# Patient Record
Sex: MALE | Race: WHITE | Employment: OTHER | URBAN - METROPOLITAN AREA
[De-identification: names, ages, dates, MRNs, and addresses within clinical notes are randomized per-mention and may not be internally consistent; named-entity substitution may affect disease eponyms.]

---

## 2017-03-17 ENCOUNTER — HOSPITAL ENCOUNTER (OUTPATIENT)
Dept: RADIATION ONCOLOGY | Age: 72
Discharge: HOME | End: 2017-03-17
Payer: MEDICARE

## 2017-03-17 DIAGNOSIS — C61 PROSTATE CANCER (HCC): ICD-10-CM

## 2017-03-17 DIAGNOSIS — C61 PROSTATE CANCER (HCC): Primary | ICD-10-CM

## 2017-03-17 LAB — PSA SERPL-MCNC: 0.4 NG/ML

## 2017-03-17 PROCEDURE — 84403 ASSAY OF TOTAL TESTOSTERONE: CPT | Performed by: RADIOLOGY

## 2017-03-17 PROCEDURE — 36415 COLL VENOUS BLD VENIPUNCTURE: CPT | Performed by: RADIOLOGY

## 2017-03-17 PROCEDURE — 84153 ASSAY OF PSA TOTAL: CPT | Performed by: RADIOLOGY

## 2017-03-18 LAB
COMMENT, TESC2: ABNORMAL
TESTOST SERPL-MCNC: 244 NG/DL (ref 348–1197)

## 2017-03-21 ENCOUNTER — APPOINTMENT (OUTPATIENT)
Dept: RADIATION ONCOLOGY | Age: 72
End: 2017-03-21
Payer: MEDICARE

## 2017-03-28 ENCOUNTER — HOSPITAL ENCOUNTER (OUTPATIENT)
Dept: RADIATION ONCOLOGY | Age: 72
Discharge: HOME | End: 2017-03-28
Payer: MEDICARE

## 2017-03-28 VITALS
RESPIRATION RATE: 18 BRPM | TEMPERATURE: 98.6 F | SYSTOLIC BLOOD PRESSURE: 116 MMHG | OXYGEN SATURATION: 95 % | HEART RATE: 78 BPM | WEIGHT: 276.7 LBS | BODY MASS INDEX: 38.59 KG/M2 | DIASTOLIC BLOOD PRESSURE: 68 MMHG

## 2017-03-28 PROCEDURE — 99211 OFF/OP EST MAY X REQ PHY/QHP: CPT

## 2017-03-28 NOTE — PROGRESS NOTES
Pt here today for FUP post RT to the prostate and has an AUA urinary symptom  score of 11 with mild c/o frequency, hesitancy, and nocturia. Pt's PSA has increased from 0.3 12/2016 to 0.4 now, Testosterone 244. Pt will have a Fluciclovine PET scan ordered at [facility] to assess if the prostate cancer has spread. Pt is scheduled to have the Fluciclovine PET ib 4/11/17.

## 2017-03-28 NOTE — PROGRESS NOTES
Patient: Thang Milian MRN: [id number]  SSN: [id number]    YOB: 1945  Age: 66 y.o. Sex: male      Other Providers:  Rudie Holter, D.O.    CHIEF COMPLAINT: Prostate cancer    DIAGNOSIS: Adenocarcinoma of the prostate, [place] score 7, negative margins s/p RRP in 2003, now with biochemical recurrence    SITE TREATED AND DOSE DELIVERED:   Mr. Vandana Qiu received salvage radiation therapy to the prostate bed to a dose of 6840 cGy in 38 fractions of 180 cGy each using a RapidArc IMRT/IGRT plan from  6/21/16 - 8/12/16. HISTORY OF PRESENT ILLNESS:  Thang Milian is a 66 y.o. male who saw at the request of Dr. Brittanie Pringle regarding the role of salvage radiation therapy for this biochemically recurrent prostate cancer. In 05/2003 he underwent an RRP under the direction of Dr. Benigno Guo. Final pathology was Piotr score 7 with negative margins.  Over the past few years  The PSA has been rising slowly.  In 03/13 PSA was 0.05, in 02/14 it was 0.06 and on 03/06/15 had risen to 0.13.  He was sent to Dr. Brittanie Pringle for further management.  The patient also had a history of hypotestosteronism and had been on IM testosterone injections under the direction of Dr. Ann Marie Alicea When the PSA began to rise, injections were stopped.  His PSA on 03/06/15 was 0.13. PSA on 04/22/16 ck to 0.246. At that time, Dr. Brittanie Pringle recommended MRI of the pelvis and radiation oncology consultation. MRI of the pelvis on 05/09/16 showed no evidence for local recurrence or evolving metastatic disease in the pelvis. INTERVAL HISTORY: Mr. Vandana Qiu returns today, 7 months following completion of his radiation therapy. His AUA symptom score is 11/35 and is \"mostly satisfied\" with his urinary function. He has nocturia x3 which is unchanged since prior to receiving his radiation. He continues with urinary urgency along with frequency. He reports his flow is \"weak at times\" without urinary leakage. Denies pain.  He continues with chronic constipation with a history of bloody stools at times. He reports having a negative colonoscopy last year. He has had complete erectile dysfunction since the time of the surgery in 2003 and doesn't find this to be a problem. He continues with chronic lower extremity leg pain in which he is followed by his orthopedist. His energy is low most times, but manageable. MEDICATIONS:     Current Outpatient Prescriptions:     vitamin e (E GEMS) 1,000 unit capsule, Take 1,000 Units by mouth two (2) times a day., Disp: , Rfl:     meloxicam (MOBIC) 7.5 mg tablet, Take 7.5 mg by mouth daily. , Disp: , Rfl:     cinnamon bark 500 mg cap, Take 500 mg by mouth daily. , Disp: , Rfl:     b complex vitamins tablet, Take 1 Tab by mouth daily. , Disp: , Rfl:     fenofibrate (TRICOR) 160 mg tablet, Take 160 mg by mouth daily. , Disp: , Rfl:     fluticasone (FLONASE) 50 mcg/actuation nasal spray, 2 Sprays by Both Nostrils route daily. , Disp: , Rfl:     losartan-hydrochlorothiazide (HYZAAR) 50-12.5 mg per tablet, Take 1 Tab by mouth daily. , Disp: , Rfl:     metFORMIN ER (GLUCOPHAGE XR) 500 mg tablet, Take 500 mg by mouth two (2) times a day., Disp: , Rfl:     pantoprazole (PROTONIX) 40 mg tablet, Take 40 mg by mouth daily. , Disp: , Rfl:     rOPINIRole (REQUIP) 1 mg tablet, Take 3 mg by mouth daily. One tab AM, two tabs PM, Disp: , Rfl:     simvastatin (ZOCOR) 80 mg tablet, Take 80 mg by mouth daily. , Disp: , Rfl:     multivitamin (DAILY MULTIPLE) tablet, Take 1 Tab by mouth daily. , Disp: , Rfl:     Aspirin, Buffered 81 mg tab, Take 81 mg by mouth daily. , Disp: , Rfl:     ascorbic acid (VITAMIN C) 500 mg tablet, Take 500 mg by mouth daily. , Disp: , Rfl:     ALLERGIES:   Allergies   Allergen Reactions    Amoxicillin Rash    Sulfa (Sulfonamide Antibiotics) Rash       ECOG Performance status 1  VITAL SIGNS: Weight 265 pounds, blood pressure 124/75, heart rate 64, temp 98.6    GENERAL: The patient is well-developed, ambulatory, alert and in no acute distress. ENT: No oral lesion notes. HEART: NSR. LUNGS: Clear to auscultation. ABDOMEN: Soft and nontender. EXTREMITIES: ankle brace of LLE Remaining exam deferred. LABORATORY:   03/17/17: PSA 0.4 with testosterone of 244  12/08/16: PSA 0.3 with testosterone of 410  09/07/16: PSA 0.282 with Testosterone 351  04/22/16: PSA 0.246       RADIOLOGY:  5/10/2016   MRI PELVIS WITH CONTRAST, 5/10/2016  History: History of prostate cancer status post prostatectomy with rising PSA. Technique: Axial, sagittal, coronal T2; axial T1; axial fat-saturated T1 images were followed by followed by 10 cc intravenous gadolinium, following which fat-saturated axial images through the rectum are repeated. Images were performed on a 3 Elizabeth system. Findings:    The patient is status post prostatectomy. No abnormal enhancement is seen in the prostate bed to suggest a local recurrence. Enhancing structures are seen about this area felt to represent benign vascular structures. No pelvic lymphadenopathy is seen. No retroperitoneal adenopathy is seen on large field-of-view images obtained to the kidneys. No focal osseous lesion is identified. No hydronephrosis is seen of the kidneys. The urinary bladder is poorly distended limiting assessment although shows no gross abnormality. Mild diverticulosis is seen of the sigmoid colon. A small fat-containing left inguinal hernia is seen. Impression:  No evidence for local recurrence or evolving metastatic disease in the pelvis or visualized abdomen. IMPRESSION:  Flaca Bird is a 66 y.o. male adenocarcinoma of the prostate, [place] score 7, negative margins s/p RRP in 2003, with biochemical recurrence - now 7 months after completion of radiation. His urinary function is basically at his baseline. He is 7 months out from radiation therapy and his PSA continues to rise, today at 0.4 with previous PSA of 0.246 prior to radiation. He has a testosterone level of 244.  With the continued rise in his PSA, we will go ahead and schedule a Fluciclovine PET for further evaluation. PLAN:   1) labs reviewed with patient  2) rising PSA - schedule Fluciclovine PET. I will see him along with Dr. Angelica Gutierrez shortly after PET  3) We had a 25 minute visit, over 50% of which was in direct counseling regarding rise in his PSA and Fluciclovine PET and ongoing management of his biochemical recurrent prostate cancer. All questions were answered to the best of my ability. Sam Henderson NP   March 28, 2017      Portions of this note were copied from prior encounters and reviewed for accuracy, currency, and represent documentation and tasks completed during this encounter. I verify and attest these portions to be unchanged from prior visits.

## 2017-04-18 ENCOUNTER — HOSPITAL ENCOUNTER (OUTPATIENT)
Dept: RADIATION ONCOLOGY | Age: 72
Discharge: HOME | End: 2017-04-18
Payer: MEDICARE

## 2017-04-18 VITALS
SYSTOLIC BLOOD PRESSURE: 121 MMHG | WEIGHT: 269.8 LBS | TEMPERATURE: 98.1 F | OXYGEN SATURATION: 94 % | BODY MASS INDEX: 37.63 KG/M2 | DIASTOLIC BLOOD PRESSURE: 61 MMHG | HEART RATE: 78 BPM

## 2017-04-18 DIAGNOSIS — C61 PROSTATE CANCER (HCC): Primary | ICD-10-CM

## 2017-04-18 PROCEDURE — 99211 OFF/OP EST MAY X REQ PHY/QHP: CPT

## 2017-04-18 RX ORDER — TRAMADOL HYDROCHLORIDE 50 MG/1
50 TABLET ORAL
COMMUNITY
End: 2017-06-20 | Stop reason: ALTCHOICE

## 2017-04-18 NOTE — PROGRESS NOTES
Patient: Trupti Potter MRN: [id number]  SSN: [id number]    YOB: 1945  Age: 66 y.o. Sex: male      Other Providers:  Mahamed Vogel D.O.    CHIEF COMPLAINT: Prostate cancer    DIAGNOSIS: Adenocarcinoma of the prostate, Piotr score 7, negative margins s/p RRP in 2003, now with biochemical recurrence    SITE TREATED AND DOSE DELIVERED:   Mr. Sang Rodriguez received salvage radiation therapy to the prostate bed to a dose of 6840 cGy in 38 fractions of 180 cGy each using a RapidArc IMRT/IGRT plan from  6/21/16 - 8/12/16. HISTORY OF PRESENT ILLNESS:  Trupti Potter is a 66 y.o. male who saw at the request of Dr. Rickie Wogn regarding the role of salvage radiation therapy for this biochemically recurrent prostate cancer. In 05/2003 he underwent an RRP under the direction of Dr. Donny Villagran. Final pathology was Piotr score 7 with negative margins.  Over the past few years  The PSA has been rising slowly.  In 03/13 PSA was 0.05, in 02/14 it was 0.06 and on 03/06/15 had risen to 0.13.  He was sent to Dr. Rickie Wong for further management.  The patient also had a history of hypotestosteronism and had been on IM testosterone injections under the direction of Dr. Graeme Cloud When the PSA began to rise, injections were stopped.  His PSA on 03/06/15 was 0.13. PSA on 04/22/16 ck to 0.246. At that time, Dr. Rickie Wong recommended MRI of the pelvis and radiation oncology consultation. MRI of the pelvis on 05/09/16 showed no evidence for local recurrence or evolving metastatic disease in the pelvis. INTERVAL HISTORY: Mr. Sang Rodriguez returns today to discuss result of [address] PET. He is now 8 months after completion of radiation. His PSA in March was 0.4, up from 0.3 in 12/16. His PSA prior to radiation therapy was  0.246. Fluciclovine PET on 04/11/17 was not evident for disease. He recently had a colonoscopy and EGD and was found to have radiation proctitis and external hemorrhoids.  He reports that he continues with significant constipation that causes him to strain then bleeding with stools. He doesn't take the stool softeners on a regular basis, but does find softeners helpful when taking. MEDICATIONS:     Current Outpatient Prescriptions:     traMADol (ULTRAM) 50 mg tablet, Take 50 mg by mouth every six (6) hours as needed for Pain., Disp: , Rfl:     cinnamon bark 500 mg cap, Take 500 mg by mouth daily. , Disp: , Rfl:     b complex vitamins tablet, Take 1 Tab by mouth daily. , Disp: , Rfl:     fenofibrate (TRICOR) 160 mg tablet, Take 160 mg by mouth daily. , Disp: , Rfl:     fluticasone (FLONASE) 50 mcg/actuation nasal spray, 2 Sprays by Both Nostrils route as needed. , Disp: , Rfl:     losartan-hydrochlorothiazide (HYZAAR) 50-12.5 mg per tablet, Take 1 Tab by mouth daily. , Disp: , Rfl:     metFORMIN ER (GLUCOPHAGE XR) 500 mg tablet, Take 500 mg by mouth two (2) times a day., Disp: , Rfl:     pantoprazole (PROTONIX) 40 mg tablet, Take 40 mg by mouth daily. , Disp: , Rfl:     rOPINIRole (REQUIP) 1 mg tablet, Take 3 mg by mouth daily. One tab AM, two tabs PM, Disp: , Rfl:     simvastatin (ZOCOR) 80 mg tablet, Take 80 mg by mouth daily. , Disp: , Rfl:     multivitamin (DAILY MULTIPLE) tablet, Take 1 Tab by mouth daily. , Disp: , Rfl:     Aspirin, Buffered 81 mg tab, Take 81 mg by mouth daily. , Disp: , Rfl:     ascorbic acid (VITAMIN C) 500 mg tablet, Take 500 mg by mouth daily. , Disp: , Rfl:     ALLERGIES:   Allergies   Allergen Reactions    Amoxicillin Rash    Sulfa (Sulfonamide Antibiotics) Rash       ECOG Performance status 1  VITAL SIGNS: Weight 269 pounds, blood pressure 121/61, heart rate 78, temp 98.1    GENERAL: The patient is well-developed, ambulatory, alert and in no acute distress. ENT: No oral lesion notes. HEART: NSR. LUNGS: Clear to auscultation. ABDOMEN: Soft and nontender. EXTREMITIES: ankle brace of LLE Remaining exam deferred.     LABORATORY:   03/17/17: PSA 0.4 with testosterone of 244  12/08/16: PSA 0.3 with testosterone of 410  09/07/16: PSA 0.282 with Testosterone 351  04/22/16: PSA 0.246       RADIOLOGY:  5/10/2016   MRI PELVIS WITH CONTRAST, 5/10/2016  History: History of prostate cancer status post prostatectomy with rising PSA. Technique: Axial, sagittal, coronal T2; axial T1; axial fat-saturated T1 images were followed by followed by 10 cc intravenous gadolinium, following which fat-saturated axial images through the rectum are repeated. Images were performed on a 3 Elizabeth system. Findings:    The patient is status post prostatectomy. No abnormal enhancement is seen in the prostate bed to suggest a local recurrence. Enhancing structures are seen about this area felt to represent benign vascular structures. No pelvic lymphadenopathy is seen. No retroperitoneal adenopathy is seen on large field-of-view images obtained to the kidneys. No focal osseous lesion is identified. No hydronephrosis is seen of the kidneys. The urinary bladder is poorly distended limiting assessment although shows no gross abnormality. Mild diverticulosis is seen of the sigmoid colon. A small fat-containing left inguinal hernia is seen. Impression:  No evidence for local recurrence or evolving metastatic disease in the pelvis or visualized abdomen. IMPRESSION:  Christopher Latham is a 66 y.o. male adenocarcinoma of the prostate, [place] score 7, negative margins s/p RRP in 2003, with biochemical recurrence - now 8 months after completion of radiation. His urinary function is basically at his baseline. His PSA continues to rise, with a PSA in March of 0.4. His PSA prior to radiation therapy was  0.246. Fluciclovine PET on 04/11/17 was not evident for disease. At this time it is unclear if the rise in PSA is due to inflammation, or disease that is too small to be picked up on the [address] [place], [place] [address] St PET. We will continue to follow closely and if the PSA continues to rise, we will think about repeating the Fluciclovine PET in the future. He recently had a colonoscopy and EGD and was found to have radiation proctitis and external hemorrhoids. He reports that he continues with significant constipation that causes him to strain then bleeding with stools. He doesn't take the stool softeners on a regular basis, but does find softeners helpful when taking. PLAN:   1) Return 2 months for visit and repeat PSA/Testosterone  2) We had a 25 minute visit, over 75% of which was in direct counseling regarding rise in his PSA and Fluciclovine PET and ongoing management of his biochemical recurrent prostate cancer. All questions were answered to the best of my ability. 3) Constipation: Try taking stool softeners on a regular basis with the addition of Miralax. He is to follow up with his GI physician in regards of his colonoscopy, EGD and anemia. Jc Walls NP   April 18, 2017      Portions of this note were copied from prior encounters and reviewed for accuracy, currency, and represent documentation and tasks completed during this encounter. I verify and attest these portions to be unchanged from prior visits.

## 2017-04-18 NOTE — NURSE NAVIGATOR
Pt here for f/u for Prostate cancer. Fluciclovine pet scan 4-11-17. Colonoscopy/endoscopy on 4-12-17 with Dr. Isabel Quach states DrBeverly said colon looked like measles possibly from previous RT and that straining could cause bleeding. Pending tendon surgery to left foot 5-1-17.       Mike Arthur RN

## 2017-06-13 ENCOUNTER — APPOINTMENT (OUTPATIENT)
Dept: RADIATION ONCOLOGY | Age: 72
End: 2017-06-13
Payer: MEDICARE

## 2017-06-14 ENCOUNTER — HOSPITAL ENCOUNTER (OUTPATIENT)
Dept: RADIATION ONCOLOGY | Age: 72
Discharge: HOME | End: 2017-06-14
Payer: MEDICARE

## 2017-06-14 DIAGNOSIS — C61 PROSTATE CANCER (HCC): ICD-10-CM

## 2017-06-14 LAB — PSA SERPL-MCNC: 0.6 NG/ML

## 2017-06-14 PROCEDURE — 84403 ASSAY OF TOTAL TESTOSTERONE: CPT | Performed by: RADIOLOGY

## 2017-06-14 PROCEDURE — 84153 ASSAY OF PSA TOTAL: CPT | Performed by: RADIOLOGY

## 2017-06-14 PROCEDURE — 36415 COLL VENOUS BLD VENIPUNCTURE: CPT | Performed by: RADIOLOGY

## 2017-06-15 LAB
COMMENT, TESC2: ABNORMAL
TESTOST SERPL-MCNC: 298 NG/DL (ref 348–1197)

## 2017-06-20 ENCOUNTER — HOSPITAL ENCOUNTER (OUTPATIENT)
Dept: RADIATION ONCOLOGY | Age: 72
Discharge: HOME | End: 2017-06-20
Payer: MEDICARE

## 2017-06-20 VITALS
BODY MASS INDEX: 37.39 KG/M2 | HEART RATE: 85 BPM | OXYGEN SATURATION: 96 % | DIASTOLIC BLOOD PRESSURE: 65 MMHG | TEMPERATURE: 98.1 F | SYSTOLIC BLOOD PRESSURE: 130 MMHG | WEIGHT: 268.1 LBS

## 2017-06-20 DIAGNOSIS — C61 PROSTATE CANCER (HCC): Primary | ICD-10-CM

## 2017-06-20 PROCEDURE — 99211 OFF/OP EST MAY X REQ PHY/QHP: CPT

## 2017-06-20 RX ORDER — HYDROCODONE BITARTRATE AND ACETAMINOPHEN 5; 325 MG/1; MG/1
1 TABLET ORAL
COMMUNITY
End: 2017-08-14

## 2017-06-20 NOTE — PROGRESS NOTES
Pt here for f/u for Prostate cancer. Aua/Epic symptom  completed. Aua score 15. S/P RRP 2003. Biochemical recurrence. RT end 8-12-16. Labs 6-14-17. Taking stool softners 2 bid. Constipation is better.   Pt had left foot surgery on 5-1-17 and is wearing brace to right wrist.  Presented in w/c and weighed with assist.    Morena Schulz RN

## 2017-06-20 NOTE — PROGRESS NOTES
Patient: Erin Wood MRN: [id number]  SSN: [id number]    YOB: 1945  Age: 67 y.o. Sex: male      Other Providers:  Costa De Leon D.O.    CHIEF COMPLAINT: Prostate cancer    DIAGNOSIS: Adenocarcinoma of the prostate, Piotr score 7, negative margins s/p RRP in 2003, now with biochemical recurrence    SITE TREATED AND DOSE DELIVERED:   Mr. Stanford Snellen received salvage radiation therapy to the prostate bed to a dose of 6840 cGy in 38 fractions of 180 cGy each using a RapidArc IMRT/IGRT plan from  6/21/16 - 8/12/16. HISTORY OF PRESENT ILLNESS:  Erin Wood is a 66year old male seen by Dr. Preciado Hedge 05/13/16 at the request of Dr. Ranulfo Billy regarding the role of salvage radiation therapy for this biochemically recurrent prostate cancer. In 05/2003 he underwent an RRP under the direction of Dr. Aman Muñiz. Final pathology was [place] score 7 with negative margins.  Over the past few years  The PSA has been rising slowly.  In 03/13 PSA was 0.05, in 02/14 it was 0.06 and on 03/06/15 had risen to 0.13.  He was sent to Dr. Ranulfo Billy for further management.  The patient also had a history of hypotestosteronism and had been on IM testosterone injections under the direction of Dr. Dominguez Kinds When the PSA began to rise, injections were stopped.  His PSA on 03/06/15 was 0.13. PSA on 04/22/16 kc to 0.246. At that time, Dr. Ranulfo Billy recommended MRI of the pelvis and radiation oncology consultation. MRI of the pelvis on 05/09/16 showed no evidence for local recurrence or evolving metastatic disease in the pelvis. INTERVAL HISTORY: Mr. Stanford Snellen returns today for follow up. He is now 10 months after completion of radiation. He has an AUA score of 15/35. He reports his urinary issues are unchanged. He continues with urinary frequency and occasional urgency. He has incomplete emptying and intermittency. He has nocturia x2. He denies dysuria or hematuria. His PSA in March was 0.4, up from 0.3 in 12/16.  His PSA prior to radiation therapy was  0.246. Fluciclovine PET on 04/11/17 was not evident for disease. In March he had a colonoscopy and EGD and was found to have radiation proctitis and external hemorrhoids that have since resolved. Constipation controlled with stool softeners. MEDICATIONS:     Current Outpatient Prescriptions:     HYDROcodone-acetaminophen (NORCO) 5-325 mg per tablet, Take 1 Tab by mouth every six (6) hours as needed for Pain., Disp: , Rfl:     DOCUSATE CALCIUM (STOOL SOFTENER PO), Take 2 Tabs by mouth two (2) times a day., Disp: , Rfl:     cinnamon bark 500 mg cap, Take 500 mg by mouth daily. , Disp: , Rfl:     b complex vitamins tablet, Take 1 Tab by mouth daily. , Disp: , Rfl:     fenofibrate (TRICOR) 160 mg tablet, Take 160 mg by mouth daily. , Disp: , Rfl:     fluticasone (FLONASE) 50 mcg/actuation nasal spray, 2 Sprays by Both Nostrils route as needed. , Disp: , Rfl:     losartan-hydrochlorothiazide (HYZAAR) 50-12.5 mg per tablet, Take 1 Tab by mouth daily. , Disp: , Rfl:     metFORMIN ER (GLUCOPHAGE XR) 500 mg tablet, Take 500 mg by mouth two (2) times a day., Disp: , Rfl:     pantoprazole (PROTONIX) 40 mg tablet, Take 40 mg by mouth daily. , Disp: , Rfl:     rOPINIRole (REQUIP) 1 mg tablet, Take 3 mg by mouth daily. One tab AM, two tabs PM, Disp: , Rfl:     simvastatin (ZOCOR) 80 mg tablet, Take 80 mg by mouth daily. , Disp: , Rfl:     multivitamin (DAILY MULTIPLE) tablet, Take 1 Tab by mouth daily. , Disp: , Rfl:     Aspirin, Buffered 81 mg tab, Take 81 mg by mouth daily. , Disp: , Rfl:     ascorbic acid (VITAMIN C) 500 mg tablet, Take 500 mg by mouth daily. , Disp: , Rfl:     ALLERGIES:   Allergies   Allergen Reactions    Amoxicillin Rash    Sulfa (Sulfonamide Antibiotics) Rash       ECOG Performance status 1  VITAL SIGNS: Weight 268.1 pounds, blood pressure 130/65, heart rate 85, temp 98.1    GENERAL: The patient is well-developed, ambulatory, alert and in no acute distress.  ENT: No oral lesion notes. HEART: NSR. LUNGS: Clear to auscultation. EXTREMITIES: ankle brace of LLE Remaining exam deferred. LABORATORY:   06/14/17: PSA 0.6 with testosterone of 298  03/17/17: PSA 0.4 with testosterone of 244  12/08/16: PSA 0.3 with testosterone of 410  09/07/16: PSA 0.282 with Testosterone 351  04/22/16: PSA 0.246       RADIOLOGY:  Accident PET/CT Nuclear Medicine Skull Base to Mid-Thigh Axumin PET-CT Study 4/11/2017    INDICATION: Prostate cancer initially diagnosed in 2003, status post radical prostatectomy now with rising PSA, most recently 0.4. COMPARISONS: none     FINDINGS:  Examination of the 3D tomographic PET images demonstrates on careful review a focus of intense activity in the midline pelvis which localizes at the dependent bladder base just cephalad to the anastomosis.  The bladder itself is nondistended   with a nonspecific appearance of bladder wall thickening or redundancy.  This appears most consistent with excreted contrast in the dependent bladder lumen rather than recurrent tumor. Elsewhere in the abdomen and pelvis there appears to be only physiologic radiotracer biodistribution with no suspicious tracer accumulation at a lymph node or mass lesion. In the chest and visualized head and neck there is no suspicious site of radiotracer activity.  No lung nodule is seen.  There are atherosclerosis findings and mild sinus disease. No suspicious skeletal lesion seen. IMPRESSION: Focally prominent tracer activity noted only at the dependent bladder base which appears most consistent with excreted tracer pooling in the dependent lumen. No suspicious focus of tracer accumulation seen. IMPRESSION:  Tess Fleming is a 67 y.o. male adenocarcinoma of the prostate, [place] score 7, negative margins s/p RRP in 2003, with biochemical recurrence - now 10 months after completion of radiation. His urinary function is basically at his baseline. His PSA continues to rise, with a PSA in March of 0.4. His PSA prior to radiation therapy was  0.246. Fluciclovine PET on 04/11/17 was not evident for disease. At this time it is unclear if the rise in PSA is due to previous radiation proctitis (March), or disease that is too small to be picked up on the [address] PET. We will continue to follow closely and if the PSA continues to rise (between 1 or 2), we will think about repeating the [address] PET. PLAN:   1) Return 3 months for visit and repeat PSA/Testosterone  2) We had a 25 minute visit, over 75% of which was in direct counseling regarding rise in his PSA. If the PSA continues to rise (between 1--2) we discussed repeating the Fluciclovine PET  and ongoing management of his biochemical recurrent prostate cancer. Light discussion on future treatment, if needed, discussed - radiation if possible or intermittent ADT. All questions were answered to the best of my ability. Jc Walls NP   June 20, 2017      Portions of this note were copied from prior encounters and reviewed for accuracy, currency, and represent documentation and tasks completed during this encounter. I verify and attest these portions to be unchanged from prior visits.

## 2017-06-26 ENCOUNTER — TELEPHONE (OUTPATIENT)
Dept: CASE MANAGEMENT | Age: 72
End: 2017-06-26

## 2017-06-26 NOTE — TELEPHONE ENCOUNTER
Received email from Triage 6-23-17. Dr. Marc Norman's office called to see if it was ok to give pt weekly Methotrexate for Psoriatic Arthritis. I discussed with Dr. Flaquita Lui. I called the nurse line 6-26-17 and left vm that per Dr. Flaquita Lui it is ok to give pt Methotrexate. Radiation ended on 8-12-16.     Claudia Maldonado RN

## 2017-08-14 ENCOUNTER — HOSPITAL ENCOUNTER (EMERGENCY)
Age: 72
Discharge: HOME | End: 2017-08-14
Attending: EMERGENCY MEDICINE
Payer: MEDICARE

## 2017-08-14 VITALS
DIASTOLIC BLOOD PRESSURE: 64 MMHG | HEIGHT: 71 IN | BODY MASS INDEX: 36.86 KG/M2 | WEIGHT: 263.31 LBS | TEMPERATURE: 98.3 F | OXYGEN SATURATION: 98 % | SYSTOLIC BLOOD PRESSURE: 114 MMHG | RESPIRATION RATE: 16 BRPM | HEART RATE: 73 BPM

## 2017-08-14 DIAGNOSIS — D50.9 IRON DEFICIENCY ANEMIA, UNSPECIFIED IRON DEFICIENCY ANEMIA TYPE: ICD-10-CM

## 2017-08-14 DIAGNOSIS — R42 VERTIGO: Primary | ICD-10-CM

## 2017-08-14 LAB
ALBUMIN SERPL BCP-MCNC: 3.9 G/DL (ref 3.2–4.6)
ALBUMIN/GLOB SERPL: 1.1 {RATIO} (ref 1.2–3.5)
ALP SERPL-CCNC: 64 U/L (ref 50–136)
ALT SERPL-CCNC: 23 U/L (ref 12–65)
ANION GAP BLD CALC-SCNC: 9 MMOL/L (ref 7–16)
AST SERPL W P-5'-P-CCNC: 30 U/L (ref 15–37)
ATRIAL RATE: 72 BPM
BASOPHILS # BLD AUTO: 0 K/UL (ref 0–0.2)
BASOPHILS # BLD: 1 % (ref 0–2)
BILIRUB SERPL-MCNC: 0.6 MG/DL (ref 0.2–1.1)
BUN SERPL-MCNC: 20 MG/DL (ref 8–23)
CALCIUM SERPL-MCNC: 9.3 MG/DL (ref 8.3–10.4)
CALCULATED P AXIS, ECG09: 77 DEGREES
CALCULATED R AXIS, ECG10: 22 DEGREES
CALCULATED T AXIS, ECG11: 61 DEGREES
CHLORIDE SERPL-SCNC: 99 MMOL/L (ref 98–107)
CO2 SERPL-SCNC: 27 MMOL/L (ref 21–32)
CREAT SERPL-MCNC: 0.96 MG/DL (ref 0.8–1.5)
DIAGNOSIS, 93000: NORMAL
DIFFERENTIAL METHOD BLD: ABNORMAL
EOSINOPHIL # BLD: 0.1 K/UL (ref 0–0.8)
EOSINOPHIL NFR BLD: 2 % (ref 0.5–7.8)
ERYTHROCYTE [DISTWIDTH] IN BLOOD BY AUTOMATED COUNT: 17.9 % (ref 11.9–14.6)
GLOBULIN SER CALC-MCNC: 3.4 G/DL (ref 2.3–3.5)
GLUCOSE SERPL-MCNC: 119 MG/DL (ref 65–100)
HCT VFR BLD AUTO: 27.2 % (ref 41.1–50.3)
HGB BLD-MCNC: 8.3 G/DL (ref 13.6–17.2)
IMM GRANULOCYTES # BLD: 0 K/UL (ref 0–0.5)
IMM GRANULOCYTES NFR BLD AUTO: 0.6 % (ref 0–5)
LYMPHOCYTES # BLD AUTO: 15 % (ref 13–44)
LYMPHOCYTES # BLD: 0.8 K/UL (ref 0.5–4.6)
MCH RBC QN AUTO: 22.3 PG (ref 26.1–32.9)
MCHC RBC AUTO-ENTMCNC: 30.5 G/DL (ref 31.4–35)
MCV RBC AUTO: 72.9 FL (ref 79.6–97.8)
MONOCYTES # BLD: 0.4 K/UL (ref 0.1–1.3)
MONOCYTES NFR BLD AUTO: 7 % (ref 4–12)
NEUTS SEG # BLD: 4 K/UL (ref 1.7–8.2)
NEUTS SEG NFR BLD AUTO: 74 % (ref 43–78)
P-R INTERVAL, ECG05: 182 MS
PLATELET # BLD AUTO: 173 K/UL (ref 150–450)
PMV BLD AUTO: 8.8 FL (ref 10.8–14.1)
POTASSIUM SERPL-SCNC: 4.1 MMOL/L (ref 3.5–5.1)
PROT SERPL-MCNC: 7.3 G/DL (ref 6.3–8.2)
Q-T INTERVAL, ECG07: 400 MS
QRS DURATION, ECG06: 98 MS
QTC CALCULATION (BEZET), ECG08: 438 MS
RBC # BLD AUTO: 3.73 M/UL (ref 4.23–5.67)
SODIUM SERPL-SCNC: 135 MMOL/L (ref 136–145)
VENTRICULAR RATE, ECG03: 72 BPM
WBC # BLD AUTO: 5.3 K/UL (ref 4.3–11.1)

## 2017-08-14 PROCEDURE — 85025 COMPLETE CBC W/AUTO DIFF WBC: CPT | Performed by: EMERGENCY MEDICINE

## 2017-08-14 PROCEDURE — 80053 COMPREHEN METABOLIC PANEL: CPT | Performed by: EMERGENCY MEDICINE

## 2017-08-14 PROCEDURE — 74011250637 HC RX REV CODE- 250/637: Performed by: EMERGENCY MEDICINE

## 2017-08-14 PROCEDURE — 74011250636 HC RX REV CODE- 250/636: Performed by: EMERGENCY MEDICINE

## 2017-08-14 PROCEDURE — 99285 EMERGENCY DEPT VISIT HI MDM: CPT | Performed by: EMERGENCY MEDICINE

## 2017-08-14 PROCEDURE — 93005 ELECTROCARDIOGRAM TRACING: CPT | Performed by: EMERGENCY MEDICINE

## 2017-08-14 RX ORDER — TRAMADOL HYDROCHLORIDE 50 MG/1
50 TABLET ORAL
Status: ON HOLD | COMMUNITY
End: 2022-01-01 | Stop reason: SDUPTHER

## 2017-08-14 RX ORDER — MECLIZINE HYDROCHLORIDE 25 MG/1
25 TABLET ORAL
Qty: 15 TAB | Refills: 0 | Status: SHIPPED | OUTPATIENT
Start: 2017-08-14 | End: 2017-08-24

## 2017-08-14 RX ORDER — METHOTREXATE 25 MG/ML
0.4 INJECTION INTRA-ARTERIAL; INTRAMUSCULAR; INTRATHECAL; INTRAVENOUS
COMMUNITY
End: 2018-10-17

## 2017-08-14 RX ORDER — PROMETHAZINE HYDROCHLORIDE 25 MG/1
25 TABLET ORAL
Qty: 12 TAB | Refills: 0 | Status: SHIPPED | OUTPATIENT
Start: 2017-08-14 | End: 2017-09-26 | Stop reason: ALTCHOICE

## 2017-08-14 RX ORDER — ROPINIROLE 1 MG/1
1 TABLET, FILM COATED ORAL
Status: COMPLETED | OUTPATIENT
Start: 2017-08-14 | End: 2017-08-14

## 2017-08-14 RX ORDER — FERROUS SULFATE 325(65) MG
325 TABLET, DELAYED RELEASE (ENTERIC COATED) ORAL
Qty: 90 TAB | Refills: 0 | Status: SHIPPED | OUTPATIENT
Start: 2017-08-14 | End: 2018-03-27 | Stop reason: ALTCHOICE

## 2017-08-14 RX ORDER — ONDANSETRON 8 MG/1
8 TABLET, ORALLY DISINTEGRATING ORAL
Status: COMPLETED | OUTPATIENT
Start: 2017-08-14 | End: 2017-08-14

## 2017-08-14 RX ORDER — MECLIZINE HYDROCHLORIDE 25 MG/1
25 TABLET ORAL
Status: COMPLETED | OUTPATIENT
Start: 2017-08-14 | End: 2017-08-14

## 2017-08-14 RX ORDER — DOCUSATE SODIUM 100 MG/1
200 CAPSULE, LIQUID FILLED ORAL DAILY
Qty: 62 CAP | Refills: 0 | Status: SHIPPED | OUTPATIENT
Start: 2017-08-14 | End: 2017-08-15

## 2017-08-14 RX ADMIN — MECLIZINE HYDROCHLORIDE 25 MG: 25 TABLET ORAL at 12:08

## 2017-08-14 RX ADMIN — ROPINIROLE HYDROCHLORIDE 1 MG: 1 TABLET, FILM COATED ORAL at 12:08

## 2017-08-14 RX ADMIN — ONDANSETRON 8 MG: 8 TABLET, ORALLY DISINTEGRATING ORAL at 12:08

## 2017-08-14 NOTE — ED NOTES
Pt c/o dizziness and not feeling well for a few days. Pt came to ER after seeing PCP and told his blood counts were low.

## 2017-08-14 NOTE — ED PROVIDER NOTES
HPI Comments: Patient was at rheumatologist office and complained of not feeling well, patient said he felt like \"death warmed over\", the physician noted his hemoglobin to be 8.8 and upon hearing the patient was dizzy since him to the emergency department. Patient describes a vertiginous type dizziness which is spinning by his description and worsens with changes in head incision, the faster he moves his head but worse the dizziness becomes. If he remains still, the spinning sensation abates. Patient has had vertigo in the past from time to time. He has chronic tinnitus which is unchanged. Patient's hemoglobin was 11 has recently as April 20. He has recently undergone EGD and colonoscopy neither of which were very revealing, it sounds like he is soon to be scheduled for a pill endoscopy. He does have some constipation at times and describes some hard stools with rectal bleeding. No melena, no hematemesis. Patient is a 67 y.o. male presenting with dizziness. The history is provided by the patient and the spouse. Dizziness   This is a new problem. The current episode started 2 days ago. The problem has not changed since onset. There was no focality noted. Pertinent negatives include no focal weakness, no loss of sensation, no slurred speech, no speech difficulty, no visual change and no unresponsiveness. There has been no fever. Associated symptoms include vomiting and nausea. Pertinent negatives include no shortness of breath, no chest pain and no headaches.  There were no medications administered prior to arrival.        Past Medical History:   Diagnosis Date    Allergic rhinitis     Back pain     Colon polyps     Diabetes ([address]     Elevated liver function tests     GERD (gastroesophageal reflux disease)     H/O alcohol abuse     Hiatal hernia     Hypercholesterolemia     Hypertension     Hypotestosteronism     Migraine     Personal history of prostate cancer     Restless leg     Sleep apnea        Past Surgical History:   Procedure Laterality Date    HX COLONOSCOPY      HX PROSTATECTOMY           Family History:   Problem Relation Age of Onset    Heart Disease Father     Emphysema Mother     Heart Disease Brother        Social History     Social History    Marital status:      Spouse name: N/A    Number of children: N/A    Years of education: N/A     Occupational History    Not on file. Social History Main Topics    Smoking status: Former Smoker     Types: Cigars     Quit date: 4/18/2003    Smokeless tobacco: Not on file    Alcohol use No    Drug use: Not on file    Sexual activity: Not on file     Other Topics Concern    Not on file     Social History Narrative         ALLERGIES: Amoxicillin and Sulfa (sulfonamide antibiotics)    Review of Systems   Constitutional: Negative for chills and fever. HENT: Positive for tinnitus. Negative for nosebleeds, rhinorrhea, sinus pressure and sore throat. Eyes: Negative for discharge and redness. Respiratory: Negative for cough and shortness of breath. Cardiovascular: Negative for chest pain and palpitations. Gastrointestinal: Positive for anal bleeding, nausea and vomiting. Negative for abdominal pain and diarrhea. Genitourinary: Negative for dysuria and hematuria. Musculoskeletal: Negative for arthralgias and back pain. Skin: Negative for rash. Neurological: Positive for dizziness. Negative for focal weakness, syncope, speech difficulty, weakness, light-headedness, numbness and headaches. All other systems reviewed and are negative. Vitals:    08/14/17 1056   BP: 130/75   Pulse: 72   Resp: 18   Temp: 98 °F (36.7 °C)   SpO2: 99%   Weight: 119.4 kg (263 lb 5 oz)   Height: 5' 11\" (1.803 m)            Physical Exam   Constitutional: He is oriented to person, place, and time. He appears well-developed and well-nourished. No distress. HENT:   Head: Normocephalic and atraumatic.    Right Ear: External ear normal.   Left Ear: External ear normal.   Mouth/Throat: Oropharynx is clear and moist.   Eyes: Conjunctivae are normal. Pupils are equal, round, and reactive to light. Right eye exhibits no discharge. Left eye exhibits no discharge. No scleral icterus. pale conjunctiva     Neck: Normal range of motion. Neck supple. Cardiovascular: Normal rate, regular rhythm and normal heart sounds. Exam reveals no gallop. No murmur heard. Pulmonary/Chest: Effort normal and breath sounds normal. No respiratory distress. He has no wheezes. He has no rales. Abdominal: Soft. Bowel sounds are normal. There is no tenderness. There is no guarding. Genitourinary: Rectal exam shows guaiac positive stool. Musculoskeletal: Normal range of motion. He exhibits no edema. Neurological: He is alert and oriented to person, place, and time. He exhibits normal muscle tone. cni 2-12   No pronator drift, normal finger to nose,  Clear speech, symmetrical facies  Normal gait   Skin: Skin is warm and dry. He is not diaphoretic. Psychiatric: He has a normal mood and affect. His behavior is normal.   Nursing note and vitals reviewed. MDM  Number of Diagnoses or Management Options  Iron deficiency anemia, unspecified iron deficiency anemia type:   Vertigo:   Diagnosis management comments: Medical decision making note:  Patient feeling poorly for a few days. Presents with vertigo. Hemoglobin is 2 g lighter than in April. Start iron, treat vertigo,  Gi work up underway - awaiting pill endoscopy  Pt told md he felt like \"death warmed over\" this am,  In good spirits,   This concludes the \"medical decision making note\" part of this emergency department visit note.       ED Course       Procedures

## 2017-08-14 NOTE — DISCHARGE INSTRUCTIONS
Follow-up with Dr. Ila Sapp regarding the vertigo, and the anemia, which appears to be iron deficiency in nature. Start the iron supplements daily  Continue with stool softeners daily  Antivert as needed for vertigo  Phenergan as needed for nausea  Follow-up with the GI doctors as discussed to continue the \"pill endoscopy\" workup     Vertigo: Care Instructions  Your Care Instructions  Vertigo is the feeling that you or your surroundings are moving when there is no actual movement. It is often described as a feeling of spinning, whirling, falling, or tilting. Vertigo may make you vomit or feel nauseated. You may have trouble standing or walking and may lose your balance. Vertigo is often related to an inner ear problem, but it can have other more serious causes. If vertigo continues, you may need more tests to find its cause. Follow-up care is a key part of your treatment and safety. Be sure to make and go to all appointments, and call your doctor if you are having problems. Its also a good idea to know your test results and keep a list of the medicines you take. How can you care for yourself at home? · Do not lie flat on your back. Prop yourself up slightly. This may reduce the spinning feeling. Keep your eyes open. · Move slowly so that you do not fall. · If your doctor recommends medicine, take it exactly as directed. · Do not drive while you are having vertigo. Certain exercises, called Biggs-Daroff exercises, can help decrease vertigo. To do Biggs-Daroff exercises:  · Sit on the edge of a bed or sofa and quickly lie down on the side that causes the worst vertigo. Lie on your side with your ear down. · Stay in this position for at least 30 seconds or until the vertigo goes away. · Sit up. If this causes vertigo, wait for it to stop. · Repeat the procedure on the other side. · Repeat this 10 times. Do these exercises 2 times a day until the vertigo is gone. When should you call for help?   Call [phone] anytime you think you may need emergency care. For example, call if:  · You passed out (lost consciousness). · You have symptoms of a stroke. These may include:  ¨ Sudden numbness, tingling, weakness, or loss of movement in your face, arm, or leg, especially on only one side of your body. ¨ Sudden vision changes. ¨ Sudden trouble speaking. ¨ Sudden confusion or trouble understanding simple statements. ¨ Sudden problems with walking or balance. ¨ A sudden, severe headache that is different from past headaches. Call your doctor now or seek immediate medical care if:  · Vertigo occurs with a fever, a headache, or ringing in your ears. · You have new or increased nausea and vomiting. Watch closely for changes in your health, and be sure to contact your doctor if:  · Vertigo gets worse or happens more often. · Vertigo has not gotten better after 2 weeks. Where can you learn more? Go to http://mary lou-osvaldo.info/. Enter F622 in the search box to learn more about \"Vertigo: Care Instructions. \"  Current as of: July 29, 2016  Content Version: 11.3  © 3480-5638 [employer]. Care instructions adapted under license by [employer] (which disclaims liability or warranty for this information). If you have questions about a medical condition or this instruction, always ask your healthcare professional. [place] [address] any warranty or liability for your use of this information. Iron-Rich Diet: Care Instructions  Your Care Instructions  Your body needs iron to make hemoglobin. Hemoglobin is a substance in red blood cells that carries oxygen from the lungs to cells all through your body. If you do not get enough iron, your body makes fewer and smaller red blood cells. As a result, your body's cells may not get enough oxygen. Adult men need 8 milligrams of iron a day; adult women need 18 milligrams of iron a day.  After menopause, women need 8 milligrams of iron a day. A pregnant woman needs 27 milligrams of iron a day. Infants and young children have higher iron needs relative to their size than other age groups. People who have lost blood because of ulcers or heavy menstrual periods may become very low in iron and may develop anemia. Most people can get the iron their bodies need by eating enough of certain iron-rich foods. Your doctor may recommend that you take an iron supplement along with eating an iron-rich diet. Follow-up care is a key part of your treatment and safety. Be sure to make and go to all appointments, and call your doctor if you are having problems. Its also a good idea to know your test results and keep a list of the medicines you take. How can you care for yourself at home? · Make iron-rich foods a part of your daily diet. Iron-rich foods include:  ¨ All meats, such as chicken, beef, lamb, pork, fish, and shellfish. Liver is especially high in iron. ¨ Leafy green vegetables. ¨ Raisins, peas, beans, lentils, barley, and eggs. ¨ Iron-fortified breakfast cereals. · Eat foods with vitamin C along with iron-rich foods. Vitamin C helps you absorb more iron from food. Drink a glass of orange juice or another citrus juice with your food. · Eat meat and vegetables or grains together. The iron in meat helps your body absorb the iron in other foods. Where can you learn more? Go to http://mary lou-osvaldo.info/. Enter 0328 [id number] in the search box to learn more about \"Iron-Rich Diet: Care Instructions. \"  Current as of: July 26, 2016  Content Version: 11.3  © 7301-2085 [employer]. Care instructions adapted under license by [employer] (which disclaims liability or warranty for this information). If you have questions about a medical condition or this instruction, always ask your healthcare professional. [address] any warranty or liability for your use of this information.          Iron Deficiency Anemia: Care Instructions  Your Care Instructions    Anemia means that you do not have enough red blood cells. Red blood cells carry oxygen around your body. When you have anemia, it can make you pale, weak, and tired. Many things can cause anemia. The most common cause is loss of blood. This can happen if you have heavy menstrual periods. It can also happen if you have bleeding in your stomach or bowel. You can also get anemia if you don't have enough iron in your diet or if it's hard for your body to absorb iron. In some cases, pregnancy causes anemia. That's because a pregnant woman needs more iron. Your doctor may do more tests to find the cause of your anemia. If a disease or other health problem is causing it, your doctor will treat that problem. It's important to follow up with your doctor to make sure that your iron level returns to normal.  Follow-up care is a key part of your treatment and safety. Be sure to make and go to all appointments, and call your doctor if you are having problems. It's also a good idea to know your test results and keep a list of the medicines you take. How can you care for yourself at home? · If your doctor recommended iron pills, take them as directed. ¨ Try to take the pills on an empty stomach. You can do this about 1 hour before or 2 hours after meals. But you may need to take iron with food to avoid an upset stomach. ¨ Do not take antacids or drink milk or anything with caffeine within 2 hours of when you take your iron. They can keep your body from absorbing the iron well. ¨ Vitamin C helps your body absorb iron. You may want to take iron pills with a glass of orange juice or some other food high in vitamin C.  ¨ Iron pills may cause stomach problems. These include heartburn, nausea, diarrhea, constipation, and cramps. It can help to drink plenty of fluids and include fruits, vegetables, and fiber in your diet.   ¨ It's normal for iron pills to make your stool a greenish or grayish black. But internal bleeding can also cause dark stool. So it's important to tell your doctor about any color changes. ¨ Call your doctor if you think you are having a problem with your iron pills. Even after you start to feel better, it will take several months for your body to build up its supply of iron. ¨ If you miss a pill, don't take a double dose. ¨ Keep iron pills out of the reach of small children. Too much iron can be very dangerous. · Eat foods with a lot of iron. These include red meat, shellfish, poultry, and eggs. They also include beans, raisins, whole-grain bread, and leafy green vegetables. · Steam your vegetables. This is the best way to prepare them if you want to get as much iron as possible. · Be safe with medicines. Do not take nonsteroidal anti-inflammatory pain relievers unless your doctor tells you to. These include aspirin, naproxen (Aleve), and ibuprofen (Advil, Motrin). · Liquid iron can stain your teeth. But you can mix it with water or juice and drink it with a straw. Then it won't get on your teeth. When should you call for help? Call 911 anytime you think you may need emergency care. For example, call if:  · You passed out (lost consciousness). · You vomit blood or what looks like coffee grounds. · You pass maroon or very bloody stools. Call your doctor now or seek immediate medical care if:  · Your stools are black and look like tar, or they have streaks of blood. · You are dizzy or lightheaded, or you feel like you may faint. Watch closely for changes in your health, and be sure to contact your doctor if:  · Your fatigue and weakness continue or get worse. · You have side effects from taking iron pills, such as nausea, vomiting, constipation, diarrhea, or heartburn. · You do not get better as expected. Where can you learn more? Go to http://mary lou-osvaldo.info/.   Enter A299 in the search box to learn more about \"Iron Deficiency Anemia: Care Instructions. \"  Current as of: October 13, 2016  Content Version: 11.3  © 5186-9062 [employer]. Care instructions adapted under license by [employer] (which disclaims liability or warranty for this information). If you have questions about a medical condition or this instruction, always ask your healthcare professional. [place] [address] any warranty or liability for your use of this information. Learning About Blood Transfusions  What is a blood transfusion? Blood transfusion is a medical treatment to replace the blood or parts of blood that your body has lost. The blood goes through a tube from a bag to an intravenous (IV) catheter and into your vein. You may need a blood transfusion after losing blood from an injury, a major surgery, an illness that causes bleeding, or an illness that destroys blood cells. Transfusions are also used to give you the parts of blood--such as platelets, plasma, or substances that cause clotting--that your body needs to fight an illness or stop bleeding. How is a blood transfusion done? Before you receive a blood transfusion, your blood is tested to find out what your blood type is. Blood or blood parts that are a match with your blood type are ordered by your doctor. Blood is typed as A, B, AB, or O. It is also typed as Rh-positive or Rh-negative. The blood you are getting is checked and rechecked to make sure that it's the right type for you. A sample of your blood is mixed with a sample of the blood you will receive to check for problems. Before actually giving you the transfusion, a doctor and nurses will look at the label on the package of blood and compare it to your hospital ID bracelet and medical records. The transfusion begins only when all agree that this is the correct blood and that you are the correct person to receive it.   To receive the transfusion, you will have an intravenous (IV) catheter inserted into a vein. A tube connects the catheter to the bag containing the blood, which is placed higher than your body. The blood then flows slowly into your vein. A doctor or nurse will check you several times during the transfusion to watch for a reaction or other problems. What are the possible risks? Blood transfusions have many benefits and are often life-saving. But they also have a few risks. Possible risks include:  · Your body's reaction to receiving new blood. This may include:  ¨ Fever. ¨ Allergic reactions. ¨ Breathing problems. · An infection from the blood. This risk is small because of the strict rules placed on handling and storing blood. Getting a viral infection, such as HIV or hepatitis B or C, through blood transfusions has become very rare. The U[place]S. Food and Drug Administration (FDA) enforces strict guidelines on the collection, testing, storage, and use of blood. · Getting the wrong blood type by accident. Severe reactions, which can be life-threatening, are very rare. What can you expect after a blood transfusion? Here are some things you can do at home to help prevent infection at the transfusion site:  · Wash the area daily with warm, soapy water, and pat it dry. Don't use hydrogen peroxide or alcohol, which can slow healing. You may cover the area with a gauze bandage if it weeps or rubs against clothing. Change the bandage every day. · Keep the area clean and dry. Follow-up care is a key part of your treatment and safety. Be sure to make and go to all appointments, and call your doctor if you are having problems. It's also a good idea to know your test results and keep a list of the medicines you take. When should you call for help? Call 911 anytime you think you may need emergency care. For example, call if:  · You have severe trouble breathing. Call your doctor now or seek immediate medical care if:  · You have a fever.   · You feel weaker or more tired than usual.  · You have a yellow tint to your skin or the whites of your eyes. Watch closely for changes in your health, and be sure to contact your doctor if you have any problems. Where can you learn more? Go to http://mary lou-osvaldo.info/. Enter V588 in the search box to learn more about \"Learning About Blood Transfusions. \"  Current as of: October 13, 2016  Content Version: 11.3  © 5434-8550 [employer] [place]. Care instructions adapted under license by [employer] (which disclaims liability or warranty for this information). If you have questions about a medical condition or this instruction, always ask your healthcare professional. [place] [address] any warranty or liability for your use of this information.

## 2017-09-19 ENCOUNTER — HOSPITAL ENCOUNTER (OUTPATIENT)
Dept: RADIATION ONCOLOGY | Age: 72
Discharge: HOME | End: 2017-09-19
Payer: MEDICARE

## 2017-09-19 DIAGNOSIS — C61 PROSTATE CANCER (HCC): ICD-10-CM

## 2017-09-19 PROCEDURE — 84403 ASSAY OF TOTAL TESTOSTERONE: CPT | Performed by: NURSE PRACTITIONER

## 2017-09-19 PROCEDURE — 84153 ASSAY OF PSA TOTAL: CPT

## 2017-09-19 PROCEDURE — 36415 COLL VENOUS BLD VENIPUNCTURE: CPT | Performed by: NURSE PRACTITIONER

## 2017-09-20 LAB
Lab: NORMAL
REFERENCE LAB,REFLB: NORMAL
TEST DESCRIPTION:,ATST: NORMAL
TESTOST SERPL-MCNC: 384 NG/DL (ref 264–916)

## 2017-09-26 ENCOUNTER — HOSPITAL ENCOUNTER (OUTPATIENT)
Dept: RADIATION ONCOLOGY | Age: 72
Discharge: HOME | End: 2017-09-26
Payer: MEDICARE

## 2017-09-26 VITALS
WEIGHT: 263.7 LBS | DIASTOLIC BLOOD PRESSURE: 70 MMHG | HEART RATE: 76 BPM | BODY MASS INDEX: 36.78 KG/M2 | SYSTOLIC BLOOD PRESSURE: 127 MMHG | OXYGEN SATURATION: 97 % | TEMPERATURE: 98.6 F

## 2017-09-26 DIAGNOSIS — C61 PROSTATE CANCER (HCC): Primary | ICD-10-CM

## 2017-09-26 PROCEDURE — 99211 OFF/OP EST MAY X REQ PHY/QHP: CPT

## 2017-09-26 NOTE — PROGRESS NOTES
Patient: Cheryl Chavira MRN: [id number]  SSN: [id number]    YOB: 1945  Age: 67 y.o. Sex: male      Other Providers:  Jerman Trevino D.O.    CHIEF COMPLAINT: Prostate cancer    DIAGNOSIS: Adenocarcinoma of the prostate, Piotr score 7, negative margins s/p RRP in 2003, now with biochemical recurrence    SITE TREATED AND DOSE DELIVERED:   Mr. Isabela Winters received salvage radiation therapy to the prostate bed to a dose of 6840 cGy in 38 fractions of 180 cGy each using a RapidArc IMRT/IGRT plan from  6/21/16 - 8/12/16    HISTORY OF PRESENT ILLNESS:  Cheryl Chavira is a 66year old male seen by Dr. Irma Liriano 05/13/16 at the request of Dr. Nicko Riley regarding the role of salvage radiation therapy for this biochemically recurrent prostate cancer. In 05/2003 he underwent an RRP under the direction of Dr. Gilma Jaeger. Final pathology was [place] score 7 with negative margins.  Over the past few years  The PSA has been rising slowly.  In 03/13 PSA was 0.05, in 02/14 it was 0.06 and on 03/06/15 had risen to 0.13.  He was sent to Dr. Nicko Riley for further management.  The patient also had a history of hypotestosteronism and had been on IM testosterone injections under the direction of Dr. Jesús Gill When the PSA began to rise, injections were stopped.  His PSA on 03/06/15 was 0.13. PSA on 04/22/16 ck to 0.246. At that time, Dr. Nicko Riley recommended MRI of the pelvis and radiation oncology consultation. MRI of the pelvis on 05/09/16 showed no evidence for local recurrence or evolving metastatic disease in the pelvis. INTERVAL HISTORY: Mr. Isabela Winters returns today for follow up. He is now 13 months after completion of radiation. He has an AUA score of 15/35 and is \"mostly satisfied\" with his urinary function. Dennis Elizabeth He reports his urinary issues are unchanged. He continues with urinary frequency and occasional urgency. He has incomplete emptying and intermittency. He has nocturia x2. He denies dysuria or hematuria.  He has an AUA score of 15/35 His PSA in March was 0.4, up from 0.3 in 12/16. His PSA prior to radiation therapy was  0.246. Fluciclovine PET on 04/11/17 was not evident for disease. 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 Constipation controlled with stool softeners. His energy remains on the low side, but manageable. MEDICATIONS:     Current Outpatient Prescriptions:     methotrexate, PF, 25 mg/mL injection, 0.4 mg once., Disp: , Rfl:     traMADol (ULTRAM) 50 mg tablet, Take 50 mg by mouth every six (6) hours as needed for Pain., Disp: , Rfl:     promethazine (PHENERGAN) 25 mg tablet, Take 1 Tab by mouth every six (6) hours as needed for Nausea., Disp: 12 Tab, Rfl: 0    ferrous sulfate (IRON) 325 mg (65 mg iron) EC tablet, Take 1 Tab by mouth three (3) times daily (with meals). , Disp: 90 Tab, Rfl: 0    DOCUSATE CALCIUM (STOOL SOFTENER PO), Take 2 Tabs by mouth two (2) times a day., Disp: , Rfl:     cinnamon bark 500 mg cap, Take 500 mg by mouth daily. , Disp: , Rfl:     b complex vitamins tablet, Take 1 Tab by mouth daily. , Disp: , Rfl:     fenofibrate (TRICOR) 160 mg tablet, Take 160 mg by mouth daily. , Disp: , Rfl:     fluticasone (FLONASE) 50 mcg/actuation nasal spray, 2 Sprays by Both Nostrils route as needed. , Disp: , Rfl:     losartan-hydrochlorothiazide (HYZAAR) 50-12.5 mg per tablet, Take 1 Tab by mouth daily. , Disp: , Rfl:     metFORMIN ER (GLUCOPHAGE XR) 500 mg tablet, Take 500 mg by mouth two (2) times a day., Disp: , Rfl:     pantoprazole (PROTONIX) 40 mg tablet, Take 40 mg by mouth daily. , Disp: , Rfl:     rOPINIRole (REQUIP) 1 mg tablet, Take 3 mg by mouth daily. One tab AM, two tabs PM, Disp: , Rfl:     simvastatin (ZOCOR) 80 mg tablet, Take 80 mg by mouth daily. , Disp: , Rfl:     multivitamin (DAILY MULTIPLE) tablet, Take 1 Tab by mouth daily. , Disp: , Rfl:     Aspirin, Buffered 81 mg tab, Take 81 mg by mouth daily. , Disp: , Rfl:     ascorbic acid (VITAMIN C) 500 mg tablet, Take 500 mg by mouth daily. , Disp: , Rfl:     ALLERGIES:   Allergies   Allergen Reactions    Amoxicillin Rash    Sulfa (Sulfonamide Antibiotics) Rash       ECOG Performance status 1  VITAL SIGNS: note flow sheet     GENERAL: The patient is well-developed, ambulatory, alert and in no acute distress. ENT: No oral lesion notes. HEART: NSR. LUNGS: Clear to auscultation. EXTREMITIES: ankle wrap of LLE. Right upper extremity brace. Remaining exam deferred. LABORATORY:   09/19/17: PSA 0.542 with testosterone of 384  06/14/17: PSA 0.6 with testosterone of 298  03/17/17: PSA 0.4 with testosterone of 244  12/08/16: PSA 0.3 with testosterone of 410  09/07/16: PSA 0.282 with Testosterone 351  04/22/16: PSA 0.246     RADIOLOGY:  Accident PET/CT Nuclear Medicine Skull Base to Mid-Thigh Axumin PET-CT Study 4/11/2017    INDICATION: Prostate cancer initially diagnosed in 2003, status post radical prostatectomy now with rising PSA, most recently 0.4. COMPARISONS: none     FINDINGS:  Examination of the 3D tomographic PET images demonstrates on careful review a focus of intense activity in the midline pelvis which localizes at the dependent bladder base just cephalad to the anastomosis.  The bladder itself is nondistended   with a nonspecific appearance of bladder wall thickening or redundancy.  This appears most consistent with excreted contrast in the dependent bladder lumen rather than recurrent tumor. Elsewhere in the abdomen and pelvis there appears to be only physiologic radiotracer biodistribution with no suspicious tracer accumulation at a lymph node or mass lesion. In the chest and visualized head and neck there is no suspicious site of radiotracer activity.  No lung nodule is seen.  There are atherosclerosis findings and mild sinus disease. No suspicious skeletal lesion seen.     IMPRESSION: Focally prominent tracer activity noted only at the dependent bladder base which appears most consistent with excreted tracer pooling in the dependent lumen. No suspicious focus of tracer accumulation seen. IMPRESSION:  Paul Childress is a 67 y.o. male adenocarcinoma of the prostate, Piotr score 7, negative margins s/p RRP in 2003, with biochemical recurrence - now 13 months after completion of radiation. His urinary function is essentially at his baseline. His PSA was at 0.4 in March 2017. His PSA prior to radiation therapy was  0.246. Fluciclovine PET on 04/11/17 was not evident for disease. At this time it is unclear if the rise in PSA was due to previous radiation proctitis (March), or disease that is too small to be picked up on the [address] PET. We will continue to follow closely and if the PSA continues to rise (between 1 or 2), we will think about repeating the [address] PET. PLAN:   1) We will begin alternating visits with urology. Follow up with Dr. Vicki Sheehan in 3 months, I will see him again in 6 months with PSA/Testosterone prior to his visit. 2) We had a 20 minute visit, over 50% of which was in direct counseling regarding the management of his biochemical recurrent prostate cancer. All questions were answered to the best of my ability. 3) If the PSA continues to rise (between 1 or 2), we will think about repeating the [address] PET. Machelle Curtis NP   September 26, 2017      Portions of this note were copied from prior encounters and reviewed for accuracy, currency, and represent documentation and tasks completed during this encounter. I verify and attest these portions to be unchanged from prior visits.

## 2017-09-26 NOTE — PROGRESS NOTES
Pt here today for FUP post RT to the prostate which ended 8/2016. Pt has an AUA score of 15/35 with c/o weak urinary stream, frequency, urgency. PSA is 0.542, Testosterone 298.

## 2018-03-20 ENCOUNTER — HOSPITAL ENCOUNTER (OUTPATIENT)
Dept: RADIATION ONCOLOGY | Age: 73
Discharge: HOME | End: 2018-03-20
Payer: MEDICARE

## 2018-03-20 DIAGNOSIS — C61 PROSTATE CANCER (HCC): ICD-10-CM

## 2018-03-20 PROCEDURE — 84403 ASSAY OF TOTAL TESTOSTERONE: CPT | Performed by: NURSE PRACTITIONER

## 2018-03-20 PROCEDURE — 84153 ASSAY OF PSA TOTAL: CPT | Performed by: NURSE PRACTITIONER

## 2018-03-21 LAB
PSA SERPL DL<=0.01 NG/ML-MCNC: 1.08 NG/ML (ref 0–4)
TESTOST SERPL-MCNC: 373 NG/DL (ref 264–916)

## 2018-03-27 ENCOUNTER — HOSPITAL ENCOUNTER (OUTPATIENT)
Dept: RADIATION ONCOLOGY | Age: 73
Discharge: HOME | End: 2018-03-27
Payer: MEDICARE

## 2018-03-27 VITALS
OXYGEN SATURATION: 95 % | BODY MASS INDEX: 37.62 KG/M2 | HEART RATE: 75 BPM | TEMPERATURE: 97.8 F | WEIGHT: 269.7 LBS | DIASTOLIC BLOOD PRESSURE: 73 MMHG | SYSTOLIC BLOOD PRESSURE: 119 MMHG

## 2018-03-27 PROCEDURE — 99211 OFF/OP EST MAY X REQ PHY/QHP: CPT

## 2018-03-27 NOTE — PROGRESS NOTES
F/u for prostate cancer. Prostatectomy 5/03. RT end 8-12-16. Aua/Epic completed. Aua score  18. Labs 3-20-18. F/u Dr. Shwetha Amezquita 7-27-18.     Bridget Obrien RN

## 2018-03-27 NOTE — PROGRESS NOTES
Patient: Phyllis Concepcion MRN: [id number]  SSN: [id number]    YOB: 1945  Age: 67 y.o. Sex: male      Other Providers:  Nadira Albarran D.O.    CHIEF COMPLAINT: Prostate cancer    DIAGNOSIS: Adenocarcinoma of the prostate, Piotr score 7, negative margins s/p RRP in 2003, now with biochemical recurrence    SITE TREATED AND DOSE DELIVERED:   Mr. Soila Stephens received salvage radiation therapy to the prostate bed to a dose of 6840 cGy in 38 fractions of 180 cGy each using a RapidArc IMRT/IGRT plan from  6/21/16 - 8/12/16    HISTORY OF PRESENT ILLNESS:  Phyllis Concepcion is a 66year old male initially seen by Dr. Pérez Larger 05/13/16 at the request of Dr. Rhonda Huizar regarding the role of salvage radiation therapy for this biochemically recurrent prostate cancer. In 05/2003 he underwent an RRP under the direction of Dr. Ashish Madsen. Final pathology was [place] score 7 with negative margins.  Over the past few years  The PSA has been rising slowly.  In 03/13 PSA was 0.05, in 02/14 it was 0.06 and on 03/06/15 had risen to 0.13.  He was sent to Dr. Rhonda Huizar for further management.  The patient also had a history of hypotestosteronism and had been on IM testosterone injections under the direction of Dr. Baylee Hyatt When the PSA began to rise, injections were stopped.  His PSA on 03/06/15 was 0.13. PSA on 04/22/16 ck to 0.246. At that time, Dr. Rhonda Huizar recommended MRI of the pelvis and radiation oncology consultation. MRI of the pelvis on 05/09/16 showed no evidence for local recurrence or evolving metastatic disease in the pelvis. His PSA in March was 0.4, up from 0.3 in 12/16. His PSA prior to radiation therapy was  0.246. Fluciclovine PET on 04/11/17 was not evident for disease. 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 INTERVAL HISTORY: Mr. Soila Stephens returns today for follow up. He is now 18 months after completion of radiation.  He has an AUA score of 18/35 and is \"pleased\" with his urinary function. He reports his urinary issues are unchanged. He continues with urinary frequency and urgency about half the time. He has incomplete emptying and intermittency. He has nocturia x2-3. He denies dysuria or hematuria. Constipation controlled with stool softeners. His energy remains on the low side, but manageable. MEDICATIONS:     Current Outpatient Prescriptions:     Iron Fum & P-FA-Vit B & C No.9 (INTEGRA PLUS) 125 mg iron- 1 mg cap, Take 1 Tab by mouth daily. Except Thursday. Integra on Thursday due to Methotrexate injection. , Disp: , Rfl:     Iron Fum & PS Cmp-Vit C-Niacin (INTEGRA) 125-40-3 mg cap, Take 1 Tab by mouth Every Thursday. , Disp: , Rfl:     methotrexate, PF, 25 mg/mL injection, 0.4 mg every seven (7) days. On Thursdays. , Disp: , Rfl:     traMADol (ULTRAM) 50 mg tablet, Take 50 mg by mouth every six (6) hours as needed for Pain., Disp: , Rfl:     DOCUSATE CALCIUM (STOOL SOFTENER PO), Take 2 Tabs by mouth two (2) times a day., Disp: , Rfl:     cinnamon bark 500 mg cap, Take 500 mg by mouth daily. , Disp: , Rfl:     b complex vitamins tablet, Take 1 Tab by mouth daily. , Disp: , Rfl:     fenofibrate (TRICOR) 160 mg tablet, Take 160 mg by mouth daily. , Disp: , Rfl:     fluticasone (FLONASE) 50 mcg/actuation nasal spray, 2 Sprays by Both Nostrils route as needed. , Disp: , Rfl:     losartan-hydrochlorothiazide (HYZAAR) 50-12.5 mg per tablet, Take 1 Tab by mouth daily. , Disp: , Rfl:     metFORMIN ER (GLUCOPHAGE XR) 500 mg tablet, Take 500 mg by mouth two (2) times a day., Disp: , Rfl:     pantoprazole (PROTONIX) 40 mg tablet, Take 40 mg by mouth daily. , Disp: , Rfl:     rOPINIRole (REQUIP) 1 mg tablet, Take 3 mg by mouth daily. One tab AM, two tabs PM, Disp: , Rfl:     simvastatin (ZOCOR) 80 mg tablet, Take 80 mg by mouth daily. , Disp: , Rfl:     multivitamin (DAILY MULTIPLE) tablet, Take 1 Tab by mouth daily. , Disp: , Rfl:    Aspirin, Buffered 81 mg tab, Take 81 mg by mouth daily. , Disp: , Rfl:     ascorbic acid (VITAMIN C) 500 mg tablet, Take 500 mg by mouth daily. , Disp: , Rfl:     ALLERGIES:   Allergies   Allergen Reactions    Amoxicillin Rash    Sulfa (Sulfonamide Antibiotics) Rash       ECOG Performance status 1  VITAL SIGNS: blood pressure 119/73  Pulse 75  Temperature 97.8  Weight 269.7    GENERAL: The patient is well-developed, ambulatory, alert and in no acute distress. LABORATORY:   03/20/18: PSA 1.080 with testosterone of 327  09/19/17: PSA 0.542 with testosterone of 384  06/14/17: PSA 0.6 with testosterone of 298  03/17/17: PSA 0.4 with testosterone of 244  12/08/16: PSA 0.3 with testosterone of 410  09/07/16: PSA 0.282 with Testosterone 351  04/22/16: PSA 0.246     RADIOLOGY:  Accident PET/CT Nuclear Medicine Skull Base to Mid-Thigh Axumin PET-CT Study 4/11/2017    INDICATION: Prostate cancer initially diagnosed in 2003, status post radical prostatectomy now with rising PSA, most recently 0.4. COMPARISONS: none     FINDINGS:  Examination of the 3D tomographic PET images demonstrates on careful review a focus of intense activity in the midline pelvis which localizes at the dependent bladder base just cephalad to the anastomosis.  The bladder itself is nondistended   with a nonspecific appearance of bladder wall thickening or redundancy.  This appears most consistent with excreted contrast in the dependent bladder lumen rather than recurrent tumor. Elsewhere in the abdomen and pelvis there appears to be only physiologic radiotracer biodistribution with no suspicious tracer accumulation at a lymph node or mass lesion. In the chest and visualized head and neck there is no suspicious site of radiotracer activity.  No lung nodule is seen.  There are atherosclerosis findings and mild sinus disease. No suspicious skeletal lesion seen.     IMPRESSION: Focally prominent tracer activity noted only at the dependent bladder base which appears most consistent with excreted tracer pooling in the dependent lumen. No suspicious focus of tracer accumulation seen. IMPRESSION:  Pushpa Meraz is a 67 y.o. male adenocarcinoma of the prostate, [place] score 7, negative margins s/p RRP in 2003, with biochemical recurrence. He completed radiation therapy, 8/12/2016. He is now months after completion of radiation. His urinary function is essentially at his baseline. His PSA was at 0.4 in March 2017. His PSA prior to radiation therapy was  0.246. Fluciclovine PET on 04/11/17 was not evident for disease. His PSA continues to rise. He has a doubling time of 9.1 month with a PSA of 1.080 today. His PSA is at a level that could now be identifiable on an axumin scan. PLAN:   1) Axumin scan in the next 1-2 weeks   2) RTC shortly after the scan to discuss PET and treatment plan if needed. He will meet with Dr. Lois Iglesias and myself. 3) He is sheduled with Dr. Zena Carl in July. 4) We had a 25 minute visit, over 75% of which was in direct counseling regarding the management of his biochemical recurrent prostate cancer. All questions were answered to the best of my ability. Robert Jeong NP   March 27, 2018      Portions of this note were copied from prior encounters and reviewed for accuracy, currency, and represent documentation and tasks completed during this encounter. I verify and attest these portions to be unchanged from prior visits.

## 2018-04-10 ENCOUNTER — HOSPITAL ENCOUNTER (OUTPATIENT)
Dept: RADIATION ONCOLOGY | Age: 73
Discharge: HOME | End: 2018-04-10
Payer: MEDICARE

## 2018-04-10 VITALS
BODY MASS INDEX: 38.08 KG/M2 | DIASTOLIC BLOOD PRESSURE: 71 MMHG | WEIGHT: 272 LBS | SYSTOLIC BLOOD PRESSURE: 121 MMHG | HEIGHT: 71 IN | TEMPERATURE: 97.8 F | OXYGEN SATURATION: 94 % | HEART RATE: 78 BPM | RESPIRATION RATE: 18 BRPM

## 2018-04-10 DIAGNOSIS — C61 PROSTATE CANCER (HCC): Primary | ICD-10-CM

## 2018-04-10 PROCEDURE — 99211 OFF/OP EST MAY X REQ PHY/QHP: CPT

## 2018-04-10 NOTE — PROGRESS NOTES
Patient: Jordan Herring MRN: [id number]  SSN: [id number]    YOB: 1945  Age: 67 y.o. Sex: male      Other Providers:  Bijal Rose D.O.    CHIEF COMPLAINT: Prostate cancer    DIAGNOSIS: Adenocarcinoma of the prostate, [place] score 7, negative margins s/p RRP in 2003, now with biochemical recurrence    SITE TREATED AND DOSE DELIVERED:   Mr. Bobbi Holland received salvage radiation therapy to the prostate bed to a dose of 6840 cGy in 38 fractions of 180 cGy each using a RapidArc IMRT/IGRT plan from  6/21/16 - 8/12/16    HISTORY OF PRESENT ILLNESS:  Jordan Herring is a 66year old male initially seen by Dr. Yi Ends 05/13/16 at the request of Dr. Ernst Castano regarding the role of salvage radiation therapy for this biochemically recurrent prostate cancer. In 05/2003 he underwent an RRP under the direction of Dr. Melina Nagel. Final pathology was Piot[place] score 7 with negative margins.  Over the past few years  The PSA has been rising slowly.  In 03/13 PSA was 0.05, in 02/14 it was 0.06 and on 03/06/15 had risen to 0.13.  He was sent to Dr. Ernst Castano for further management.  The patient also had a history of hypotestosteronism and had been on IM testosterone injections under the direction of Dr. Kaelyn Larios When the PSA began to rise, injections were stopped.  His PSA on 03/06/15 was 0.13. PSA on 04/22/16 ck to 0.246. At that time, Dr. Ernst Castano recommended MRI of the pelvis and radiation oncology consultation. MRI of the pelvis on 05/09/16 showed no evidence for local recurrence or evolving metastatic disease in the pelvis. His PSA in March was 0.4, up from 0.3 in 12/16. His PSA prior to radiation therapy was  0.246. Fluciclovine PET on 04/11/17 was not evident for disease. 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 INTERVAL HISTORY: Mr. Bobbi Holland returns today to discuss recent Axumin scan.  He is now 18 months after completion of radiation. He continues to do well with no complaints. He denies urinary changes. he denies pain    MEDICATIONS:     Current Outpatient Prescriptions:     Iron Fum & P-FA-Vit B & C No.9 (INTEGRA PLUS) 125 mg iron- 1 mg cap, Take 1 Tab by mouth daily. Except Thursday. Integra on Thursday due to Methotrexate injection. , Disp: , Rfl:     Iron Fum & PS Cmp-Vit C-Niacin (INTEGRA) 125-40-3 mg cap, Take 1 Tab by mouth Every Thursday. , Disp: , Rfl:     methotrexate, PF, 25 mg/mL injection, 0.4 mg every seven (7) days. On Thursdays. , Disp: , Rfl:     traMADol (ULTRAM) 50 mg tablet, Take 50 mg by mouth every six (6) hours as needed for Pain., Disp: , Rfl:     DOCUSATE CALCIUM (STOOL SOFTENER PO), Take 2 Tabs by mouth two (2) times a day., Disp: , Rfl:     cinnamon bark 500 mg cap, Take 500 mg by mouth daily. , Disp: , Rfl:     b complex vitamins tablet, Take 1 Tab by mouth daily. , Disp: , Rfl:     fenofibrate (TRICOR) 160 mg tablet, Take 160 mg by mouth daily. , Disp: , Rfl:     fluticasone (FLONASE) 50 mcg/actuation nasal spray, 2 Sprays by Both Nostrils route as needed. , Disp: , Rfl:     losartan-hydrochlorothiazide (HYZAAR) 50-12.5 mg per tablet, Take 1 Tab by mouth daily. , Disp: , Rfl:     metFORMIN ER (GLUCOPHAGE XR) 500 mg tablet, Take 500 mg by mouth two (2) times a day., Disp: , Rfl:     pantoprazole (PROTONIX) 40 mg tablet, Take 40 mg by mouth daily. , Disp: , Rfl:     rOPINIRole (REQUIP) 1 mg tablet, Take 3 mg by mouth daily. One tab AM, two tabs PM, Disp: , Rfl:     simvastatin (ZOCOR) 80 mg tablet, Take 80 mg by mouth daily. , Disp: , Rfl:     Aspirin, Buffered 81 mg tab, Take 81 mg by mouth daily. , Disp: , Rfl:     ascorbic acid (VITAMIN C) 500 mg tablet, Take 500 mg by mouth daily. , Disp: , Rfl:     multivitamin (DAILY MULTIPLE) tablet, Take 1 Tab by mouth daily. , Disp: , Rfl:     ALLERGIES:   Allergies   Allergen Reactions    Amoxicillin Rash    Sulfa (Sulfonamide Antibiotics) Rash       ECOG Performance status 1  VITAL SIGNS: blood pressure 119/73  Pulse 75  Temperature 97.8  Weight 269.7    GENERAL: The patient is well-developed, ambulatory, alert and in no acute distress. LABORATORY:   03/20/18: PSA 1.080 with testosterone of 327  09/19/17: PSA 0.542 with testosterone of 384  06/14/17: PSA 0.6 with testosterone of 298  03/17/17: PSA 0.4 with testosterone of 244  12/08/16: PSA 0.3 with testosterone of 410  09/07/16: PSA 0.282 with Testosterone 351  04/22/16: PSA 0.246     RADIOLOGY:  Accident PET/CT Nuclear Medicine Skull Base to Mid-Thigh Axumin PET-CT Study 4/11/2017    INDICATION: Prostate cancer initially diagnosed in 2003, status post radical prostatectomy now with rising PSA, most recently 0.4. COMPARISONS: none     FINDINGS:  Examination of the 3D tomographic PET images demonstrates on careful review a focus of intense activity in the midline pelvis which localizes at the dependent bladder base just cephalad to the anastomosis.  The bladder itself is nondistended   with a nonspecific appearance of bladder wall thickening or redundancy.  This appears most consistent with excreted contrast in the dependent bladder lumen rather than recurrent tumor. Elsewhere in the abdomen and pelvis there appears to be only physiologic radiotracer biodistribution with no suspicious tracer accumulation at a lymph node or mass lesion. In the chest and visualized head and neck there is no suspicious site of radiotracer activity.  No lung nodule is seen.  There are atherosclerosis findings and mild sinus disease. No suspicious skeletal lesion seen. IMPRESSION: Focally prominent tracer activity noted only at the dependent bladder base which appears most consistent with excreted tracer pooling in the dependent lumen. No suspicious focus of tracer accumulation seen.     IMPRESSION:  Shanna Sullivan is a 67 y.o. male adenocarcinoma of the prostate, [place] score 7, negative margins s/p RRP in 2003, with biochemical recurrence. He completed radiation therapy, 8/12/2016. He is now months after completion of radiation. His urinary function is essentially at his baseline. His PSA was at 0.4 in March 2017. His PSA prior to radiation therapy was  0.246. Fluciclovine PET on 04/11/17 was not evident for disease. His PSA continued to rise with a doubling time of 9.1 months and now with a PSA of 1.080. Dorice Ponds scan was recommended which he had done on 4/5/18. The scan shows no evidence of disease. The plan will be to continue close follow up and monitoring of his PSA doubling time. If the doubling time worsens, we discussed possibly referring him to medical oncology for discussion of treatment options, otherwise if no change or better we would continue to closely follow him. Mr. Lilliam Pendleton is in agreement with this plan. PLAN:   1) For now we will continue close follow up and plan to see him in 3 months with PSA/Testosterone prior to his visit. We will continue checking his doubling time. 2) We had a 20 minute visit, over 75% of which was in direct counseling regarding the management of his biochemical recurrent prostate cancer. All questions were answered to the best of my ability. Spenser Mejia NP   April 10, 2018      Portions of this note were copied from prior encounters and reviewed for accuracy, currency, and represent documentation and tasks completed during this encounter. I verify and attest these portions to be unchanged from prior visits.

## 2018-04-10 NOTE — PROGRESS NOTES
Pt here today for FUP post RT to the prostate which ended 8/12/16. He has a prostatectomy in 2003. Pt had a 4/5/18 Axumin scan which indicated no changes. No c/o today. He will see Dr. Pablito Grove 7/27/18.

## 2018-06-26 ENCOUNTER — HOSPITAL ENCOUNTER (OUTPATIENT)
Dept: RADIATION ONCOLOGY | Age: 73
Discharge: HOME | End: 2018-06-26
Payer: MEDICARE

## 2018-06-26 DIAGNOSIS — C61 PROSTATE CANCER (HCC): ICD-10-CM

## 2018-06-26 PROCEDURE — 36415 COLL VENOUS BLD VENIPUNCTURE: CPT | Performed by: NURSE PRACTITIONER

## 2018-06-26 PROCEDURE — 84403 ASSAY OF TOTAL TESTOSTERONE: CPT | Performed by: NURSE PRACTITIONER

## 2018-06-26 PROCEDURE — 84153 ASSAY OF PSA TOTAL: CPT | Performed by: NURSE PRACTITIONER

## 2018-06-27 LAB
PSA SERPL DL<=0.01 NG/ML-MCNC: 1.53 NG/ML (ref 0–4)
TESTOST SERPL-MCNC: 322 NG/DL (ref 264–916)

## 2018-07-03 ENCOUNTER — HOSPITAL ENCOUNTER (OUTPATIENT)
Dept: RADIATION ONCOLOGY | Age: 73
Discharge: HOME | End: 2018-07-03
Payer: MEDICARE

## 2018-07-03 VITALS
WEIGHT: 267.9 LBS | TEMPERATURE: 97.7 F | RESPIRATION RATE: 16 BRPM | DIASTOLIC BLOOD PRESSURE: 71 MMHG | HEART RATE: 76 BPM | BODY MASS INDEX: 37.51 KG/M2 | HEIGHT: 71 IN | SYSTOLIC BLOOD PRESSURE: 128 MMHG | OXYGEN SATURATION: 96 %

## 2018-07-03 DIAGNOSIS — C61 PROSTATE CANCER (HCC): Primary | ICD-10-CM

## 2018-07-03 PROCEDURE — 99211 OFF/OP EST MAY X REQ PHY/QHP: CPT

## 2018-07-03 NOTE — PROGRESS NOTES
Pt here today for FUP post RT to the prostate bed which ended 8/12/16. He is s/p a prostatectomy in 2003. His 6/26/18 PSA has increased slightly to 1.530, (it was 1.080 on 3/20/18) and his testosterone is 322. The 4/5/18 Axumin scan indicated no changes. Pt will see Dr. Helena Younger 9/18. He will return for FUP 12/2018 with labs prior.

## 2018-07-03 NOTE — PROGRESS NOTES
Patient: Shasta Mendez MRN: [id number]  SSN: [id number]    YOB: 1945  Age: 75 y.o. Sex: male      Other Providers:  Yanet Hare D.O.    CHIEF COMPLAINT: Prostate cancer    DIAGNOSIS: Adenocarcinoma of the prostate, Piotr score 7, negative margins s/p RRP in 2003, now with biochemical recurrence    SITE TREATED AND DOSE DELIVERED:   Mr. Francia Junior received salvage radiation therapy to the prostate bed to a dose of 6840 cGy in 38 fractions of 180 cGy each using a RapidArc IMRT/IGRT plan from  6/21/16 - 8/12/16    HISTORY OF PRESENT ILLNESS:  Shasta Mendez is a 66year old male initially seen by Dr. Pretty Delaney 05/13/16 at the request of Dr. Catalina Almaraz regarding the role of salvage radiation therapy for this biochemically recurrent prostate cancer. In 05/2003 he underwent an RRP under the direction of Dr. Jah Mcclelland. Final pathology was Gregory score 7 with negative margins.  Over the past few years  The PSA has been rising slowly.  In 03/13 PSA was 0.05, in 02/14 it was 0.06 and on 03/06/15 had risen to 0.13.  He was sent to Dr. Catalina Almaraz for further management.  The patient also had a history of hypotestosteronism and had been on IM testosterone injections under the direction of Dr. Vale Anderson When the PSA began to rise, injections were stopped.  His PSA on 03/06/15 was 0.13. PSA on 04/22/16 ck to 0.246. At that time, Dr. Catalina Almaraz recommended MRI of the pelvis and radiation oncology consultation. MRI of the pelvis on 05/09/16 showed no evidence for local recurrence or evolving metastatic disease in the pelvis. His PSA in March was 0.4, up from 0.3 in 12/16. His PSA prior to radiation therapy was  0.246. Fluciclovine PET on 04/11/17 was not evident for disease. 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 INTERVAL HISTORY: Mr. Francia Junior returns today for routine follow up of his PSA.  He is now 23 months after completion of salvage radiation. He continues to do well with no complaints. He denies urinary changes, although his AUA is much higher than his previous visit. His AUA today is 25/35 and is \"mixed\" with his urinary function. He reports that his urinary function is essentially unchanged. He has nocturia x3. He has frequency and urgency more than half the time. He continues with frequent dribbling that requires him to wear a pad. He has normal bowel function. He has no new pains. Other than fatigue, he is doing well. MEDICATIONS:     Current Outpatient Prescriptions:     Iron Fum & P-FA-Vit B & C No.9 (INTEGRA PLUS) 125 mg iron- 1 mg cap, Take 1 Tab by mouth daily. Except Thursday. Integra on Thursday due to Methotrexate injection. , Disp: , Rfl:     Iron Fum & PS Cmp-Vit C-Niacin (INTEGRA) 125-40-3 mg cap, Take 1 Tab by mouth Every Thursday. , Disp: , Rfl:     methotrexate, PF, 25 mg/mL injection, 0.4 mg every seven (7) days. On Thursdays. , Disp: , Rfl:     traMADol (ULTRAM) 50 mg tablet, Take 50 mg by mouth every six (6) hours as needed for Pain., Disp: , Rfl:     DOCUSATE CALCIUM (STOOL SOFTENER PO), Take 2 Tabs by mouth two (2) times a day., Disp: , Rfl:     cinnamon bark 500 mg cap, Take 500 mg by mouth daily. , Disp: , Rfl:     b complex vitamins tablet, Take 1 Tab by mouth daily. , Disp: , Rfl:     fenofibrate (TRICOR) 160 mg tablet, Take 160 mg by mouth daily. , Disp: , Rfl:     fluticasone (FLONASE) 50 mcg/actuation nasal spray, 2 Sprays by Both Nostrils route as needed. , Disp: , Rfl:     losartan-hydrochlorothiazide (HYZAAR) 50-12.5 mg per tablet, Take 1 Tab by mouth daily. , Disp: , Rfl:     metFORMIN ER (GLUCOPHAGE XR) 500 mg tablet, Take 500 mg by mouth two (2) times a day., Disp: , Rfl:     pantoprazole (PROTONIX) 40 mg tablet, Take 40 mg by mouth daily. , Disp: , Rfl:     rOPINIRole (REQUIP) 1 mg tablet, Take 3 mg by mouth daily.  One tab AM, two tabs PM, Disp: , Rfl:     simvastatin (ZOCOR) 80 mg tablet, Take 80 mg by mouth daily. , Disp: , Rfl:     multivitamin (DAILY MULTIPLE) tablet, Take 1 Tab by mouth daily. , Disp: , Rfl:     Aspirin, Buffered 81 mg tab, Take 81 mg by mouth daily. , Disp: , Rfl:     ascorbic acid (VITAMIN C) 500 mg tablet, Take 500 mg by mouth daily. , Disp: , Rfl:     ALLERGIES:   Allergies   Allergen Reactions    Amoxicillin Rash    Sulfa (Sulfonamide Antibiotics) Rash       ECOG Performance status 0-1  VITAL SIGNS: blood pressure 128/71  Pulse 76  Temperature  97.7  Weight  267.9    GENERAL: The patient is well-developed, ambulatory, alert and in no acute distress. LABORATORY:   06/26/18: PSA 1.53 with testosterone of 322  03/20/18: PSA 1.080 with testosterone of 327  09/19/17: PSA 0.542 with testosterone of 384  06/14/17: PSA 0.6 with testosterone of 298  03/17/17: PSA 0.4 with testosterone of 244  12/08/16: PSA 0.3 with testosterone of 410  09/07/16: PSA 0.282 with Testosterone 351  04/22/16: PSA 0.246     RADIOLOGY:  Accident PET/CT Nuclear Medicine Skull Base to Mid-Thigh Axumin PET-CT Study 4/11/2017    INDICATION: Prostate cancer initially diagnosed in 2003, status post radical prostatectomy now with rising PSA, most recently 0.4. COMPARISONS: none     FINDINGS:  Examination of the 3D tomographic PET images demonstrates on careful review a focus of intense activity in the midline pelvis which localizes at the dependent bladder base just cephalad to the anastomosis.  The bladder itself is nondistended   with a nonspecific appearance of bladder wall thickening or redundancy.  This appears most consistent with excreted contrast in the dependent bladder lumen rather than recurrent tumor. Elsewhere in the abdomen and pelvis there appears to be only physiologic radiotracer biodistribution with no suspicious tracer accumulation at a lymph node or mass lesion.     In the chest and visualized head and neck there is no suspicious site of radiotracer activity.  No lung nodule is seen.  There are atherosclerosis findings and mild sinus disease. No suspicious skeletal lesion seen. IMPRESSION: Focally prominent tracer activity noted only at the dependent bladder base which appears most consistent with excreted tracer pooling in the dependent lumen. No suspicious focus of tracer accumulation seen. IMPRESSION:  Nahid Barton is a 75 y.o. male adenocarcinoma of the prostate, Piotr score 7, negative margins s/p RRP in 2003, with biochemical recurrence. He completed radiation therapy, 8/12/2016. He is now 23 months after completion of radiation. His urinary function is essentially at his baseline. His PSA prior to radiation therapy was  0.246. His PSA was at 0.4 in March 2017. Fluciclovine PET on 04/11/17 was not evident for disease. His PSA continued to rise with a doubling time of 9.1 months with a PSA of 1.080 in March 2018. He had an Axumin scan on 4/5/18 that showed no evidence of disease. He now has a PSA of 1.53 with a doubling time of 6 months. He is asymptomatic. I reviewed his case with Dr. Gary Whatley. We discussed at some point he will need to see medical oncology for discussion of treatment options. He is asymptomatic. Recent Axumin was negative. For now, Mr. Kisha Adrian is comfortable with watchful waiting. PLAN:   1) To continue close follow up and plan to see him in 3 months with PSA/Testosterone prior to his visit. We will continue checking his doubling time. 2) We had a 25 minute visit, over 75% of which was in direct counseling regarding the management of his biochemical recurrent prostate cancer. All questions were answered to the best of my ability. Zac Green NP   July 3, 2018      Portions of this note were copied from prior encounters and reviewed for accuracy, currency, and represent documentation and tasks completed during this encounter. I verify and attest these portions to be unchanged from prior visits.

## 2018-09-26 ENCOUNTER — TELEPHONE (OUTPATIENT)
Dept: CASE MANAGEMENT | Age: 73
End: 2018-09-26

## 2018-09-26 NOTE — TELEPHONE ENCOUNTER
Pt called. C/o having trouble urinating unless lying flat. States he will pass a clot and then will have easier time going. He saw Dr. Cee Sloan on 9-21-18. Pt is scheduled for Cystoscopy on 9-28-18. Pt is concerned about UTI. He had questions about Cystoscopy. I instructed  him to call Urology and request urine testing. He will inquire about Cystoscopy procedure. I explained that Cystoscopy is important to find out what is going on.     Farhana Glass RN

## 2018-10-17 ENCOUNTER — HOSPITAL ENCOUNTER (OUTPATIENT)
Dept: SURGERY | Age: 73
Discharge: HOME | End: 2018-10-17
Payer: MEDICARE

## 2018-10-17 VITALS
TEMPERATURE: 97.8 F | OXYGEN SATURATION: 96 % | WEIGHT: 272.5 LBS | BODY MASS INDEX: 38.15 KG/M2 | RESPIRATION RATE: 16 BRPM | SYSTOLIC BLOOD PRESSURE: 119 MMHG | DIASTOLIC BLOOD PRESSURE: 65 MMHG | HEART RATE: 72 BPM | HEIGHT: 71 IN

## 2018-10-17 LAB
ANION GAP SERPL CALC-SCNC: 7 MMOL/L (ref 7–16)
APPEARANCE UR: CLEAR
BACTERIA URNS QL MICRO: 0 /HPF
BILIRUB UR QL: NEGATIVE
BUN SERPL-MCNC: 14 MG/DL (ref 8–23)
CALCIUM SERPL-MCNC: 9.4 MG/DL (ref 8.3–10.4)
CASTS URNS QL MICRO: ABNORMAL /LPF
CHLORIDE SERPL-SCNC: 107 MMOL/L (ref 98–107)
CO2 SERPL-SCNC: 27 MMOL/L (ref 21–32)
COLOR UR: YELLOW
CREAT SERPL-MCNC: 1.28 MG/DL (ref 0.8–1.5)
EPI CELLS #/AREA URNS HPF: 0 /HPF
ERYTHROCYTE [DISTWIDTH] IN BLOOD BY AUTOMATED COUNT: 15.8 %
GLUCOSE BLD STRIP.AUTO-MCNC: 108 MG/DL (ref 65–100)
GLUCOSE SERPL-MCNC: 108 MG/DL (ref 65–100)
GLUCOSE UR STRIP.AUTO-MCNC: NEGATIVE MG/DL
HCT VFR BLD AUTO: 36.3 % (ref 41.1–50.3)
HGB BLD-MCNC: 11.9 G/DL (ref 13.6–17.2)
HGB UR QL STRIP: NEGATIVE
KETONES UR QL STRIP.AUTO: NEGATIVE MG/DL
LEUKOCYTE ESTERASE UR QL STRIP.AUTO: ABNORMAL
MCH RBC QN AUTO: 30.2 PG (ref 26.1–32.9)
MCHC RBC AUTO-ENTMCNC: 32.8 G/DL (ref 31.4–35)
MCV RBC AUTO: 92.1 FL (ref 79.6–97.8)
NITRITE UR QL STRIP.AUTO: NEGATIVE
NRBC # BLD: 0 K/UL (ref 0–0.2)
PH UR STRIP: 6.5 [PH] (ref 5–9)
PLATELET # BLD AUTO: 116 K/UL (ref 150–450)
PMV BLD AUTO: 9.3 FL (ref 9.4–12.3)
POTASSIUM SERPL-SCNC: 4.4 MMOL/L (ref 3.5–5.1)
PROT UR STRIP-MCNC: NEGATIVE MG/DL
RBC # BLD AUTO: 3.94 M/UL (ref 4.23–5.6)
RBC #/AREA URNS HPF: ABNORMAL /HPF
SODIUM SERPL-SCNC: 141 MMOL/L (ref 136–145)
SP GR UR REFRACTOMETRY: 1.01 (ref 1–1.02)
UROBILINOGEN UR QL STRIP.AUTO: 1 EU/DL (ref 0.2–1)
WBC # BLD AUTO: 4.4 K/UL (ref 4.3–11.1)
WBC URNS QL MICRO: ABNORMAL /HPF

## 2018-10-17 PROCEDURE — 80048 BASIC METABOLIC PNL TOTAL CA: CPT

## 2018-10-17 PROCEDURE — 81001 URINALYSIS AUTO W/SCOPE: CPT

## 2018-10-17 PROCEDURE — 87186 SC STD MICRODIL/AGAR DIL: CPT

## 2018-10-17 PROCEDURE — 87086 URINE CULTURE/COLONY COUNT: CPT

## 2018-10-17 PROCEDURE — 85027 COMPLETE CBC AUTOMATED: CPT

## 2018-10-17 PROCEDURE — 87088 URINE BACTERIA CULTURE: CPT

## 2018-10-17 PROCEDURE — 82962 GLUCOSE BLOOD TEST: CPT

## 2018-10-17 RX ORDER — CLINDAMYCIN HYDROCHLORIDE 300 MG/1
300 CAPSULE ORAL 3 TIMES DAILY
COMMUNITY
Start: 2018-10-03 | End: 2022-01-01

## 2018-10-17 RX ORDER — UREA 10 %
400 LOTION (ML) TOPICAL DAILY
COMMUNITY
End: 2022-01-01

## 2018-10-17 RX ORDER — TRIAMCINOLONE ACETONIDE 1 MG/G
CREAM TOPICAL
COMMUNITY
Start: 2018-01-25 | End: 2022-01-01

## 2018-10-17 RX ORDER — MONTELUKAST SODIUM 10 MG/1
10 TABLET ORAL DAILY
COMMUNITY
Start: 2018-05-04 | End: 2019-05-04

## 2018-10-17 RX ORDER — CODEINE PHOSPHATE AND GUAIFENESIN 10; 100 MG/5ML; MG/5ML
5 SOLUTION ORAL
COMMUNITY
Start: 2018-06-26 | End: 2019-06-26

## 2018-10-17 RX ORDER — METHOTREXATE 25 MG/ML
20 INJECTION, SOLUTION INTRA-ARTERIAL; INTRAMUSCULAR; INTRAVENOUS
COMMUNITY
Start: 2018-08-16 | End: 2022-01-01

## 2018-10-17 NOTE — PERIOP NOTES
Recent Results (from the past 12 hour(s))   GLUCOSE, POC    Collection Time: 10/17/18  3:23 PM   Result Value Ref Range    Glucose (POC) 108 (H) 65 - 100 mg/dL   CBC W/O DIFF    Collection Time: 10/17/18  3:24 PM   Result Value Ref Range    WBC 4.4 4.3 - 11.1 K/uL    RBC 3.94 (L) 4.23 - 5.6 M/uL    HGB 11.9 (L) 13.6 - 17.2 g/dL    HCT 36.3 (L) 41.1 - 50.3 %    MCV 92.1 79.6 - 97.8 FL    MCH 30.2 26.1 - 32.9 PG    MCHC 32.8 31.4 - 35.0 g/dL    RDW 15.8 %    PLATELET 275 (L) 848 - 450 K/uL    MPV 9.3 (L) 9.4 - 12.3 FL    ABSOLUTE NRBC 0.00 0.0 - 0.2 K/uL   METABOLIC PANEL, BASIC    Collection Time: 10/17/18  3:24 PM   Result Value Ref Range    Sodium 141 136 - 145 mmol/L    Potassium 4.4 3.5 - 5.1 mmol/L    Chloride 107 98 - 107 mmol/L    CO2 27 21 - 32 mmol/L    Anion gap 7 7 - 16 mmol/L    Glucose 108 (H) 65 - 100 mg/dL    BUN 14 8 - 23 MG/DL    Creatinine 1.28 0.8 - 1.5 MG/DL    GFR est AA >60 >60 ml/min/1.73m2    GFR est non-AA 58 (L) >60 ml/min/1.73m2    Calcium 9.4 8.3 - 10.4 MG/DL   URINALYSIS W/ RFLX MICROSCOPIC    Collection Time: 10/17/18  3:24 PM   Result Value Ref Range    Color YELLOW      Appearance CLEAR      Specific gravity 1.015 1.001 - 1.023      pH (UA) 6.5 5.0 - 9.0      Protein NEGATIVE  NEG mg/dL    Glucose NEGATIVE  mg/dL    Ketone NEGATIVE  NEG mg/dL    Bilirubin NEGATIVE  NEG      Blood NEGATIVE  NEG      Urobilinogen 1.0 0.2 - 1.0 EU/dL    Nitrites NEGATIVE  NEG      Leukocyte Esterase TRACE (A) NEG      WBC 5-10 0 /hpf    RBC 0-3 0 /hpf    Epithelial cells 0 0 /hpf    Bacteria 0 0 /hpf    Casts 0-3 0 /lpf     Labs reviewed. No further action required, but will route to surgeon to review platelets.

## 2018-10-17 NOTE — PERIOP NOTES
Type 1B surgery, PAT assessment complete. Labs per surgeon: Urine culture and UA, CBC, BMP    Labs per anesthesia protocol: none  EKG: not required  Glucose: 108    Hibiclens and instructions given per hospital policy. Patient provided with and instructed on educational handouts including Guide to Surgery, Pain Management, Hand Hygiene, Blood Transfusion Education, and [facility] Brochure. Patient answered medical/surgical history questions at their best of ability. All prior to admission medications documented in [facility]. Patient instructed to hold all vitamins 7 days prior to surgery and NSAIDS 5 days prior to surgery, patient verbalized understanding. Medications to be held: aspirin 81 (patient states instructed to hold per surgeon- denies hx CAD, MI, or any heart stents- states taking just as preventative)    Patient instructed to continue previous medications as prescribed prior to surgery and to take the following medications the day of surgery according to anesthesia guidelines with a small sip of water: flonase, singulair, protonix, requip, zocor. May take ultram if needed. Patient instructed on the following:  Arrive at [facility], time of arrival to be called the day before by 1700  NPO after midnight including gum, mints, and ice chips. Responsible adult must drive patient to the hospital, stay during surgery, and patient will require supervision 24 hours after anesthesia. Use antibacterial soap in shower the night before surgery and on the morning of surgery. Leave all valuables (money and jewelry) at home but bring insurance card and ID on       DOS. Do not wear make-up, nail polish, lotions, cologne, perfumes, powders, or oil on skin. Patient teach back successful and patient demonstrates knowledge of instruction.

## 2018-10-19 LAB
BACTERIA SPEC CULT: ABNORMAL
SERVICE CMNT-IMP: ABNORMAL

## 2018-10-19 NOTE — PERIOP NOTES
[facility] at Dr. Hughes Prime office notified of abnormal urine culture results. She will have provider review.

## 2018-10-23 ENCOUNTER — ANESTHESIA EVENT (OUTPATIENT)
Dept: SURGERY | Age: 73
End: 2018-10-23
Payer: MEDICARE

## 2018-10-24 ENCOUNTER — ANESTHESIA (OUTPATIENT)
Dept: SURGERY | Age: 73
End: 2018-10-24
Payer: MEDICARE

## 2018-10-24 ENCOUNTER — HOSPITAL ENCOUNTER (OUTPATIENT)
Age: 73
Setting detail: OUTPATIENT SURGERY
Discharge: HOME | End: 2018-10-24
Attending: UROLOGY | Admitting: UROLOGY
Payer: MEDICARE

## 2018-10-24 VITALS
OXYGEN SATURATION: 98 % | RESPIRATION RATE: 15 BRPM | BODY MASS INDEX: 36.96 KG/M2 | DIASTOLIC BLOOD PRESSURE: 71 MMHG | TEMPERATURE: 98 F | WEIGHT: 265 LBS | HEART RATE: 68 BPM | SYSTOLIC BLOOD PRESSURE: 124 MMHG

## 2018-10-24 DIAGNOSIS — C61 PROSTATE CANCER (HCC): ICD-10-CM

## 2018-10-24 DIAGNOSIS — N32.0 BLADDER NECK CONTRACTURE: Primary | ICD-10-CM

## 2018-10-24 LAB — GLUCOSE BLD STRIP.AUTO-MCNC: 109 MG/DL (ref 65–100)

## 2018-10-24 PROCEDURE — 77030020782 HC GWN BAIR PAWS FLX 3M -B: Performed by: ANESTHESIOLOGY

## 2018-10-24 PROCEDURE — 74011250636 HC RX REV CODE- 250/636

## 2018-10-24 PROCEDURE — 76210000020 HC REC RM PH II FIRST 0.5 HR: Performed by: UROLOGY

## 2018-10-24 PROCEDURE — 77030019927 HC TBNG IRR CYSTO BAXT -A: Performed by: UROLOGY

## 2018-10-24 PROCEDURE — 77030005518 HC CATH URETH FOL 2W BARD -B: Performed by: UROLOGY

## 2018-10-24 PROCEDURE — 88305 TISSUE EXAM BY PATHOLOGIST: CPT

## 2018-10-24 PROCEDURE — 74011250636 HC RX REV CODE- 250/636: Performed by: NURSE PRACTITIONER

## 2018-10-24 PROCEDURE — 77030032490 HC SLV COMPR SCD KNE COVD -B: Performed by: UROLOGY

## 2018-10-24 PROCEDURE — 77030020143 HC AIRWY LARYN INTUB CGAS -A: Performed by: ANESTHESIOLOGY

## 2018-10-24 PROCEDURE — 76060000032 HC ANESTHESIA 0.5 TO 1 HR: Performed by: UROLOGY

## 2018-10-24 PROCEDURE — 77030018832 HC SOL IRR H20 ICUM -A: Performed by: UROLOGY

## 2018-10-24 PROCEDURE — 77030010545: Performed by: UROLOGY

## 2018-10-24 PROCEDURE — 82962 GLUCOSE BLOOD TEST: CPT

## 2018-10-24 PROCEDURE — 74011250636 HC RX REV CODE- 250/636: Performed by: ANESTHESIOLOGY

## 2018-10-24 PROCEDURE — C1769 GUIDE WIRE: HCPCS | Performed by: UROLOGY

## 2018-10-24 PROCEDURE — 74011636320 HC RX REV CODE- 636/320: Performed by: UROLOGY

## 2018-10-24 PROCEDURE — 76210000006 HC OR PH I REC 0.5 TO 1 HR: Performed by: UROLOGY

## 2018-10-24 PROCEDURE — 76010000138 HC OR TIME 0.5 TO 1 HR: Performed by: UROLOGY

## 2018-10-24 RX ORDER — PROPOFOL 10 MG/ML
INJECTION, EMULSION INTRAVENOUS AS NEEDED
Status: DISCONTINUED | OUTPATIENT
Start: 2018-10-24 | End: 2018-10-24 | Stop reason: HOSPADM

## 2018-10-24 RX ORDER — OXYCODONE HYDROCHLORIDE 5 MG/1
5 TABLET ORAL
Status: DISCONTINUED | OUTPATIENT
Start: 2018-10-24 | End: 2018-10-24 | Stop reason: HOSPADM

## 2018-10-24 RX ORDER — SODIUM CHLORIDE 0.9 % (FLUSH) 0.9 %
5-10 SYRINGE (ML) INJECTION EVERY 8 HOURS
Status: DISCONTINUED | OUTPATIENT
Start: 2018-10-24 | End: 2018-10-24 | Stop reason: HOSPADM

## 2018-10-24 RX ORDER — FLUMAZENIL 0.1 MG/ML
0.2 INJECTION INTRAVENOUS
Status: DISCONTINUED | OUTPATIENT
Start: 2018-10-24 | End: 2018-10-24 | Stop reason: HOSPADM

## 2018-10-24 RX ORDER — SODIUM CHLORIDE 0.9 % (FLUSH) 0.9 %
5-10 SYRINGE (ML) INJECTION AS NEEDED
Status: DISCONTINUED | OUTPATIENT
Start: 2018-10-24 | End: 2018-10-24 | Stop reason: HOSPADM

## 2018-10-24 RX ORDER — MIDAZOLAM HYDROCHLORIDE 1 MG/ML
2 INJECTION, SOLUTION INTRAMUSCULAR; INTRAVENOUS
Status: DISCONTINUED | OUTPATIENT
Start: 2018-10-24 | End: 2018-10-24 | Stop reason: HOSPADM

## 2018-10-24 RX ORDER — SODIUM CHLORIDE, SODIUM LACTATE, POTASSIUM CHLORIDE, CALCIUM CHLORIDE 600; 310; 30; 20 MG/100ML; MG/100ML; MG/100ML; MG/100ML
75 INJECTION, SOLUTION INTRAVENOUS CONTINUOUS
Status: DISCONTINUED | OUTPATIENT
Start: 2018-10-24 | End: 2018-10-24 | Stop reason: HOSPADM

## 2018-10-24 RX ORDER — ONDANSETRON 2 MG/ML
INJECTION INTRAMUSCULAR; INTRAVENOUS AS NEEDED
Status: DISCONTINUED | OUTPATIENT
Start: 2018-10-24 | End: 2018-10-24 | Stop reason: HOSPADM

## 2018-10-24 RX ORDER — HYDROCODONE BITARTRATE AND ACETAMINOPHEN 5; 325 MG/1; MG/1
1 TABLET ORAL
Qty: 15 TAB | Refills: 0 | Status: SHIPPED | OUTPATIENT
Start: 2018-10-24 | End: 2022-01-01

## 2018-10-24 RX ORDER — FENTANYL CITRATE 50 UG/ML
INJECTION, SOLUTION INTRAMUSCULAR; INTRAVENOUS AS NEEDED
Status: DISCONTINUED | OUTPATIENT
Start: 2018-10-24 | End: 2018-10-24 | Stop reason: HOSPADM

## 2018-10-24 RX ORDER — HYDROMORPHONE HYDROCHLORIDE 2 MG/ML
0.5 INJECTION, SOLUTION INTRAMUSCULAR; INTRAVENOUS; SUBCUTANEOUS
Status: DISCONTINUED | OUTPATIENT
Start: 2018-10-24 | End: 2018-10-24 | Stop reason: HOSPADM

## 2018-10-24 RX ORDER — LIDOCAINE HYDROCHLORIDE 10 MG/ML
0.1 INJECTION INFILTRATION; PERINEURAL AS NEEDED
Status: DISCONTINUED | OUTPATIENT
Start: 2018-10-24 | End: 2018-10-24 | Stop reason: HOSPADM

## 2018-10-24 RX ORDER — LIDOCAINE HYDROCHLORIDE 20 MG/ML
INJECTION, SOLUTION EPIDURAL; INFILTRATION; INTRACAUDAL; PERINEURAL AS NEEDED
Status: DISCONTINUED | OUTPATIENT
Start: 2018-10-24 | End: 2018-10-24 | Stop reason: HOSPADM

## 2018-10-24 RX ORDER — CIPROFLOXACIN 2 MG/ML
400 INJECTION, SOLUTION INTRAVENOUS
Status: COMPLETED | OUTPATIENT
Start: 2018-10-24 | End: 2018-10-24

## 2018-10-24 RX ORDER — DIPHENHYDRAMINE HYDROCHLORIDE 50 MG/ML
12.5 INJECTION, SOLUTION INTRAMUSCULAR; INTRAVENOUS
Status: DISCONTINUED | OUTPATIENT
Start: 2018-10-24 | End: 2018-10-24 | Stop reason: RX

## 2018-10-24 RX ORDER — NALOXONE HYDROCHLORIDE 0.4 MG/ML
0.1 INJECTION, SOLUTION INTRAMUSCULAR; INTRAVENOUS; SUBCUTANEOUS
Status: DISCONTINUED | OUTPATIENT
Start: 2018-10-24 | End: 2018-10-24 | Stop reason: HOSPADM

## 2018-10-24 RX ORDER — SODIUM CHLORIDE, SODIUM LACTATE, POTASSIUM CHLORIDE, CALCIUM CHLORIDE 600; 310; 30; 20 MG/100ML; MG/100ML; MG/100ML; MG/100ML
100 INJECTION, SOLUTION INTRAVENOUS CONTINUOUS
Status: DISCONTINUED | OUTPATIENT
Start: 2018-10-24 | End: 2018-10-24 | Stop reason: HOSPADM

## 2018-10-24 RX ADMIN — ONDANSETRON 4 MG: 2 INJECTION INTRAMUSCULAR; INTRAVENOUS at 13:37

## 2018-10-24 RX ADMIN — CIPROFLOXACIN 400 MG: 2 INJECTION, SOLUTION INTRAVENOUS at 12:59

## 2018-10-24 RX ADMIN — FENTANYL CITRATE 50 MCG: 50 INJECTION, SOLUTION INTRAMUSCULAR; INTRAVENOUS at 13:09

## 2018-10-24 RX ADMIN — FENTANYL CITRATE 50 MCG: 50 INJECTION, SOLUTION INTRAMUSCULAR; INTRAVENOUS at 13:23

## 2018-10-24 RX ADMIN — LIDOCAINE HYDROCHLORIDE 100 MG: 20 INJECTION, SOLUTION EPIDURAL; INFILTRATION; INTRACAUDAL; PERINEURAL at 13:09

## 2018-10-24 RX ADMIN — SODIUM CHLORIDE, SODIUM LACTATE, POTASSIUM CHLORIDE, AND CALCIUM CHLORIDE 100 ML/HR: 600; 310; 30; 20 INJECTION, SOLUTION INTRAVENOUS at 12:29

## 2018-10-24 RX ADMIN — PROPOFOL 250 MG: 10 INJECTION, EMULSION INTRAVENOUS at 13:09

## 2018-10-24 NOTE — ANESTHESIA POSTPROCEDURE EVALUATION
Procedure(s): 
CYSTOSCOPY WITH BLADDER NECK CONTRACTURE DILATION / BLADDER BIOPSY / RETROGRADE PYELOGRAM. Anesthesia Post Evaluation Multimodal analgesia: multimodal analgesia not used between 6 hours prior to anesthesia start to PACU discharge Patient location during evaluation: PACU Patient participation: complete - patient participated Level of consciousness: awake and alert Pain management: adequate Airway patency: patent Anesthetic complications: no 
Cardiovascular status: hemodynamically stable Respiratory status: acceptable Hydration status: acceptable Visit Vitals /67 (BP 1 Location: Left arm, BP Patient Position: At rest) Pulse 71 Temp 36.7 °C (98 °F) Resp 16 Wt 120.2 kg (265 lb) SpO2 99% BMI 36.96 kg/m²

## 2018-10-24 NOTE — BRIEF OP NOTE
BRIEF OPERATIVE NOTE    Date of Procedure: 10/24/2018   Preoperative Diagnosis: Bladder neck contracture [N32.0] gross hematuria  Postoperative Diagnosis: Bladder neck contracture [N32.0]   Gross hematuria.   Abnl bladder urothelium  Procedure(s):  CYSTOSCOPY WITH BLADDER NECK CONTRACTURE DILATION / BLADDER BIOPSIES/FULGERATION / RETROGRADE PYELOGRAMS  Surgeon(s) and Role:     * Nj Leon DO - Primary         Surgical Assistant: None    Surgical Staff:  Circ-1: Anat Abreu RN  Circ-2: Zoë Yañez RN  Event Time In Time Out   Incision Start 1320    Incision Close 1337      Anesthesia: General   Estimated Blood Loss: <5cc  Specimens:   ID Type Source Tests Collected by Time Destination   1 : bladder biopsy Preservative Bladder  Vandana Ahmadi DO 10/24/2018 1329 Pathology      Findings: see op note  Complications: none immediate  Implants: * No implants in log *

## 2018-10-24 NOTE — DISCHARGE INSTRUCTIONS
May resume asa in 48-72h if urine is clear. Follow up in am for Guajardo removal with nursing staff if urine is clear. 8:15 AM at . [address]--- Follow up in am for Guajardo removal with nursing staff if urine is clear      . Surgery Follow up on - 11/2/18 @09:30. Cystoscopy: What to Expect at [facility]    A cystoscopy is a procedure that lets a doctor look inside of the bladder and the urethra. The urethra is the tube that carries urine from the bladder to outside the body. The doctor uses a thin, lighted tool called a cystoscope. Your bladder is filled with fluid. This stretches the bladder so that your doctor can look closely at the inside of your bladder. After the cystoscopy, your urethra may be sore at first, and it may burn when you urinate for the first few days after the procedure. You may feel the need to urinate more often, and your urine may be pink. These symptoms should get better in 1 or 2 days. You will probably be able to go back to most of your usual activities in 1 or 2 days. This care sheet gives you a general idea about how long it will take for you to recover. But each person recovers at a different pace. Follow the steps below to get better as quickly as possible. How can you care for yourself at home? Activity    · Rest when you feel tired. Getting enough sleep will help you recover.     · Try to walk each day. Start by walking a little more than you did the day before. Bit by bit, increase the amount you walk. Walking boosts blood flow and helps prevent pneumonia and constipation.     · Avoid strenuous activities, such as bicycle riding, jogging, weight lifting, or aerobic exercise, until your doctor says it is okay.     · Ask your doctor when you can drive again.     · Most people are able to return to work within 1 or 2 days after the procedure.     · You may shower and take baths as usual.     · Ask your doctor when it is okay for you to have sex. Diet    · You can eat your normal diet. If your stomach is upset, try bland, low-fat foods like plain rice, broiled chicken, toast, and yogurt.     · Drink plenty of fluids (unless your doctor tells you not to). Medicines    · Take pain medicines exactly as directed. ? If the doctor gave you a prescription medicine for pain, take it as prescribed. ? If you are not taking a prescription pain medicine, ask your doctor if you can take an over-the-counter medicine.     · If you think your pain medicine is making you sick to your stomach:  ? Take your medicine after meals (unless your doctor has told you not to). ? Ask your doctor for a different pain medicine.     · If your doctor prescribed antibiotics, take them as directed. Do not stop taking them just because you feel better. You need to take the full course of antibiotics. Follow-up care is a key part of your treatment and safety. Be sure to make and go to all appointments, and call your doctor if you are having problems. It's also a good idea to know your test results and keep a list of the medicines you take. When should you call for help? Call 911 anytime you think you may need emergency care. For example, call if:    · You passed out (lost consciousness).     · You have severe trouble breathing.     · You have sudden chest pain and shortness of breath, or you cough up blood.     · You have severe belly pain.    Call your doctor now or seek immediate medical care if:    · You are sick to your stomach or cannot keep fluids down.     · Your urine is still red or you see blood clots after you have urinated several times.     · You have trouble passing urine or stool, especially if you have pain or swelling in your lower belly.     · You have signs of a blood clot, such as:  ? Pain in your calf, back of the knee, thigh, or groin. ?  Redness and swelling in your leg or groin.     · You develop a fever or severe chills.     · You have pain in your back just below your rib cage. This is called flank pain.    Watch closely for changes in your health, and be sure to contact your doctor if:    · You have pain or burning when you urinate. A burning feeling is normal for a day or two after the test, but call if it does not get better.     · You have a frequent urge to urinate but can pass only small amounts of urine.     · Your urine is pink, red, or cloudy, or smells bad. It is normal for the urine to have a pinkish color for a few days after the test, but call if it does not get better. Where can you learn more? Go to http://mary lou-osvaldo.info/. Enter G354 in the search box to learn more about \"Cystoscopy: What to Expect at Home. \"  Current as of: March 21, 2018  Content Version: 11.8  © 6118-3065 [employer]. Care instructions adapted under license by [employer] (which disclaims liability or warranty for this information). If you have questions about a medical condition or this instruction, always ask your healthcare professional. [place] [address] any warranty or liability for your use of this information. Learning About How to Care for an Indwelling Urinary Catheter  Learning About How to Care for an Indwelling Urinary Catheter    A urinary catheter is a flexible plastic tube used to drain urine from the bladder when a person cannot urinate. A doctor will place the catheter into the bladder by inserting it through the urethra. The urethra is the opening that carries urine from the bladder to the outside of the body. When the catheter is in the bladder, a small balloon is used to keep the catheter in place. The catheter lets urine drain from the bladder into a bag. The bag is usually attached to the thigh. Urinary catheters can be used in both men and women. A catheter that stays in place for a longer period of time is called an indwelling catheter.   A catheter may be needed because of certain medical conditions. These include an enlarged prostate or problems controlling urine. It may be used after surgery on the pelvis or urinary tract. Urinary catheters are also used when the lower part of the body is paralyzed. When helping a loved one with a catheter, try to be relaxed. Caring for a catheter can be embarrassing for both of you. This may be especially true if you are caring for someone of the opposite sex. If you are calm and don't seem embarrassed, the person may feel more comfortable. How do you take care of the catheter? Wear disposable gloves when handling someone's catheter. Make sure to follow all of the instructions the doctor has given. And always wash your hands before and after you're done. Here are some other things to remember when caring for someone's catheter:  · Make sure that urine is running out of the catheter into the urine collection bag. And make sure that the catheter tubing does not get twisted or bent. · Keep the urine collection bag below the level of the bladder. At night it may be helpful to hang the bag on the side of the bed. · Make sure that the urine collection bag does not drag and pull on the catheter. · It is okay to shower with a catheter and urine collection bag in place, unless the doctor says not to. · Check for swelling or signs of infection in the area around the catheter. Signs of infection include pus or irritated, swollen, red, or tender skin. · Clean the area around the catheter twice a day with soap and water. Dry with a clean towel afterward. · Do not apply powder or lotion to the skin around the catheter. · Do not tug or pull on the catheter. · Sexual intercourse may still be possible for individuals who wear a catheter. It is best to talk with a doctor about options. How do you empty the bag? The urine collection bag needs to be emptied regularly. It is best to empty the bag when it's about half full or at bedtime.  If the doctor has asked you to measure the amount of urine, do that before you empty the urine into the toilet. When you are ready to empty the bag, follow these steps:  1. Put on disposable gloves. 2. Remove the drain spout from its sleeve at the bottom of the collection bag. Open the valve on the spout. 3. Let the urine flow out of the bag and into the toilet or a container. Do not let the tubing or drain spout touch anything. 4. After you empty the bag, wipe off any liquid on the end of the drain spout. Close the valve and put the drain spout back into its sleeve. 5. Remove your gloves and throw them away. 6. Wash your hands with soap and water. How do you care for someone after the catheter is removed? After the catheter is taken out, the person may have trouble urinating. If this happens, try helping them sit in a few inches of warm water (sitz bath). If the urge to urinate comes during the sitz bath, it may be easier for them to urinate while still in the bath. Some burning may happen the first few times the person urinates. If the burning lasts longer, it may be a sign of an infection. If the catheter causes irritation or a rash, wearing loose, cotton underwear may help. Watch closely for changes in the person's health, and be sure to contact their doctor if you notice any problems. Where can you learn more? Go to http://mary lou-osvaldo.info/. Enter Z125 in the search box to learn more about \"Learning About How to Care for an Indwelling Urinary Catheter. \"  Current as of: April 19, 2018  Content Version: 11.8  © 5271-7387 [employer]. Care instructions adapted under license by [employer] (which disclaims liability or warranty for this information). If you have questions about a medical condition or this instruction, always ask your healthcare professional. [address] any warranty or liability for your use of this information.     After general anesthesia or intravenous sedation, for 24 hours or while taking prescription Narcotics:  · Limit your activities  · Do not drive and operate hazardous machinery  · Do not make important personal or business decisions  · Do  not drink alcoholic beverages  · If you have not urinated within 8 hours after discharge, please contact your surgeon on call. *  Please give a list of your current medications to your Primary Care Provider. *  Please update this list whenever your medications are discontinued, doses are      changed, or new medications (including over-the-counter products) are added. *  Please carry medication information at all times in case of emergency situations. These are general instructions for a healthy lifestyle:  No smoking/ No tobacco products/ Avoid exposure to second hand smoke  Surgeon General's Warning:  Quitting smoking now greatly reduces serious risk to your health. Obesity, smoking, and sedentary lifestyle greatly increases your risk for illness  A healthy diet, regular physical exercise & weight monitoring are important for maintaining a healthy lifestyle    You may be retaining fluid if you have a history of heart failure or if you experience any of the following symptoms:  Weight gain of 3 pounds or more overnight or 5 pounds in a week, increased swelling in our hands or feet or shortness of breath while lying flat in bed. Please call your doctor as soon as you notice any of these symptoms; do not wait until your next office visit. Recognize signs and symptoms of STROKE:  F-face looks uneven  A-arms unable to move or move unevenly  S-speech slurred or non-existent  T-time-call 911 as soon as signs and symptoms begin-DO NOT go       Back to bed or wait to see if you get better-TIME IS BRAIN.

## 2018-10-24 NOTE — ANESTHESIA PREPROCEDURE EVALUATION
Anesthetic History Review of Systems / Medical History Patient summary reviewed and pertinent labs reviewed Pulmonary Sleep apnea: CPAP Neuro/Psych Cardiovascular Hypertension: well controlled GI/Hepatic/Renal 
  
GERD: well controlled Endo/Other Diabetes: type 2 Obesity Other Findings Physical Exam 
 
Airway Mallampati: II 
TM Distance: > 6 cm Neck ROM: normal range of motion Mouth opening: Normal 
 
 Cardiovascular Regular rate and rhythm,  S1 and S2 normal,  no murmur, click, rub, or gallop Rhythm: regular Rate: normal 
 
 
 
 Dental 
No notable dental hx Pulmonary Breath sounds clear to auscultation Abdominal 
 
 
 
 Other Findings Anesthetic Plan ASA: 3 Anesthesia type: general 
 
 
 
 
 
Anesthetic plan and risks discussed with: Patient

## 2018-10-25 NOTE — OP NOTES
[facility] REPORT    Bismark Perez  MR#: [id number]  : 1945  ACCOUNT #: [de-identified]   DATE OF SERVICE: 10/24/2018    PREOPERATIVE DIAGNOSES:  Gross hematuria and bladder neck contracture. POSTOPERATIVE DIAGNOSES:  Gross hematuria and bladder neck contracture, with abnormal bladder urothelium. PROCEDURES PERFORMED:  Cystoscopy, dilation of bladder neck contracture, bladder biopsies, fulguration of abnormal bladder urothelium, and bilateral retrograde pyelograms. SURGEON:  Stacey Burgos DO    ANESTHESIA:  General    SPECIMENS REMOVED:  Bladder biopsies    COMPLICATIONS:  None immediate. ESTIMATED BLOOD LOSS:  Less than 5 mL. ASSISTANT:  None    IMPLANTS:  None    CLINICAL HISTORY:  This is a 79-year-old gentleman who is status post radical prostatectomy in . 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 He was recently noted to have a terminal stream gross hematuria. Cystoscopy in the office on 2018, showed a bladder neck contracture. Previous PET/CT scan on 2018 was unremarkable. All risks, benefits, and alternatives to the procedure have been reviewed, and he is willing to proceed at this time. DESCRIPTION OF OPERATIVE PROCEDURE:  Patient consent was obtained. The patient was brought to the operating suite, at which time he was placed in the supine position. After the uneventful induction of general anesthesia, he was then placed in the dorsal lithotomy position. His genital area was prepped and draped, and a sterile field applied. A 22-Arabic cystoscope was inserted into urethra and advanced under direct vision. A tight bladder neck contracture was seen. A guidewire was introduced into the bladder without difficulty. The bladder neck contracture was then dilated using Amplatz dilators serially up to 24-Arabic. This was done without significant resistance.   The cystoscope was then reinserted alongside the wire. The bladder neck contracture was very short in nature, measuring less than 1 cm. The bladder was systematically surveyed. Several areas of raised and injected bladder urothelium were seen on the lateral walls bilaterally. The more concerning lesions were seen on the left lateral wall. Both of these lesions were biopsied using a cold cup biopsy forceps. All abnormal bladder urothelium was then fulgurated using the Bugbee electrode. Following fulguration, bilateral retrograde pyelograms were performed using a 5-Macanese cone-tipped catheter. No filling defects or dilation were seen bilaterally. Prompt excretion was noted bilaterally. The wire was then replaced into the bladder, and the cystoscope was removed. A [place] tip catheter was passed over the wire to dependent drainage. The patient tolerated the procedure well. I will plan to have his catheter removed tomorrow if clear, and have him follow up in 1 week to review his pathology results. INTERPRETATION OF BILATERAL RETROGRADE PYELOGRAMS:  Bilateral retrograde pyelograms were performed using a 5-Macanese cone-tipped catheter. No filling defects or dilation were seen bilaterally. Prompt excretion was noted bilaterally.       DO Willis Bonilla / Mitali Martin  D: 10/24/2018 13:54     T: 10/24/2018 21:26  JOB #: [id number]

## 2018-11-29 ENCOUNTER — HOSPITAL ENCOUNTER (EMERGENCY)
Age: 73
Discharge: HOME | End: 2018-11-29
Attending: EMERGENCY MEDICINE
Payer: MEDICARE

## 2018-11-29 ENCOUNTER — HOSPITAL ENCOUNTER (EMERGENCY)
Age: 73
Discharge: HOME | End: 2018-11-30
Attending: EMERGENCY MEDICINE
Payer: MEDICARE

## 2018-11-29 VITALS
SYSTOLIC BLOOD PRESSURE: 150 MMHG | HEART RATE: 70 BPM | OXYGEN SATURATION: 98 % | BODY MASS INDEX: 37.8 KG/M2 | WEIGHT: 270 LBS | HEIGHT: 71 IN | RESPIRATION RATE: 14 BRPM | DIASTOLIC BLOOD PRESSURE: 80 MMHG | TEMPERATURE: 97.4 F

## 2018-11-29 DIAGNOSIS — R31.9 HEMATURIA, UNSPECIFIED TYPE: ICD-10-CM

## 2018-11-29 DIAGNOSIS — R31.0 GROSS HEMATURIA: Primary | ICD-10-CM

## 2018-11-29 DIAGNOSIS — R33.9 URINARY RETENTION: ICD-10-CM

## 2018-11-29 DIAGNOSIS — R33.9 URINARY RETENTION: Primary | ICD-10-CM

## 2018-11-29 LAB
AMORPH CRY URNS QL MICRO: ABNORMAL
ANION GAP SERPL CALC-SCNC: 8 MMOL/L (ref 7–16)
APPEARANCE UR: ABNORMAL
BACTERIA URNS QL MICRO: 0 /HPF
BASOPHILS # BLD: 0 K/UL (ref 0–0.2)
BASOPHILS NFR BLD: 1 % (ref 0–2)
BILIRUB UR QL: NEGATIVE
BUN SERPL-MCNC: 18 MG/DL (ref 8–23)
CALCIUM SERPL-MCNC: 9.5 MG/DL (ref 8.3–10.4)
CHLORIDE SERPL-SCNC: 108 MMOL/L (ref 98–107)
CK SERPL-CCNC: 77 U/L (ref 21–215)
CO2 SERPL-SCNC: 23 MMOL/L (ref 21–32)
COLOR UR: ABNORMAL
CREAT SERPL-MCNC: 1.18 MG/DL (ref 0.8–1.5)
DIFFERENTIAL METHOD BLD: ABNORMAL
EOSINOPHIL # BLD: 0.1 K/UL (ref 0–0.8)
EOSINOPHIL NFR BLD: 3 % (ref 0.5–7.8)
ERYTHROCYTE [DISTWIDTH] IN BLOOD BY AUTOMATED COUNT: 15.3 % (ref 11.9–14.6)
GLUCOSE SERPL-MCNC: 117 MG/DL (ref 65–100)
GLUCOSE UR STRIP.AUTO-MCNC: NEGATIVE MG/DL
HCT VFR BLD AUTO: 36.1 % (ref 41.1–50.3)
HGB BLD-MCNC: 11.8 G/DL (ref 13.6–17.2)
HGB UR QL STRIP: ABNORMAL
IMM GRANULOCYTES # BLD: 0 K/UL (ref 0–0.5)
IMM GRANULOCYTES NFR BLD AUTO: 1 % (ref 0–5)
KETONES UR QL STRIP.AUTO: ABNORMAL MG/DL
LEUKOCYTE ESTERASE UR QL STRIP.AUTO: ABNORMAL
LYMPHOCYTES # BLD: 0.9 K/UL (ref 0.5–4.6)
LYMPHOCYTES NFR BLD: 20 % (ref 13–44)
MCH RBC QN AUTO: 29 PG (ref 26.1–32.9)
MCHC RBC AUTO-ENTMCNC: 32.7 G/DL (ref 31.4–35)
MCV RBC AUTO: 88.7 FL (ref 79.6–97.8)
MONOCYTES # BLD: 0.4 K/UL (ref 0.1–1.3)
MONOCYTES NFR BLD: 9 % (ref 4–12)
MYOGLOBIN SERPL-MCNC: 88 NG/ML (ref 16–96)
NEUTS SEG # BLD: 2.8 K/UL (ref 1.7–8.2)
NEUTS SEG NFR BLD: 66 % (ref 43–78)
NITRITE UR QL STRIP.AUTO: NEGATIVE
NRBC # BLD: 0 K/UL (ref 0–0.2)
PH UR STRIP: 6 [PH] (ref 5–9)
PLATELET # BLD AUTO: 100 K/UL (ref 150–450)
PMV BLD AUTO: 9.5 FL (ref 9.4–12.3)
POTASSIUM SERPL-SCNC: 4 MMOL/L (ref 3.5–5.1)
PROT UR STRIP-MCNC: 100 MG/DL
RBC # BLD AUTO: 4.07 M/UL (ref 4.23–5.6)
RBC #/AREA URNS HPF: >100 /HPF
SODIUM SERPL-SCNC: 139 MMOL/L (ref 136–145)
SP GR UR REFRACTOMETRY: 1.02 (ref 1–1.02)
UROBILINOGEN UR QL STRIP.AUTO: 1 EU/DL (ref 0.2–1)
WBC # BLD AUTO: 4.2 K/UL (ref 4.3–11.1)
WBC URNS QL MICRO: ABNORMAL /HPF
YEAST URNS QL MICRO: ABNORMAL

## 2018-11-29 PROCEDURE — 87086 URINE CULTURE/COLONY COUNT: CPT

## 2018-11-29 PROCEDURE — 81001 URINALYSIS AUTO W/SCOPE: CPT

## 2018-11-29 PROCEDURE — 77030005518 HC CATH URETH FOL 2W BARD -B

## 2018-11-29 PROCEDURE — 82550 ASSAY OF CK (CPK): CPT

## 2018-11-29 PROCEDURE — 99283 EMERGENCY DEPT VISIT LOW MDM: CPT | Performed by: EMERGENCY MEDICINE

## 2018-11-29 PROCEDURE — 51702 INSERT TEMP BLADDER CATH: CPT | Performed by: EMERGENCY MEDICINE

## 2018-11-29 PROCEDURE — 51703 INSERT BLADDER CATH COMPLEX: CPT | Performed by: EMERGENCY MEDICINE

## 2018-11-29 PROCEDURE — 80048 BASIC METABOLIC PNL TOTAL CA: CPT

## 2018-11-29 PROCEDURE — 83874 ASSAY OF MYOGLOBIN: CPT

## 2018-11-29 PROCEDURE — 85025 COMPLETE CBC W/AUTO DIFF WBC: CPT

## 2018-11-29 PROCEDURE — 77030012862 HC BG URIN LEG BARD -A

## 2018-11-29 RX ORDER — PHENAZOPYRIDINE HYDROCHLORIDE 200 MG/1
200 TABLET, FILM COATED ORAL 3 TIMES DAILY
Qty: 6 TAB | Refills: 0 | Status: SHIPPED | OUTPATIENT
Start: 2018-11-29 | End: 2018-12-01

## 2018-11-29 RX ORDER — LIDOCAINE HYDROCHLORIDE 20 MG/ML
JELLY TOPICAL ONCE
Status: DISCONTINUED | OUTPATIENT
Start: 2018-11-29 | End: 2018-11-29 | Stop reason: HOSPADM

## 2018-11-29 RX ORDER — NYSTATIN 100000 U/G
CREAM TOPICAL 2 TIMES DAILY
Qty: 15 G | Refills: 0 | Status: SHIPPED | OUTPATIENT
Start: 2018-11-29 | End: 2022-01-01

## 2018-11-29 NOTE — DISCHARGE INSTRUCTIONS
Blood in the Urine: Care Instructions  Your Care Instructions    Blood in the urine, or hematuria, may make the urine look red, brown, or pink. There may be blood every time you urinate or just from time to time. You cannot always see blood in the urine, but it will show up in a urine test.  Blood in the urine may be serious. It should always be checked by a doctor. Your doctor may recommend more tests, including an X-ray, a CT scan, or a cystoscopy (which lets a doctor look inside the urethra and bladder). Blood in the urine can be a sign of another problem. Common causes are bladder infections and kidney stones. An injury to your groin or your genital area can also cause bleeding in the urinary tract. Very hard exercise--such as running a marathon--can cause blood in the urine. Blood in the urine can also be a sign of kidney disease or cancer in the bladder or kidney. Many cases of blood in the urine are caused by a harmless condition that runs in families. This is called benign familial hematuria. It does not need any treatment. Sometimes your urine may look red or brown even though it does not contain blood. For example, not getting enough fluids (dehydration), taking certain medicines, or having a liver problem can change the color of your urine. Eating foods such as beets, rhubarb, or blackberries or foods with red food coloring can make your urine look red or pink. Follow-up care is a key part of your treatment and safety. Be sure to make and go to all appointments, and call your doctor if you are having problems. It's also a good idea to know your test results and keep a list of the medicines you take. When should you call for help? Call your doctor now or seek immediate medical care if:    · You have symptoms of a urinary infection. For example:  ? You have pus in your urine. ? You have pain in your back just below your rib cage. This is called flank pain. ?  You have a fever, chills, or body aches.  ? It hurts to urinate. ? You have groin or belly pain.     · You have more blood in your urine.    Watch closely for changes in your health, and be sure to contact your doctor if:    · You have new urination problems.     · You do not get better as expected. Where can you learn more? Go to http://mary lou-osvaldo.info/. Enter E116 in the search box to learn more about \"Blood in the Urine: Care Instructions. \"  Current as of: March 21, 2018  Content Version: 11.8  © 0551-2621 [employer]. Care instructions adapted under license by [employer] (which disclaims liability or warranty for this information). If you have questions about a medical condition or this instruction, always ask your healthcare professional. [address] any warranty or liability for your use of this information. Urinary Retention: Care Instructions  Your Care Instructions    Urinary retention means that you aren't able to urinate. In men, it is often caused by a blockage of the urinary tract from an enlarged prostate gland. In men and women, it can also be caused by an infection or nerve damage. Or it may be a side effect of a medicine. The doctor may have drained the urine from your bladder. If you still have problems passing urine, you may need to use a catheter at home. This is used to empty your bladder until the problem can be fixed. Your doctor may put a catheter in your bladder before you go home. If so, he or she will tell you when to come back to have the catheter removed. The doctor has checked you closely. But problems can develop later. If you notice any problems or new symptoms, get medical treatment right away. Follow-up care is a key part of your treatment and safety. Be sure to make and go to all appointments, and call your doctor if you are having problems.  It's also a good idea to know your test results and keep a list of the medicines you take.  How can you care for yourself at home? · Take your medicines exactly as prescribed. Call your doctor if you think you are having a problem with your medicine. You will get more details on the specific medicines your doctor prescribes. · Check with your doctor before you use any over-the-counter medicines. Many cold and allergy medicines, for example, can make this problem worse. Make sure your doctor knows all of the medicines, vitamins, supplements, and herbal remedies you take. · Spread out through the day the amount of fluid you drink. Do not drink a lot at bedtime. · Avoid alcohol and caffeine. · If you have been given a catheter, or if one is already in place, follow the instructions you were given. Always wash your hands before and after you handle the catheter. When should you call for help? Call your doctor now or seek immediate medical care if:    · You cannot urinate at all, or it is getting harder to urinate.     · You have symptoms of a urinary tract infection. These may include:  ? Pain or burning when you urinate. ? A frequent need to urinate without being able to pass much urine. ? Pain in the flank, which is just below the rib cage and above the waist on either side of the back. ? Blood in your urine. ? A fever.    Watch closely for changes in your health, and be sure to contact your doctor if:    · You have any problems with your catheter.     · You do not get better as expected. Where can you learn more? Go to http://mary lou-osvaldo.info/. Enter M244 in the search box to learn more about \"Urinary Retention: Care Instructions. \"  Current as of: March 21, 2018  Content Version: 11.8  © 5535-1763 [employer]. Care instructions adapted under license by [employer] (which disclaims liability or warranty for this information).  If you have questions about a medical condition or this instruction, always ask your healthcare professional. [employer] disclaims any warranty or liability for your use of this information.

## 2018-11-29 NOTE — LETTER
NOTIFICATION RETURN TO WORK / SCHOOL 
 
11/29/2018 10:46 AM 
 
Mr. Alberto Buenrostro [address] To Whom It May Concern: 
 
Alberto Buenrostro is currently under the care of [facility] EMERGENCY DEPT. He will return to work/school on: After following up with Urology.  
 
 
 
Sincerely,

## 2018-11-29 NOTE — ED TRIAGE NOTES
Pt states he has been unable to urinate since last night. Had cystoscopy around 3 weeks ago by Dr Chava Gregory. Has experienced burning with urination and hematuria. Family states urine was very bloody last night.

## 2018-11-29 NOTE — ED PROVIDER NOTES
Patient presents from complaining of suprapubic pain and urinary retention. The patient had a recent uroscopy performed by Dr. Wong Livers the patient reported there were some abnormalities in the bladder wall that were cauterized. The patient reports total prostatectomy in the past.  He denies any history of urinary retention. He reports seeing blood in the urine yesterday then passed a small clot last night but has not urinated since and has a feeling of constant pressure and the urge to void but has been unable to do so. The history is provided by the patient. Urinary Retention This is a new problem. The current episode started 6 to 12 hours ago. The problem occurs constantly. The problem has not changed since onset. The pain is associated with an unknown factor. The pain is located in the suprapubic region. The quality of the pain is sharp. The pain is at a severity of 10/10. The pain is severe. Pertinent negatives include no anorexia, no fever, no belching, no diarrhea, no flatus, no hematochezia, no melena, no nausea, no vomiting, no constipation, no dysuria, no frequency, no hematuria, no headaches, no arthralgias, no myalgias, no trauma, no chest pain and no back pain. Nothing worsens the pain. The pain is relieved by nothing. prostatectomy Past Medical History:  
Diagnosis Date  Adverse effect of anesthesia   
 reports \"slow to get over effects\" of anesthesia- drowsy  Allergic rhinitis  Anemia 2018  Back pain  Colon polyps  Diabetes ([facility] [address] oral agents; rarely checks BS, reports 138 today; occasional hypo symptoms (shaky) unaware of hypo level  Elevated liver function tests  GERD (gastroesophageal reflux disease)   
 controlled with meds  H/O alcohol abuse  Hiatal hernia  Hypercholesterolemia  Hypertension   
 well controlled with meds  Hypotestosteronism  Migraine  Personal history of prostate cancer  Restless leg  Sleep apnea   
 wears CPAP Past Surgical History:  
Procedure Laterality Date  HX COLONOSCOPY  ~2017  HX ORTHOPAEDIC Left 2017  
 post tibial tendon repair  HX OTHER SURGICAL Right 10/16/2018  
 ingrown toenail removal  
 HX PROSTATECTOMY  2003 Family History:  
Problem Relation Age of Onset  Heart Disease Father  Emphysema Mother  Heart Disease Brother  Psychiatric Disorder Brother Social History Socioeconomic History  Marital status:  Spouse name: Not on file  Number of children: Not on file  Years of education: Not on file  Highest education level: Not on file Social Needs  Financial resource strain: Not on file  Food insecurity - worry: Not on file  Food insecurity - inability: Not on file  Transportation needs - medical: Not on file  Transportation needs - non-medical: Not on file Occupational History  Not on file Tobacco Use  Smoking status: Former Smoker Types: Cigars Last attempt to quit: 4/18/2003 Years since quitting: 15.6  Smokeless tobacco: Never Used Substance and Sexual Activity  Alcohol use: No  
 Drug use: No  
 Sexual activity: Not on file Other Topics Concern  Not on file Social History Narrative  Not on file ALLERGIES: Amoxicillin; Lisinopril; and Sulfa (sulfonamide antibiotics) Review of Systems Constitutional: Negative for fever. Cardiovascular: Negative for chest pain. Gastrointestinal: Negative for anorexia, constipation, diarrhea, flatus, hematochezia, melena, nausea and vomiting. Genitourinary: Negative for dysuria, frequency and hematuria. Musculoskeletal: Negative for arthralgias, back pain and myalgias. Neurological: Negative for headaches. All other systems reviewed and are negative. Vitals:  
 11/29/18 6762 BP: 155/82 Pulse: 76 Resp: 16 Temp: 97.4 °F (36.3 °C) SpO2: 96% Weight: 122.5 kg (270 lb) Height: 5' 11\" (1.803 m) Physical Exam  
Constitutional: He is oriented to person, place, and time. He appears well-developed and well-nourished. No distress. HENT:  
Head: Normocephalic and atraumatic. Eyes: EOM are normal. Pupils are equal, round, and reactive to light. Neck: Normal range of motion. Neck supple. Cardiovascular: Normal rate and regular rhythm. Pulmonary/Chest: Effort normal and breath sounds normal.  
Abdominal: Soft. Musculoskeletal: Normal range of motion. Neurological: He is alert and oriented to person, place, and time. Skin: Skin is warm and dry. He is not diaphoretic. Psychiatric: He has a normal mood and affect. His behavior is normal.  
Nursing note and vitals reviewed. MDM Number of Diagnoses or Management Options Diagnosis management comments: After Guajardo placement there was no evidence of hematuria or clots only dark urine. Will evaluate renal function. Amount and/or Complexity of Data Reviewed Clinical lab tests: ordered and reviewed Independent visualization of images, tracings, or specimens: yes Risk of Complications, Morbidity, and/or Mortality Presenting problems: moderate Diagnostic procedures: moderate Management options: moderate Patient Progress Patient progress: stable Procedures

## 2018-11-29 NOTE — ED NOTES
Pts romero moved to leg bag at this time. I have reviewed discharge instructions with the patient. The patient verbalized understanding. Patient left ED via Discharge Method: ambulatory to Home with wife. Opportunity for questions and clarification provided. Patient given 2 scripts. To continue your aftercare when you leave the hospital, you may receive an automated call from our care team to check in on how you are doing. This is a free service and part of our promise to provide the best care and service to meet your aftercare needs.  If you have questions, or wish to unsubscribe from this service please call [phone]. Thank you for Choosing our [facility] Emergency Department.

## 2018-11-30 VITALS
SYSTOLIC BLOOD PRESSURE: 132 MMHG | DIASTOLIC BLOOD PRESSURE: 76 MMHG | HEART RATE: 75 BPM | TEMPERATURE: 97.6 F | HEIGHT: 71 IN | BODY MASS INDEX: 37.8 KG/M2 | OXYGEN SATURATION: 99 % | WEIGHT: 270 LBS | RESPIRATION RATE: 16 BRPM

## 2018-11-30 PROCEDURE — 77030005546 HC CATH URETH FOL 3W BARD -A

## 2018-11-30 PROCEDURE — 51703 INSERT BLADDER CATH COMPLEX: CPT | Performed by: EMERGENCY MEDICINE

## 2018-11-30 PROCEDURE — 77030005518 HC CATH URETH FOL 2W BARD -B

## 2018-11-30 NOTE — ED PROVIDER NOTES
patient had Guajardo catheter placed this morning for urinary retention in ED. It was flowing fine Until he went home and then he had no output for several hours. He felt like his bladder was distended and came back. Guajardo was attempted in triage without success and now states he is constantly urinating and patient is incontinent. He has seen urology recently for hematuria and had cystoscopy. The history is provided by the patient and the spouse. Urinary Catheter Problem This is a new problem. The problem occurs every urination. The problem has been gradually worsening. There has been no fever. Associated symptoms include frequency, hematuria, hesitancy, urgency and abdominal pain. His past medical history is significant for urological procedure and urinary catheter problem. Past Medical History:  
Diagnosis Date  Adverse effect of anesthesia   
 reports \"slow to get over effects\" of anesthesia- drowsy  Allergic rhinitis  Anemia 2018  Back pain  Colon polyps  Diabetes ([address] oral agents; rarely checks BS, reports 138 today; occasional hypo symptoms (shaky) unaware of hypo level  Elevated liver function tests  GERD (gastroesophageal reflux disease)   
 controlled with meds  H/O alcohol abuse  Hiatal hernia  Hypercholesterolemia  Hypertension   
 well controlled with meds  Hypotestosteronism  Migraine  Personal history of prostate cancer  Restless leg  Sleep apnea   
 wears CPAP Past Surgical History:  
Procedure Laterality Date  HX COLONOSCOPY  ~2017  HX ORTHOPAEDIC Left 2017  
 post tibial tendon repair  HX OTHER SURGICAL Right 10/16/2018  
 ingrown toenail removal  
 HX PROSTATECTOMY  2003 Family History:  
Problem Relation Age of Onset  Heart Disease Father  Emphysema Mother  Heart Disease Brother  Psychiatric Disorder Brother Social History Socioeconomic History  Marital status:  Spouse name: Not on file  Number of children: Not on file  Years of education: Not on file  Highest education level: Not on file Social Needs  Financial resource strain: Not on file  Food insecurity - worry: Not on file  Food insecurity - inability: Not on file  Transportation needs - medical: Not on file  Transportation needs - non-medical: Not on file Occupational History  Not on file Tobacco Use  Smoking status: Former Smoker Types: Cigars Last attempt to quit: 4/18/2003 Years since quitting: 15.6  Smokeless tobacco: Never Used Substance and Sexual Activity  Alcohol use: No  
 Drug use: No  
 Sexual activity: Not on file Other Topics Concern  Not on file Social History Narrative  Not on file ALLERGIES: Amoxicillin; Lisinopril; and Sulfa (sulfonamide antibiotics) Review of Systems Gastrointestinal: Positive for abdominal pain. Genitourinary: Positive for frequency, hematuria, hesitancy and urgency. All other systems reviewed and are negative. Vitals:  
 11/29/18 2050 BP: 138/84 Pulse: 78 Resp: 14 Temp: 97.6 °F (36.4 °C) SpO2: 96% Weight: 122.5 kg (270 lb) Height: 5' 11\" (1.803 m) Physical Exam  
Constitutional: He is oriented to person, place, and time. He appears well-developed and well-nourished. No distress. HENT:  
Head: Normocephalic and atraumatic. Eyes: Conjunctivae are normal. Right eye exhibits no discharge. Left eye exhibits no discharge. Neck: Normal range of motion. Neck supple. Cardiovascular: Normal rate and regular rhythm. Pulmonary/Chest: Effort normal and breath sounds normal. No respiratory distress. Abdominal: Soft. He exhibits no distension. There is no tenderness. Musculoskeletal: Normal range of motion. He exhibits no edema. Neurological: He is alert and oriented to person, place, and time. No cranial nerve deficit. Skin: Skin is warm and dry. Capillary refill takes less than 2 seconds. He is not diaphoretic. Psychiatric: He has a normal mood and affect. His behavior is normal.  
Nursing note and vitals reviewed. MDM Number of Diagnoses or Management Options Gross hematuria:  
Urinary retention:  
Diagnosis management comments: Nursing tried to place cath in triage without success. In room a different nurse was able to place 3 way and pt felt much better. I reviewed chart. He had labs and urine culture this am.  He had cystoscopy and bx with high grade dysplasia. Pt d/w Dr. Geri Downs and were planning to obs. Called urology for f/u Risk of Complications, Morbidity, and/or Mortality Presenting problems: moderate Diagnostic procedures: moderate Management options: moderate Patient Progress Patient progress: improved Procedures

## 2018-11-30 NOTE — ED TRIAGE NOTES
Established jasso on arrival.  Reports no output for several hours and blood noted in jasso yesterday and earlier today.

## 2018-11-30 NOTE — DISCHARGE INSTRUCTIONS
Urinary Retention: Care Instructions  Your Care Instructions    Urinary retention means that you aren't able to urinate. In men, it is often caused by a blockage of the urinary tract from an enlarged prostate gland. In men and women, it can also be caused by an infection or nerve damage. Or it may be a side effect of a medicine. The doctor may have drained the urine from your bladder. If you still have problems passing urine, you may need to use a catheter at home. This is used to empty your bladder until the problem can be fixed. Your doctor may put a catheter in your bladder before you go home. If so, he or she will tell you when to come back to have the catheter removed. The doctor has checked you closely. But problems can develop later. If you notice any problems or new symptoms, get medical treatment right away. Follow-up care is a key part of your treatment and safety. Be sure to make and go to all appointments, and call your doctor if you are having problems. It's also a good idea to know your test results and keep a list of the medicines you take. How can you care for yourself at home? · Take your medicines exactly as prescribed. Call your doctor if you think you are having a problem with your medicine. You will get more details on the specific medicines your doctor prescribes. · Check with your doctor before you use any over-the-counter medicines. Many cold and allergy medicines, for example, can make this problem worse. Make sure your doctor knows all of the medicines, vitamins, supplements, and herbal remedies you take. · Spread out through the day the amount of fluid you drink. Do not drink a lot at bedtime. · Avoid alcohol and caffeine. · If you have been given a catheter, or if one is already in place, follow the instructions you were given. Always wash your hands before and after you handle the catheter. When should you call for help?   Call your doctor now or seek immediate medical care if:    · You cannot urinate at all, or it is getting harder to urinate.     · You have symptoms of a urinary tract infection. These may include:  ? Pain or burning when you urinate. ? A frequent need to urinate without being able to pass much urine. ? Pain in the flank, which is just below the rib cage and above the waist on either side of the back. ? Blood in your urine. ? A fever.    Watch closely for changes in your health, and be sure to contact your doctor if:    · You have any problems with your catheter.     · You do not get better as expected. Where can you learn more? Go to http://mary lou-osvaldo.info/. Enter M244 in the search box to learn more about \"Urinary Retention: Care Instructions. \"  Current as of: March 21, 2018  Content Version: 11.8  © 2178-2474 [employer]. Care instructions adapted under license by [employer] (which disclaims liability or warranty for this information). If you have questions about a medical condition or this instruction, always ask your healthcare professional. [place] [address] any warranty or liability for your use of this information.

## 2018-11-30 NOTE — ED NOTES
I have reviewed discharge instructions with the patient. The patient verbalized understanding. Patient left ED via Discharge Method: ambulatory to Home with wife. Opportunity for questions and clarification provided. Patient given 0 scripts. To continue your aftercare when you leave the hospital, you may receive an automated call from our care team to check in on how you are doing. This is a free service and part of our promise to provide the best care and service to meet your aftercare needs.  If you have questions, or wish to unsubscribe from this service please call [phone]. Thank you for Choosing our [facility] Emergency Department.

## 2018-12-01 LAB
BACTERIA SPEC CULT: NORMAL
SERVICE CMNT-IMP: NORMAL

## 2018-12-20 ENCOUNTER — APPOINTMENT (OUTPATIENT)
Dept: RADIATION ONCOLOGY | Age: 73
End: 2018-12-20

## 2019-01-11 ENCOUNTER — HOSPITAL ENCOUNTER (OUTPATIENT)
Dept: RADIATION ONCOLOGY | Age: 74
Discharge: HOME | End: 2019-01-11
Payer: MEDICARE

## 2019-01-11 DIAGNOSIS — C61 PROSTATE CANCER (HCC): ICD-10-CM

## 2019-01-11 PROCEDURE — 84403 ASSAY OF TOTAL TESTOSTERONE: CPT

## 2019-01-11 PROCEDURE — 36415 COLL VENOUS BLD VENIPUNCTURE: CPT

## 2019-01-11 PROCEDURE — 84153 ASSAY OF PSA TOTAL: CPT

## 2019-01-12 LAB
PSA SERPL DL<=0.01 NG/ML-MCNC: 1.61 NG/ML (ref 0–4)
TESTOST SERPL-MCNC: 389 NG/DL (ref 264–916)

## 2019-01-18 ENCOUNTER — HOSPITAL ENCOUNTER (OUTPATIENT)
Dept: RADIATION ONCOLOGY | Age: 74
Discharge: HOME | End: 2019-01-18
Payer: MEDICARE

## 2019-01-18 VITALS
DIASTOLIC BLOOD PRESSURE: 75 MMHG | WEIGHT: 266.9 LBS | RESPIRATION RATE: 16 BRPM | SYSTOLIC BLOOD PRESSURE: 134 MMHG | TEMPERATURE: 98.8 F | HEART RATE: 82 BPM | BODY MASS INDEX: 37.22 KG/M2

## 2019-01-18 DIAGNOSIS — C61 PROSTATE CANCER (HCC): Primary | ICD-10-CM

## 2019-01-18 PROCEDURE — 99211 OFF/OP EST MAY X REQ PHY/QHP: CPT

## 2019-01-18 NOTE — PROGRESS NOTES
Pt here today for FUP post RT to the prostate bed which ended 8/12/16. The 1/11/19 PSA is slightly elevated at 1.1610, testosterone 389.  6/26/18 PSA 1.530. Pt is s/p a prostatectomy in 2003. Pt stated that Dr. Dana Byrd removed some tissue in his bladder and now pt is continuously incontinent. Pt sees Dr. Dana Byrd and is asking to switch to another urologist.  Dea Medina called [facility] and requested Dr. Lawyer Ferris for pt. Pt will return in 6/2019 with labs prior.

## 2019-01-18 NOTE — PROGRESS NOTES
Patient: Sylvia Harrington MRN: [id number]  SSN: [id number] YOB: 1945  Age: 75 y.o. Sex: male Other Providers:  Bijal Rose D.O. 
 
CHIEF COMPLAINT: Prostate cancer DIAGNOSIS: Adenocarcinoma of the prostate, Piotr score 7, negative margins s/p RRP in 2003, now with biochemical recurrence SITE TREATED AND DOSE DELIVERED:  
Mr. Bobbi Holland received salvage radiation therapy to the prostate bed to a dose of 6840 cGy in 38 fractions of 180 cGy each using a RapidArc IMRT/IGRT plan from  6/21/16 - 8/12/16 HISTORY OF PRESENT ILLNESS:  Jordan Herring is a 66year old male initially seen by Dr. Yi Ends 05/13/16 at the request of Dr. Ernst Castano regarding the role of salvage radiation therapy for this biochemically recurrent prostate cancer. In 05/2003 he underwent an RRP under the direction of Dr. Melina Nagel. Final pathology was Piotr score 7 with negative margins.  Over the past few years  The PSA has been rising slowly.  In 03/13 PSA was 0.05, in 02/14 it was 0.06 and on 03/06/15 had risen to 0.13.  He was sent to Dr. Ernst Castano for further management.  The patient also had a history of hypotestosteronism and had been on IM testosterone injections under the direction of Dr. Kaelyn Larios When the PSA began to rise, injections were stopped.  His PSA on 03/06/15 was 0.13. PSA on 04/22/16 ck to 0.246. At that time, Dr. Ernst Castano recommended MRI of the pelvis and radiation oncology consultation. MRI of the pelvis on 05/09/16 showed no evidence for local recurrence or evolving metastatic disease in the pelvis. His PSA in March was 0.4, up from 0.3 in 12/16. His PSA prior to radiation therapy was  0.246. Fluciclovine PET on 04/11/17 was not evident for disease. 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 INTERVAL HISTORY:  
 
1/18/19: Returns today 29 months after completing radiation.  He underwent a cystoscopy, 12/13/18 for multiple episodes of gross hematuria with clots with Dr. Rondon Friday. 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 He is to return in March for repeat cystoscopy with Dr. Rondon Friday. AUA not completed. Patient states that he has had leakage since his surgery that required x1 pad daily, but since his most recent cystoscopy, he now goes through a pad every 30 minutes. He is currently using a \"texas cath\" that he made himself. He denies hematuria at this time. Denies pain. Has normal bowel function. He is not happy in regards to the urinary leakage, otherwise doing OK. MEDICATIONS:  
 
Current Outpatient Medications:  
  nystatin (MYCOSTATIN) topical cream, Apply  to affected area two (2) times a day., Disp: 15 g, Rfl: 0 
  HYDROcodone-acetaminophen (NORCO) 5-325 mg per tablet, Take 1 Tab by mouth every four (4) hours as needed for Pain. Max Daily Amount: 6 Tabs., Disp: 15 Tab, Rfl: 0 
  ciprofloxacin HCl (CIPRO) 500 mg tablet, Take 1 Tab by mouth two (2) times a day., Disp: 14 Tab, Rfl: 0 
  doxepin HCl (DOXEPIN PO), Take 0.3 mL by mouth nightly., Disp: , Rfl:  
  methotrexate 25 mg/mL chemo injection, 20 mg by SubCUTAneous route Every Thursday. , Disp: , Rfl:  
  montelukast (SINGULAIR) 10 mg tablet, Take 10 mg by mouth daily. , Disp: , Rfl:  
  triamcinolone acetonide (KENALOG) 0.1 % topical cream, Apply topically 2 (two) times a day., Disp: , Rfl:  
  clindamycin (CLEOCIN) 300 mg capsule, 300 mg three (3) times daily. , Disp: , Rfl:  
  guaiFENesin-codeine (ROBITUSSIN AC) 100-10 mg/5 mL solution, Take 5 mL by mouth., Disp: , Rfl:  
  folic acid 002 mcg tablet, Take 400 mcg by mouth daily. Everyday except Thursday, Disp: , Rfl:  
  traMADol (ULTRAM) 50 mg tablet, Take 50 mg by mouth every six (6) hours as needed for Pain., Disp: , Rfl:  
  cinnamon bark 500 mg cap, Take 500 mg by mouth daily. , Disp: , Rfl:  
   b complex vitamins tablet, Take 1 Tab by mouth daily. , Disp: , Rfl:  
  fenofibrate (TRICOR) 160 mg tablet, Take 160 mg by mouth daily. , Disp: , Rfl:  
  fluticasone (FLONASE) 50 mcg/actuation nasal spray, 2 Sprays by Both Nostrils route as needed. , Disp: , Rfl:  
  losartan-hydrochlorothiazide (HYZAAR) 50-12.5 mg per tablet, Take 1 Tab by mouth daily. , Disp: , Rfl:  
  metFORMIN ER (GLUCOPHAGE XR) 500 mg tablet, Take 500 mg by mouth two (2) times a day., Disp: , Rfl:  
  pantoprazole (PROTONIX) 40 mg tablet, Take 40 mg by mouth daily. , Disp: , Rfl:  
  rOPINIRole (REQUIP) 1 mg tablet, Take 3 mg by mouth daily. One tab AM, two tabs PM, Disp: , Rfl:  
  simvastatin (ZOCOR) 80 mg tablet, Take 80 mg by mouth daily. , Disp: , Rfl:  
  ascorbic acid (VITAMIN C) 500 mg tablet, Take 500 mg by mouth daily. , Disp: , Rfl: ALLERGIES:  
Allergies Allergen Reactions  Amoxicillin Rash  Lisinopril Cough  
  cough  Sulfa (Sulfonamide Antibiotics) Rash ECOG Performance status 0-1 VITAL SIGNS: blood pressure 134/75 Pulse 82  Temperature  98.8  Weight  266.9 GENERAL: The patient is well-developed, ambulatory, alert and in no acute distress. LABORATORY:  
01/11/19: PSA 1.610 with testosterone of 389 
06/26/18: PSA 1.53 with testosterone of 322 
03/20/18: PSA 1.080 with testosterone of 327 
09/19/17: PSA 0.542 with testosterone of 384 
06/14/17: PSA 0.6 with testosterone of 298 
03/17/17: PSA 0.4 with testosterone of 244 
12/08/16: PSA 0.3 with testosterone of 410 
09/07/16: PSA 0.282 with Testosterone 351 
04/22/16: PSA 0.246 RADIOLOGY: 
Accident PET/CT Nuclear Medicine Skull Base to Mid-Thigh Axumin PET-CT Study 4/11/2017 INDICATION: Prostate cancer initially diagnosed in 2003, status post radical prostatectomy now with rising PSA, most recently 0.4. COMPARISONS: none FINDINGS:  Examination of the 3D tomographic PET images demonstrates on careful review a focus of intense activity in the midline pelvis which localizes at the dependent bladder base just cephalad to the anastomosis.  The bladder itself is nondistended  
with a nonspecific appearance of bladder wall thickening or redundancy.  This appears most consistent with excreted contrast in the dependent bladder lumen rather than recurrent tumor. Elsewhere in the abdomen and pelvis there appears to be only physiologic radiotracer biodistribution with no suspicious tracer accumulation at a lymph node or mass lesion. In the chest and visualized head and neck there is no suspicious site of radiotracer activity.  No lung nodule is seen.  There are atherosclerosis findings and mild sinus disease. No suspicious skeletal lesion seen. IMPRESSION: Focally prominent tracer activity noted only at the dependent bladder base which appears most consistent with excreted tracer pooling in the dependent lumen. No suspicious focus of tracer accumulation seen. IMPRESSION:  Donovan Restrepo is a 75 y.o. male adenocarcinoma of the prostate, Kramer score 7, negative margins s/p RRP in 2003, with biochemical recurrence. He completed radiation therapy, 8/12/2016. He is now 23 months after completion of radiation. His urinary function is essentially at his baseline. His PSA prior to radiation therapy was  0.246. His PSA was at 0.4 in March 2017. Fluciclovine PET on 04/11/17 was not evident for disease. His PSA continued to rise with a doubling time of 9.1 months with a PSA of 1.080 in March 2018. He had an Axumin scan on 4/5/18 that showed no evidence of disease. He now has a PSA of 1.53 with a doubling time of 6 months. He is asymptomatic. I reviewed his case with Dr. Faye Heart. Mr Candace Escobar is now 29 months after completion of salvage radiation. He underwent a cystoscopy, 12/13/18 for an episode of gross hematuria/clots with Dr. Leticia Simpson.  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 He is to return in March for repeat cystoscopy with Dr. Clinton Mckeon. PSA is 1.61, up from 1.5 and 1.08 the time before that. We again discussed at some point he will need to see medical oncology for discussion of treatment options. He is asymptomatic. Recent Axumin, 4/5/18 was negative. For now, Mr. Graeme Valencia is comfortable and would like to continues with watchful waiting. He states that he understands to risk of rising PSA but is more concerned with his urinary leakage at this time. PLAN:  
1) Scheduled with Dr. Clinton Mckeon in March for labs and repeat cystoscopy. To continue close follow up. I plan to see him in June with PSA/Testosterone prior to his visit. We will continue checking his doubling time. 2) We had a 25 minute visit, over 75% of which was in direct counseling regarding the management of his biochemical recurrent prostate cancer. All questions were answered to the best of my ability. Sharron Joseph NP  
January 18, 2019 Portions of this note were copied from prior encounters and reviewed for accuracy, currency, and represent documentation and tasks completed during this encounter. I verify and attest these portions to be unchanged from prior visits.

## 2019-03-22 PROCEDURE — 88112 CYTOPATH CELL ENHANCE TECH: CPT

## 2019-03-25 ENCOUNTER — HOSPITAL ENCOUNTER (OUTPATIENT)
Dept: LAB | Age: 74
Discharge: HOME | End: 2019-03-25

## 2019-03-25 NOTE — PROGRESS NOTES
Please call patient to let him know that urine shows rare atypical cells consistent with his prior radiation but no cancer cells. He should keep his follow up with me as scheduled.

## 2019-06-18 ENCOUNTER — HOSPITAL ENCOUNTER (OUTPATIENT)
Dept: RADIATION ONCOLOGY | Age: 74
Discharge: HOME | End: 2019-06-18
Payer: MEDICARE

## 2019-06-18 DIAGNOSIS — C61 PROSTATE CANCER (HCC): ICD-10-CM

## 2019-06-18 PROCEDURE — 84403 ASSAY OF TOTAL TESTOSTERONE: CPT

## 2019-06-18 PROCEDURE — 84153 ASSAY OF PSA TOTAL: CPT

## 2019-06-18 PROCEDURE — 36415 COLL VENOUS BLD VENIPUNCTURE: CPT

## 2019-06-19 LAB
PSA SERPL DL<=0.01 NG/ML-MCNC: 1.28 NG/ML (ref 0–4)
TESTOST SERPL-MCNC: 422 NG/DL (ref 264–916)

## 2019-06-25 ENCOUNTER — HOSPITAL ENCOUNTER (OUTPATIENT)
Dept: RADIATION ONCOLOGY | Age: 74
Discharge: HOME | End: 2019-06-25
Payer: MEDICARE

## 2019-06-25 VITALS
TEMPERATURE: 98.3 F | RESPIRATION RATE: 18 BRPM | SYSTOLIC BLOOD PRESSURE: 127 MMHG | DIASTOLIC BLOOD PRESSURE: 65 MMHG | HEART RATE: 75 BPM | OXYGEN SATURATION: 95 %

## 2019-06-25 DIAGNOSIS — C61 PROSTATE CANCER (HCC): Primary | ICD-10-CM

## 2019-06-25 PROCEDURE — 99211 OFF/OP EST MAY X REQ PHY/QHP: CPT

## 2019-06-25 NOTE — PROGRESS NOTES
Patient: Katlin Jurado MRN: [id number]  SSN: [id number]    YOB: 1945  Age: 75 y.o. Sex: male      Other Providers:  Yanet Hare D.O.    CHIEF COMPLAINT: Prostate cancer    DIAGNOSIS: Adenocarcinoma of the prostate, Piotr score 7, negative margins s/p RRP in 2003, now with biochemical recurrence    SITE TREATED AND DOSE DELIVERED:   Mr. Francia Junior received salvage radiation therapy to the prostate bed to a dose of 6840 cGy in 38 fractions of 180 cGy each using a RapidArc IMRT/IGRT plan from  6/21/16 - 8/12/16    HISTORY OF PRESENT ILLNESS:  Shasta Mendez is a 66year old male initially seen by Dr. Pretty Delaney 05/13/16 at the request of Dr. Catalina Almaraz regarding the role of salvage radiation therapy for this biochemically recurrent prostate cancer. In 05/2003 he underwent an RRP under the direction of Dr. Jah Mcclelland. Final pathology was [place] score 7 with negative margins.  Over the past few years  The PSA has been rising slowly.  In 03/13 PSA was 0.05, in 02/14 it was 0.06 and on 03/06/15 had risen to 0.13.  He was sent to Dr. Catalina Almaraz for further management.  The patient also had a history of hypotestosteronism and had been on IM testosterone injections under the direction of Dr. Vale Anderson When the PSA began to rise, injections were stopped.  His PSA on 03/06/15 was 0.13. PSA on 04/22/16 ck to 0.246. At that time, Dr. Catalina Almaraz recommended MRI of the pelvis and radiation oncology consultation. MRI of the pelvis on 05/09/16 showed no evidence for local recurrence or evolving metastatic disease in the pelvis. His PSA in March was 0.4, up from 0.3 in 12/16. His PSA prior to radiation therapy was  0.246. Fluciclovine PET on 04/11/17 was not evident for disease. 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 INTERVAL HISTORY:     -4/5/18: Izabela Body, negative  -10/2018: Gross hematuria, cystoscopy.  Pathology revealed high grade urothelial dysplasia  -12/13/18: cystoscopy for an episode of gross hematuria/clots with Dr. Helena Younger. The hematuria was noted   to be most likely to radiation cystitis. -12/2018: Surveillance cystoscopy: no recurrence   -1/18/19: Returns 29 months after completing radiation. He underwent a cystoscopy, 12/13/18 for multiple episodes of gross hematuria with clots with Dr. Helena Younger. 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 He is to return in March for repeat cystoscopy with Dr. Helena Younger. AUA not completed. Patient states that he has had leakage since his surgery that required x1 pad daily, but since his most recent cystoscopy, he now goes through a pad every 30 minutes. He is currently using a \"texas cath\" that he made himself. He denies hematuria at this time. Denies pain. Has normal bowel function. He is not happy in regards to the urinary leakage, otherwise doing OK.   -3/8/19: Cystoscopy revealed post radiation changes - negative pathology    -6/25/19: Returns 35 months out from completing radiation therapy. He reports that he is doing well. He continues with urinary incontinence. AUA not completed (not applicable due to incontinence). He requires 3+ pads daily. Denies painful urination or hematuria. He discussed multiple options regarding his urinary incontinence with Dr. Opal Rausch but is still not sure if he wants to undergo an invasive procedure that may not be helpful. He has normal bowel function. He has poor erectile function that he reports as no problem. He is in good spirits. No other complaints.     MEDICATIONS:     Current Outpatient Medications:     miscellaneous medical supply ([facility]) misc, Use as needed for incontinence, Disp: 1 Each, Rfl: 0    brief disposable (ADULT) misc, by Does Not Apply route., Disp: 1 Package, Rfl: 11    nystatin (MYCOSTATIN) topical cream, Apply  to affected area two (2) times a day., Disp: 15 g, Rfl: 0   HYDROcodone-acetaminophen (NORCO) 5-325 mg per tablet, Take 1 Tab by mouth every four (4) hours as needed for Pain. Max Daily Amount: 6 Tabs., Disp: 15 Tab, Rfl: 0    ciprofloxacin HCl (CIPRO) 500 mg tablet, Take 1 Tab by mouth two (2) times a day., Disp: 14 Tab, Rfl: 0    doxepin HCl (DOXEPIN PO), Take 0.3 mL by mouth nightly., Disp: , Rfl:     methotrexate 25 mg/mL chemo injection, 20 mg by SubCUTAneous route Every Thursday. , Disp: , Rfl:     triamcinolone acetonide (KENALOG) 0.1 % topical cream, Apply topically 2 (two) times a day., Disp: , Rfl:     clindamycin (CLEOCIN) 300 mg capsule, 300 mg three (3) times daily. , Disp: , Rfl:     guaiFENesin-codeine (ROBITUSSIN AC) 100-10 mg/5 mL solution, Take 5 mL by mouth., Disp: , Rfl:     folic acid 784 mcg tablet, Take 400 mcg by mouth daily. Everyday except Thursday, Disp: , Rfl:     traMADol (ULTRAM) 50 mg tablet, Take 50 mg by mouth every six (6) hours as needed for Pain., Disp: , Rfl:     cinnamon bark 500 mg cap, Take 500 mg by mouth daily. , Disp: , Rfl:     b complex vitamins tablet, Take 1 Tab by mouth daily. , Disp: , Rfl:     fenofibrate (TRICOR) 160 mg tablet, Take 160 mg by mouth daily. , Disp: , Rfl:     fluticasone (FLONASE) 50 mcg/actuation nasal spray, 2 Sprays by Both Nostrils route as needed. , Disp: , Rfl:     losartan-hydrochlorothiazide (HYZAAR) 50-12.5 mg per tablet, Take 1 Tab by mouth daily. , Disp: , Rfl:     metFORMIN ER (GLUCOPHAGE XR) 500 mg tablet, Take 500 mg by mouth two (2) times a day., Disp: , Rfl:     pantoprazole (PROTONIX) 40 mg tablet, Take 40 mg by mouth daily. , Disp: , Rfl:     rOPINIRole (REQUIP) 1 mg tablet, Take 3 mg by mouth daily. One tab AM, two tabs PM, Disp: , Rfl:     simvastatin (ZOCOR) 80 mg tablet, Take 80 mg by mouth daily. , Disp: , Rfl:     ascorbic acid (VITAMIN C) 500 mg tablet, Take 500 mg by mouth daily. , Disp: , Rfl:     ALLERGIES:   Allergies   Allergen Reactions    Amoxicillin Rash    Lisinopril Cough     cough    Sulfa (Sulfonamide Antibiotics) Rash     ECOG Performance status 0  VITAL SIGNS: blood pressure 127/65 Pulse 75  Temperature 98.3  Weight  Not done     GENERAL: The patient is well-developed, ambulatory, alert and in no acute distress. LABORATORY:   06/18/19: PSA 1.280 with testosterone 422  06/14/19: PSA 1.3  - testosterone not done  01/11/19: PSA 1.610 with testosterone of 389  06/26/18: PSA 1.53 with testosterone of 322  03/20/18: PSA 1.080 with testosterone of 327  09/19/17: PSA 0.542 with testosterone of 384  06/14/17: PSA 0.6 with testosterone of 298  03/17/17: PSA 0.4 with testosterone of 244  12/08/16: PSA 0.3 with testosterone of 410  09/07/16: PSA 0.282 with Testosterone 351  04/22/16: PSA 0.246     RADIOLOGY:  Accident PET/CT Nuclear Medicine Skull Base to Mid-Thigh Axumin PET-CT Study 4/11/2017    INDICATION: Prostate cancer initially diagnosed in 2003, status post radical prostatectomy now with rising PSA, most recently 0.4. COMPARISONS: none     FINDINGS:  Examination of the 3D tomographic PET images demonstrates on careful review a focus of intense activity in the midline pelvis which localizes at the dependent bladder base just cephalad to the anastomosis.  The bladder itself is nondistended   with a nonspecific appearance of bladder wall thickening or redundancy.  This appears most consistent with excreted contrast in the dependent bladder lumen rather than recurrent tumor. Elsewhere in the abdomen and pelvis there appears to be only physiologic radiotracer biodistribution with no suspicious tracer accumulation at a lymph node or mass lesion. In the chest and visualized head and neck there is no suspicious site of radiotracer activity.  No lung nodule is seen.  There are atherosclerosis findings and mild sinus disease. No suspicious skeletal lesion seen.     IMPRESSION: Focally prominent tracer activity noted only at the dependent bladder base which appears most consistent with excreted tracer pooling in the dependent lumen. No suspicious focus of tracer accumulation seen. IMPRESSION:  Rosalee Choi is a 75 y.o. male adenocarcinoma of the prostate, T[place] score 7, negative margins s/p RRP in 2003, with biochemical recurrence. He completed radiation therapy, 8/12/2016. He is now 23 months after completion of radiation. His urinary function is essentially at his baseline. His PSA prior to radiation therapy was  0.246. His PSA was at 0.4 in March 2017. Fluciclovine PET on 04/11/17 was not evident for disease. His PSA continued to rise with a doubling time of 9.1 months with a PSA of 1.080 in March 2018. He had an Axumin scan on 4/5/18 that showed no evidence of disease. Mr Faby Morse is now 35 months after completion of salvage radiation. He returned in March 2019 for repeat cystoscopy by Dr. Georgiana Torres. PSA better, today 1.280, down from 1.60. Continues with urinary incontinence. For now, Mr. Faby Morse is comfortable and would like to continues with watchful waiting. He states that he understands to risk of rising PSA but is more concerned with his urinary leakage at this time. PLAN:   1) Scheduled with Dr. Georgiana Torres in September with plans for repeat cystoscopy. To continue close follow up. I plan to see him in 6 months with PSA/Testosterone checked prior to his visit. 2) Urinary incontinence - Mr Faby Morse has discussed options with Dr Georgiana Torres   3) We had a 25 minute visit, over 75% of which was in direct counseling regarding the management of his biochemical recurrent prostate cancer. All questions were answered to the best of my ability. Faustino Sandoval NP   June 25, 2019      Portions of this note were copied from prior encounters and reviewed for accuracy, currency, and represent documentation and tasks completed during this encounter. I verify and attest these portions to be unchanged from prior visits.

## 2019-06-25 NOTE — PROGRESS NOTES
RT Prostate Bed 08/12/2016 06/18/2019: PSA: 1.280, Test: 422  01/11/2019: PSA: 1.610, Test: 389    Switched from Edward to Anil Salomon at patient's request    05/2003: S/P RRP    Cystoscopies by Edward, episodes of hematuria    History of continuous incontinence  -Patient states he uses disposable diapers and pads and those seem to prevent leaks. AUA/Epic: 32  -Patient has several symptom complaints and states that the quality of life due to his urinary symptoms are terrible.        Enzo Honeycutt, CMA

## 2019-06-27 PROBLEM — E66.01 SEVERE OBESITY (HCC): Status: ACTIVE | Noted: 2019-06-27

## 2020-01-06 PROCEDURE — 88112 CYTOPATH CELL ENHANCE TECH: CPT

## 2020-01-07 ENCOUNTER — HOSPITAL ENCOUNTER (OUTPATIENT)
Dept: LAB | Age: 75
Discharge: HOME | End: 2020-01-07

## 2020-01-07 ENCOUNTER — HOSPITAL ENCOUNTER (OUTPATIENT)
Dept: RADIATION ONCOLOGY | Age: 75
Discharge: HOME | End: 2020-01-07
Payer: MEDICARE

## 2020-01-07 DIAGNOSIS — C61 PROSTATE CANCER (HCC): ICD-10-CM

## 2020-01-07 PROCEDURE — 84403 ASSAY OF TOTAL TESTOSTERONE: CPT

## 2020-01-07 PROCEDURE — 36415 COLL VENOUS BLD VENIPUNCTURE: CPT

## 2020-01-07 PROCEDURE — 84153 ASSAY OF PSA TOTAL: CPT

## 2020-01-08 LAB
PSA SERPL DL<=0.01 NG/ML-MCNC: 1.04 NG/ML (ref 0–4)
TESTOST SERPL-MCNC: 566 NG/DL (ref 264–916)

## 2020-01-21 ENCOUNTER — HOSPITAL ENCOUNTER (OUTPATIENT)
Dept: RADIATION ONCOLOGY | Age: 75
Discharge: HOME | End: 2020-01-21
Payer: MEDICARE

## 2020-01-21 VITALS
SYSTOLIC BLOOD PRESSURE: 128 MMHG | WEIGHT: 260.5 LBS | OXYGEN SATURATION: 96 % | DIASTOLIC BLOOD PRESSURE: 65 MMHG | HEART RATE: 73 BPM | BODY MASS INDEX: 36.33 KG/M2 | RESPIRATION RATE: 16 BRPM | TEMPERATURE: 98.6 F

## 2020-01-21 PROCEDURE — 99211 OFF/OP EST MAY X REQ PHY/QHP: CPT

## 2020-01-21 NOTE — PROGRESS NOTES
Patient: Jane Rain MRN: [id number]  SSN: [id number]    YOB: 1945  Age: 75 y.o. Sex: male      Other Providers:  Cassidy Krishnan D.O.    CHIEF COMPLAINT: Prostate cancer    DIAGNOSIS: Adenocarcinoma of the prostate, [place] score 7, negative margins s/p RRP in 2003, now with biochemical recurrence    SITE TREATED AND DOSE DELIVERED:   Mr. Sharpe Neither received salvage radiation therapy to the prostate bed to a dose of 6840 cGy in 38 fractions of 180 cGy each using a RapidArc IMRT/IGRT plan from  6/21/16 - 8/12/16    HISTORY OF PRESENT ILLNESS:  Sheela Garcia is a 66year old male initially seen by Dr. Basurto Bhupendra 05/13/16 at the request of Dr. Martita Cordoba regarding the role of salvage radiation therapy for this biochemically recurrent prostate cancer. In 05/2003 he underwent an RRP under the direction of Dr. Shelly Alcaraz. Final pathology was [place] score 7 with negative margins.  Over the past few years  The PSA has been rising slowly.  In 03/13 PSA was 0.05, in 02/14 it was 0.06 and on 03/06/15 had risen to 0.13.  He was sent to Dr. Martita Cordoba for further management.  The patient also had a history of hypotestosteronism and had been on IM testosterone injections under the direction of Dr. Woody Tobias When the PSA began to rise, injections were stopped.  His PSA on 03/06/15 was 0.13. PSA on 04/22/16 ck to 0.246. At that time, Dr. Martita Cordoba recommended MRI of the pelvis and radiation oncology consultation. MRI of the pelvis on 05/09/16 showed no evidence for local recurrence or evolving metastatic disease in the pelvis. His PSA in March was 0.4, up from 0.3 in 12/16. His PSA prior to radiation therapy was  0.246. Fluciclovine PET on 04/11/17 was not evident for disease. 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 INTERVAL HISTORY:     -4/5/18: Shaquille Brink, negative  -10/2018: Gross hematuria, cystoscopy.  Pathology revealed high grade urothelial dysplasia  -12/13/18: cystoscopy for an episode of gross hematuria/clots with Dr. Edward Nuno. The hematuria was noted   to be most likely to radiation cystitis. -12/2018: Surveillance cystoscopy: no recurrence   -1/18/19: Returns 29 months after completing radiation. He underwent a cystoscopy, 12/13/18 for multiple episodes of gross hematuria with clots with Dr. Edward Nuno. 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 He is to return in March for repeat cystoscopy with Dr. Edward Nuno. AUA not completed. Patient states that he has had leakage since his surgery that required x1 pad daily, but since his most recent cystoscopy, he now goes through a pad every 30 minutes. He is currently using a \"texas cath\" that he made himself. He denies hematuria at this time. Denies pain. Has normal bowel function. He is not happy in regards to the urinary leakage, otherwise doing OK.   -3/8/19: Cystoscopy revealed post radiation changes - negative pathology    -6/25/19: Returns 35 months out from completing radiation therapy. He reports that he is doing well. He continues with urinary incontinence. AUA not completed (not applicable due to incontinence). He requires 3+ pads daily. Denies painful urination or hematuria. He discussed multiple options regarding his urinary incontinence with Dr. Krissy Stein but is still not sure if he wants to undergo an invasive procedure that may not be helpful. He has normal bowel function. He has poor erectile function that he reports as no problem. He is in good spirits. No other complaints. 01/21/20: Returns 40 months out from completing radiation therapy. He reports that he is doing well. AUA not completed (not applicable due to incontinence). He requires 3+ pads daily. Denies painful urination or hematuria. He discussed multiple options regarding his urinary incontinence with Dr. Krissy Stein and has decided that he wants no further intervention.  He has normal bowel function. He has poor erectile function that he reports as no problem. He is in good spirits. No other complaints. MEDICATIONS:     Current Outpatient Medications:     brief disposable (ADULT) misc, by Does Not Apply route., Disp: 1 Package, Rfl: 11    miscellaneous medical supply (BARD [facility] INCONTIN CLAMP) misc, Use as needed for incontinence, Disp: 1 Each, Rfl: 0    nystatin (MYCOSTATIN) topical cream, Apply  to affected area two (2) times a day., Disp: 15 g, Rfl: 0    HYDROcodone-acetaminophen (NORCO) 5-325 mg per tablet, Take 1 Tab by mouth every four (4) hours as needed for Pain. Max Daily Amount: 6 Tabs., Disp: 15 Tab, Rfl: 0    ciprofloxacin HCl (CIPRO) 500 mg tablet, Take 1 Tab by mouth two (2) times a day., Disp: 14 Tab, Rfl: 0    doxepin HCl (DOXEPIN PO), Take 0.3 mL by mouth nightly., Disp: , Rfl:     methotrexate 25 mg/mL chemo injection, 20 mg by SubCUTAneous route Every Thursday. , Disp: , Rfl:     triamcinolone acetonide (KENALOG) 0.1 % topical cream, Apply topically 2 (two) times a day., Disp: , Rfl:     clindamycin (CLEOCIN) 300 mg capsule, 300 mg three (3) times daily. , Disp: , Rfl:     folic acid 137 mcg tablet, Take 400 mcg by mouth daily. Everyday except Thursday, Disp: , Rfl:     traMADol (ULTRAM) 50 mg tablet, Take 50 mg by mouth every six (6) hours as needed for Pain., Disp: , Rfl:     cinnamon bark 500 mg cap, Take 500 mg by mouth daily. , Disp: , Rfl:     b complex vitamins tablet, Take 1 Tab by mouth daily. , Disp: , Rfl:     fenofibrate (TRICOR) 160 mg tablet, Take 160 mg by mouth daily. , Disp: , Rfl:     fluticasone (FLONASE) 50 mcg/actuation nasal spray, 2 Sprays by Both Nostrils route as needed. , Disp: , Rfl:     losartan-hydrochlorothiazide (HYZAAR) 50-12.5 mg per tablet, Take 1 Tab by mouth daily. , Disp: , Rfl:     metFORMIN ER (GLUCOPHAGE XR) 500 mg tablet, Take 500 mg by mouth two (2) times a day., Disp: , Rfl:     pantoprazole (PROTONIX) 40 mg tablet, Take 40 mg by mouth daily. , Disp: , Rfl:     rOPINIRole (REQUIP) 1 mg tablet, Take 3 mg by mouth daily. One tab AM, two tabs PM, Disp: , Rfl:     simvastatin (ZOCOR) 80 mg tablet, Take 80 mg by mouth daily. , Disp: , Rfl:     ascorbic acid (VITAMIN C) 500 mg tablet, Take 500 mg by mouth daily. , Disp: , Rfl:     ALLERGIES:   Allergies   Allergen Reactions    Amoxicillin Rash    Lisinopril Cough     cough    Sulfa (Sulfonamide Antibiotics) Rash     ECOG Performance status 0  VITAL SIGNS: blood pressure 128/65 Pulse 73 Temperature 98.6  Weight  260.5     GENERAL: The patient is well-developed, ambulatory, alert and in no acute distress. LABORATORY:   01/07/20: PSA 1.040 with testosterone 566  12/30/19: PSA 1.1  06/18/19: PSA 1.280 with testosterone 422  06/14/19: PSA 1.3  - testosterone not done  01/11/19: PSA 1.610 with testosterone of 389  06/26/18: PSA 1.53 with testosterone of 322  03/20/18: PSA 1.080 with testosterone of 327  09/19/17: PSA 0.542 with testosterone of 384  06/14/17: PSA 0.6 with testosterone of 298  03/17/17: PSA 0.4 with testosterone of 244  12/08/16: PSA 0.3 with testosterone of 410  09/07/16: PSA 0.282 with Testosterone 351  04/22/16: PSA 0.246     RADIOLOGY:  Accident PET/CT Nuclear Medicine Skull Base to Mid-Thigh Axumin PET-CT Study 4/11/2017    INDICATION: Prostate cancer initially diagnosed in 2003, status post radical prostatectomy now with rising PSA, most recently 0.4. COMPARISONS: none     FINDINGS:  Examination of the 3D tomographic PET images demonstrates on careful review a focus of intense activity in the midline pelvis which localizes at the dependent bladder base just cephalad to the anastomosis.  The bladder itself is nondistended   with a nonspecific appearance of bladder wall thickening or redundancy.  This appears most consistent with excreted contrast in the dependent bladder lumen rather than recurrent tumor.   Elsewhere in the abdomen and pelvis there appears to be only physiologic radiotracer biodistribution with no suspicious tracer accumulation at a lymph node or mass lesion. In the chest and visualized head and neck there is no suspicious site of radiotracer activity.  No lung nodule is seen.  There are atherosclerosis findings and mild sinus disease. No suspicious skeletal lesion seen. IMPRESSION: Focally prominent tracer activity noted only at the dependent bladder base which appears most consistent with excreted tracer pooling in the dependent lumen. No suspicious focus of tracer accumulation seen. IMPRESSION:  Andrez Dancer is a 75 y.o. male adenocarcinoma of the prostate, [place] score 7, negative margins s/p RRP in 2003, with biochemical recurrence. He completed radiation therapy, 8/12/2016. He is now 40 months after completion of radiation. His urinary function is essentially at his baseline. His PSA prior to radiation therapy was  0.246. Fluciclovine PET on 04/11/17 was not evident for disease. His PSA continued to rise with a doubling time of 9.1 months with a PSA of 1.080 in March 2018. He had an Axumin scan on 4/5/18 that showed no evidence of disease. In October 2018 he developed gross hematuria. Cysto performed on 10/24/2018. Bladder biopsy revealed high grade urothelial dysplasia. Cystoscopy on 1/6/2020 revealed no obvious tumors (information provided from Dr Nafisa Hu, Urology. Mr Mackenzie Campoverde is now 40 months after completion of salvage radiation. PSA 1.040. Continues with urinary incontinence. Mr. Mackenzie Campoverde has had multiple conversations with Dr. Nafisa Hu in regards to treatment for his urinary incontinence and has decided to forgo any further intervention. Cystoscopy on 1/6/2020 revealed no obvious tumors (information provided from Dr Gonzalez Fails note). He is scheduled with Dr. Nafisa Hu in July with plans for repeat surveillance cystoscopy.  After our discussion, Mr Mackenzie Campoverde has decided to continue his care for his prostate cancer solely with  Nazia Arguello, Urology. We are available if needed in the future. We had a 25 minute visit, over 75% of which was in direct counseling regarding the management of his biochemical recurrent prostate cancer. All questions were answered to the best of my ability. John Duarte NP   January 21, 2020      Portions of this note were copied from prior encounters and reviewed for accuracy, currency, and represent documentation and tasks completed during this encounter. I verify and attest these portions to be unchanged from prior visits.

## 2020-01-21 NOTE — PROGRESS NOTES
07/06/2020 - Dr Gwen Gonzales    5/2003 - S/P RRP  08/12/2016 - SBRT, BCR  10/24/2018 - Dilation BNC    PET/Axumin (04/05/2018): Negative  Cystoscopy (01/06/2020): Negative  -History of bladder dysplasia    LABS:  01/07/2020 - PSA, US: 1.040, Test: 566  12/03/2019 - PSA: 1.1  06/18/2019 - PSA, US: 1.280, Test: 422    No AUA  -Incontinent        Carissa Long, [facility]

## 2020-07-13 PROCEDURE — 88112 CYTOPATH CELL ENHANCE TECH: CPT

## 2020-07-15 ENCOUNTER — HOSPITAL ENCOUNTER (OUTPATIENT)
Dept: LAB | Age: 75
Discharge: HOME | End: 2020-07-15

## 2020-07-16 NOTE — PROGRESS NOTES
Please call patient to let him know that urine showed NO cancer cells. He should keep follow up next year as scheduled.

## 2020-08-25 ENCOUNTER — HOSPITAL ENCOUNTER (OUTPATIENT)
Dept: PHYSICAL THERAPY | Age: 75
Discharge: HOME | End: 2020-08-25
Payer: MEDICARE

## 2020-08-25 PROCEDURE — 97165 OT EVAL LOW COMPLEX 30 MIN: CPT

## 2020-08-25 PROCEDURE — 97535 SELF CARE MNGMENT TRAINING: CPT

## 2020-08-25 NOTE — THERAPY EVALUATION
Nadira Younger  : 1945  [place]  Secondary: [facility] Generic [facility]  at [address] [address], [facility] [address].  [phone]   [phone]         OUTPATIENT OCCUPATIONAL THERAPY:Initial Assessment 2020   ICD-10: Treatment Diagnosis: Lymphedema, not elsewhere classified (I89.0)  Precautions/Allergies:   Amoxicillin; Lisinopril; and Sulfa (sulfonamide antibiotics)   TREATMENT PLAN:  Effective Dates: 2020 TO 2020 (90 days). Frequency/Duration: 2 times a week for 90 Day(s) MEDICAL/REFERRING DIAGNOSIS:  Lymphedema, not elsewhere classified [I89.0]   DATE OF ONSET: at least 2-3 years ago  REFERRING PHYSICIAN: Bettie Coburn*  MD Orders: Evaluate and treat  Return MD Appointment: unknown     INITIAL ASSESSMENT:  Mr. Liudmila Mcnamara presents with edema in lower extremities, left worse than right, that is consistent with stage 2 lymphedema. Feel he may benefit from skilled occupational therapy to maximize independence with home management of lymphedema. PROBLEM LIST (Impacting functional limitations):  1. Edema/Girth  2. Decreased Knowledge of Precautions INTERVENTIONS PLANNED: (Treatment may consist of any combination of the following)  1. Activities of daily living training  2. Manual therapy training  3. Therapeutic activity  4. Therapeutic exercise     GOALS: (Goals have been discussed and agreed upon with patient.)  Short-Term Functional Goals: Time Frame: 45 days  1. The patient/caregiver will verbalize understanding of lymphedema precautions. 2. Patient will be independent with skin care regimen. 3. The patient/caregiver will be independent at donning and doffing bilateral lower extremity compression bandages. 4. Patient will be independent in lymphatic exercises. Discharge Goals: Time Frame: 90 days  1.  Patient's bilateral lower extremity circumferential measurements will decrease on volumetric graph by 1-3 cm to maximize functional use in ADL's.   2. The patient/caregiver will be independent with home management of lymphedema. 3. Patient/caregiver will be independent donning and doffing bilateral lower extremity compression garment. OUTCOME MEASURE:   Tool Used: Lymphedema Life Impact Scale   Score:  Initial: 12 Most Recent: X (Date: -- )   Interpretation of Score: The Lymphedema Life Impact Scale (LLIS) is a validated instrument that measures the physical, functional, and psychosocial concerns pertinent to patients with extremity lymphedema. The Scale's questionnaire is administered to patients to gauge impairments, activity limitations, and participation restrictions resulting from their lymphedema. MEDICAL NECESSITY:   · Patient demonstrates good rehab potential due to higher previous functional level. REASON FOR SERVICES/OTHER COMMENTS:  · Patient continues to require skilled intervention due to decreased independence with home management of lymphedema. Total Duration:  OT Patient Time In/Time Out  Time In: 0800  Time Out: 0900    Rehabilitation Potential For Stated Goals: Good  Regarding Toni Younger's therapy, I certify that the treatment plan above will be carried out by a therapist or under their direction.   Thank you for this referral,  Lazarus George, OT     Referring Physician Signature: Damion Prakash _______________________________ Date _____________     PAIN/SUBJECTIVE:   Initial: Pain Intensity 1: 0   Post Session:  0/10   OCCUPATIONAL PROFILE & HISTORY:   History of Injury/Illness (Reason for Referral):  Edema in lower extremities that is worse in left than right  Past Medical History/Comorbidities:   Mr. Dalia Cotton  has a past medical history of Adverse effect of anesthesia, Allergic rhinitis, Anemia (2018), Back pain, Colon polyps, Diabetes ([facility] [address] Elevated liver function tests, GERD (gastroesophageal reflux disease), H/O alcohol abuse, Hiatal hernia, Hypercholesterolemia, Hypertension, Hypotestosteronism, Migraine, Personal history of prostate cancer, Restless leg, and Sleep apnea. Mr. Arun Kwong  has a past surgical history that includes hx colonoscopy (~2017); hx prostatectomy (2003); hx orthopaedic (Left, 2017); and hx other surgical (Right, 10/16/2018). Social History/Living Environment:   Home Environment: Private residence  # Steps to Enter: 2  One/Two Story Residence: One story  Living Alone: No  Support Systems: Spouse/Significant Other/Partner  Current DME Used/Available at Home: None  Tub or Shower Type: Tub/Shower combination  Prior Level of Function/Work/Activity:  Works in  at [employer]:          Sex:  male        Age:  67 y.o. Ambulatory/Rehab Services H2 Model Falls Risk Assessment   Risk Factors:       (1)  Gender [Male] Ability to Rise from Chair:       (1)  Pushes up, successful in one attempt   Falls Prevention Plan:       No modifications necessary   Total: (5 or greater = High Risk): 2   ©2010 [facility] [employer]. All Rights Reserved. [facility] [employer] Patent #[id number]. Federal Law prohibits the replication, distribution or use without written permission from [facility] [employer]   Current Medications:       Current Outpatient Medications:     brief disposable (ADULT) misc, by Does Not Apply route., Disp: 1 Package, Rfl: 11    miscellaneous medical supply ([facility]) misc, Use as needed for incontinence, Disp: 1 Each, Rfl: 0    nystatin (MYCOSTATIN) topical cream, Apply  to affected area two (2) times a day., Disp: 15 g, Rfl: 0    HYDROcodone-acetaminophen (NORCO) 5-325 mg per tablet, Take 1 Tab by mouth every four (4) hours as needed for Pain.  Max Daily Amount: 6 Tabs., Disp: 15 Tab, Rfl: 0    ciprofloxacin HCl (CIPRO) 500 mg tablet, Take 1 Tab by mouth two (2) times a day., Disp: 14 Tab, Rfl: 0    doxepin HCl (DOXEPIN PO), Take 0.3 mL by mouth nightly., Disp: , Rfl:   methotrexate 25 mg/mL chemo injection, 20 mg by SubCUTAneous route Every Thursday. , Disp: , Rfl:     triamcinolone acetonide (KENALOG) 0.1 % topical cream, Apply topically 2 (two) times a day., Disp: , Rfl:     clindamycin (CLEOCIN) 300 mg capsule, 300 mg three (3) times daily. , Disp: , Rfl:     folic acid 944 mcg tablet, Take 400 mcg by mouth daily. Everyday except Thursday, Disp: , Rfl:     traMADol (ULTRAM) 50 mg tablet, Take 50 mg by mouth every six (6) hours as needed for Pain., Disp: , Rfl:     cinnamon bark 500 mg cap, Take 500 mg by mouth daily. , Disp: , Rfl:     b complex vitamins tablet, Take 1 Tab by mouth daily. , Disp: , Rfl:     fenofibrate (TRICOR) 160 mg tablet, Take 160 mg by mouth daily. , Disp: , Rfl:     fluticasone (FLONASE) 50 mcg/actuation nasal spray, 2 Sprays by Both Nostrils route as needed. , Disp: , Rfl:     losartan-hydrochlorothiazide (HYZAAR) 50-12.5 mg per tablet, Take 1 Tab by mouth daily. , Disp: , Rfl:     metFORMIN ER (GLUCOPHAGE XR) 500 mg tablet, Take 500 mg by mouth daily (with dinner). , Disp: , Rfl:     pantoprazole (PROTONIX) 40 mg tablet, Take 40 mg by mouth daily. , Disp: , Rfl:     rOPINIRole (REQUIP) 1 mg tablet, Take 3 mg by mouth daily. One tab AM, two tabs PM, Disp: , Rfl:     simvastatin (ZOCOR) 80 mg tablet, Take 80 mg by mouth daily. , Disp: , Rfl:     ascorbic acid, vitamin C, (VITAMIN C) 500 mg tablet, Take 500 mg by mouth daily. , Disp: , Rfl:    Date Last Reviewed:  8/25/2020   Complexity Level: Expanded review of therapy/medical records (1-2):  MODERATE COMPLEXITY   ASSESSMENT OF OCCUPATIONAL PERFORMANCE:   Palpation:          Notable for pitting edema in lower legs and feet  ROM:          WDLs  Strength:          WDLs  Skin Integrity:          Intact  Mental Status:             Neurologic State: Alert  Orientation Level: Oriented X4  Cognition: Appropriate decision making  Perception: Appears intact  Perseveration: No perseveration noted  Safety/Judgement: Insight into deficits     Edema/Girth:  pitting    Left Right    Initial Most Recent Initial Most Recent   Upper  Extremity           Lower  Extremity 5th Tuberosity (cm): 24.8  Ankle (cm): 30.7  Calf (cm): 40.8  Mid Knee (cm): 40.5   5th Tuberosity (cm): 24.2(20 cm from base of wild;)  Ankle (cm): 28.6(10 cm from base of heel)  Calf (cm): 39.2(30 cm from base of heel)  Mid Knee (cm): 41.5(45 cm from base of heel)        Activities of Daily Living:           Basic ADLs (From Assessment) Complex ADLs (From Assessment)   Basic ADL  Feeding: Independent  Oral Facial Hygiene/Grooming: Independent  Bathing: Independent  Type of Bath: Shower  Upper Body Dressing: Independent  Lower Body Dressing: Modified independent, Additional time  Toileting: Independent Instrumental ADL  Meal Preparation: Independent  Homemaking: Independent  Medication Management: Independent   Grooming/Bathing/Dressing Activities of Daily Living     Cognitive Retraining  Safety/Judgement: Insight into deficits                 Functional Transfers  Bathroom Mobility: Independent  Toilet Transfer : Independent  Tub Transfer: Independent  Shower Transfer: Independent  Car Transfer: Independent     Bed/Mat Mobility  Rolling: Independent  Supine to Sit: Independent  Sit to Supine: Independent  Sit to Stand: Independent  Stand to Sit: Independent  Bed to Chair: Independent  Scooting: Independent     Sensation:         Light touch intact bilateral lower extremities    Physical Skills Involved:  1. Edema Cognitive Skills Affected (resulting in the inability to perform in a timely and safe manner):  1. None Psychosocial Skills Affected:  1. Habits/Routines   Number of elements that affect the Plan of Care[de-identified] 1-3:  LOW COMPLEXITY   CLINICAL DECISION MAKING:   Clinical Decision-Making Assessment:  Uncomplicated at this time.    Assessment process, impact of co-morbidities, assessment modification\need for assistance, and selection of interventions: Analytical Complexity:LOW COMPLEXITY

## 2020-08-25 NOTE — PROGRESS NOTES
Sofie Younger  : 1945  [place]  Secondary: [employer] Generic [facility]  at [facility], [address] [address], [place] [address].  [phone]   [phone]       OUTPATIENT OCCUPATIONAL THERAPY: Daily Treatment Note 2020  Visit Count:  1    ICD-10: Treatment Diagnosis: Lymphedema, not elsewhere classified (I89.0)  Precautions/Allergies:   Amoxicillin; Lisinopril; and Sulfa (sulfonamide antibiotics)   TREATMENT PLAN:  Effective Dates: 2020 TO 2020 (90 days). Frequency/Duration: 2 times a week for 90 Day(s)    Pre-treatment Symptoms/Complaints:  Patient states, \"I understand this swelling won't go away. I don't mind wearing compression. \"  Pain: Initial: Pain Intensity 1: 0  Post Session:  0/10   Medications Last Reviewed:  2020  Updated Objective Findings:  See evaluation note from today   TREATMENT:     Self Care: (20 minutes): Procedure(s) (per grid) utilized to improve and/or restore self-care/home management as related to home management of lymphedema. Required moderate verbal cueing to facilitate education. .  Patient educated on lymphedema diagnosis, risk reduction practices and complete decongestive therapy including skin care, manual lymph drainage, compression and therapeutic exercise; patient verbalized understanding. [employer] Portal  Treatment/Session Summary:    · Response to Treatment: Patient agreeable to goals and plan of care. · Communication/Consultation:  None today  · Equipment provided today:  None today  · Recommendations/Intent for next treatment session: Next visit will focus on complete decongestive therapy.     Total Treatment Billable Duration:  20 minutes in addition to low complexity evaluation  OT Patient Time In/Time Out  Time In: 0800  Time Out: 0900  Pedro Dorado OT    Future Appointments   Date Time Provider Giuseppe Robins   2020  8:00 AM Cornel Thacker OT SFEORPT SFE   9/1/2020  8:00 AM Ildefonso RIOS, OT SFEORPT SFE   9/4/2020  8:00 AM Ildefonso RIOS, OT SFEORPT SFE   9/8/2020  8:00 AM Ildefonso RIOS, OT SFEORPT SFE   9/11/2020  8:00 AM Elroy Olivas OT SFEORPT SFE   7/9/2021 10:00 AM [id number] BLOOD DRAW [id number] PGU   7/16/2021  9:00 AM Andrew Mchugh MD [id number] PGU

## 2020-08-28 ENCOUNTER — HOSPITAL ENCOUNTER (OUTPATIENT)
Dept: PHYSICAL THERAPY | Age: 75
Discharge: HOME | End: 2020-08-28
Payer: MEDICARE

## 2020-08-28 PROCEDURE — 97140 MANUAL THERAPY 1/> REGIONS: CPT

## 2020-08-28 NOTE — PROGRESS NOTES
Christianne Younger  : 1945  [place]  Secondary: [facility] at [facility]  [address], [address] [address],[address], [address] [facility]  [phone]   [phone]       OUTPATIENT OCCUPATIONAL THERAPY: Daily Treatment Note 2020  Visit Count:  2    ICD-10: Treatment Diagnosis: Lymphedema, not elsewhere classified (I89.0)  Precautions/Allergies:   Amoxicillin; Lisinopril; and Sulfa (sulfonamide antibiotics)   TREATMENT PLAN:  Effective Dates: 2020 TO 2020 (90 days). Frequency/Duration: 2 times a week for 90 Day(s)    Pre-treatment Symptoms/Complaints:  Patient states, \"I forgot that I was supposed to try and bring bigger shoes. I might need you to write some of this down. \"  Pain: Initial: Pain Intensity 1: 0  Post Session:  0/10   Medications Last Reviewed:  2020  Updated Objective Findings:  None Today   Edema/Girth:   Left Right    Initial Most Recent Initial Most Recent   Upper  Extremity           Lower  Extremity              TREATMENT:   Manual Therapy: (45 minutes): Complex decongestive physiotherapy was utilized and necessary because of the patient's stage 2 lymphedema. Patient's bilateral lower extremities wrapped from base of toes to just below the popliteal crease with cotton stockinette and padding then short-stretch compression bandages. Patient educated re: donning/doffing and wear schedule until next appointment unless painful, as well as the need for larger and/or more adjustable shoes and the option to have a family member come to learn how to change the bandages between therapy visits; he verbalized understanding. All education also provided in written form. [employer] Portal  Treatment/Session Summary:    · Response to Treatment: Patient initially tolerating compression bandaging without complaint.     · Communication/Consultation:  None today  · Equipment provided today:  None today  · Recommendations/Intent for next treatment session: Next visit will focus on complete decongestive therapy.     Total Treatment Billable Duration:  45 minutesevaluation  OT Patient Time In/Time Out  Time In: 0800  Time Out: [address], OT    Future Appointments   Date Time Provider Giuseppe Robins   9/1/2020  8:00 AM Cornel Thacker, OT SFEORPT SFE   9/4/2020  8:00 AM Cornel Thacker, OT SFEORPT SFE   9/8/2020  8:00 AM Cornel Thacker OT SFEORPT SFE   9/11/2020  8:00 AM Cornel Thacker, OT SFEORPT SFE   7/9/2021 10:00 AM [id number] BLOOD DRAW [id number] PGU   7/16/2021  9:00 AM Romana Creighton, MD [id number] PGU

## 2020-08-31 RX ORDER — PHENYLEPHRINE HYDROCHLORIDE 25 MG/ML
1 SOLUTION/ DROPS OPHTHALMIC
Status: CANCELLED | OUTPATIENT
Start: 2020-08-31 | End: 2020-08-31

## 2020-08-31 RX ORDER — CYCLOPENTOLATE HYDROCHLORIDE 20 MG/ML
1 SOLUTION/ DROPS OPHTHALMIC
Status: CANCELLED | OUTPATIENT
Start: 2020-08-31 | End: 2020-08-31

## 2020-08-31 RX ORDER — TROPICAMIDE 10 MG/ML
1 SOLUTION/ DROPS OPHTHALMIC
Status: CANCELLED | OUTPATIENT
Start: 2020-08-31 | End: 2020-08-31

## 2020-09-01 ENCOUNTER — ANESTHESIA (OUTPATIENT)
Dept: SURGERY | Age: 75
End: 2020-09-01
Payer: MEDICARE

## 2020-09-01 ENCOUNTER — HOSPITAL ENCOUNTER (OUTPATIENT)
Age: 75
Setting detail: OUTPATIENT SURGERY
Discharge: HOME | End: 2020-09-01
Attending: OPHTHALMOLOGY | Admitting: OPHTHALMOLOGY
Payer: MEDICARE

## 2020-09-01 ENCOUNTER — ANESTHESIA EVENT (OUTPATIENT)
Dept: SURGERY | Age: 75
End: 2020-09-01
Payer: MEDICARE

## 2020-09-01 ENCOUNTER — HOSPITAL ENCOUNTER (OUTPATIENT)
Dept: PHYSICAL THERAPY | Age: 75
Discharge: HOME | End: 2020-09-01

## 2020-09-01 VITALS
HEART RATE: 85 BPM | BODY MASS INDEX: 36.4 KG/M2 | HEIGHT: 71 IN | DIASTOLIC BLOOD PRESSURE: 67 MMHG | RESPIRATION RATE: 16 BRPM | WEIGHT: 260 LBS | OXYGEN SATURATION: 98 % | SYSTOLIC BLOOD PRESSURE: 123 MMHG | TEMPERATURE: 98 F

## 2020-09-01 LAB
GLUCOSE BLD STRIP.AUTO-MCNC: 110 MG/DL (ref 65–100)
POTASSIUM BLD-SCNC: 4.4 MMOL/L (ref 3.5–5.1)

## 2020-09-01 PROCEDURE — 77030022403 HC IMPL RETIN STRP DTCH -B: Performed by: OPHTHALMOLOGY

## 2020-09-01 PROCEDURE — 77030002937 HC SUT MERS J&J -B: Performed by: OPHTHALMOLOGY

## 2020-09-01 PROCEDURE — 74011000250 HC RX REV CODE- 250: Performed by: NURSE ANESTHETIST, CERTIFIED REGISTERED

## 2020-09-01 PROCEDURE — 77030040922 HC BLNKT HYPOTHRM STRY -A: Performed by: ANESTHESIOLOGY

## 2020-09-01 PROCEDURE — 77030002996 HC SUT SLK J&J -A: Performed by: OPHTHALMOLOGY

## 2020-09-01 PROCEDURE — 77030018838 HC SOL IRR OPTH ALCN -B: Performed by: OPHTHALMOLOGY

## 2020-09-01 PROCEDURE — 82962 GLUCOSE BLOOD TEST: CPT

## 2020-09-01 PROCEDURE — 77030032623 HC KT VITRCMY TOT PLS ALCN -F: Performed by: OPHTHALMOLOGY

## 2020-09-01 PROCEDURE — 77030008547 HC TBNG OPHTH BD -A: Performed by: OPHTHALMOLOGY

## 2020-09-01 PROCEDURE — 77030003029 HC SUT VCRL J&J -B: Performed by: OPHTHALMOLOGY

## 2020-09-01 PROCEDURE — 76210000020 HC REC RM PH II FIRST 0.5 HR: Performed by: OPHTHALMOLOGY

## 2020-09-01 PROCEDURE — 76010000162 HC OR TIME 1.5 TO 2 HR INTENSV-TIER 1: Performed by: OPHTHALMOLOGY

## 2020-09-01 PROCEDURE — 77030032490 HC SLV COMPR SCD KNE COVD -B: Performed by: OPHTHALMOLOGY

## 2020-09-01 PROCEDURE — 74011250636 HC RX REV CODE- 250/636: Performed by: ANESTHESIOLOGY

## 2020-09-01 PROCEDURE — 77030014436 HC IMPL RETIN SLV DTCH -A: Performed by: OPHTHALMOLOGY

## 2020-09-01 PROCEDURE — 74011250636 HC RX REV CODE- 250/636: Performed by: OPHTHALMOLOGY

## 2020-09-01 PROCEDURE — 77030002975 HC SUT PLN J&J -B: Performed by: OPHTHALMOLOGY

## 2020-09-01 PROCEDURE — 74011000250 HC RX REV CODE- 250: Performed by: OPHTHALMOLOGY

## 2020-09-01 PROCEDURE — 77030010509 HC AIRWY LMA MSK TELE -A: Performed by: ANESTHESIOLOGY

## 2020-09-01 PROCEDURE — 77030040356 HC CORD BPLR FRCP COVD -A: Performed by: OPHTHALMOLOGY

## 2020-09-01 PROCEDURE — 77030017621 HC NDL OPHTH1 ALCN -B: Performed by: OPHTHALMOLOGY

## 2020-09-01 PROCEDURE — 76210000063 HC OR PH I REC FIRST 0.5 HR: Performed by: OPHTHALMOLOGY

## 2020-09-01 PROCEDURE — 84132 ASSAY OF SERUM POTASSIUM: CPT

## 2020-09-01 PROCEDURE — 77030011291 HC ERAS HEMSTAT BVTC -A: Performed by: OPHTHALMOLOGY

## 2020-09-01 PROCEDURE — 76060000034 HC ANESTHESIA 1.5 TO 2 HR: Performed by: OPHTHALMOLOGY

## 2020-09-01 PROCEDURE — 74011250637 HC RX REV CODE- 250/637: Performed by: ANESTHESIOLOGY

## 2020-09-01 PROCEDURE — 74011250636 HC RX REV CODE- 250/636: Performed by: NURSE ANESTHETIST, CERTIFIED REGISTERED

## 2020-09-01 PROCEDURE — 77030032653 HC PRB DSP DIATHRMY DISP ALCN -B: Performed by: OPHTHALMOLOGY

## 2020-09-01 DEVICE — STRIP SCLER W3.5XL125MM THK0.75MM SIL SLD STYL 41/S2970: Type: IMPLANTABLE DEVICE | Site: EYE | Status: FUNCTIONAL

## 2020-09-01 DEVICE — SLEEVE SCLER BCKL L30IN OD21IN ID1IN SIL STYL 70: Type: IMPLANTABLE DEVICE | Site: EYE | Status: FUNCTIONAL

## 2020-09-01 RX ORDER — TROPICAMIDE 10 MG/ML
1 SOLUTION/ DROPS OPHTHALMIC
Status: COMPLETED | OUTPATIENT
Start: 2020-09-01 | End: 2020-09-01

## 2020-09-01 RX ORDER — CIPROFLOXACIN HYDROCHLORIDE 3.5 MG/ML
1 SOLUTION/ DROPS TOPICAL
Status: DISCONTINUED | OUTPATIENT
Start: 2020-09-01 | End: 2020-09-01 | Stop reason: HOSPADM

## 2020-09-01 RX ORDER — PREDNISOLONE ACETATE 10 MG/ML
SUSPENSION/ DROPS OPHTHALMIC AS NEEDED
Status: DISCONTINUED | OUTPATIENT
Start: 2020-09-01 | End: 2020-09-01 | Stop reason: HOSPADM

## 2020-09-01 RX ORDER — NEOMYCIN SULFATE, POLYMYXIN B SULFATE, AND DEXAMETHASONE 3.5; 10000; 1 MG/G; [USP'U]/G; MG/G
OINTMENT OPHTHALMIC AS NEEDED
Status: DISCONTINUED | OUTPATIENT
Start: 2020-09-01 | End: 2020-09-01 | Stop reason: HOSPADM

## 2020-09-01 RX ORDER — SODIUM CHLORIDE, SODIUM LACTATE, POTASSIUM CHLORIDE, CALCIUM CHLORIDE 600; 310; 30; 20 MG/100ML; MG/100ML; MG/100ML; MG/100ML
50 INJECTION, SOLUTION INTRAVENOUS CONTINUOUS
Status: DISCONTINUED | OUTPATIENT
Start: 2020-09-01 | End: 2020-09-01 | Stop reason: HOSPADM

## 2020-09-01 RX ORDER — FENTANYL CITRATE 50 UG/ML
100 INJECTION, SOLUTION INTRAMUSCULAR; INTRAVENOUS ONCE
Status: DISCONTINUED | OUTPATIENT
Start: 2020-09-01 | End: 2020-09-01 | Stop reason: HOSPADM

## 2020-09-01 RX ORDER — DEXAMETHASONE SODIUM PHOSPHATE 4 MG/ML
INJECTION, SOLUTION INTRA-ARTICULAR; INTRALESIONAL; INTRAMUSCULAR; INTRAVENOUS; SOFT TISSUE AS NEEDED
Status: DISCONTINUED | OUTPATIENT
Start: 2020-09-01 | End: 2020-09-01 | Stop reason: HOSPADM

## 2020-09-01 RX ORDER — ACETAMINOPHEN 500 MG
1000 TABLET ORAL ONCE
Status: COMPLETED | OUTPATIENT
Start: 2020-09-01 | End: 2020-09-01

## 2020-09-01 RX ORDER — CYCLOPENTOLATE HYDROCHLORIDE 20 MG/ML
1 SOLUTION/ DROPS OPHTHALMIC
Status: COMPLETED | OUTPATIENT
Start: 2020-09-01 | End: 2020-09-01

## 2020-09-01 RX ORDER — ONDANSETRON 2 MG/ML
INJECTION INTRAMUSCULAR; INTRAVENOUS AS NEEDED
Status: DISCONTINUED | OUTPATIENT
Start: 2020-09-01 | End: 2020-09-01 | Stop reason: HOSPADM

## 2020-09-01 RX ORDER — PROPOFOL 10 MG/ML
INJECTION, EMULSION INTRAVENOUS AS NEEDED
Status: DISCONTINUED | OUTPATIENT
Start: 2020-09-01 | End: 2020-09-01 | Stop reason: HOSPADM

## 2020-09-01 RX ORDER — FENTANYL CITRATE 50 UG/ML
INJECTION, SOLUTION INTRAMUSCULAR; INTRAVENOUS AS NEEDED
Status: DISCONTINUED | OUTPATIENT
Start: 2020-09-01 | End: 2020-09-01 | Stop reason: HOSPADM

## 2020-09-01 RX ORDER — GENTAMICIN SULFATE 3 MG/ML
SOLUTION/ DROPS OPHTHALMIC AS NEEDED
Status: DISCONTINUED | OUTPATIENT
Start: 2020-09-01 | End: 2020-09-01 | Stop reason: HOSPADM

## 2020-09-01 RX ORDER — OXYCODONE HYDROCHLORIDE 5 MG/1
5 TABLET ORAL
Status: DISCONTINUED | OUTPATIENT
Start: 2020-09-01 | End: 2020-09-01 | Stop reason: HOSPADM

## 2020-09-01 RX ORDER — BUPIVACAINE HYDROCHLORIDE 5 MG/ML
INJECTION, SOLUTION EPIDURAL; INTRACAUDAL AS NEEDED
Status: DISCONTINUED | OUTPATIENT
Start: 2020-09-01 | End: 2020-09-01 | Stop reason: HOSPADM

## 2020-09-01 RX ORDER — ALBUTEROL SULFATE 0.83 MG/ML
2.5 SOLUTION RESPIRATORY (INHALATION) AS NEEDED
Status: DISCONTINUED | OUTPATIENT
Start: 2020-09-01 | End: 2020-09-01 | Stop reason: HOSPADM

## 2020-09-01 RX ORDER — MIDAZOLAM HYDROCHLORIDE 1 MG/ML
2 INJECTION, SOLUTION INTRAMUSCULAR; INTRAVENOUS ONCE
Status: DISCONTINUED | OUTPATIENT
Start: 2020-09-01 | End: 2020-09-01 | Stop reason: HOSPADM

## 2020-09-01 RX ORDER — MIDAZOLAM HYDROCHLORIDE 1 MG/ML
2 INJECTION, SOLUTION INTRAMUSCULAR; INTRAVENOUS
Status: DISCONTINUED | OUTPATIENT
Start: 2020-09-01 | End: 2020-09-01 | Stop reason: HOSPADM

## 2020-09-01 RX ORDER — EPHEDRINE SULFATE/0.9% NACL/PF 50 MG/5 ML
SYRINGE (ML) INTRAVENOUS AS NEEDED
Status: DISCONTINUED | OUTPATIENT
Start: 2020-09-01 | End: 2020-09-01 | Stop reason: HOSPADM

## 2020-09-01 RX ORDER — PHENYLEPHRINE HYDROCHLORIDE 25 MG/ML
1 SOLUTION/ DROPS OPHTHALMIC
Status: COMPLETED | OUTPATIENT
Start: 2020-09-01 | End: 2020-09-01

## 2020-09-01 RX ORDER — CIPROFLOXACIN HYDROCHLORIDE 3.5 MG/ML
SOLUTION/ DROPS TOPICAL AS NEEDED
Status: DISCONTINUED | OUTPATIENT
Start: 2020-09-01 | End: 2020-09-01 | Stop reason: HOSPADM

## 2020-09-01 RX ORDER — HYDROMORPHONE HYDROCHLORIDE 2 MG/ML
0.5 INJECTION, SOLUTION INTRAMUSCULAR; INTRAVENOUS; SUBCUTANEOUS
Status: DISCONTINUED | OUTPATIENT
Start: 2020-09-01 | End: 2020-09-01 | Stop reason: HOSPADM

## 2020-09-01 RX ORDER — LIDOCAINE HYDROCHLORIDE 10 MG/ML
0.1 INJECTION INFILTRATION; PERINEURAL AS NEEDED
Status: DISCONTINUED | OUTPATIENT
Start: 2020-09-01 | End: 2020-09-01 | Stop reason: HOSPADM

## 2020-09-01 RX ORDER — LIDOCAINE HYDROCHLORIDE 20 MG/ML
INJECTION, SOLUTION INFILTRATION; PERINEURAL AS NEEDED
Status: DISCONTINUED | OUTPATIENT
Start: 2020-09-01 | End: 2020-09-01 | Stop reason: HOSPADM

## 2020-09-01 RX ORDER — LIDOCAINE HYDROCHLORIDE 20 MG/ML
INJECTION, SOLUTION EPIDURAL; INFILTRATION; INTRACAUDAL; PERINEURAL AS NEEDED
Status: DISCONTINUED | OUTPATIENT
Start: 2020-09-01 | End: 2020-09-01 | Stop reason: HOSPADM

## 2020-09-01 RX ORDER — BETAMETHASONE SODIUM PHOSPHATE AND BETAMETHASONE ACETATE 3; 3 MG/ML; MG/ML
INJECTION, SUSPENSION INTRA-ARTICULAR; INTRALESIONAL; INTRAMUSCULAR; SOFT TISSUE AS NEEDED
Status: DISCONTINUED | OUTPATIENT
Start: 2020-09-01 | End: 2020-09-01 | Stop reason: HOSPADM

## 2020-09-01 RX ORDER — SODIUM CHLORIDE, SODIUM LACTATE, POTASSIUM CHLORIDE, CALCIUM CHLORIDE 600; 310; 30; 20 MG/100ML; MG/100ML; MG/100ML; MG/100ML
75 INJECTION, SOLUTION INTRAVENOUS CONTINUOUS
Status: DISCONTINUED | OUTPATIENT
Start: 2020-09-01 | End: 2020-09-01 | Stop reason: HOSPADM

## 2020-09-01 RX ADMIN — PROPOFOL 30 MG: 10 INJECTION, EMULSION INTRAVENOUS at 10:06

## 2020-09-01 RX ADMIN — ACETAMINOPHEN 1000 MG: 500 TABLET, FILM COATED ORAL at 07:35

## 2020-09-01 RX ADMIN — DEXAMETHASONE SODIUM PHOSPHATE 4 MG: 4 INJECTION, SOLUTION INTRAMUSCULAR; INTRAVENOUS at 11:11

## 2020-09-01 RX ADMIN — PROPOFOL 150 MG: 10 INJECTION, EMULSION INTRAVENOUS at 09:40

## 2020-09-01 RX ADMIN — PHENYLEPHRINE HYDROCHLORIDE 100 MCG: 10 INJECTION INTRAVENOUS at 10:46

## 2020-09-01 RX ADMIN — TROPICAMIDE 1 DROP: 10 SOLUTION/ DROPS OPHTHALMIC at 07:35

## 2020-09-01 RX ADMIN — SODIUM CHLORIDE, SODIUM LACTATE, POTASSIUM CHLORIDE, AND CALCIUM CHLORIDE: 600; 310; 30; 20 INJECTION, SOLUTION INTRAVENOUS at 10:55

## 2020-09-01 RX ADMIN — CYCLOPENTOLATE HYDROCHLORIDE 1 DROP: 20 SOLUTION/ DROPS OPHTHALMIC at 07:45

## 2020-09-01 RX ADMIN — PHENYLEPHRINE HYDROCHLORIDE 1 DROP: 25 SOLUTION/ DROPS OPHTHALMIC at 07:35

## 2020-09-01 RX ADMIN — PHENYLEPHRINE HYDROCHLORIDE 1 DROP: 25 SOLUTION/ DROPS OPHTHALMIC at 07:45

## 2020-09-01 RX ADMIN — TROPICAMIDE 1 DROP: 10 SOLUTION/ DROPS OPHTHALMIC at 07:45

## 2020-09-01 RX ADMIN — Medication 10 MG: at 09:55

## 2020-09-01 RX ADMIN — FENTANYL CITRATE 25 MCG: 50 INJECTION INTRAMUSCULAR; INTRAVENOUS at 10:08

## 2020-09-01 RX ADMIN — FENTANYL CITRATE 50 MCG: 50 INJECTION INTRAMUSCULAR; INTRAVENOUS at 09:51

## 2020-09-01 RX ADMIN — PHENYLEPHRINE HYDROCHLORIDE 100 MCG: 10 INJECTION INTRAVENOUS at 10:34

## 2020-09-01 RX ADMIN — CYCLOPENTOLATE HYDROCHLORIDE 1 DROP: 20 SOLUTION/ DROPS OPHTHALMIC at 07:40

## 2020-09-01 RX ADMIN — FENTANYL CITRATE 25 MCG: 50 INJECTION INTRAMUSCULAR; INTRAVENOUS at 10:06

## 2020-09-01 RX ADMIN — ONDANSETRON 4 MG: 2 INJECTION INTRAMUSCULAR; INTRAVENOUS at 11:13

## 2020-09-01 RX ADMIN — SODIUM CHLORIDE, SODIUM LACTATE, POTASSIUM CHLORIDE, AND CALCIUM CHLORIDE 75 ML/HR: 600; 310; 30; 20 INJECTION, SOLUTION INTRAVENOUS at 07:30

## 2020-09-01 RX ADMIN — CYCLOPENTOLATE HYDROCHLORIDE 1 DROP: 20 SOLUTION/ DROPS OPHTHALMIC at 07:35

## 2020-09-01 RX ADMIN — TROPICAMIDE 1 DROP: 10 SOLUTION/ DROPS OPHTHALMIC at 07:40

## 2020-09-01 RX ADMIN — Medication 10 MG: at 10:52

## 2020-09-01 RX ADMIN — PHENYLEPHRINE HYDROCHLORIDE 1 DROP: 25 SOLUTION/ DROPS OPHTHALMIC at 07:40

## 2020-09-01 RX ADMIN — Medication 10 MG: at 09:57

## 2020-09-01 RX ADMIN — LIDOCAINE HYDROCHLORIDE 100 MG: 20 INJECTION, SOLUTION EPIDURAL; INFILTRATION; INTRACAUDAL; PERINEURAL at 09:40

## 2020-09-01 NOTE — PROGRESS NOTES
Rubia Younger  : 1945  [place]  Secondary: [facility] at [address] [address] [place] [address].  [phone]   [phone]       Mr. Younger cancelled today's outpatient therapy session due to surgery.      Chaitanya Lynn, OT

## 2020-09-01 NOTE — ANESTHESIA POSTPROCEDURE EVALUATION
Procedure(s):  LEFT EYE  -VITRECTOMY POSTERIOR 25 GAUGE/ LASER/ GAS FLUID EXCHANGE  SCLERAL BUCKLE. general    Anesthesia Post Evaluation      Multimodal analgesia: multimodal analgesia used between 6 hours prior to anesthesia start to PACU discharge  Patient location during evaluation: PACU  Patient participation: complete - patient participated  Level of consciousness: awake  Pain management: adequate  Airway patency: patent  Anesthetic complications: no  Cardiovascular status: acceptable  Respiratory status: acceptable, spontaneous ventilation and nonlabored ventilation  Hydration status: acceptable  Post anesthesia nausea and vomiting:  none      INITIAL Post-op Vital signs:   Vitals Value Taken Time   /66 9/1/2020 11:55 AM   Temp 36.4 °C (97.6 °F) 9/1/2020 11:30 AM   Pulse 78 9/1/2020 11:56 AM   Resp 16 9/1/2020 11:54 AM   SpO2 98 % 9/1/2020 11:57 AM   Vitals shown include unvalidated device data.

## 2020-09-01 NOTE — BRIEF OP NOTE
Brief Postoperative Note    Patient: Silke Campos  YOB: 1945  MRN: [id number]    Date of Procedure: 9/1/2020     Pre-Op Diagnosis: 1. 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 Proliferative diabetic retinopathy left eye                      3. Preretinal and vitreous hemorrhage left eye    Post-Op Diagnosis: Same as preoperative diagnosis. Procedure(s):  VITRECTOMY POSTERIOR 25 GAUGE/ SCAR TISSUE REMOVAL/ LASER LEFT EYE    Surgeon(s):  Sofiya Dotson MD    Surgical Assistant: None    Anesthesia: General     Estimated Blood Loss (mL): Minimal    Complications: None    Specimens: * No specimens in log *     Implants: * No implants in log *    Drains: * No LDAs found *    Findings: Tractional retinal detachment due to severe proliferative diabetic retinopathy of the left eye. Tractional retinal detachment involves superior posterior pole area and mid periphery. Preretinal heme below inferotemporal arcade.   Dictation# [id number]    Electronically Signed by Lucía Jacobson MD on 9/1/2020 at 9:35 AM

## 2020-09-01 NOTE — DISCHARGE INSTRUCTIONS
ACTIVITY  · As tolerated and as directed by your doctor. · Bathe or shower as directed by your doctor. DIET  · Clear liquids until no nausea or vomiting; then light diet for the first day. · Advance to regular diet on second day, unless your doctor orders otherwise. · If nausea and vomiting continues, call your doctor. PAIN  · Take pain medication as directed by your doctor. · Call your doctor if pain is NOT relieved by medication. · DO NOT take aspirin of blood thinners unless directed by your doctor. DRESSING CARE       CALL YOUR DOCTOR IF   · Excessive bleeding that does not stop after holding pressure over the area  · Temperature of 101 degrees F or above  · Excessive redness, swelling or bruising, and/ or green or yellow, smelly discharge from incision    AFTER ANESTHESIA   · For the first 24 hours: DO NOT Drive, Drink alcoholic beverages, or Make important decisions. · Be aware of dizziness following anesthesia and while taking pain medication. APPOINTMENT DATE/ TIME    YOUR DOCTOR'S PHONE NUMBER       DISCHARGE SUMMARY from Nurse    PATIENT INSTRUCTIONS:    After general anesthesia or intravenous sedation, for 24 hours or while taking prescription Narcotics:  · Limit your activities  · Do not drive and operate hazardous machinery  · Do not make important personal or business decisions  · Do  not drink alcoholic beverages  · If you have not urinated within 8 hours after discharge, please contact your surgeon on call. *  Please give a list of your current medications to your Primary Care Provider. *  Please update this list whenever your medications are discontinued, doses are      changed, or new medications (including over-the-counter products) are added. *  Please carry medication information at all times in case of emergency situations.       These are general instructions for a healthy lifestyle:    No smoking/ No tobacco products/ Avoid exposure to second hand smoke    Surgeon General's Warning:  Quitting smoking now greatly reduces serious risk to your health. Obesity, smoking, and sedentary lifestyle greatly increases your risk for illness    A healthy diet, regular physical exercise & weight monitoring are important for maintaining a healthy lifestyle    You may be retaining fluid if you have a history of heart failure or if you experience any of the following symptoms:  Weight gain of 3 pounds or more overnight or 5 pounds in a week, increased swelling in our hands or feet or shortness of breath while lying flat in bed. Please call your doctor as soon as you notice any of these symptoms; do not wait until your next office visit. Recognize signs and symptoms of STROKE:    F-face looks uneven    A-arms unable to move or move unevenly    S-speech slurred or non-existent    T-time-call 911 as soon as signs and symptoms begin-DO NOT go       Back to bed or wait to see if you get better-TIME IS BRAIN. Patient Education        Learning About COVID-19 and Social Distancing  What is it? Social distancing means putting space between yourself and other people. The recommended distance is 6 feet, or about 2 meters. This also means staying away from any place where people may gather, such as gonsales or other public gathering places. Why is it important? Social distancing is the best way to reduce the spread of COVID-19. This virus seems to spread from person to person through droplets from coughing and sneezing. So if you keep your distance from others, you're less likely to get it or spread it. And social distancing is important for everyone, not just those who are at high risk of infection, like older people. You might have the virus but not have symptoms. You could then give the infection to someone you come into contact with. How is it done? Putting 6 feet, or about 2 meters, between you and other people is the recommended distance.  Also stay away from any place where people may gather, such as gonsales or other public gathering places. So if possible:  · Work from home, and keep your kids at home. · Don't travel if you don't have to. And avoid public transportation, ride-shares, and taxis unless you have no choice. · Limit shopping to essentials, like food and medicines. · Wear a cloth face cover if you have to go to a public place like the grocery store or pharmacy. · Don't eat in restaurants. (You can still get takeout or food deliveries.)  · Avoid crowds and busy places. Follow stay-at-home orders or other directions for your area. Current as of: May 8, 2020               Content Version: 12.5  © 2006-2020 [employer]. Care instructions adapted under license by [employer] (which disclaims liability or warranty for this information). If you have questions about a medical condition or this instruction, always ask your healthcare professional. [address] [place] [address] any warranty or liability for your use of this information.

## 2020-09-01 NOTE — OP NOTES
[address] 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 PROCEDURE PERFORMED:  Repair of retinal detachment of the left eye by vitrectomy, scleral buckle, gas-fluid exchange and laser. SURGEON:  Radha Saldana. Nila ATKINSON MD    ASSISTANT:  None. ANESTHESIA:  None. COMPLICATIONS:  None. SPECIMENS REMOVED:  None. IMPLANTS:  We used a #41 scleral buckle and #70 sleeve. We also used SF6 gas. ESTIMATED BLOOD LOSS:  None. INDICATIONS FOR SURGERY:  The patient has experienced loss of vision in the left eye associated with a total retinal detachment. Surgery is recommended to repair the retinal detachment in order to improve vision. The risks and benefits of the surgical procedure were thoroughly reviewed and the patient wished to proceed. SUMMARY OF PROCEDURE:  After appropriate preoperative evaluation and signing of operative permit, the patient was taken to the operating room and placed in a supine fashion upon the procedure table. A time-out was then performed and all operating room personnel confirmed the patient's identity, the surgical site and procedures to be performed. The patient was then placed under general anesthesia and a retrobulbar injection was given on the left consisting of 5 mL of a 50:50 solution of 2% Xylocaine and 0.75% Marcaine. The left eye was then carefully draped and prepped in sterile ophthalmic fashion with 5% Betadine solution. The conjunctiva and periorbital tissues were carefully prepped. A 360-degree conjunctival peritomy was fashioned at the limbus with blunt-tip Warner scissors.   Each of the four quadrants were opened with Rick Alaniz scissors. 3-0 silk ties were placed under the insertions of each of the four rectus muscles. A #41 band was soaked in gentamicin solution and then placed under the insertions of each of the four rectus muscles. The band was secured superior nasally with a #70 sleeve. The scleral buckle was then secured in each of four quadrants with 5-0 Mersilene sutures placed in a mattress fashion. Each suture was placed in the mid quadrant approximately 2 mm posterior to the muscle insertions. Once this was completed, the scleral buckle was tightened approximately 10 mm. The ends of the buckle were then trimmed. The buckle was noted to be in good position externally and of moderate height. A 25-gauge 4-mm infusion cannula was placed 4 mm posterior to the limbus at the 4 o'clock position. Once the tip of the cannula was visualized and posterior segment found to be clear, it was turned to the on position and the intraocular pressure was adjusted to 30 mmHg. Two additional cannulas were placed at the 10 and 2 o'clock positions 4 mm posterior to the limbus. The handheld fiberoptic tube was inserted in the nasal cannula site while the Microvit instrument was inserted in the temporal cannula site. Visualization of the posterior segment was achieved using the overhead microscope and a handheld contact lens. Examination of the posterior segment confirmed a total retinal detachment. A horseshoe retinal tear was visualized at the 9 o'clock position and an additional atrophic retinal hole was present at the 10 o'clock position. Both were present anterior to the equator. Posterior vitreous detachment was present. Also previous demarcation line was visualized extending radially at the 10 o'clock position. A thorough central vitrectomy was performed. Care was taken to trim the vitreous to the far periphery in all quadrants. Each of the retinal tears were carefully marked with internal diathermy instrument.   In addition, a retinotomy was fashioned at the 11 o'clock position superior to the optic nerve. An air-fluid exchange was performed and all subretinal fluid was drained. The indirect laser was then used by laser treatment around the retinotomy site as well as the initial retinal tears. Additional laser was applied in all quadrants between the ora joseph and the vortex veins. This laser was applied both on and posterior to the scleral buckle in all quadrants. All instruments were removed from the eye and a 20% solution of SF6 gas was infused through the infusion cannula. This was vented through the superior temporal cannula site. The intraocular pressure was adjusted to approximately 15 mmHg and all cannulas were removed from the eye. The muscle retraction sutures were removed. The conjunctiva was reapproximated at the limbus with buried 6-0 plain gut suture. A subconjunctival injection consisting of 0.5 mL of Ancef and 0.5 mL of betamethasone was given in the inferior temporal quadrant. Prednisolone and ciprofloxacin were placed in the inferior fornix and a patch and shield were placed over the left eye. The patient tolerated the procedure well and was taken to the recovery room for routine postoperative care.         Dilcia Trejo MD [id number]  D:  09/01/2020 11:26  T:  09/01/2020 13:08  JOB #:  [id number]

## 2020-09-01 NOTE — BRIEF OP NOTE
Brief Postoperative Note    Patient: Waqar Vick  YOB: 1945  MRN: [id number]    Date of Procedure: 9/1/2020     Pre-Op Diagnosis: Retinal detachment of left eye with multiple breaks [H33.022]    Post-Op Diagnosis: Same as preoperative diagnosis. Procedure(s):  LEFT EYE  -VITRECTOMY POSTERIOR 25 GAUGE/ LASER/ GAS FLUID EXCHANGE  SCLERAL BUCKLE    Surgeon(s):  Yunier Dotson MD    Surgical Assistant: None    Anesthesia: General     Estimated Blood Loss (mL): Minimal    Complications: None    Specimens: * No specimens in log *     Implants:   Implant Name Type Inv. Item Serial No.  Lot No. LRB No. Used Action   IMPL RETIN SLV RND 1X2.1X30MM --  - [id number]  IMPL RETIN SLV RND 1X2.1X30MM --   [facility] [employer] [id number] Left 1 Implanted   IMPL RETIN STRP 0.75X3. [id number] --  - [id number]  IMPL RETIN STRP 0.75X3. [id number] --   [facility] [id number] Left 1 Implanted       Drains: * No LDAs found *    Findings: Total retinal detachment with tears at 9 and 10 o'clock. Old radial demarcation line at 10.   Dictation# [id number]    Electronically Signed by Indira Morillo MD on 9/1/2020 at 11:27 AM

## 2020-09-01 NOTE — ANESTHESIA PREPROCEDURE EVALUATION
Relevant Problems   No relevant active problems       Anesthetic History     Other anesthesia complications     Comments: Slow to wake up- sensitive to meds     Review of Systems / Medical History  Patient summary reviewed, nursing notes reviewed and pertinent labs reviewed    Pulmonary        Sleep apnea: CPAP           Neuro/Psych         Headaches     Cardiovascular    Hypertension: well controlled          Hyperlipidemia    Exercise tolerance: >4 METS     GI/Hepatic/Renal     GERD: well controlled           Endo/Other    Diabetes: well controlled, type 2    Obesity     Other Findings   Comments: Restless leg syndrome           Physical Exam    Airway  Mallampati: II    Neck ROM: normal range of motion   Mouth opening: Normal     Cardiovascular  Regular rate and rhythm,  S1 and S2 normal,  no murmur, click, rub, or gallop             Dental  No notable dental hx       Pulmonary  Breath sounds clear to auscultation               Abdominal         Other Findings            Anesthetic Plan    ASA: 2  Anesthesia type: general          Induction: Intravenous  Anesthetic plan and risks discussed with: Patient and Family

## 2020-09-04 ENCOUNTER — HOSPITAL ENCOUNTER (OUTPATIENT)
Dept: PHYSICAL THERAPY | Age: 75
Discharge: HOME | End: 2020-09-04

## 2020-09-04 NOTE — PROGRESS NOTES
Raul Younger  : 1945  [place]  Secondary: [facility] at [address] [place] [address],[address], [address] [place] [address]  [phone]   [phone]       Mr. Younger cancelled today's outpatient therapy session due to eye surgery. He will call back to schedule next visit once he is able.     Kathi Marc, OT

## 2020-09-08 ENCOUNTER — APPOINTMENT (OUTPATIENT)
Dept: PHYSICAL THERAPY | Age: 75
End: 2020-09-08

## 2020-09-11 ENCOUNTER — APPOINTMENT (OUTPATIENT)
Dept: PHYSICAL THERAPY | Age: 75
End: 2020-09-11

## 2021-01-01 ENCOUNTER — HOSPITAL ENCOUNTER (OUTPATIENT)
Dept: GENERAL RADIOLOGY | Age: 76
Discharge: HOME | End: 2021-12-17
Payer: MEDICARE

## 2021-01-01 ENCOUNTER — TRANSCRIBE ORDER (OUTPATIENT)
Dept: REGISTRATION | Age: 76
End: 2021-01-01

## 2021-01-01 ENCOUNTER — HOSPITAL ENCOUNTER (OUTPATIENT)
Dept: LAB | Age: 76
Discharge: HOME | End: 2021-07-12

## 2021-01-01 DIAGNOSIS — M54.9 BACK PAIN: ICD-10-CM

## 2021-01-01 DIAGNOSIS — L40.50 PSORIATIC ARTHRITIS (HCC): ICD-10-CM

## 2021-01-01 DIAGNOSIS — C61 PROSTATE CANCER (HCC): ICD-10-CM

## 2021-01-01 DIAGNOSIS — L40.50 PSORIATIC ARTHRITIS (HCC): Primary | ICD-10-CM

## 2021-01-01 PROCEDURE — 73120 X-RAY EXAM OF HAND: CPT

## 2021-01-01 PROCEDURE — 88112 CYTOPATH CELL ENHANCE TECH: CPT

## 2021-01-01 PROCEDURE — 73630 X-RAY EXAM OF FOOT: CPT

## 2021-01-01 PROCEDURE — 72100 X-RAY EXAM L-S SPINE 2/3 VWS: CPT

## 2021-01-01 PROCEDURE — 72070 X-RAY EXAM THORAC SPINE 2VWS: CPT

## 2021-07-17 NOTE — PROGRESS NOTES
Mr. Tonie Beach, your cytology shows NO cancer cells. I will plan to see you next year as scheduled.      Best,  Dr. Erich Coelho

## 2021-08-06 NOTE — THERAPY DISCHARGE
Feroz Younger  : 1945  [place]  Secondary: [facility]  at [place] [address],[address] [place] [address].  [phone]   [phone]         OUTPATIENT OCCUPATIONAL THERAPY:Discontinuation Summary 21   ICD-10: Treatment Diagnosis: Lymphedema, not elsewhere classified (I89.0)  Precautions/Allergies:   Amoxicillin, Lisinopril, and Sulfa (sulfonamide antibiotics)   TREATMENT PLAN:  Effective Dates: 2020 TO 2020 (90 days). Frequency/Duration: 2 times a week for 90 Day(s) MEDICAL/REFERRING DIAGNOSIS:  Lymphedema, not elsewhere classified [I89.0]   DATE OF ONSET: at least 2-3 years ago  REFERRING PHYSICIAN: Olivia Campos*  MD Orders: Evaluate and treat  Return MD Appointment: unknown   Discontinuation Summary: Mr. Ruel Pompa cancelled his last 2 therapy visits due to surgery and did not return; his plan of care  and will be discontinued. Thank you. INITIAL ASSESSMENT:  Mr. Ruel Pompa presents with edema in lower extremities, left worse than right, that is consistent with stage 2 lymphedema. Feel he may benefit from skilled occupational therapy to maximize independence with home management of lymphedema. PROBLEM LIST (Impacting functional limitations):  1. Edema/Girth  2. Decreased Knowledge of Precautions INTERVENTIONS PLANNED: (Treatment may consist of any combination of the following)  1. Activities of daily living training  2. Manual therapy training  3. Therapeutic activity  4. Therapeutic exercise     GOALS: (Goals have been discussed and agreed upon with patient.) Unable to assess at discharge  Short-Term Functional Goals: Time Frame: 45 days  1. The patient/caregiver will verbalize understanding of lymphedema precautions. 2. Patient will be independent with skin care regimen.    3. The patient/caregiver will be independent at donning and doffing bilateral lower extremity compression bandages. 4. Patient will be independent in lymphatic exercises. Discharge Goals: Time Frame: 90 days  1. Patient's bilateral lower extremity circumferential measurements will decrease on volumetric graph by 1-3 cm to maximize functional use in ADL's.   2. The patient/caregiver will be independent with home management of lymphedema. 3. Patient/caregiver will be independent donning and doffing bilateral lower extremity compression garment. OUTCOME MEASURE:   Tool Used: Lymphedema Life Impact Scale   Score:  Initial: 12 Most Recent: X (Date: -- )   Interpretation of Score: The Lymphedema Life Impact Scale (LLIS) is a validated instrument that measures the physical, functional, and psychosocial concerns pertinent to patients with extremity lymphedema. The Scale's questionnaire is administered to patients to gauge impairments, activity limitations, and participation restrictions resulting from their lymphedema. Thank you for this referral,  Yris Brown OT     Referring Physician Signature: Mady Garcia* No Signature is Required for this note.

## 2022-01-01 ENCOUNTER — APPOINTMENT (OUTPATIENT)
Dept: INTERVENTIONAL RADIOLOGY/VASCULAR | Age: 77
DRG: 699 | End: 2022-01-01
Attending: INTERNAL MEDICINE
Payer: MEDICARE

## 2022-01-01 ENCOUNTER — HOME CARE VISIT (OUTPATIENT)
Dept: SCHEDULING | Facility: HOME HEALTH | Age: 77
End: 2022-01-01
Payer: MEDICARE

## 2022-01-01 ENCOUNTER — APPOINTMENT (OUTPATIENT)
Dept: ULTRASOUND IMAGING | Age: 77
DRG: 699 | End: 2022-01-01
Attending: FAMILY MEDICINE
Payer: MEDICARE

## 2022-01-01 ENCOUNTER — APPOINTMENT (OUTPATIENT)
Dept: MRI IMAGING | Age: 77
DRG: 314 | End: 2022-01-01
Attending: INTERNAL MEDICINE
Payer: MEDICARE

## 2022-01-01 ENCOUNTER — HOSPITAL ENCOUNTER (OUTPATIENT)
Dept: INTERVENTIONAL RADIOLOGY/VASCULAR | Age: 77
Discharge: HOME | DRG: 314 | End: 2022-04-08
Attending: INTERNAL MEDICINE
Payer: MEDICARE

## 2022-01-01 ENCOUNTER — HOME CARE VISIT (OUTPATIENT)
Dept: HOSPICE | Facility: HOSPICE | Age: 77
End: 2022-01-01
Payer: MEDICARE

## 2022-01-01 ENCOUNTER — APPOINTMENT (OUTPATIENT)
Dept: GENERAL RADIOLOGY | Age: 77
DRG: 314 | End: 2022-01-01
Attending: INTERNAL MEDICINE
Payer: MEDICARE

## 2022-01-01 ENCOUNTER — APPOINTMENT (OUTPATIENT)
Dept: GENERAL RADIOLOGY | Age: 77
DRG: 699 | End: 2022-01-01
Attending: FAMILY MEDICINE
Payer: MEDICARE

## 2022-01-01 ENCOUNTER — HOSPITAL ENCOUNTER (INPATIENT)
Age: 77
LOS: 5 days | Discharge: STILL ADMITTED OR ADMINISTRATIVE | DRG: 314 | End: 2022-04-08
Attending: EMERGENCY MEDICINE | Admitting: FAMILY MEDICINE
Payer: MEDICARE

## 2022-01-01 ENCOUNTER — HOSPITAL ENCOUNTER (INPATIENT)
Age: 77
LOS: 11 days | Discharge: NURSING, REHAB OR LONG-TERM CARE | DRG: 699 | End: 2022-02-11
Attending: EMERGENCY MEDICINE | Admitting: FAMILY MEDICINE
Payer: MEDICARE

## 2022-01-01 ENCOUNTER — HOSPITAL ENCOUNTER (INPATIENT)
Age: 77
LOS: 4 days | Discharge: HOSPICE | DRG: 314 | End: 2022-04-12
Attending: FAMILY MEDICINE | Admitting: INTERNAL MEDICINE
Payer: MEDICARE

## 2022-01-01 ENCOUNTER — APPOINTMENT (OUTPATIENT)
Dept: CT IMAGING | Age: 77
DRG: 699 | End: 2022-01-01
Attending: NURSE PRACTITIONER
Payer: MEDICARE

## 2022-01-01 ENCOUNTER — APPOINTMENT (OUTPATIENT)
Dept: NON INVASIVE DIAGNOSTICS | Age: 77
DRG: 699 | End: 2022-01-01
Payer: MEDICARE

## 2022-01-01 ENCOUNTER — APPOINTMENT (OUTPATIENT)
Dept: GENERAL RADIOLOGY | Age: 77
DRG: 314 | End: 2022-01-01
Attending: EMERGENCY MEDICINE
Payer: MEDICARE

## 2022-01-01 ENCOUNTER — HOSPICE ADMISSION (OUTPATIENT)
Dept: HOSPICE | Facility: HOSPICE | Age: 77
End: 2022-01-01
Payer: MEDICARE

## 2022-01-01 ENCOUNTER — APPOINTMENT (OUTPATIENT)
Dept: NON INVASIVE DIAGNOSTICS | Age: 77
DRG: 314 | End: 2022-01-01
Attending: HOSPITALIST
Payer: MEDICARE

## 2022-01-01 VITALS
TEMPERATURE: 97.8 F | SYSTOLIC BLOOD PRESSURE: 128 MMHG | HEART RATE: 70 BPM | RESPIRATION RATE: 17 BRPM | DIASTOLIC BLOOD PRESSURE: 76 MMHG

## 2022-01-01 VITALS
WEIGHT: 220.02 LBS | TEMPERATURE: 98.3 F | BODY MASS INDEX: 30.8 KG/M2 | HEART RATE: 108 BPM | DIASTOLIC BLOOD PRESSURE: 70 MMHG | HEIGHT: 71 IN | OXYGEN SATURATION: 95 % | RESPIRATION RATE: 22 BRPM | SYSTOLIC BLOOD PRESSURE: 101 MMHG

## 2022-01-01 VITALS
DIASTOLIC BLOOD PRESSURE: 72 MMHG | OXYGEN SATURATION: 100 % | HEART RATE: 87 BPM | HEIGHT: 71 IN | BODY MASS INDEX: 29.71 KG/M2 | WEIGHT: 212.2 LBS | SYSTOLIC BLOOD PRESSURE: 110 MMHG | RESPIRATION RATE: 15 BRPM | TEMPERATURE: 97.8 F

## 2022-01-01 VITALS
SYSTOLIC BLOOD PRESSURE: 86 MMHG | TEMPERATURE: 98.2 F | HEART RATE: 79 BPM | DIASTOLIC BLOOD PRESSURE: 52 MMHG | RESPIRATION RATE: 24 BRPM

## 2022-01-01 VITALS
WEIGHT: 236.7 LBS | TEMPERATURE: 98.2 F | DIASTOLIC BLOOD PRESSURE: 60 MMHG | BODY MASS INDEX: 39.44 KG/M2 | SYSTOLIC BLOOD PRESSURE: 99 MMHG | OXYGEN SATURATION: 99 % | RESPIRATION RATE: 16 BRPM | HEART RATE: 64 BPM | HEIGHT: 65 IN

## 2022-01-01 VITALS
TEMPERATURE: 97.9 F | HEART RATE: 77 BPM | RESPIRATION RATE: 18 BRPM | DIASTOLIC BLOOD PRESSURE: 78 MMHG | SYSTOLIC BLOOD PRESSURE: 132 MMHG

## 2022-01-01 VITALS
TEMPERATURE: 98 F | SYSTOLIC BLOOD PRESSURE: 115 MMHG | DIASTOLIC BLOOD PRESSURE: 63 MMHG | HEART RATE: 75 BPM | RESPIRATION RATE: 19 BRPM

## 2022-01-01 VITALS
HEART RATE: 76 BPM | BODY MASS INDEX: 30.8 KG/M2 | HEIGHT: 71 IN | DIASTOLIC BLOOD PRESSURE: 80 MMHG | TEMPERATURE: 97.4 F | RESPIRATION RATE: 18 BRPM | SYSTOLIC BLOOD PRESSURE: 120 MMHG | WEIGHT: 220 LBS

## 2022-01-01 VITALS
RESPIRATION RATE: 16 BRPM | SYSTOLIC BLOOD PRESSURE: 133 MMHG | DIASTOLIC BLOOD PRESSURE: 64 MMHG | TEMPERATURE: 97.5 F | HEART RATE: 84 BPM

## 2022-01-01 VITALS
HEART RATE: 66 BPM | TEMPERATURE: 98.6 F | RESPIRATION RATE: 16 BRPM | DIASTOLIC BLOOD PRESSURE: 58 MMHG | SYSTOLIC BLOOD PRESSURE: 102 MMHG

## 2022-01-01 VITALS
SYSTOLIC BLOOD PRESSURE: 97 MMHG | WEIGHT: 212 LBS | TEMPERATURE: 97.5 F | OXYGEN SATURATION: 98 % | DIASTOLIC BLOOD PRESSURE: 58 MMHG | HEART RATE: 82 BPM | RESPIRATION RATE: 16 BRPM | HEIGHT: 71 IN | BODY MASS INDEX: 29.68 KG/M2

## 2022-01-01 VITALS — HEART RATE: 80 BPM | RESPIRATION RATE: 20 BRPM | SYSTOLIC BLOOD PRESSURE: 130 MMHG | DIASTOLIC BLOOD PRESSURE: 72 MMHG

## 2022-01-01 DIAGNOSIS — A41.9 SEPTIC SHOCK (HCC): ICD-10-CM

## 2022-01-01 DIAGNOSIS — N02.9 RECURRENT HEMATURIA: ICD-10-CM

## 2022-01-01 DIAGNOSIS — Z51.5 ENCOUNTER FOR PALLIATIVE CARE: ICD-10-CM

## 2022-01-01 DIAGNOSIS — K74.60 HEPATIC CIRRHOSIS, UNSPECIFIED HEPATIC CIRRHOSIS TYPE, UNSPECIFIED WHETHER ASCITES PRESENT (HCC): Chronic | ICD-10-CM

## 2022-01-01 DIAGNOSIS — N39.0 SEPSIS DUE TO URINARY TRACT INFECTION (HCC): ICD-10-CM

## 2022-01-01 DIAGNOSIS — R41.82 ALTERED MENTAL STATUS, UNSPECIFIED ALTERED MENTAL STATUS TYPE: ICD-10-CM

## 2022-01-01 DIAGNOSIS — D69.6 THROMBOCYTOPENIA (HCC): ICD-10-CM

## 2022-01-01 DIAGNOSIS — A41.9 SEPSIS, DUE TO UNSPECIFIED ORGANISM, UNSPECIFIED WHETHER ACUTE ORGAN DYSFUNCTION PRESENT (HCC): ICD-10-CM

## 2022-01-01 DIAGNOSIS — N30.40 RADIATION CYSTITIS: ICD-10-CM

## 2022-01-01 DIAGNOSIS — S32.010A CLOSED COMPRESSION FRACTURE OF BODY OF L1 VERTEBRA (HCC): ICD-10-CM

## 2022-01-01 DIAGNOSIS — R52 PAIN, GENERALIZED: ICD-10-CM

## 2022-01-01 DIAGNOSIS — D64.9 ANEMIA, UNSPECIFIED TYPE: ICD-10-CM

## 2022-01-01 DIAGNOSIS — R77.8 ELEVATED TROPONIN: ICD-10-CM

## 2022-01-01 DIAGNOSIS — R54 FRAILTY: ICD-10-CM

## 2022-01-01 DIAGNOSIS — R31.0 GROSS HEMATURIA: Primary | ICD-10-CM

## 2022-01-01 DIAGNOSIS — C61 PROSTATE CANCER (HCC): ICD-10-CM

## 2022-01-01 DIAGNOSIS — Z71.89 ADVANCED CARE PLANNING/COUNSELING DISCUSSION: ICD-10-CM

## 2022-01-01 DIAGNOSIS — I50.30 HEART FAILURE WITH PRESERVED EJECTION FRACTION, UNSPECIFIED HF CHRONICITY (HCC): Chronic | ICD-10-CM

## 2022-01-01 DIAGNOSIS — N30.00 ACUTE CYSTITIS WITHOUT HEMATURIA: Primary | ICD-10-CM

## 2022-01-01 DIAGNOSIS — D62 ANEMIA DUE TO ACUTE BLOOD LOSS: ICD-10-CM

## 2022-01-01 DIAGNOSIS — R65.21 SEPTIC SHOCK (HCC): ICD-10-CM

## 2022-01-01 DIAGNOSIS — D62 ACUTE BLOOD LOSS ANEMIA (ABLA): ICD-10-CM

## 2022-01-01 DIAGNOSIS — A41.9 SEPSIS DUE TO URINARY TRACT INFECTION (HCC): ICD-10-CM

## 2022-01-01 LAB
ABO + RH BLD: NORMAL
ABO + RH BLD: NORMAL
ACC. NO. FROM MICRO ORDER, ACCP: ABNORMAL
ALBUMIN SERPL-MCNC: 2.1 G/DL (ref 3.2–4.6)
ALBUMIN SERPL-MCNC: 2.2 G/DL (ref 3.2–4.6)
ALBUMIN SERPL-MCNC: 2.2 G/DL (ref 3.2–4.6)
ALBUMIN SERPL-MCNC: 2.5 G/DL (ref 3.2–4.6)
ALBUMIN/GLOB SERPL: 0.9 {RATIO} (ref 1.2–3.5)
ALBUMIN/GLOB SERPL: 1 {RATIO} (ref 1.2–3.5)
ALBUMIN/GLOB SERPL: 1.1 {RATIO} (ref 1.2–3.5)
ALBUMIN/GLOB SERPL: 1.2 {RATIO} (ref 1.2–3.5)
ALP SERPL-CCNC: 57 U/L (ref 50–136)
ALP SERPL-CCNC: 62 U/L (ref 50–136)
ALP SERPL-CCNC: 79 U/L (ref 50–136)
ALP SERPL-CCNC: 99 U/L (ref 50–136)
ALT SERPL-CCNC: 18 U/L (ref 12–65)
ALT SERPL-CCNC: 20 U/L (ref 12–65)
ALT SERPL-CCNC: 26 U/L (ref 12–65)
ALT SERPL-CCNC: 33 U/L (ref 12–65)
AMMONIA PLAS-SCNC: 27 UMOL/L (ref 11–32)
ANION GAP SERPL CALC-SCNC: 11 MMOL/L (ref 7–16)
ANION GAP SERPL CALC-SCNC: 3 MMOL/L (ref 7–16)
ANION GAP SERPL CALC-SCNC: 3 MMOL/L (ref 7–16)
ANION GAP SERPL CALC-SCNC: 4 MMOL/L (ref 7–16)
ANION GAP SERPL CALC-SCNC: 5 MMOL/L (ref 7–16)
ANION GAP SERPL CALC-SCNC: 6 MMOL/L (ref 7–16)
ANION GAP SERPL CALC-SCNC: 7 MMOL/L (ref 7–16)
ANION GAP SERPL CALC-SCNC: 8 MMOL/L (ref 7–16)
ANION GAP SERPL CALC-SCNC: 9 MMOL/L (ref 7–16)
APPEARANCE UR: ABNORMAL
AST SERPL-CCNC: 25 U/L (ref 15–37)
AST SERPL-CCNC: 28 U/L (ref 15–37)
AST SERPL-CCNC: 37 U/L (ref 15–37)
AST SERPL-CCNC: 60 U/L (ref 15–37)
ATRIAL RATE: 75 BPM
BACTERIA SPEC CULT: ABNORMAL
BACTERIA SPEC CULT: NORMAL
BACTERIA URNS QL MICRO: NORMAL /HPF
BACTERIA URNS QL MICRO: NORMAL /HPF
BASOPHILS # BLD: 0 K/UL (ref 0–0.2)
BASOPHILS # BLD: 0.1 K/UL (ref 0–0.2)
BASOPHILS NFR BLD: 0 % (ref 0–2)
BASOPHILS NFR BLD: 1 % (ref 0–2)
BILIRUB SERPL-MCNC: 0.7 MG/DL (ref 0.2–1.1)
BILIRUB SERPL-MCNC: 0.8 MG/DL (ref 0.2–1.1)
BILIRUB SERPL-MCNC: 0.9 MG/DL (ref 0.2–1.1)
BILIRUB SERPL-MCNC: 1.4 MG/DL (ref 0.2–1.1)
BILIRUB UR QL: NEGATIVE
BLD PROD TYP BPU: NORMAL
BLD PROD TYP BPU: NORMAL
BLOOD BANK CMNT PATIENT-IMP: NORMAL
BLOOD GROUP ANTIBODIES SERPL: NORMAL
BLOOD GROUP ANTIBODIES SERPL: NORMAL
BNP SERPL-MCNC: 2968 PG/ML
BPU ID: NORMAL
BPU ID: NORMAL
BUN SERPL-MCNC: 10 MG/DL (ref 8–23)
BUN SERPL-MCNC: 12 MG/DL (ref 8–23)
BUN SERPL-MCNC: 12 MG/DL (ref 8–23)
BUN SERPL-MCNC: 14 MG/DL (ref 8–23)
BUN SERPL-MCNC: 24 MG/DL (ref 8–23)
BUN SERPL-MCNC: 27 MG/DL (ref 8–23)
BUN SERPL-MCNC: 31 MG/DL (ref 8–23)
BUN SERPL-MCNC: 32 MG/DL (ref 8–23)
BUN SERPL-MCNC: 33 MG/DL (ref 8–23)
BUN SERPL-MCNC: 34 MG/DL (ref 8–23)
BUN SERPL-MCNC: 34 MG/DL (ref 8–23)
BUN SERPL-MCNC: 36 MG/DL (ref 8–23)
BUN SERPL-MCNC: 36 MG/DL (ref 8–23)
BUN SERPL-MCNC: 37 MG/DL (ref 8–23)
BUN SERPL-MCNC: 38 MG/DL (ref 8–23)
BUN SERPL-MCNC: 40 MG/DL (ref 8–23)
BUN SERPL-MCNC: 46 MG/DL (ref 8–23)
CALCIUM SERPL-MCNC: 7.6 MG/DL (ref 8.3–10.4)
CALCIUM SERPL-MCNC: 7.9 MG/DL (ref 8.3–10.4)
CALCIUM SERPL-MCNC: 8 MG/DL (ref 8.3–10.4)
CALCIUM SERPL-MCNC: 8.2 MG/DL (ref 8.3–10.4)
CALCIUM SERPL-MCNC: 8.2 MG/DL (ref 8.3–10.4)
CALCIUM SERPL-MCNC: 8.3 MG/DL (ref 8.3–10.4)
CALCIUM SERPL-MCNC: 8.3 MG/DL (ref 8.3–10.4)
CALCIUM SERPL-MCNC: 8.4 MG/DL (ref 8.3–10.4)
CALCIUM SERPL-MCNC: 8.5 MG/DL (ref 8.3–10.4)
CALCIUM SERPL-MCNC: 8.5 MG/DL (ref 8.3–10.4)
CALCIUM SERPL-MCNC: 8.6 MG/DL (ref 8.3–10.4)
CALCIUM SERPL-MCNC: 8.6 MG/DL (ref 8.3–10.4)
CALCIUM SERPL-MCNC: 8.7 MG/DL (ref 8.3–10.4)
CALCIUM SERPL-MCNC: 8.8 MG/DL (ref 8.3–10.4)
CALCIUM SERPL-MCNC: 8.8 MG/DL (ref 8.3–10.4)
CALCIUM SERPL-MCNC: 8.9 MG/DL (ref 8.3–10.4)
CALCIUM SERPL-MCNC: 9.4 MG/DL (ref 8.3–10.4)
CALCULATED P AXIS, ECG09: 70 DEGREES
CALCULATED R AXIS, ECG10: 9 DEGREES
CALCULATED T AXIS, ECG11: 66 DEGREES
CASTS URNS QL MICRO: 0 /LPF
CASTS URNS QL MICRO: NORMAL /LPF
CHLORIDE SERPL-SCNC: 103 MMOL/L (ref 98–107)
CHLORIDE SERPL-SCNC: 105 MMOL/L (ref 98–107)
CHLORIDE SERPL-SCNC: 106 MMOL/L (ref 98–107)
CHLORIDE SERPL-SCNC: 109 MMOL/L (ref 98–107)
CHLORIDE SERPL-SCNC: 110 MMOL/L (ref 98–107)
CHLORIDE SERPL-SCNC: 110 MMOL/L (ref 98–107)
CHLORIDE SERPL-SCNC: 111 MMOL/L (ref 98–107)
CHLORIDE SERPL-SCNC: 111 MMOL/L (ref 98–107)
CHLORIDE SERPL-SCNC: 112 MMOL/L (ref 98–107)
CHLORIDE SERPL-SCNC: 113 MMOL/L (ref 98–107)
CHLORIDE SERPL-SCNC: 113 MMOL/L (ref 98–107)
CHLORIDE SERPL-SCNC: 114 MMOL/L (ref 98–107)
CHLORIDE SERPL-SCNC: 115 MMOL/L (ref 98–107)
CHLORIDE SERPL-SCNC: 116 MMOL/L (ref 98–107)
CHLORIDE SERPL-SCNC: 117 MMOL/L (ref 98–107)
CHLORIDE SERPL-SCNC: 117 MMOL/L (ref 98–107)
CK SERPL-CCNC: 18 U/L (ref 21–215)
CO2 SERPL-SCNC: 19 MMOL/L (ref 21–32)
CO2 SERPL-SCNC: 20 MMOL/L (ref 21–32)
CO2 SERPL-SCNC: 21 MMOL/L (ref 21–32)
CO2 SERPL-SCNC: 22 MMOL/L (ref 21–32)
CO2 SERPL-SCNC: 22 MMOL/L (ref 21–32)
CO2 SERPL-SCNC: 23 MMOL/L (ref 21–32)
CO2 SERPL-SCNC: 24 MMOL/L (ref 21–32)
CO2 SERPL-SCNC: 25 MMOL/L (ref 21–32)
CO2 SERPL-SCNC: 26 MMOL/L (ref 21–32)
CO2 SERPL-SCNC: 28 MMOL/L (ref 21–32)
COLOR UR: ABNORMAL
CREAT SERPL-MCNC: 0.6 MG/DL (ref 0.8–1.5)
CREAT SERPL-MCNC: 0.6 MG/DL (ref 0.8–1.5)
CREAT SERPL-MCNC: 0.62 MG/DL (ref 0.8–1.5)
CREAT SERPL-MCNC: 0.62 MG/DL (ref 0.8–1.5)
CREAT SERPL-MCNC: 0.65 MG/DL (ref 0.8–1.5)
CREAT SERPL-MCNC: 0.7 MG/DL (ref 0.8–1.5)
CREAT SERPL-MCNC: 0.7 MG/DL (ref 0.8–1.5)
CREAT SERPL-MCNC: 0.71 MG/DL (ref 0.8–1.5)
CREAT SERPL-MCNC: 0.79 MG/DL (ref 0.8–1.5)
CREAT SERPL-MCNC: 0.8 MG/DL (ref 0.8–1.5)
CREAT SERPL-MCNC: 0.81 MG/DL (ref 0.8–1.5)
CREAT SERPL-MCNC: 0.9 MG/DL (ref 0.8–1.5)
CREAT SERPL-MCNC: 1 MG/DL (ref 0.8–1.5)
CREAT SERPL-MCNC: 1.11 MG/DL (ref 0.8–1.5)
CREAT SERPL-MCNC: 1.37 MG/DL (ref 0.8–1.5)
CREAT SERPL-MCNC: 1.5 MG/DL (ref 0.8–1.5)
CREAT SERPL-MCNC: 1.5 MG/DL (ref 0.8–1.5)
CROSSMATCH RESULT,%XM: NORMAL
CROSSMATCH RESULT,%XM: NORMAL
CRYSTALS URNS QL MICRO: 0 /LPF
CRYSTALS URNS QL MICRO: 0 /LPF
DIAGNOSIS, 93000: NORMAL
DIFFERENTIAL METHOD BLD: ABNORMAL
ECHO AO ASC DIAM: 3.7 CM
ECHO AO ASCENDING AORTA INDEX: 1.78 CM/M2
ECHO AO ROOT DIAM: 3.8 CM
ECHO AO ROOT DIAM: 4.1 CM
ECHO AO ROOT INDEX: 1.88 CM/M2
ECHO AO ROOT INDEX: 1.97 CM/M2
ECHO AV AREA PEAK VELOCITY: 2.4 CM2
ECHO AV AREA PEAK VELOCITY: 2.6 CM2
ECHO AV AREA VTI: 2.6 CM2
ECHO AV AREA VTI: 2.8 CM2
ECHO AV AREA/BSA PEAK VELOCITY: 1.2 CM2/M2
ECHO AV AREA/BSA PEAK VELOCITY: 1.3 CM2/M2
ECHO AV AREA/BSA VTI: 1.3 CM2/M2
ECHO AV AREA/BSA VTI: 1.4 CM2/M2
ECHO AV MEAN GRADIENT: 10 MMHG
ECHO AV MEAN GRADIENT: 6 MMHG
ECHO AV MEAN VELOCITY: 1.2 M/S
ECHO AV MEAN VELOCITY: 1.5 M/S
ECHO AV PEAK GRADIENT: 10 MMHG
ECHO AV PEAK GRADIENT: 18 MMHG
ECHO AV PEAK VELOCITY: 1.6 M/S
ECHO AV PEAK VELOCITY: 2.2 M/S
ECHO AV VELOCITY RATIO: 0.68
ECHO AV VELOCITY RATIO: 0.69
ECHO AV VTI: 34.9 CM
ECHO AV VTI: 40.5 CM
ECHO EST RA PRESSURE: 8 MMHG
ECHO IVC EXP: 2.2 CM
ECHO IVC PROX: 2.5 CM
ECHO LA AREA 2C: 22.1 CM2
ECHO LA AREA 4C: 21.7 CM2
ECHO LA AREA 4C: 26.2 CM2
ECHO LA DIAMETER INDEX: 1.83 CM/M2
ECHO LA DIAMETER: 3.8 CM
ECHO LA MAJOR AXIS: 6.3 CM
ECHO LA MAJOR AXIS: 6.6 CM
ECHO LA MINOR AXIS: 6.3 CM
ECHO LA TO AORTIC ROOT RATIO: 0.93
ECHO LA VOL BP: 73 ML (ref 18–58)
ECHO LA VOL/BSA BIPLANE: 35 ML/M2 (ref 16–34)
ECHO LV E' LATERAL VELOCITY: 11 CM/S
ECHO LV E' LATERAL VELOCITY: 15 CM/S
ECHO LV E' SEPTAL VELOCITY: 11 CM/S
ECHO LV E' SEPTAL VELOCITY: 11 CM/S
ECHO LV EDV A2C: 143 ML
ECHO LV EDV A4C: 144 ML
ECHO LV EDV A4C: 166 ML
ECHO LV EDV INDEX A4C: 71 ML/M2
ECHO LV EDV INDEX A4C: 80 ML/M2
ECHO LV EDV NDEX A2C: 69 ML/M2
ECHO LV EJECTION FRACTION A2C: 67 %
ECHO LV EJECTION FRACTION A4C: 62 %
ECHO LV EJECTION FRACTION A4C: 67 %
ECHO LV EJECTION FRACTION BIPLANE: 67 % (ref 55–100)
ECHO LV ESV A2C: 48 ML
ECHO LV ESV A4C: 54 ML
ECHO LV ESV A4C: 55 ML
ECHO LV ESV INDEX A2C: 23 ML/M2
ECHO LV ESV INDEX A4C: 26 ML/M2
ECHO LV ESV INDEX A4C: 27 ML/M2
ECHO LV FRACTIONAL SHORTENING: 39 % (ref 28–44)
ECHO LV INTERNAL DIMENSION DIASTOLE INDEX: 2.6 CM/M2
ECHO LV INTERNAL DIMENSION DIASTOLIC: 5.4 CM (ref 4.2–5.9)
ECHO LV INTERNAL DIMENSION SYSTOLIC INDEX: 1.59 CM/M2
ECHO LV INTERNAL DIMENSION SYSTOLIC: 3.3 CM
ECHO LV IVSD: 1 CM (ref 0.6–1)
ECHO LV MASS 2D: 220.6 G (ref 88–224)
ECHO LV MASS INDEX 2D: 106.1 G/M2 (ref 49–115)
ECHO LV POSTERIOR WALL DIASTOLIC: 1.1 CM (ref 0.6–1)
ECHO LV RELATIVE WALL THICKNESS RATIO: 0.41
ECHO LVOT AREA: 3.5 CM2
ECHO LVOT AREA: 3.8 CM2
ECHO LVOT AV VTI INDEX: 0.73
ECHO LVOT AV VTI INDEX: 0.75
ECHO LVOT DIAM: 2.1 CM
ECHO LVOT DIAM: 2.2 CM
ECHO LVOT MEAN GRADIENT: 3 MMHG
ECHO LVOT MEAN GRADIENT: 5 MMHG
ECHO LVOT PEAK GRADIENT: 5 MMHG
ECHO LVOT PEAK GRADIENT: 9 MMHG
ECHO LVOT PEAK VELOCITY: 1.1 M/S
ECHO LVOT PEAK VELOCITY: 1.5 M/S
ECHO LVOT STROKE VOLUME INDEX: 48 ML/M2
ECHO LVOT STROKE VOLUME INDEX: 50.4 ML/M2
ECHO LVOT SV: 104.9 ML
ECHO LVOT SV: 96.9 ML
ECHO LVOT VTI: 25.5 CM
ECHO LVOT VTI: 30.3 CM
ECHO MV A VELOCITY: 0.82 M/S
ECHO MV A VELOCITY: 0.92 M/S
ECHO MV E DECELERATION TIME (DT): 169 MS
ECHO MV E DECELERATION TIME (DT): 183 MS
ECHO MV E VELOCITY: 1.03 M/S
ECHO MV E VELOCITY: 1.29 M/S
ECHO MV E/A RATIO: 1.26
ECHO MV E/A RATIO: 1.4
ECHO MV E/E' LATERAL: 11.73
ECHO MV E/E' LATERAL: 6.87
ECHO MV E/E' RATIO (AVERAGED): 11.73
ECHO MV E/E' RATIO (AVERAGED): 8.12
ECHO MV E/E' SEPTAL: 11.73
ECHO MV E/E' SEPTAL: 9.36
ECHO RIGHT VENTRICULAR SYSTOLIC PRESSURE (RVSP): 28 MMHG
ECHO RV BASAL DIMENSION: 4.9 CM
ECHO RV TAPSE: 2.1 CM (ref 1.5–2)
ECHO RV TAPSE: 2.5 CM (ref 1.5–2)
ECHO TV REGURGITANT MAX VELOCITY: 2.21 M/S
ECHO TV REGURGITANT PEAK GRADIENT: 20 MMHG
EOSINOPHIL # BLD: 0 K/UL (ref 0–0.8)
EOSINOPHIL # BLD: 0.2 K/UL (ref 0–0.8)
EOSINOPHIL # BLD: 0.3 K/UL (ref 0–0.8)
EOSINOPHIL # BLD: 0.4 K/UL (ref 0–0.8)
EOSINOPHIL # BLD: 0.5 K/UL (ref 0–0.8)
EOSINOPHIL NFR BLD: 0 % (ref 0.5–7.8)
EOSINOPHIL NFR BLD: 2 % (ref 0.5–7.8)
EOSINOPHIL NFR BLD: 2 % (ref 0.5–7.8)
EOSINOPHIL NFR BLD: 3 % (ref 0.5–7.8)
EOSINOPHIL NFR BLD: 4 % (ref 0.5–7.8)
EOSINOPHIL NFR BLD: 7 % (ref 0.5–7.8)
EOSINOPHIL NFR BLD: 8 % (ref 0.5–7.8)
EOSINOPHIL NFR BLD: 8 % (ref 0.5–7.8)
EOSINOPHIL NFR BLD: 9 % (ref 0.5–7.8)
EPI CELLS #/AREA URNS HPF: NORMAL /HPF
EPI CELLS #/AREA URNS HPF: NORMAL /HPF
ERYTHROCYTE [DISTWIDTH] IN BLOOD BY AUTOMATED COUNT: 15.4 % (ref 11.9–14.6)
ERYTHROCYTE [DISTWIDTH] IN BLOOD BY AUTOMATED COUNT: 15.5 % (ref 11.9–14.6)
ERYTHROCYTE [DISTWIDTH] IN BLOOD BY AUTOMATED COUNT: 15.5 % (ref 11.9–14.6)
ERYTHROCYTE [DISTWIDTH] IN BLOOD BY AUTOMATED COUNT: 15.6 % (ref 11.9–14.6)
ERYTHROCYTE [DISTWIDTH] IN BLOOD BY AUTOMATED COUNT: 15.7 % (ref 11.9–14.6)
ERYTHROCYTE [DISTWIDTH] IN BLOOD BY AUTOMATED COUNT: 15.7 % (ref 11.9–14.6)
ERYTHROCYTE [DISTWIDTH] IN BLOOD BY AUTOMATED COUNT: 15.8 % (ref 11.9–14.6)
ERYTHROCYTE [DISTWIDTH] IN BLOOD BY AUTOMATED COUNT: 15.9 % (ref 11.9–14.6)
ERYTHROCYTE [DISTWIDTH] IN BLOOD BY AUTOMATED COUNT: 16 % (ref 11.9–14.6)
ERYTHROCYTE [DISTWIDTH] IN BLOOD BY AUTOMATED COUNT: 16.1 % (ref 11.9–14.6)
ERYTHROCYTE [DISTWIDTH] IN BLOOD BY AUTOMATED COUNT: 16.2 % (ref 11.9–14.6)
ERYTHROCYTE [DISTWIDTH] IN BLOOD BY AUTOMATED COUNT: 16.3 % (ref 11.9–14.6)
ERYTHROCYTE [DISTWIDTH] IN BLOOD BY AUTOMATED COUNT: 16.4 % (ref 11.9–14.6)
ERYTHROCYTE [DISTWIDTH] IN BLOOD BY AUTOMATED COUNT: 16.6 % (ref 11.9–14.6)
ERYTHROCYTE [DISTWIDTH] IN BLOOD BY AUTOMATED COUNT: 17 % (ref 11.9–14.6)
ERYTHROCYTE [DISTWIDTH] IN BLOOD BY AUTOMATED COUNT: 17 % (ref 11.9–14.6)
ERYTHROCYTE [DISTWIDTH] IN BLOOD BY AUTOMATED COUNT: 17.1 % (ref 11.9–14.6)
EST. AVERAGE GLUCOSE BLD GHB EST-MCNC: NORMAL MG/DL
FOLATE SERPL-MCNC: 21.2 NG/ML (ref 3.1–17.5)
GLOBULIN SER CALC-MCNC: 2 G/DL (ref 2.3–3.5)
GLOBULIN SER CALC-MCNC: 2.1 G/DL (ref 2.3–3.5)
GLOBULIN SER CALC-MCNC: 2.2 G/DL (ref 2.3–3.5)
GLOBULIN SER CALC-MCNC: 2.5 G/DL (ref 2.3–3.5)
GLUCOSE BLD STRIP.AUTO-MCNC: 101 MG/DL (ref 65–100)
GLUCOSE BLD STRIP.AUTO-MCNC: 101 MG/DL (ref 65–100)
GLUCOSE BLD STRIP.AUTO-MCNC: 102 MG/DL (ref 65–100)
GLUCOSE BLD STRIP.AUTO-MCNC: 104 MG/DL (ref 65–100)
GLUCOSE BLD STRIP.AUTO-MCNC: 105 MG/DL (ref 65–100)
GLUCOSE BLD STRIP.AUTO-MCNC: 106 MG/DL (ref 65–100)
GLUCOSE BLD STRIP.AUTO-MCNC: 106 MG/DL (ref 65–100)
GLUCOSE BLD STRIP.AUTO-MCNC: 107 MG/DL (ref 65–100)
GLUCOSE BLD STRIP.AUTO-MCNC: 108 MG/DL (ref 65–100)
GLUCOSE BLD STRIP.AUTO-MCNC: 111 MG/DL (ref 65–100)
GLUCOSE BLD STRIP.AUTO-MCNC: 113 MG/DL (ref 65–100)
GLUCOSE BLD STRIP.AUTO-MCNC: 113 MG/DL (ref 65–100)
GLUCOSE BLD STRIP.AUTO-MCNC: 115 MG/DL (ref 65–100)
GLUCOSE BLD STRIP.AUTO-MCNC: 116 MG/DL (ref 65–100)
GLUCOSE BLD STRIP.AUTO-MCNC: 120 MG/DL (ref 65–100)
GLUCOSE BLD STRIP.AUTO-MCNC: 121 MG/DL (ref 65–100)
GLUCOSE BLD STRIP.AUTO-MCNC: 125 MG/DL (ref 65–100)
GLUCOSE BLD STRIP.AUTO-MCNC: 130 MG/DL (ref 65–100)
GLUCOSE BLD STRIP.AUTO-MCNC: 131 MG/DL (ref 65–100)
GLUCOSE BLD STRIP.AUTO-MCNC: 139 MG/DL (ref 65–100)
GLUCOSE BLD STRIP.AUTO-MCNC: 174 MG/DL (ref 65–100)
GLUCOSE BLD STRIP.AUTO-MCNC: 52 MG/DL (ref 65–100)
GLUCOSE BLD STRIP.AUTO-MCNC: 58 MG/DL (ref 65–100)
GLUCOSE BLD STRIP.AUTO-MCNC: 58 MG/DL (ref 65–100)
GLUCOSE BLD STRIP.AUTO-MCNC: 62 MG/DL (ref 65–100)
GLUCOSE BLD STRIP.AUTO-MCNC: 66 MG/DL (ref 65–100)
GLUCOSE BLD STRIP.AUTO-MCNC: 66 MG/DL (ref 65–100)
GLUCOSE BLD STRIP.AUTO-MCNC: 67 MG/DL (ref 65–100)
GLUCOSE BLD STRIP.AUTO-MCNC: 68 MG/DL (ref 65–100)
GLUCOSE BLD STRIP.AUTO-MCNC: 70 MG/DL (ref 65–100)
GLUCOSE BLD STRIP.AUTO-MCNC: 71 MG/DL (ref 65–100)
GLUCOSE BLD STRIP.AUTO-MCNC: 72 MG/DL (ref 65–100)
GLUCOSE BLD STRIP.AUTO-MCNC: 73 MG/DL (ref 65–100)
GLUCOSE BLD STRIP.AUTO-MCNC: 73 MG/DL (ref 65–100)
GLUCOSE BLD STRIP.AUTO-MCNC: 75 MG/DL (ref 65–100)
GLUCOSE BLD STRIP.AUTO-MCNC: 76 MG/DL (ref 65–100)
GLUCOSE BLD STRIP.AUTO-MCNC: 76 MG/DL (ref 65–100)
GLUCOSE BLD STRIP.AUTO-MCNC: 77 MG/DL (ref 65–100)
GLUCOSE BLD STRIP.AUTO-MCNC: 78 MG/DL (ref 65–100)
GLUCOSE BLD STRIP.AUTO-MCNC: 78 MG/DL (ref 65–100)
GLUCOSE BLD STRIP.AUTO-MCNC: 79 MG/DL (ref 65–100)
GLUCOSE BLD STRIP.AUTO-MCNC: 79 MG/DL (ref 65–100)
GLUCOSE BLD STRIP.AUTO-MCNC: 80 MG/DL (ref 65–100)
GLUCOSE BLD STRIP.AUTO-MCNC: 81 MG/DL (ref 65–100)
GLUCOSE BLD STRIP.AUTO-MCNC: 82 MG/DL (ref 65–100)
GLUCOSE BLD STRIP.AUTO-MCNC: 84 MG/DL (ref 65–100)
GLUCOSE BLD STRIP.AUTO-MCNC: 85 MG/DL (ref 65–100)
GLUCOSE BLD STRIP.AUTO-MCNC: 87 MG/DL (ref 65–100)
GLUCOSE BLD STRIP.AUTO-MCNC: 88 MG/DL (ref 65–100)
GLUCOSE BLD STRIP.AUTO-MCNC: 90 MG/DL (ref 65–100)
GLUCOSE BLD STRIP.AUTO-MCNC: 91 MG/DL (ref 65–100)
GLUCOSE BLD STRIP.AUTO-MCNC: 93 MG/DL (ref 65–100)
GLUCOSE BLD STRIP.AUTO-MCNC: 93 MG/DL (ref 65–100)
GLUCOSE BLD STRIP.AUTO-MCNC: 99 MG/DL (ref 65–100)
GLUCOSE BLD STRIP.AUTO-MCNC: 99 MG/DL (ref 65–100)
GLUCOSE SERPL-MCNC: 102 MG/DL (ref 65–100)
GLUCOSE SERPL-MCNC: 111 MG/DL (ref 65–100)
GLUCOSE SERPL-MCNC: 113 MG/DL (ref 65–100)
GLUCOSE SERPL-MCNC: 128 MG/DL (ref 65–100)
GLUCOSE SERPL-MCNC: 169 MG/DL (ref 65–100)
GLUCOSE SERPL-MCNC: 57 MG/DL (ref 65–100)
GLUCOSE SERPL-MCNC: 65 MG/DL (ref 65–100)
GLUCOSE SERPL-MCNC: 66 MG/DL (ref 65–100)
GLUCOSE SERPL-MCNC: 67 MG/DL (ref 65–100)
GLUCOSE SERPL-MCNC: 67 MG/DL (ref 65–100)
GLUCOSE SERPL-MCNC: 71 MG/DL (ref 65–100)
GLUCOSE SERPL-MCNC: 72 MG/DL (ref 65–100)
GLUCOSE SERPL-MCNC: 73 MG/DL (ref 65–100)
GLUCOSE SERPL-MCNC: 76 MG/DL (ref 65–100)
GLUCOSE SERPL-MCNC: 85 MG/DL (ref 65–100)
GLUCOSE SERPL-MCNC: 87 MG/DL (ref 65–100)
GLUCOSE SERPL-MCNC: 92 MG/DL (ref 65–100)
GLUCOSE SERPL-MCNC: 93 MG/DL (ref 65–100)
GLUCOSE SERPL-MCNC: 96 MG/DL (ref 65–100)
GLUCOSE UR STRIP.AUTO-MCNC: NEGATIVE MG/DL
GRAM STN SPEC: ABNORMAL
HBA1C MFR BLD: 4.7 % (ref 4.2–6.3)
HCT VFR BLD AUTO: 19.3 % (ref 41.1–50.3)
HCT VFR BLD AUTO: 21.4 % (ref 41.1–50.3)
HCT VFR BLD AUTO: 22.5 % (ref 41.1–50.3)
HCT VFR BLD AUTO: 22.5 % (ref 41.1–50.3)
HCT VFR BLD AUTO: 22.6 % (ref 41.1–50.3)
HCT VFR BLD AUTO: 22.7 % (ref 41.1–50.3)
HCT VFR BLD AUTO: 22.9 % (ref 41.1–50.3)
HCT VFR BLD AUTO: 23.1 % (ref 41.1–50.3)
HCT VFR BLD AUTO: 23.3 % (ref 41.1–50.3)
HCT VFR BLD AUTO: 23.3 % (ref 41.1–50.3)
HCT VFR BLD AUTO: 23.6 % (ref 41.1–50.3)
HCT VFR BLD AUTO: 23.8 % (ref 41.1–50.3)
HCT VFR BLD AUTO: 24.2 % (ref 41.1–50.3)
HCT VFR BLD AUTO: 24.4 % (ref 41.1–50.3)
HCT VFR BLD AUTO: 24.6 % (ref 41.1–50.3)
HCT VFR BLD AUTO: 25 % (ref 41.1–50.3)
HCT VFR BLD AUTO: 25.4 % (ref 41.1–50.3)
HCT VFR BLD AUTO: 25.5 % (ref 41.1–50.3)
HCT VFR BLD AUTO: 26.1 % (ref 41.1–50.3)
HCT VFR BLD AUTO: 26.5 % (ref 41.1–50.3)
HCT VFR BLD AUTO: 27.1 % (ref 41.1–50.3)
HCT VFR BLD AUTO: 28.2 % (ref 41.1–50.3)
HCT VFR BLD AUTO: 30 % (ref 41.1–50.3)
HEMOCCULT STL QL: NEGATIVE
HGB BLD-MCNC: 6.2 G/DL (ref 13.6–17.2)
HGB BLD-MCNC: 7 G/DL (ref 13.6–17.2)
HGB BLD-MCNC: 7.2 G/DL (ref 13.6–17.2)
HGB BLD-MCNC: 7.3 G/DL (ref 13.6–17.2)
HGB BLD-MCNC: 7.4 G/DL (ref 13.6–17.2)
HGB BLD-MCNC: 7.4 G/DL (ref 13.6–17.2)
HGB BLD-MCNC: 7.5 G/DL (ref 13.6–17.2)
HGB BLD-MCNC: 7.6 G/DL (ref 13.6–17.2)
HGB BLD-MCNC: 7.7 G/DL (ref 13.6–17.2)
HGB BLD-MCNC: 7.8 G/DL (ref 13.6–17.2)
HGB BLD-MCNC: 7.8 G/DL (ref 13.6–17.2)
HGB BLD-MCNC: 7.9 G/DL (ref 13.6–17.2)
HGB BLD-MCNC: 8 G/DL (ref 13.6–17.2)
HGB BLD-MCNC: 8.1 G/DL (ref 13.6–17.2)
HGB BLD-MCNC: 8.2 G/DL (ref 13.6–17.2)
HGB BLD-MCNC: 8.3 G/DL (ref 13.6–17.2)
HGB BLD-MCNC: 8.3 G/DL (ref 13.6–17.2)
HGB BLD-MCNC: 8.7 G/DL (ref 13.6–17.2)
HGB BLD-MCNC: 8.8 G/DL (ref 13.6–17.2)
HGB BLD-MCNC: 9.1 G/DL (ref 13.6–17.2)
HGB BLD-MCNC: 9.9 G/DL (ref 13.6–17.2)
HGB UR QL STRIP: ABNORMAL
HISTORY CHECKED?,CKHIST: NORMAL
HISTORY CHECKED?,CKHIST: NORMAL
IMM GRANULOCYTES # BLD AUTO: 0 K/UL (ref 0–0.5)
IMM GRANULOCYTES # BLD AUTO: 0.1 K/UL (ref 0–0.5)
IMM GRANULOCYTES # BLD AUTO: 0.2 K/UL (ref 0–0.5)
IMM GRANULOCYTES # BLD AUTO: 0.3 K/UL (ref 0–0.5)
IMM GRANULOCYTES # BLD AUTO: 0.7 K/UL (ref 0–0.5)
IMM GRANULOCYTES NFR BLD AUTO: 0 % (ref 0–5)
IMM GRANULOCYTES NFR BLD AUTO: 0 % (ref 0–5)
IMM GRANULOCYTES NFR BLD AUTO: 1 % (ref 0–5)
IMM GRANULOCYTES NFR BLD AUTO: 2 % (ref 0–5)
IMM GRANULOCYTES NFR BLD AUTO: 2 % (ref 0–5)
IMM GRANULOCYTES NFR BLD AUTO: 3 % (ref 0–5)
INR PPP: 1.4
INR PPP: 1.5
INR PPP: 1.5
INR PPP: 2.1
INTERPRETATION: ABNORMAL
KETONES UR QL STRIP.AUTO: NEGATIVE MG/DL
LACTATE SERPL-SCNC: 2 MMOL/L (ref 0.4–2)
LACTATE SERPL-SCNC: 3 MMOL/L (ref 0.4–2)
LACTATE SERPL-SCNC: 3.4 MMOL/L (ref 0.4–2)
LEUKOCYTE ESTERASE UR QL STRIP.AUTO: ABNORMAL
LIPASE SERPL-CCNC: 56 U/L (ref 73–393)
LYMPHOCYTES # BLD: 0.3 K/UL (ref 0.5–4.6)
LYMPHOCYTES # BLD: 0.4 K/UL (ref 0.5–4.6)
LYMPHOCYTES # BLD: 0.5 K/UL (ref 0.5–4.6)
LYMPHOCYTES # BLD: 0.6 K/UL (ref 0.5–4.6)
LYMPHOCYTES # BLD: 0.7 K/UL (ref 0.5–4.6)
LYMPHOCYTES # BLD: 0.8 K/UL (ref 0.5–4.6)
LYMPHOCYTES # BLD: 0.8 K/UL (ref 0.5–4.6)
LYMPHOCYTES # BLD: 0.9 K/UL (ref 0.5–4.6)
LYMPHOCYTES # BLD: 1 K/UL (ref 0.5–4.6)
LYMPHOCYTES # BLD: 1 K/UL (ref 0.5–4.6)
LYMPHOCYTES # BLD: 1.2 K/UL (ref 0.5–4.6)
LYMPHOCYTES # BLD: 1.3 K/UL (ref 0.5–4.6)
LYMPHOCYTES # BLD: 1.4 K/UL (ref 0.5–4.6)
LYMPHOCYTES # BLD: 1.9 K/UL (ref 0.5–4.6)
LYMPHOCYTES NFR BLD: 10 % (ref 13–44)
LYMPHOCYTES NFR BLD: 10 % (ref 13–44)
LYMPHOCYTES NFR BLD: 11 % (ref 13–44)
LYMPHOCYTES NFR BLD: 12 % (ref 13–44)
LYMPHOCYTES NFR BLD: 12 % (ref 13–44)
LYMPHOCYTES NFR BLD: 13 % (ref 13–44)
LYMPHOCYTES NFR BLD: 14 % (ref 13–44)
LYMPHOCYTES NFR BLD: 14 % (ref 13–44)
LYMPHOCYTES NFR BLD: 17 % (ref 13–44)
LYMPHOCYTES NFR BLD: 2 % (ref 13–44)
LYMPHOCYTES NFR BLD: 2 % (ref 13–44)
LYMPHOCYTES NFR BLD: 20 % (ref 13–44)
LYMPHOCYTES NFR BLD: 20 % (ref 13–44)
LYMPHOCYTES NFR BLD: 21 % (ref 13–44)
LYMPHOCYTES NFR BLD: 24 % (ref 13–44)
LYMPHOCYTES NFR BLD: 5 % (ref 13–44)
LYMPHOCYTES NFR BLD: 7 % (ref 13–44)
LYMPHOCYTES NFR BLD: 7 % (ref 13–44)
Lab: NORMAL
Lab: NORMAL
MAGNESIUM SERPL-MCNC: 1.3 MG/DL (ref 1.8–2.4)
MAGNESIUM SERPL-MCNC: 1.8 MG/DL (ref 1.8–2.4)
MAGNESIUM SERPL-MCNC: 1.8 MG/DL (ref 1.8–2.4)
MAGNESIUM SERPL-MCNC: 2 MG/DL (ref 1.8–2.4)
MCH RBC QN AUTO: 26.4 PG (ref 26.1–32.9)
MCH RBC QN AUTO: 26.4 PG (ref 26.1–32.9)
MCH RBC QN AUTO: 26.9 PG (ref 26.1–32.9)
MCH RBC QN AUTO: 27 PG (ref 26.1–32.9)
MCH RBC QN AUTO: 27 PG (ref 26.1–32.9)
MCH RBC QN AUTO: 27.1 PG (ref 26.1–32.9)
MCH RBC QN AUTO: 27.3 PG (ref 26.1–32.9)
MCH RBC QN AUTO: 27.3 PG (ref 26.1–32.9)
MCH RBC QN AUTO: 27.8 PG (ref 26.1–32.9)
MCH RBC QN AUTO: 29.1 PG (ref 26.1–32.9)
MCH RBC QN AUTO: 29.4 PG (ref 26.1–32.9)
MCH RBC QN AUTO: 29.6 PG (ref 26.1–32.9)
MCH RBC QN AUTO: 29.8 PG (ref 26.1–32.9)
MCH RBC QN AUTO: 29.8 PG (ref 26.1–32.9)
MCH RBC QN AUTO: 29.9 PG (ref 26.1–32.9)
MCH RBC QN AUTO: 29.9 PG (ref 26.1–32.9)
MCH RBC QN AUTO: 30.3 PG (ref 26.1–32.9)
MCH RBC QN AUTO: 30.3 PG (ref 26.1–32.9)
MCHC RBC AUTO-ENTMCNC: 30.3 G/DL (ref 31.4–35)
MCHC RBC AUTO-ENTMCNC: 31.5 G/DL (ref 31.4–35)
MCHC RBC AUTO-ENTMCNC: 31.8 G/DL (ref 31.4–35)
MCHC RBC AUTO-ENTMCNC: 31.9 G/DL (ref 31.4–35)
MCHC RBC AUTO-ENTMCNC: 32 G/DL (ref 31.4–35)
MCHC RBC AUTO-ENTMCNC: 32.1 G/DL (ref 31.4–35)
MCHC RBC AUTO-ENTMCNC: 32.2 G/DL (ref 31.4–35)
MCHC RBC AUTO-ENTMCNC: 32.2 G/DL (ref 31.4–35)
MCHC RBC AUTO-ENTMCNC: 32.3 G/DL (ref 31.4–35)
MCHC RBC AUTO-ENTMCNC: 32.3 G/DL (ref 31.4–35)
MCHC RBC AUTO-ENTMCNC: 32.5 G/DL (ref 31.4–35)
MCHC RBC AUTO-ENTMCNC: 32.6 G/DL (ref 31.4–35)
MCHC RBC AUTO-ENTMCNC: 32.8 G/DL (ref 31.4–35)
MCHC RBC AUTO-ENTMCNC: 32.8 G/DL (ref 31.4–35)
MCHC RBC AUTO-ENTMCNC: 33.2 G/DL (ref 31.4–35)
MCV RBC AUTO: 82.5 FL (ref 79.6–97.8)
MCV RBC AUTO: 82.6 FL (ref 79.6–97.8)
MCV RBC AUTO: 83.1 FL (ref 79.6–97.8)
MCV RBC AUTO: 83.8 FL (ref 79.6–97.8)
MCV RBC AUTO: 83.9 FL (ref 79.6–97.8)
MCV RBC AUTO: 84 FL (ref 79.6–97.8)
MCV RBC AUTO: 84.2 FL (ref 79.6–97.8)
MCV RBC AUTO: 84.7 FL (ref 79.6–97.8)
MCV RBC AUTO: 84.8 FL (ref 79.6–97.8)
MCV RBC AUTO: 88.6 FL (ref 79.6–97.8)
MCV RBC AUTO: 90 FL (ref 79.6–97.8)
MCV RBC AUTO: 90.3 FL (ref 79.6–97.8)
MCV RBC AUTO: 91.4 FL (ref 79.6–97.8)
MCV RBC AUTO: 91.5 FL (ref 79.6–97.8)
MCV RBC AUTO: 91.7 FL (ref 79.6–97.8)
MCV RBC AUTO: 91.8 FL (ref 79.6–97.8)
MCV RBC AUTO: 92.4 FL (ref 79.6–97.8)
MCV RBC AUTO: 92.8 FL (ref 79.6–97.8)
MCV RBC AUTO: 92.8 FL (ref 79.6–97.8)
MCV RBC AUTO: 98.3 FL (ref 79.6–97.8)
MECA (METHICILLIN-RESISTANCE GENES), MRGP: DETECTED
MM INDURATION POC: 0 MM (ref 0–5)
MM INDURATION POC: NORMAL (ref 0–5)
MONOCYTES # BLD: 0.3 K/UL (ref 0.1–1.3)
MONOCYTES # BLD: 0.3 K/UL (ref 0.1–1.3)
MONOCYTES # BLD: 0.4 K/UL (ref 0.1–1.3)
MONOCYTES # BLD: 0.5 K/UL (ref 0.1–1.3)
MONOCYTES # BLD: 0.6 K/UL (ref 0.1–1.3)
MONOCYTES # BLD: 0.7 K/UL (ref 0.1–1.3)
MONOCYTES # BLD: 0.7 K/UL (ref 0.1–1.3)
MONOCYTES # BLD: 0.8 K/UL (ref 0.1–1.3)
MONOCYTES # BLD: 0.9 K/UL (ref 0.1–1.3)
MONOCYTES # BLD: 1 K/UL (ref 0.1–1.3)
MONOCYTES # BLD: 1.1 K/UL (ref 0.1–1.3)
MONOCYTES # BLD: 1.2 K/UL (ref 0.1–1.3)
MONOCYTES # BLD: 1.3 K/UL (ref 0.1–1.3)
MONOCYTES # BLD: 1.5 K/UL (ref 0.1–1.3)
MONOCYTES NFR BLD: 10 % (ref 4–12)
MONOCYTES NFR BLD: 11 % (ref 4–12)
MONOCYTES NFR BLD: 12 % (ref 4–12)
MONOCYTES NFR BLD: 14 % (ref 4–12)
MONOCYTES NFR BLD: 4 % (ref 4–12)
MONOCYTES NFR BLD: 5 % (ref 4–12)
MONOCYTES NFR BLD: 5 % (ref 4–12)
MONOCYTES NFR BLD: 7 % (ref 4–12)
MONOCYTES NFR BLD: 8 % (ref 4–12)
MONOCYTES NFR BLD: 9 % (ref 4–12)
MONOCYTES NFR BLD: 9 % (ref 4–12)
MUCOUS THREADS URNS QL MICRO: 0 /LPF
MUCOUS THREADS URNS QL MICRO: 0 /LPF
NEUTS SEG # BLD: 1.9 K/UL (ref 1.7–8.2)
NEUTS SEG # BLD: 10.4 K/UL (ref 1.7–8.2)
NEUTS SEG # BLD: 12.6 K/UL (ref 1.7–8.2)
NEUTS SEG # BLD: 15.1 K/UL (ref 1.7–8.2)
NEUTS SEG # BLD: 15.3 K/UL (ref 1.7–8.2)
NEUTS SEG # BLD: 2.2 K/UL (ref 1.7–8.2)
NEUTS SEG # BLD: 2.3 K/UL (ref 1.7–8.2)
NEUTS SEG # BLD: 21.3 K/UL (ref 1.7–8.2)
NEUTS SEG # BLD: 4.1 K/UL (ref 1.7–8.2)
NEUTS SEG # BLD: 4.4 K/UL (ref 1.7–8.2)
NEUTS SEG # BLD: 4.9 K/UL (ref 1.7–8.2)
NEUTS SEG # BLD: 5.5 K/UL (ref 1.7–8.2)
NEUTS SEG # BLD: 5.5 K/UL (ref 1.7–8.2)
NEUTS SEG # BLD: 6.5 K/UL (ref 1.7–8.2)
NEUTS SEG # BLD: 7 K/UL (ref 1.7–8.2)
NEUTS SEG # BLD: 9.9 K/UL (ref 1.7–8.2)
NEUTS SEG NFR BLD: 56 % (ref 43–78)
NEUTS SEG NFR BLD: 60 % (ref 43–78)
NEUTS SEG NFR BLD: 61 % (ref 43–78)
NEUTS SEG NFR BLD: 61 % (ref 43–78)
NEUTS SEG NFR BLD: 65 % (ref 43–78)
NEUTS SEG NFR BLD: 65 % (ref 43–78)
NEUTS SEG NFR BLD: 71 % (ref 43–78)
NEUTS SEG NFR BLD: 72 % (ref 43–78)
NEUTS SEG NFR BLD: 73 % (ref 43–78)
NEUTS SEG NFR BLD: 74 % (ref 43–78)
NEUTS SEG NFR BLD: 74 % (ref 43–78)
NEUTS SEG NFR BLD: 77 % (ref 43–78)
NEUTS SEG NFR BLD: 78 % (ref 43–78)
NEUTS SEG NFR BLD: 79 % (ref 43–78)
NEUTS SEG NFR BLD: 84 % (ref 43–78)
NEUTS SEG NFR BLD: 85 % (ref 43–78)
NEUTS SEG NFR BLD: 90 % (ref 43–78)
NEUTS SEG NFR BLD: 92 % (ref 43–78)
NITRITE UR QL STRIP.AUTO: NEGATIVE
NRBC # BLD: 0 K/UL (ref 0–0.2)
OTHER OBSERVATIONS,UCOM: NORMAL
P-R INTERVAL, ECG05: 160 MS
PH UR STRIP: 7 [PH] (ref 5–9)
PHOSPHATE SERPL-MCNC: 3.2 MG/DL (ref 2.3–3.7)
PLATELET # BLD AUTO: 101 K/UL (ref 150–450)
PLATELET # BLD AUTO: 112 K/UL (ref 150–450)
PLATELET # BLD AUTO: 113 K/UL (ref 150–450)
PLATELET # BLD AUTO: 116 K/UL (ref 150–450)
PLATELET # BLD AUTO: 121 K/UL (ref 150–450)
PLATELET # BLD AUTO: 128 K/UL (ref 150–450)
PLATELET # BLD AUTO: 129 K/UL (ref 150–450)
PLATELET # BLD AUTO: 137 K/UL (ref 150–450)
PLATELET # BLD AUTO: 142 K/UL (ref 150–450)
PLATELET # BLD AUTO: 151 K/UL (ref 150–450)
PLATELET # BLD AUTO: 285 K/UL (ref 150–450)
PLATELET # BLD AUTO: 53 K/UL (ref 150–450)
PLATELET # BLD AUTO: 66 K/UL (ref 150–450)
PLATELET # BLD AUTO: 71 K/UL (ref 150–450)
PLATELET # BLD AUTO: 74 K/UL (ref 150–450)
PLATELET # BLD AUTO: 75 K/UL (ref 150–450)
PLATELET # BLD AUTO: 84 K/UL (ref 150–450)
PLATELET # BLD AUTO: 85 K/UL (ref 150–450)
PLATELET # BLD AUTO: 88 K/UL (ref 150–450)
PLATELET # BLD AUTO: 97 K/UL (ref 150–450)
PLATELET COMMENTS,PCOM: ABNORMAL
PMV BLD AUTO: 10 FL (ref 9.4–12.3)
PMV BLD AUTO: 10.3 FL (ref 9.4–12.3)
PMV BLD AUTO: 8.2 FL (ref 9.4–12.3)
PMV BLD AUTO: 8.3 FL (ref 9.4–12.3)
PMV BLD AUTO: 8.5 FL (ref 9.4–12.3)
PMV BLD AUTO: 8.6 FL (ref 9.4–12.3)
PMV BLD AUTO: 8.8 FL (ref 9.4–12.3)
PMV BLD AUTO: 8.9 FL (ref 9.4–12.3)
PMV BLD AUTO: 8.9 FL (ref 9.4–12.3)
PMV BLD AUTO: 9 FL (ref 9.4–12.3)
PMV BLD AUTO: 9.3 FL (ref 9.4–12.3)
PMV BLD AUTO: 9.3 FL (ref 9.4–12.3)
PMV BLD AUTO: 9.5 FL (ref 9.4–12.3)
PMV BLD AUTO: 9.5 FL (ref 9.4–12.3)
PMV BLD AUTO: 9.7 FL (ref 9.4–12.3)
POTASSIUM SERPL-SCNC: 3 MMOL/L (ref 3.5–5.1)
POTASSIUM SERPL-SCNC: 3.4 MMOL/L (ref 3.5–5.1)
POTASSIUM SERPL-SCNC: 3.4 MMOL/L (ref 3.5–5.1)
POTASSIUM SERPL-SCNC: 3.6 MMOL/L (ref 3.5–5.1)
POTASSIUM SERPL-SCNC: 3.7 MMOL/L (ref 3.5–5.1)
POTASSIUM SERPL-SCNC: 3.7 MMOL/L (ref 3.5–5.1)
POTASSIUM SERPL-SCNC: 3.8 MMOL/L (ref 3.5–5.1)
POTASSIUM SERPL-SCNC: 3.8 MMOL/L (ref 3.5–5.1)
POTASSIUM SERPL-SCNC: 3.9 MMOL/L (ref 3.5–5.1)
POTASSIUM SERPL-SCNC: 3.9 MMOL/L (ref 3.5–5.1)
POTASSIUM SERPL-SCNC: 4 MMOL/L (ref 3.5–5.1)
POTASSIUM SERPL-SCNC: 4.1 MMOL/L (ref 3.5–5.1)
POTASSIUM SERPL-SCNC: 4.2 MMOL/L (ref 3.5–5.1)
POTASSIUM SERPL-SCNC: 4.4 MMOL/L (ref 3.5–5.1)
POTASSIUM SERPL-SCNC: 4.6 MMOL/L (ref 3.5–5.1)
PPD POC: NEGATIVE NEGATIVE
PPD POC: NORMAL
PROCALCITONIN SERPL-MCNC: 1.58 NG/ML (ref 0–0.49)
PROT SERPL-MCNC: 4.1 G/DL (ref 6.3–8.2)
PROT SERPL-MCNC: 4.4 G/DL (ref 6.3–8.2)
PROT SERPL-MCNC: 4.6 G/DL (ref 6.3–8.2)
PROT SERPL-MCNC: 4.7 G/DL (ref 6.3–8.2)
PROT UR STRIP-MCNC: 100 MG/DL
PROTHROMBIN TIME: 16.9 SEC (ref 12.6–14.5)
PROTHROMBIN TIME: 18 SEC (ref 12.6–14.5)
PROTHROMBIN TIME: 18.2 SEC (ref 12.6–14.5)
PROTHROMBIN TIME: 23.7 SEC (ref 12.6–14.5)
Q-T INTERVAL, ECG07: 362 MS
QRS DURATION, ECG06: 102 MS
QTC CALCULATION (BEZET), ECG08: 404 MS
RBC # BLD AUTO: 2.08 M/UL (ref 4.23–5.6)
RBC # BLD AUTO: 2.42 M/UL (ref 4.23–5.6)
RBC # BLD AUTO: 2.48 M/UL (ref 4.23–5.6)
RBC # BLD AUTO: 2.5 M/UL (ref 4.23–5.6)
RBC # BLD AUTO: 2.51 M/UL (ref 4.23–5.6)
RBC # BLD AUTO: 2.51 M/UL (ref 4.23–5.6)
RBC # BLD AUTO: 2.58 M/UL (ref 4.23–5.6)
RBC # BLD AUTO: 2.67 M/UL (ref 4.23–5.6)
RBC # BLD AUTO: 2.68 M/UL (ref 4.23–5.6)
RBC # BLD AUTO: 2.7 M/UL (ref 4.23–5.6)
RBC # BLD AUTO: 2.78 M/UL (ref 4.23–5.6)
RBC # BLD AUTO: 2.86 M/UL (ref 4.23–5.6)
RBC # BLD AUTO: 2.93 M/UL (ref 4.23–5.6)
RBC # BLD AUTO: 2.93 M/UL (ref 4.23–5.6)
RBC # BLD AUTO: 2.95 M/UL (ref 4.23–5.6)
RBC # BLD AUTO: 2.99 M/UL (ref 4.23–5.6)
RBC # BLD AUTO: 3.03 M/UL (ref 4.23–5.6)
RBC # BLD AUTO: 3.14 M/UL (ref 4.23–5.6)
RBC # BLD AUTO: 3.22 M/UL (ref 4.23–5.6)
RBC # BLD AUTO: 3.36 M/UL (ref 4.23–5.6)
RBC #/AREA URNS HPF: >100 /HPF
RBC #/AREA URNS HPF: NORMAL /HPF
RBC MORPH BLD: ABNORMAL
REFERENCE LAB,REFLB: NORMAL
REFERENCE LAB,REFLB: NORMAL
SERVICE CMNT-IMP: ABNORMAL
SERVICE CMNT-IMP: NORMAL
SODIUM SERPL-SCNC: 137 MMOL/L (ref 136–145)
SODIUM SERPL-SCNC: 137 MMOL/L (ref 136–145)
SODIUM SERPL-SCNC: 138 MMOL/L (ref 136–145)
SODIUM SERPL-SCNC: 138 MMOL/L (ref 136–145)
SODIUM SERPL-SCNC: 138 MMOL/L (ref 138–145)
SODIUM SERPL-SCNC: 139 MMOL/L (ref 136–145)
SODIUM SERPL-SCNC: 139 MMOL/L (ref 138–145)
SODIUM SERPL-SCNC: 139 MMOL/L (ref 138–145)
SODIUM SERPL-SCNC: 140 MMOL/L (ref 136–145)
SODIUM SERPL-SCNC: 140 MMOL/L (ref 138–145)
SODIUM SERPL-SCNC: 141 MMOL/L (ref 136–145)
SODIUM SERPL-SCNC: 141 MMOL/L (ref 136–145)
SODIUM SERPL-SCNC: 142 MMOL/L (ref 136–145)
SODIUM SERPL-SCNC: 142 MMOL/L (ref 136–145)
SODIUM SERPL-SCNC: 142 MMOL/L (ref 138–145)
SODIUM SERPL-SCNC: 143 MMOL/L (ref 136–145)
SODIUM SERPL-SCNC: 145 MMOL/L (ref 136–145)
SODIUM SERPL-SCNC: 146 MMOL/L (ref 138–145)
SODIUM SERPL-SCNC: 147 MMOL/L (ref 136–145)
SP GR UR REFRACTOMETRY: 1.02 (ref 1–1.02)
SPECIMEN EXP DATE BLD: NORMAL
SPECIMEN EXP DATE BLD: NORMAL
STAPHYLOCOCCUS AUREUS: DETECTED
STAPHYLOCOCCUS, STAPP: DETECTED
STATUS OF UNIT,%ST: NORMAL
STATUS OF UNIT,%ST: NORMAL
TEST DESCRIPTION:,ATST: NORMAL
TEST DESCRIPTION:,ATST: NORMAL
TROPONIN-HIGH SENSITIVITY: 46.4 PG/ML (ref 0–14)
TROPONIN-HIGH SENSITIVITY: 50 PG/ML (ref 0–14)
TROPONIN-HIGH SENSITIVITY: 592.7 PG/ML (ref 0–14)
TROPONIN-HIGH SENSITIVITY: 66.6 PG/ML (ref 0–14)
UNIT DIVISION, %UDIV: 0
UNIT DIVISION, %UDIV: 0
UROBILINOGEN UR QL STRIP.AUTO: 0.2 EU/DL (ref 0.2–1)
VANCOMYCIN SERPL-MCNC: 14.3 UG/ML
VANCOMYCIN SERPL-MCNC: 25.2 UG/ML
VANCOMYCIN SERPL-MCNC: 8.7 UG/ML
VENTRICULAR RATE, ECG03: 75 BPM
VIT B12 SERPL-MCNC: 1336 PG/ML (ref 193–986)
WBC # BLD AUTO: 12.5 K/UL (ref 4.3–11.1)
WBC # BLD AUTO: 13.3 K/UL (ref 4.3–11.1)
WBC # BLD AUTO: 16.5 K/UL (ref 4.3–11.1)
WBC # BLD AUTO: 16.6 K/UL (ref 4.3–11.1)
WBC # BLD AUTO: 17.9 K/UL (ref 4.3–11.1)
WBC # BLD AUTO: 2.5 K/UL (ref 4.3–11.1)
WBC # BLD AUTO: 23.7 K/UL (ref 4.3–11.1)
WBC # BLD AUTO: 3.1 K/UL (ref 4.3–11.1)
WBC # BLD AUTO: 3.2 K/UL (ref 4.3–11.1)
WBC # BLD AUTO: 3.3 K/UL (ref 4.3–11.1)
WBC # BLD AUTO: 3.4 K/UL (ref 4.3–11.1)
WBC # BLD AUTO: 3.9 K/UL (ref 4.3–11.1)
WBC # BLD AUTO: 4.9 K/UL (ref 4.3–11.1)
WBC # BLD AUTO: 6.1 K/UL (ref 4.3–11.1)
WBC # BLD AUTO: 6.4 K/UL (ref 4.3–11.1)
WBC # BLD AUTO: 6.5 K/UL (ref 4.3–11.1)
WBC # BLD AUTO: 6.8 K/UL (ref 4.3–11.1)
WBC # BLD AUTO: 7.7 K/UL (ref 4.3–11.1)
WBC # BLD AUTO: 8.8 K/UL (ref 4.3–11.1)
WBC # BLD AUTO: 9.5 K/UL (ref 4.3–11.1)
WBC MORPH BLD: ABNORMAL
WBC URNS QL MICRO: >100 /HPF
WBC URNS QL MICRO: NORMAL /HPF

## 2022-01-01 PROCEDURE — 85025 COMPLETE CBC W/AUTO DIFF WBC: CPT

## 2022-01-01 PROCEDURE — 82746 ASSAY OF FOLIC ACID SERUM: CPT

## 2022-01-01 PROCEDURE — 73060 X-RAY EXAM OF HUMERUS: CPT

## 2022-01-01 PROCEDURE — 65660000000 HC RM CCU STEPDOWN

## 2022-01-01 PROCEDURE — 0651 HSPC ROUTINE HOME CARE

## 2022-01-01 PROCEDURE — 74011000250 HC RX REV CODE- 250: Performed by: FAMILY MEDICINE

## 2022-01-01 PROCEDURE — 74011250637 HC RX REV CODE- 250/637: Performed by: NURSE PRACTITIONER

## 2022-01-01 PROCEDURE — 74011250636 HC RX REV CODE- 250/636: Performed by: FAMILY MEDICINE

## 2022-01-01 PROCEDURE — 2709999900 HC NON-CHARGEABLE SUPPLY

## 2022-01-01 PROCEDURE — 74011000258 HC RX REV CODE- 258: Performed by: NURSE PRACTITIONER

## 2022-01-01 PROCEDURE — 74011250637 HC RX REV CODE- 250/637: Performed by: PHYSICIAN ASSISTANT

## 2022-01-01 PROCEDURE — 74011250636 HC RX REV CODE- 250/636: Performed by: HOSPITALIST

## 2022-01-01 PROCEDURE — 74011250637 HC RX REV CODE- 250/637: Performed by: UROLOGY

## 2022-01-01 PROCEDURE — 74011250636 HC RX REV CODE- 250/636: Performed by: INTERNAL MEDICINE

## 2022-01-01 PROCEDURE — 99222 1ST HOSP IP/OBS MODERATE 55: CPT | Performed by: INTERNAL MEDICINE

## 2022-01-01 PROCEDURE — 83880 ASSAY OF NATRIURETIC PEPTIDE: CPT

## 2022-01-01 PROCEDURE — 74011000258 HC RX REV CODE- 258: Performed by: INTERNAL MEDICINE

## 2022-01-01 PROCEDURE — 87077 CULTURE AEROBIC IDENTIFY: CPT

## 2022-01-01 PROCEDURE — 77010033678 HC OXYGEN DAILY

## 2022-01-01 PROCEDURE — 97530 THERAPEUTIC ACTIVITIES: CPT

## 2022-01-01 PROCEDURE — 83735 ASSAY OF MAGNESIUM: CPT

## 2022-01-01 PROCEDURE — G0299 HHS/HOSPICE OF RN EA 15 MIN: HCPCS

## 2022-01-01 PROCEDURE — 65610000001 HC ROOM ICU GENERAL

## 2022-01-01 PROCEDURE — 85018 HEMOGLOBIN: CPT

## 2022-01-01 PROCEDURE — 87205 SMEAR GRAM STAIN: CPT

## 2022-01-01 PROCEDURE — 87040 BLOOD CULTURE FOR BACTERIA: CPT

## 2022-01-01 PROCEDURE — 74011000250 HC RX REV CODE- 250: Performed by: INTERNAL MEDICINE

## 2022-01-01 PROCEDURE — 99232 SBSQ HOSP IP/OBS MODERATE 35: CPT | Performed by: INTERNAL MEDICINE

## 2022-01-01 PROCEDURE — 74011250637 HC RX REV CODE- 250/637: Performed by: HOSPITALIST

## 2022-01-01 PROCEDURE — 99232 SBSQ HOSP IP/OBS MODERATE 35: CPT | Performed by: UROLOGY

## 2022-01-01 PROCEDURE — 36415 COLL VENOUS BLD VENIPUNCTURE: CPT

## 2022-01-01 PROCEDURE — 74011250637 HC RX REV CODE- 250/637: Performed by: FAMILY MEDICINE

## 2022-01-01 PROCEDURE — 74011250637 HC RX REV CODE- 250/637: Performed by: INTERNAL MEDICINE

## 2022-01-01 PROCEDURE — 82140 ASSAY OF AMMONIA: CPT

## 2022-01-01 PROCEDURE — 81003 URINALYSIS AUTO W/O SCOPE: CPT

## 2022-01-01 PROCEDURE — 74011250636 HC RX REV CODE- 250/636: Performed by: NURSE PRACTITIONER

## 2022-01-01 PROCEDURE — 74011250636 HC RX REV CODE- 250/636: Performed by: PHYSICIAN ASSISTANT

## 2022-01-01 PROCEDURE — 87186 SC STD MICRODIL/AGAR DIL: CPT

## 2022-01-01 PROCEDURE — 80048 BASIC METABOLIC PNL TOTAL CA: CPT

## 2022-01-01 PROCEDURE — C1894 INTRO/SHEATH, NON-LASER: HCPCS

## 2022-01-01 PROCEDURE — 77030027138 HC INCENT SPIROMETER -A

## 2022-01-01 PROCEDURE — 82962 GLUCOSE BLOOD TEST: CPT

## 2022-01-01 PROCEDURE — 85610 PROTHROMBIN TIME: CPT

## 2022-01-01 PROCEDURE — 80202 ASSAY OF VANCOMYCIN: CPT

## 2022-01-01 PROCEDURE — 74018 RADEX ABDOMEN 1 VIEW: CPT

## 2022-01-01 PROCEDURE — P9047 ALBUMIN (HUMAN), 25%, 50ML: HCPCS | Performed by: FAMILY MEDICINE

## 2022-01-01 PROCEDURE — 86900 BLOOD TYPING SEROLOGIC ABO: CPT

## 2022-01-01 PROCEDURE — 74011000636 HC RX REV CODE- 636: Performed by: RADIOLOGY

## 2022-01-01 PROCEDURE — 99222 1ST HOSP IP/OBS MODERATE 55: CPT | Performed by: NURSE PRACTITIONER

## 2022-01-01 PROCEDURE — 94760 N-INVAS EAR/PLS OXIMETRY 1: CPT

## 2022-01-01 PROCEDURE — 06PY3DZ REMOVAL OF INTRALUMINAL DEVICE FROM LOWER VEIN, PERCUTANEOUS APPROACH: ICD-10-PCS | Performed by: RADIOLOGY

## 2022-01-01 PROCEDURE — 97167 OT EVAL HIGH COMPLEX 60 MIN: CPT

## 2022-01-01 PROCEDURE — 84100 ASSAY OF PHOSPHORUS: CPT

## 2022-01-01 PROCEDURE — 92610 EVALUATE SWALLOWING FUNCTION: CPT

## 2022-01-01 PROCEDURE — 99232 SBSQ HOSP IP/OBS MODERATE 35: CPT | Performed by: NURSE PRACTITIONER

## 2022-01-01 PROCEDURE — 37193 REM ENDOVAS VENA CAVA FILTER: CPT

## 2022-01-01 PROCEDURE — 96365 THER/PROPH/DIAG IV INF INIT: CPT

## 2022-01-01 PROCEDURE — 87086 URINE CULTURE/COLONY COUNT: CPT

## 2022-01-01 PROCEDURE — 96374 THER/PROPH/DIAG INJ IV PUSH: CPT

## 2022-01-01 PROCEDURE — 99497 ADVNCD CARE PLAN 30 MIN: CPT | Performed by: INTERNAL MEDICINE

## 2022-01-01 PROCEDURE — 93005 ELECTROCARDIOGRAM TRACING: CPT | Performed by: EMERGENCY MEDICINE

## 2022-01-01 PROCEDURE — 74011000272 HC RX REV CODE- 272: Performed by: UROLOGY

## 2022-01-01 PROCEDURE — 86923 COMPATIBILITY TEST ELECTRIC: CPT

## 2022-01-01 PROCEDURE — 97161 PT EVAL LOW COMPLEX 20 MIN: CPT

## 2022-01-01 PROCEDURE — 65270000029 HC RM PRIVATE

## 2022-01-01 PROCEDURE — 97165 OT EVAL LOW COMPLEX 30 MIN: CPT

## 2022-01-01 PROCEDURE — 77030004629 HC CATH ANGI SZ AUR COOK -B

## 2022-01-01 PROCEDURE — 97110 THERAPEUTIC EXERCISES: CPT

## 2022-01-01 PROCEDURE — C8929 TTE W OR WO FOL WCON,DOPPLER: HCPCS

## 2022-01-01 PROCEDURE — 87070 CULTURE OTHR SPECIMN AEROBIC: CPT

## 2022-01-01 PROCEDURE — G0155 HHCP-SVS OF CSW,EA 15 MIN: HCPCS

## 2022-01-01 PROCEDURE — 77030010507 HC ADH SKN DERMBND J&J -B

## 2022-01-01 PROCEDURE — 80053 COMPREHEN METABOLIC PANEL: CPT

## 2022-01-01 PROCEDURE — C1769 GUIDE WIRE: HCPCS

## 2022-01-01 PROCEDURE — 74011000258 HC RX REV CODE- 258: Performed by: EMERGENCY MEDICINE

## 2022-01-01 PROCEDURE — 74011250636 HC RX REV CODE- 250/636: Performed by: RADIOLOGY

## 2022-01-01 PROCEDURE — P9016 RBC LEUKOCYTES REDUCED: HCPCS

## 2022-01-01 PROCEDURE — 97535 SELF CARE MNGMENT TRAINING: CPT

## 2022-01-01 PROCEDURE — 36430 TRANSFUSION BLD/BLD COMPNT: CPT

## 2022-01-01 PROCEDURE — 83036 HEMOGLOBIN GLYCOSYLATED A1C: CPT

## 2022-01-01 PROCEDURE — 83605 ASSAY OF LACTIC ACID: CPT

## 2022-01-01 PROCEDURE — B5191ZZ FLUOROSCOPY OF INFERIOR VENA CAVA USING LOW OSMOLAR CONTRAST: ICD-10-PCS | Performed by: RADIOLOGY

## 2022-01-01 PROCEDURE — 74011000258 HC RX REV CODE- 258: Performed by: PHYSICIAN ASSISTANT

## 2022-01-01 PROCEDURE — 99232 SBSQ HOSP IP/OBS MODERATE 35: CPT | Performed by: ORTHOPAEDIC SURGERY

## 2022-01-01 PROCEDURE — 87150 DNA/RNA AMPLIFIED PROBE: CPT

## 2022-01-01 PROCEDURE — 97112 NEUROMUSCULAR REEDUCATION: CPT

## 2022-01-01 PROCEDURE — 84484 ASSAY OF TROPONIN QUANT: CPT

## 2022-01-01 PROCEDURE — 74011250637 HC RX REV CODE- 250/637: Performed by: EMERGENCY MEDICINE

## 2022-01-01 PROCEDURE — 83690 ASSAY OF LIPASE: CPT

## 2022-01-01 PROCEDURE — 36592 COLLECT BLOOD FROM PICC: CPT

## 2022-01-01 PROCEDURE — 77030034696 HC CATH URETH FOL 2W BARD -A

## 2022-01-01 PROCEDURE — 82272 OCCULT BLD FECES 1-3 TESTS: CPT

## 2022-01-01 PROCEDURE — 85027 COMPLETE CBC AUTOMATED: CPT

## 2022-01-01 PROCEDURE — 81015 MICROSCOPIC EXAM OF URINE: CPT

## 2022-01-01 PROCEDURE — 74011000250 HC RX REV CODE- 250: Performed by: STUDENT IN AN ORGANIZED HEALTH CARE EDUCATION/TRAINING PROGRAM

## 2022-01-01 PROCEDURE — G0156 HHCP-SVS OF AIDE,EA 15 MIN: HCPCS

## 2022-01-01 PROCEDURE — 76937 US GUIDE VASCULAR ACCESS: CPT

## 2022-01-01 PROCEDURE — 3336500001 HSPC ELECTION

## 2022-01-01 PROCEDURE — 72100 X-RAY EXAM L-S SPINE 2/3 VWS: CPT

## 2022-01-01 PROCEDURE — HOSPICE MEDICATION HC HH HOSPICE MEDICATION

## 2022-01-01 PROCEDURE — 74011000258 HC RX REV CODE- 258: Performed by: HOSPITALIST

## 2022-01-01 PROCEDURE — 93970 EXTREMITY STUDY: CPT

## 2022-01-01 PROCEDURE — 82550 ASSAY OF CK (CPK): CPT

## 2022-01-01 PROCEDURE — 74011000258 HC RX REV CODE- 258: Performed by: STUDENT IN AN ORGANIZED HEALTH CARE EDUCATION/TRAINING PROGRAM

## 2022-01-01 PROCEDURE — 3331090004 HSPC SERVICE INTENSITY ADD-ON

## 2022-01-01 PROCEDURE — 74011000250 HC RX REV CODE- 250: Performed by: EMERGENCY MEDICINE

## 2022-01-01 PROCEDURE — 74178 CT ABD&PLV WO CNTR FLWD CNTR: CPT

## 2022-01-01 PROCEDURE — 87088 URINE BACTERIA CULTURE: CPT

## 2022-01-01 PROCEDURE — 94761 N-INVAS EAR/PLS OXIMETRY MLT: CPT

## 2022-01-01 PROCEDURE — 77030040393 HC DRSG OPTIFOAM GENT MDII -B

## 2022-01-01 PROCEDURE — 77030034850

## 2022-01-01 PROCEDURE — 99223 1ST HOSP IP/OBS HIGH 75: CPT | Performed by: INTERNAL MEDICINE

## 2022-01-01 PROCEDURE — 99285 EMERGENCY DEPT VISIT HI MDM: CPT

## 2022-01-01 PROCEDURE — 82607 VITAMIN B-12: CPT

## 2022-01-01 PROCEDURE — 76450000000

## 2022-01-01 PROCEDURE — 77030041974 HC CATH SYS PERIPH TELE -B

## 2022-01-01 PROCEDURE — 86580 TB INTRADERMAL TEST: CPT | Performed by: HOSPITALIST

## 2022-01-01 PROCEDURE — 96360 HYDRATION IV INFUSION INIT: CPT

## 2022-01-01 PROCEDURE — C1773 RET DEV, INSERTABLE: HCPCS

## 2022-01-01 PROCEDURE — 74011000250 HC RX REV CODE- 250: Performed by: RADIOLOGY

## 2022-01-01 PROCEDURE — 97162 PT EVAL MOD COMPLEX 30 MIN: CPT

## 2022-01-01 PROCEDURE — 71045 X-RAY EXAM CHEST 1 VIEW: CPT

## 2022-01-01 PROCEDURE — 92526 ORAL FUNCTION THERAPY: CPT

## 2022-01-01 PROCEDURE — 74011250636 HC RX REV CODE- 250/636: Performed by: STUDENT IN AN ORGANIZED HEALTH CARE EDUCATION/TRAINING PROGRAM

## 2022-01-01 PROCEDURE — 74011000250 HC RX REV CODE- 250: Performed by: HOSPITALIST

## 2022-01-01 PROCEDURE — 74011250636 HC RX REV CODE- 250/636: Performed by: EMERGENCY MEDICINE

## 2022-01-01 PROCEDURE — 97168 OT RE-EVAL EST PLAN CARE: CPT

## 2022-01-01 PROCEDURE — 72158 MRI LUMBAR SPINE W/O & W/DYE: CPT

## 2022-01-01 PROCEDURE — 97164 PT RE-EVAL EST PLAN CARE: CPT

## 2022-01-01 PROCEDURE — 87106 FUNGI IDENTIFICATION YEAST: CPT

## 2022-01-01 PROCEDURE — 77030040832 HC IRR TRAY MDII -A

## 2022-01-01 PROCEDURE — 06H03DZ INSERTION OF INTRALUMINAL DEVICE INTO INFERIOR VENA CAVA, PERCUTANEOUS APPROACH: ICD-10-PCS | Performed by: RADIOLOGY

## 2022-01-01 PROCEDURE — A9576 INJ PROHANCE MULTIPACK: HCPCS | Performed by: HOSPITALIST

## 2022-01-01 PROCEDURE — 51798 US URINE CAPACITY MEASURE: CPT

## 2022-01-01 PROCEDURE — 84145 PROCALCITONIN (PCT): CPT

## 2022-01-01 PROCEDURE — 96361 HYDRATE IV INFUSION ADD-ON: CPT

## 2022-01-01 PROCEDURE — 94660 CPAP INITIATION&MGMT: CPT

## 2022-01-01 PROCEDURE — 86580 TB INTRADERMAL TEST: CPT | Performed by: FAMILY MEDICINE

## 2022-01-01 PROCEDURE — C1880 VENA CAVA FILTER: HCPCS

## 2022-01-01 PROCEDURE — 74011000636 HC RX REV CODE- 636: Performed by: STUDENT IN AN ORGANIZED HEALTH CARE EDUCATION/TRAINING PROGRAM

## 2022-01-01 RX ORDER — MIDAZOLAM HYDROCHLORIDE 1 MG/ML
.5-2 INJECTION, SOLUTION INTRAMUSCULAR; INTRAVENOUS
Status: DISCONTINUED | OUTPATIENT
Start: 2022-01-01 | End: 2022-01-01

## 2022-01-01 RX ORDER — ATORVASTATIN CALCIUM 40 MG/1
40 TABLET, FILM COATED ORAL
Status: DISCONTINUED | OUTPATIENT
Start: 2022-01-01 | End: 2022-01-01

## 2022-01-01 RX ORDER — DEXTROSE MONOHYDRATE AND SODIUM CHLORIDE 5; .9 G/100ML; G/100ML
75 INJECTION, SOLUTION INTRAVENOUS CONTINUOUS
Status: DISCONTINUED | OUTPATIENT
Start: 2022-01-01 | End: 2022-01-01

## 2022-01-01 RX ORDER — FENOFIBRATE 160 MG/1
160 TABLET ORAL DAILY
Status: DISCONTINUED | OUTPATIENT
Start: 2022-01-01 | End: 2022-01-01 | Stop reason: HOSPADM

## 2022-01-01 RX ORDER — SODIUM CHLORIDE 9 MG/ML
75 INJECTION, SOLUTION INTRAVENOUS CONTINUOUS
Status: DISCONTINUED | OUTPATIENT
Start: 2022-01-01 | End: 2022-01-01

## 2022-01-01 RX ORDER — ROPINIROLE 2 MG/1
2 TABLET, FILM COATED ORAL
Status: DISCONTINUED | OUTPATIENT
Start: 2022-01-01 | End: 2022-01-01

## 2022-01-01 RX ORDER — TRAZODONE HYDROCHLORIDE 50 MG/1
50 TABLET ORAL
Status: DISCONTINUED | OUTPATIENT
Start: 2022-01-01 | End: 2022-01-01 | Stop reason: HOSPADM

## 2022-01-01 RX ORDER — SODIUM CHLORIDE 0.9 % (FLUSH) 0.9 %
10 SYRINGE (ML) INJECTION
Status: COMPLETED | OUTPATIENT
Start: 2022-01-01 | End: 2022-01-01

## 2022-01-01 RX ORDER — OXYCODONE HYDROCHLORIDE 5 MG/1
5 TABLET ORAL
Status: DISCONTINUED | OUTPATIENT
Start: 2022-01-01 | End: 2022-01-01 | Stop reason: HOSPADM

## 2022-01-01 RX ORDER — HEPARIN SODIUM 200 [USP'U]/100ML
1000 INJECTION, SOLUTION INTRAVENOUS AS NEEDED
Status: DISCONTINUED | OUTPATIENT
Start: 2022-01-01 | End: 2022-01-01

## 2022-01-01 RX ORDER — ATORVASTATIN CALCIUM 40 MG/1
40 TABLET, FILM COATED ORAL
Status: DISCONTINUED | OUTPATIENT
Start: 2022-01-01 | End: 2022-01-01 | Stop reason: HOSPADM

## 2022-01-01 RX ORDER — FOLIC ACID 1 MG/1
1 TABLET ORAL DAILY
Status: CANCELLED | OUTPATIENT
Start: 2022-01-01

## 2022-01-01 RX ORDER — MAGNESIUM SULFATE HEPTAHYDRATE 40 MG/ML
2 INJECTION, SOLUTION INTRAVENOUS ONCE
Status: COMPLETED | OUTPATIENT
Start: 2022-01-01 | End: 2022-01-01

## 2022-01-01 RX ORDER — TRAZODONE HYDROCHLORIDE 50 MG/1
50 TABLET ORAL
COMMUNITY
Start: 2021-01-01 | End: 2022-01-01

## 2022-01-01 RX ORDER — ONDANSETRON 2 MG/ML
4 INJECTION INTRAMUSCULAR; INTRAVENOUS
Status: DISCONTINUED | OUTPATIENT
Start: 2022-01-01 | End: 2022-01-01 | Stop reason: HOSPADM

## 2022-01-01 RX ORDER — SODIUM CHLORIDE 9 MG/ML
250 INJECTION, SOLUTION INTRAVENOUS AS NEEDED
Status: DISCONTINUED | OUTPATIENT
Start: 2022-01-01 | End: 2022-01-01 | Stop reason: HOSPADM

## 2022-01-01 RX ORDER — CHOLECALCIFEROL (VITAMIN D3) 125 MCG
5 CAPSULE ORAL
Status: DISCONTINUED | OUTPATIENT
Start: 2022-01-01 | End: 2022-01-01 | Stop reason: HOSPADM

## 2022-01-01 RX ORDER — FOLIC ACID 1 MG/1
1 TABLET ORAL DAILY
Status: DISCONTINUED | OUTPATIENT
Start: 2022-01-01 | End: 2022-01-01

## 2022-01-01 RX ORDER — HYDROMORPHONE HYDROCHLORIDE 2 MG/ML
1 INJECTION, SOLUTION INTRAMUSCULAR; INTRAVENOUS; SUBCUTANEOUS ONCE
Status: COMPLETED | OUTPATIENT
Start: 2022-01-01 | End: 2022-01-01

## 2022-01-01 RX ORDER — ATORVASTATIN CALCIUM 40 MG/1
40 TABLET, FILM COATED ORAL DAILY
COMMUNITY
Start: 2022-01-01 | End: 2022-01-01

## 2022-01-01 RX ORDER — MONTELUKAST SODIUM 10 MG/1
10 TABLET ORAL DAILY
Status: DISCONTINUED | OUTPATIENT
Start: 2022-01-01 | End: 2022-01-01

## 2022-01-01 RX ORDER — MIDODRINE HYDROCHLORIDE 5 MG/1
5 TABLET ORAL
Status: DISCONTINUED | OUTPATIENT
Start: 2022-01-01 | End: 2022-01-01

## 2022-01-01 RX ORDER — DEXTROSE MONOHYDRATE 100 MG/ML
125-250 INJECTION, SOLUTION INTRAVENOUS AS NEEDED
Status: DISCONTINUED | OUTPATIENT
Start: 2022-01-01 | End: 2022-01-01 | Stop reason: HOSPADM

## 2022-01-01 RX ORDER — TRAZODONE HYDROCHLORIDE 50 MG/1
50 TABLET ORAL
Status: CANCELLED | OUTPATIENT
Start: 2022-01-01

## 2022-01-01 RX ORDER — MAGNESIUM SULFATE 100 %
16 CRYSTALS MISCELLANEOUS AS NEEDED
Status: CANCELLED | OUTPATIENT
Start: 2022-01-01

## 2022-01-01 RX ORDER — ACETAMINOPHEN 325 MG/1
650 TABLET ORAL
Status: DISCONTINUED | OUTPATIENT
Start: 2022-01-01 | End: 2022-01-01 | Stop reason: HOSPADM

## 2022-01-01 RX ORDER — MONTELUKAST SODIUM 10 MG/1
10 TABLET ORAL DAILY
COMMUNITY
Start: 2021-01-01 | End: 2022-01-01

## 2022-01-01 RX ORDER — POLYETHYLENE GLYCOL 3350 17 G/17G
17 POWDER, FOR SOLUTION ORAL DAILY
Status: DISCONTINUED | OUTPATIENT
Start: 2022-01-01 | End: 2022-01-01 | Stop reason: HOSPADM

## 2022-01-01 RX ORDER — ROPINIROLE 1 MG/1
2 TABLET, FILM COATED ORAL
Status: DISCONTINUED | OUTPATIENT
Start: 2022-01-01 | End: 2022-01-01 | Stop reason: HOSPADM

## 2022-01-01 RX ORDER — ROPINIROLE 1 MG/1
2 TABLET, FILM COATED ORAL
Status: CANCELLED | OUTPATIENT
Start: 2022-01-01

## 2022-01-01 RX ORDER — DEXTROSE MONOHYDRATE AND SODIUM CHLORIDE 5; .9 G/100ML; G/100ML
75 INJECTION, SOLUTION INTRAVENOUS CONTINUOUS
Status: CANCELLED | OUTPATIENT
Start: 2022-01-01

## 2022-01-01 RX ORDER — FENTANYL 25 UG/1
1 PATCH TRANSDERMAL
Status: DISCONTINUED | OUTPATIENT
Start: 2022-01-01 | End: 2022-01-01 | Stop reason: HOSPADM

## 2022-01-01 RX ORDER — SODIUM CHLORIDE 9 MG/ML
25 INJECTION, SOLUTION INTRAVENOUS ONCE
Status: COMPLETED | OUTPATIENT
Start: 2022-01-01 | End: 2022-01-01

## 2022-01-01 RX ORDER — FENTANYL CITRATE 50 UG/ML
25-100 INJECTION, SOLUTION INTRAMUSCULAR; INTRAVENOUS
Status: DISCONTINUED | OUTPATIENT
Start: 2022-01-01 | End: 2022-01-01

## 2022-01-01 RX ORDER — ROPINIROLE 1 MG/1
1 TABLET, FILM COATED ORAL EVERY MORNING
Status: CANCELLED | OUTPATIENT
Start: 2022-01-01

## 2022-01-01 RX ORDER — PROMETHAZINE HYDROCHLORIDE 25 MG/1
12.5 TABLET ORAL
Status: CANCELLED | OUTPATIENT
Start: 2022-01-01

## 2022-01-01 RX ORDER — FENOFIBRATE 160 MG/1
160 TABLET ORAL DAILY
Status: CANCELLED | OUTPATIENT
Start: 2022-01-01

## 2022-01-01 RX ORDER — SIMETHICONE 80 MG
80 TABLET,CHEWABLE ORAL
Status: DISCONTINUED | OUTPATIENT
Start: 2022-01-01 | End: 2022-01-01 | Stop reason: HOSPADM

## 2022-01-01 RX ORDER — TRAMADOL HYDROCHLORIDE 50 MG/1
50 TABLET ORAL
COMMUNITY
End: 2022-01-01

## 2022-01-01 RX ORDER — SODIUM CHLORIDE 9 MG/ML
1000 INJECTION, SOLUTION INTRAVENOUS ONCE
Status: COMPLETED | OUTPATIENT
Start: 2022-01-01 | End: 2022-01-01

## 2022-01-01 RX ORDER — VANCOMYCIN/0.9 % SOD CHLORIDE 1.5G/250ML
1500 PLASTIC BAG, INJECTION (ML) INTRAVENOUS EVERY 24 HOURS
Status: DISCONTINUED | OUTPATIENT
Start: 2022-01-01 | End: 2022-01-01 | Stop reason: DRUGHIGH

## 2022-01-01 RX ORDER — ROPINIROLE 1 MG/1
1 TABLET, FILM COATED ORAL EVERY MORNING
Status: DISCONTINUED | OUTPATIENT
Start: 2022-01-01 | End: 2022-01-01 | Stop reason: HOSPADM

## 2022-01-01 RX ORDER — INSULIN LISPRO 100 [IU]/ML
INJECTION, SOLUTION INTRAVENOUS; SUBCUTANEOUS
Status: DISCONTINUED | OUTPATIENT
Start: 2022-01-01 | End: 2022-01-01

## 2022-01-01 RX ORDER — PANTOPRAZOLE SODIUM 40 MG/1
40 TABLET, DELAYED RELEASE ORAL
Status: CANCELLED | OUTPATIENT
Start: 2022-01-01

## 2022-01-01 RX ORDER — ACETAMINOPHEN 650 MG/1
650 SUPPOSITORY RECTAL
Status: DISCONTINUED | OUTPATIENT
Start: 2022-01-01 | End: 2022-01-01 | Stop reason: HOSPADM

## 2022-01-01 RX ORDER — POTASSIUM CHLORIDE 14.9 MG/ML
20 INJECTION INTRAVENOUS
Status: COMPLETED | OUTPATIENT
Start: 2022-01-01 | End: 2022-01-01

## 2022-01-01 RX ORDER — LIDOCAINE HYDROCHLORIDE 20 MG/ML
20-300 INJECTION, SOLUTION INFILTRATION; PERINEURAL
Status: DISCONTINUED | OUTPATIENT
Start: 2022-01-01 | End: 2022-01-01

## 2022-01-01 RX ORDER — ALBUMIN HUMAN 250 G/1000ML
25 SOLUTION INTRAVENOUS EVERY 6 HOURS
Status: DISCONTINUED | OUTPATIENT
Start: 2022-01-01 | End: 2022-01-01 | Stop reason: SDUPTHER

## 2022-01-01 RX ORDER — ONDANSETRON 2 MG/ML
4 INJECTION INTRAMUSCULAR; INTRAVENOUS
Status: CANCELLED | OUTPATIENT
Start: 2022-01-01

## 2022-01-01 RX ORDER — POTASSIUM CHLORIDE 20 MEQ/1
40 TABLET, EXTENDED RELEASE ORAL
Status: COMPLETED | OUTPATIENT
Start: 2022-01-01 | End: 2022-01-01

## 2022-01-01 RX ORDER — POTASSIUM CHLORIDE 750 MG/1
40 TABLET, EXTENDED RELEASE ORAL
Status: COMPLETED | OUTPATIENT
Start: 2022-01-01 | End: 2022-01-01

## 2022-01-01 RX ORDER — MORPHINE SULFATE 4 MG/ML
4 INJECTION INTRAVENOUS
Status: DISCONTINUED | OUTPATIENT
Start: 2022-01-01 | End: 2022-01-01 | Stop reason: HOSPADM

## 2022-01-01 RX ORDER — ACETAMINOPHEN 325 MG/1
650 TABLET ORAL
Status: DISCONTINUED | OUTPATIENT
Start: 2022-01-01 | End: 2022-01-01

## 2022-01-01 RX ORDER — ROPINIROLE 1 MG/1
TABLET, FILM COATED ORAL
Qty: 10 TABLET | Refills: 0 | Status: SHIPPED
Start: 2022-01-01 | End: 2022-01-01

## 2022-01-01 RX ORDER — ROPINIROLE 0.5 MG/1
1 TABLET, FILM COATED ORAL EVERY MORNING
Status: DISCONTINUED | OUTPATIENT
Start: 2022-01-01 | End: 2022-01-01

## 2022-01-01 RX ORDER — FLUCONAZOLE 100 MG/1
200 TABLET ORAL DAILY
Status: COMPLETED | OUTPATIENT
Start: 2022-01-01 | End: 2022-01-01

## 2022-01-01 RX ORDER — MAGNESIUM SULFATE 100 %
16 CRYSTALS MISCELLANEOUS AS NEEDED
Status: DISCONTINUED | OUTPATIENT
Start: 2022-01-01 | End: 2022-01-01 | Stop reason: HOSPADM

## 2022-01-01 RX ORDER — ALBUMIN HUMAN 250 G/1000ML
25 SOLUTION INTRAVENOUS EVERY 6 HOURS
Status: COMPLETED | OUTPATIENT
Start: 2022-01-01 | End: 2022-01-01

## 2022-01-01 RX ORDER — SODIUM CHLORIDE 0.9 % (FLUSH) 0.9 %
5-40 SYRINGE (ML) INJECTION AS NEEDED
Status: CANCELLED | OUTPATIENT
Start: 2022-01-01

## 2022-01-01 RX ORDER — MIRTAZAPINE 15 MG/1
7.5 TABLET, FILM COATED ORAL
Status: DISCONTINUED | OUTPATIENT
Start: 2022-01-01 | End: 2022-01-01 | Stop reason: HOSPADM

## 2022-01-01 RX ORDER — ROPINIROLE 1 MG/1
2 TABLET, FILM COATED ORAL DAILY
Status: DISCONTINUED | OUTPATIENT
Start: 2022-01-01 | End: 2022-01-01

## 2022-01-01 RX ORDER — DEXTROSE 40 %
15 GEL (GRAM) ORAL AS NEEDED
Status: DISCONTINUED | OUTPATIENT
Start: 2022-01-01 | End: 2022-01-01 | Stop reason: HOSPADM

## 2022-01-01 RX ORDER — SODIUM CHLORIDE 0.9 % (FLUSH) 0.9 %
5-40 SYRINGE (ML) INJECTION AS NEEDED
Status: DISCONTINUED | OUTPATIENT
Start: 2022-01-01 | End: 2022-01-01 | Stop reason: HOSPADM

## 2022-01-01 RX ORDER — MIDODRINE HYDROCHLORIDE 5 MG/1
5 TABLET ORAL
Status: DISCONTINUED | OUTPATIENT
Start: 2022-01-01 | End: 2022-01-01 | Stop reason: HOSPADM

## 2022-01-01 RX ORDER — MIDODRINE HYDROCHLORIDE 5 MG/1
10 TABLET ORAL
Status: DISCONTINUED | OUTPATIENT
Start: 2022-01-01 | End: 2022-01-01

## 2022-01-01 RX ORDER — SODIUM CHLORIDE 0.9 % (FLUSH) 0.9 %
5-40 SYRINGE (ML) INJECTION EVERY 8 HOURS
Status: DISCONTINUED | OUTPATIENT
Start: 2022-01-01 | End: 2022-01-01 | Stop reason: HOSPADM

## 2022-01-01 RX ORDER — ENOXAPARIN SODIUM 100 MG/ML
40 INJECTION SUBCUTANEOUS DAILY
Status: DISCONTINUED | OUTPATIENT
Start: 2022-01-01 | End: 2022-01-01 | Stop reason: HOSPADM

## 2022-01-01 RX ORDER — POLYETHYLENE GLYCOL 3350 17 G/17G
17 POWDER, FOR SOLUTION ORAL DAILY
Status: CANCELLED | OUTPATIENT
Start: 2022-01-01

## 2022-01-01 RX ORDER — MONTELUKAST SODIUM 10 MG/1
10 TABLET ORAL DAILY
Status: DISCONTINUED | OUTPATIENT
Start: 2022-01-01 | End: 2022-01-01 | Stop reason: HOSPADM

## 2022-01-01 RX ORDER — OXYCODONE HYDROCHLORIDE 5 MG/1
10 TABLET ORAL
Status: DISCONTINUED | OUTPATIENT
Start: 2022-01-01 | End: 2022-01-01

## 2022-01-01 RX ORDER — ASCORBIC ACID 500 MG
500 TABLET ORAL DAILY
Status: DISCONTINUED | OUTPATIENT
Start: 2022-01-01 | End: 2022-01-01 | Stop reason: HOSPADM

## 2022-01-01 RX ORDER — OXYCODONE HYDROCHLORIDE 5 MG/1
5 TABLET ORAL
Status: CANCELLED | OUTPATIENT
Start: 2022-01-01

## 2022-01-01 RX ORDER — VANCOMYCIN 2 GRAM/500 ML IN 0.9 % SODIUM CHLORIDE INTRAVENOUS
2000 ONCE
Status: COMPLETED | OUTPATIENT
Start: 2022-01-01 | End: 2022-01-01

## 2022-01-01 RX ORDER — FENOFIBRATE 160 MG/1
160 TABLET ORAL DAILY
Status: DISCONTINUED | OUTPATIENT
Start: 2022-01-01 | End: 2022-01-01

## 2022-01-01 RX ORDER — SODIUM CHLORIDE 0.9 % (FLUSH) 0.9 %
5-40 SYRINGE (ML) INJECTION EVERY 8 HOURS
Status: CANCELLED | OUTPATIENT
Start: 2022-01-01

## 2022-01-01 RX ORDER — TRAMADOL HYDROCHLORIDE 50 MG/1
50 TABLET ORAL
Status: DISCONTINUED | OUTPATIENT
Start: 2022-01-01 | End: 2022-01-01 | Stop reason: HOSPADM

## 2022-01-01 RX ORDER — PROMETHAZINE HYDROCHLORIDE 25 MG/1
12.5 TABLET ORAL
Status: DISCONTINUED | OUTPATIENT
Start: 2022-01-01 | End: 2022-01-01 | Stop reason: HOSPADM

## 2022-01-01 RX ORDER — TRAZODONE HYDROCHLORIDE 50 MG/1
50 TABLET ORAL
Qty: 3 TABLET | Refills: 0 | Status: SHIPPED | OUTPATIENT
Start: 2022-01-01 | End: 2022-01-01

## 2022-01-01 RX ORDER — POLYETHYLENE GLYCOL 3350 17 G/17G
17 POWDER, FOR SOLUTION ORAL DAILY
Status: DISCONTINUED | OUTPATIENT
Start: 2022-01-01 | End: 2022-01-01

## 2022-01-01 RX ORDER — IRON FUM,PS CMP/VIT C/NIACIN 125-40-3MG
1 CAPSULE ORAL DAILY
COMMUNITY
Start: 2021-01-01 | End: 2022-01-01

## 2022-01-01 RX ORDER — HYDROCODONE BITARTRATE AND ACETAMINOPHEN 7.5; 325 MG/1; MG/1
1 TABLET ORAL
Status: CANCELLED | OUTPATIENT
Start: 2022-01-01

## 2022-01-01 RX ORDER — LIDOCAINE HYDROCHLORIDE 20 MG/ML
JELLY TOPICAL ONCE
Status: DISPENSED | OUTPATIENT
Start: 2022-01-01 | End: 2022-01-01

## 2022-01-01 RX ORDER — MONTELUKAST SODIUM 10 MG/1
10 TABLET ORAL DAILY
Status: CANCELLED | OUTPATIENT
Start: 2022-01-01

## 2022-01-01 RX ORDER — FOLIC ACID 1 MG/1
1 TABLET ORAL DAILY
Status: DISCONTINUED | OUTPATIENT
Start: 2022-01-01 | End: 2022-01-01 | Stop reason: HOSPADM

## 2022-01-01 RX ORDER — NOREPINEPHRINE BIT/0.9 % NACL 4MG/250ML
.5-16 PLASTIC BAG, INJECTION (ML) INTRAVENOUS
Status: DISCONTINUED | OUTPATIENT
Start: 2022-01-01 | End: 2022-01-01

## 2022-01-01 RX ORDER — DEXTROSE MONOHYDRATE 100 MG/ML
125-250 INJECTION, SOLUTION INTRAVENOUS AS NEEDED
Status: CANCELLED | OUTPATIENT
Start: 2022-01-01

## 2022-01-01 RX ORDER — ATROPA BELLADONNA AND OPIUM 16.2; 6 MG/1; MG/1
1 SUPPOSITORY RECTAL
Qty: 6 SUPPOSITORY | Refills: 0 | Status: SHIPPED | OUTPATIENT
Start: 2022-01-01 | End: 2022-01-01

## 2022-01-01 RX ORDER — MORPHINE SULFATE 4 MG/ML
4 INJECTION INTRAVENOUS ONCE
Status: COMPLETED | OUTPATIENT
Start: 2022-01-01 | End: 2022-01-01

## 2022-01-01 RX ORDER — PANTOPRAZOLE SODIUM 40 MG/1
40 TABLET, DELAYED RELEASE ORAL
Status: DISCONTINUED | OUTPATIENT
Start: 2022-01-01 | End: 2022-01-01 | Stop reason: HOSPADM

## 2022-01-01 RX ORDER — VANCOMYCIN HYDROCHLORIDE
1250 EVERY 12 HOURS
Status: DISCONTINUED | OUTPATIENT
Start: 2022-01-01 | End: 2022-01-01

## 2022-01-01 RX ORDER — KETOROLAC TROMETHAMINE 30 MG/ML
15 INJECTION, SOLUTION INTRAMUSCULAR; INTRAVENOUS
Status: COMPLETED | OUTPATIENT
Start: 2022-01-01 | End: 2022-01-01

## 2022-01-01 RX ORDER — PRAMIPEXOLE DIHYDROCHLORIDE 1 MG/1
0.5 TABLET ORAL 2 TIMES DAILY
COMMUNITY
End: 2022-01-01

## 2022-01-01 RX ORDER — PANTOPRAZOLE SODIUM 40 MG/1
40 TABLET, DELAYED RELEASE ORAL
Status: DISCONTINUED | OUTPATIENT
Start: 2022-01-01 | End: 2022-01-01

## 2022-01-01 RX ORDER — MIRTAZAPINE 15 MG/1
7.5 TABLET, FILM COATED ORAL
Status: CANCELLED | OUTPATIENT
Start: 2022-01-01

## 2022-01-01 RX ORDER — TRAMADOL HYDROCHLORIDE 50 MG/1
50 TABLET ORAL
Qty: 9 TABLET | Refills: 0 | Status: SHIPPED | OUTPATIENT
Start: 2022-01-01 | End: 2022-01-01

## 2022-01-01 RX ORDER — ATROPA BELLADONNA AND OPIUM 16.2; 6 MG/1; MG/1
1 SUPPOSITORY RECTAL
Status: DISCONTINUED | OUTPATIENT
Start: 2022-01-01 | End: 2022-01-01 | Stop reason: HOSPADM

## 2022-01-01 RX ORDER — ACETAMINOPHEN 650 MG/1
650 SUPPOSITORY RECTAL
Status: DISCONTINUED | OUTPATIENT
Start: 2022-01-01 | End: 2022-01-01

## 2022-01-01 RX ORDER — ROPINIROLE 1 MG/1
2 TABLET, FILM COATED ORAL 2 TIMES DAILY
Status: DISCONTINUED | OUTPATIENT
Start: 2022-01-01 | End: 2022-01-01 | Stop reason: HOSPADM

## 2022-01-01 RX ORDER — MIRTAZAPINE 15 MG/1
7.5 TABLET, FILM COATED ORAL
Status: DISCONTINUED | OUTPATIENT
Start: 2022-01-01 | End: 2022-01-01

## 2022-01-01 RX ORDER — PROMETHAZINE HYDROCHLORIDE 25 MG/1
12.5 TABLET ORAL
Status: DISCONTINUED | OUTPATIENT
Start: 2022-01-01 | End: 2022-01-01

## 2022-01-01 RX ORDER — ACETAMINOPHEN 325 MG/1
650 TABLET ORAL
Status: CANCELLED | OUTPATIENT
Start: 2022-01-01

## 2022-01-01 RX ORDER — LEVOFLOXACIN 5 MG/ML
500 INJECTION, SOLUTION INTRAVENOUS EVERY 24 HOURS
Status: DISCONTINUED | OUTPATIENT
Start: 2022-01-01 | End: 2022-01-01

## 2022-01-01 RX ORDER — DEXTROSE MONOHYDRATE AND SODIUM CHLORIDE 5; .45 G/100ML; G/100ML
50 INJECTION, SOLUTION INTRAVENOUS CONTINUOUS
Status: DISCONTINUED | OUTPATIENT
Start: 2022-01-01 | End: 2022-01-01

## 2022-01-01 RX ORDER — MIDODRINE HYDROCHLORIDE 5 MG/1
5 TABLET ORAL
Status: COMPLETED | OUTPATIENT
Start: 2022-01-01 | End: 2022-01-01

## 2022-01-01 RX ORDER — MORPHINE SULFATE 20 MG/ML
10 SOLUTION ORAL
Qty: 30 ML | Refills: 0 | Status: SHIPPED | OUTPATIENT
Start: 2022-01-01 | End: 2022-01-01

## 2022-01-01 RX ORDER — BUMETANIDE 1 MG/1
1 TABLET ORAL 2 TIMES DAILY
Status: DISCONTINUED | OUTPATIENT
Start: 2022-01-01 | End: 2022-01-01 | Stop reason: HOSPADM

## 2022-01-01 RX ORDER — DIPHENHYDRAMINE HYDROCHLORIDE 50 MG/ML
12.5-5 INJECTION, SOLUTION INTRAMUSCULAR; INTRAVENOUS ONCE
Status: COMPLETED | OUTPATIENT
Start: 2022-01-01 | End: 2022-01-01

## 2022-01-01 RX ORDER — ATORVASTATIN CALCIUM 40 MG/1
40 TABLET, FILM COATED ORAL DAILY
Status: DISCONTINUED | OUTPATIENT
Start: 2022-01-01 | End: 2022-01-01 | Stop reason: SDUPTHER

## 2022-01-01 RX ORDER — LIDOCAINE HYDROCHLORIDE 20 MG/ML
20-200 INJECTION, SOLUTION INFILTRATION; PERINEURAL
Status: DISCONTINUED | OUTPATIENT
Start: 2022-01-01 | End: 2022-01-01

## 2022-01-01 RX ORDER — NALOXONE HYDROCHLORIDE 0.4 MG/ML
0.4 INJECTION, SOLUTION INTRAMUSCULAR; INTRAVENOUS; SUBCUTANEOUS AS NEEDED
Status: DISCONTINUED | OUTPATIENT
Start: 2022-01-01 | End: 2022-01-01 | Stop reason: HOSPADM

## 2022-01-01 RX ORDER — DEXTROSE MONOHYDRATE AND SODIUM CHLORIDE 5; .9 G/100ML; G/100ML
75 INJECTION, SOLUTION INTRAVENOUS CONTINUOUS
Status: DISCONTINUED | OUTPATIENT
Start: 2022-01-01 | End: 2022-01-01 | Stop reason: HOSPADM

## 2022-01-01 RX ORDER — MIDODRINE HYDROCHLORIDE 5 MG/1
5 TABLET ORAL
Status: CANCELLED | OUTPATIENT
Start: 2022-01-01

## 2022-01-01 RX ORDER — MORPHINE SULFATE 4 MG/ML
4 INJECTION INTRAVENOUS
Status: CANCELLED | OUTPATIENT
Start: 2022-01-01

## 2022-01-01 RX ORDER — ACETAMINOPHEN 500 MG
TABLET ORAL DAILY
COMMUNITY
End: 2022-01-01

## 2022-01-01 RX ORDER — SODIUM CHLORIDE 0.9 % (FLUSH) 0.9 %
5-40 SYRINGE (ML) INJECTION EVERY 8 HOURS
Status: DISCONTINUED | OUTPATIENT
Start: 2022-01-01 | End: 2022-01-01

## 2022-01-01 RX ORDER — DEXTROSE MONOHYDRATE AND SODIUM CHLORIDE 5; .9 G/100ML; G/100ML
100 INJECTION, SOLUTION INTRAVENOUS CONTINUOUS
Status: DISCONTINUED | OUTPATIENT
Start: 2022-01-01 | End: 2022-01-01

## 2022-01-01 RX ORDER — OXYCODONE HYDROCHLORIDE 5 MG/1
5 TABLET ORAL
Status: DISCONTINUED | OUTPATIENT
Start: 2022-01-01 | End: 2022-01-01

## 2022-01-01 RX ORDER — FENTANYL CITRATE 50 UG/ML
25-50 INJECTION, SOLUTION INTRAMUSCULAR; INTRAVENOUS
Status: DISCONTINUED | OUTPATIENT
Start: 2022-01-01 | End: 2022-01-01

## 2022-01-01 RX ORDER — ACETAMINOPHEN 650 MG/1
650 SUPPOSITORY RECTAL
Status: CANCELLED | OUTPATIENT
Start: 2022-01-01

## 2022-01-01 RX ORDER — BUMETANIDE 1 MG/1
1 TABLET ORAL 2 TIMES DAILY
COMMUNITY
Start: 2021-01-01 | End: 2022-01-01

## 2022-01-01 RX ORDER — HEPARIN SODIUM 200 [USP'U]/100ML
1000-8000 INJECTION, SOLUTION INTRAVENOUS
Status: DISCONTINUED | OUTPATIENT
Start: 2022-01-01 | End: 2022-01-01

## 2022-01-01 RX ADMIN — FENOFIBRATE 160 MG: 160 TABLET, FILM COATED ORAL at 09:38

## 2022-01-01 RX ADMIN — CEFTAROLINE FOSAMIL 600 MG: 600 POWDER, FOR SOLUTION INTRAVENOUS at 17:05

## 2022-01-01 RX ADMIN — SODIUM CHLORIDE: 900 IRRIGANT IRRIGATION at 06:03

## 2022-01-01 RX ADMIN — ROPINIROLE HYDROCHLORIDE 2 MG: 1 TABLET, FILM COATED ORAL at 21:15

## 2022-01-01 RX ADMIN — MULTIPLE VITAMINS W/ MINERALS TAB 1 TABLET: TAB at 08:14

## 2022-01-01 RX ADMIN — POLYETHYLENE GLYCOL 3350 17 G: 17 POWDER, FOR SOLUTION ORAL at 08:22

## 2022-01-01 RX ADMIN — PANTOPRAZOLE SODIUM 40 MG: 40 TABLET, DELAYED RELEASE ORAL at 07:47

## 2022-01-01 RX ADMIN — TRAZODONE HYDROCHLORIDE 50 MG: 50 TABLET ORAL at 21:34

## 2022-01-01 RX ADMIN — LEVOFLOXACIN 500 MG: 5 INJECTION, SOLUTION INTRAVENOUS at 11:07

## 2022-01-01 RX ADMIN — MIDODRINE HYDROCHLORIDE 5 MG: 5 TABLET ORAL at 11:21

## 2022-01-01 RX ADMIN — MORPHINE SULFATE 4 MG: 4 INJECTION INTRAVENOUS at 14:31

## 2022-01-01 RX ADMIN — ATORVASTATIN CALCIUM 40 MG: 40 TABLET, FILM COATED ORAL at 21:44

## 2022-01-01 RX ADMIN — ATORVASTATIN CALCIUM 40 MG: 40 TABLET, FILM COATED ORAL at 22:17

## 2022-01-01 RX ADMIN — MONTELUKAST 10 MG: 10 TABLET, FILM COATED ORAL at 10:28

## 2022-01-01 RX ADMIN — Medication 5 MG: at 21:38

## 2022-01-01 RX ADMIN — PERFLUTREN 1 ML: 6.52 INJECTION, SUSPENSION INTRAVENOUS at 16:03

## 2022-01-01 RX ADMIN — ROPINIROLE HYDROCHLORIDE 2 MG: 2 TABLET, FILM COATED ORAL at 08:39

## 2022-01-01 RX ADMIN — SODIUM CHLORIDE, PRESERVATIVE FREE 10 ML: 5 INJECTION INTRAVENOUS at 16:09

## 2022-01-01 RX ADMIN — PERFLUTREN 1 ML: 6.52 INJECTION, SUSPENSION INTRAVENOUS at 09:22

## 2022-01-01 RX ADMIN — SODIUM CHLORIDE, PRESERVATIVE FREE 10 ML: 5 INJECTION INTRAVENOUS at 14:26

## 2022-01-01 RX ADMIN — Medication 5 MG: at 22:17

## 2022-01-01 RX ADMIN — MORPHINE SULFATE 4 MG: 4 INJECTION INTRAVENOUS at 12:13

## 2022-01-01 RX ADMIN — PANTOPRAZOLE SODIUM 40 MG: 40 TABLET, DELAYED RELEASE ORAL at 06:13

## 2022-01-01 RX ADMIN — SODIUM CHLORIDE, PRESERVATIVE FREE 10 ML: 5 INJECTION INTRAVENOUS at 13:33

## 2022-01-01 RX ADMIN — MIRTAZAPINE 7.5 MG: 15 TABLET, FILM COATED ORAL at 21:16

## 2022-01-01 RX ADMIN — SODIUM CHLORIDE, PRESERVATIVE FREE 10 ML: 5 INJECTION INTRAVENOUS at 21:35

## 2022-01-01 RX ADMIN — CEFTRIAXONE 1 G: 1 INJECTION, POWDER, FOR SOLUTION INTRAMUSCULAR; INTRAVENOUS at 12:51

## 2022-01-01 RX ADMIN — VANCOMYCIN HYDROCHLORIDE 1250 MG: 10 INJECTION, POWDER, LYOPHILIZED, FOR SOLUTION INTRAVENOUS at 08:29

## 2022-01-01 RX ADMIN — MEROPENEM 500 MG: 500 INJECTION, POWDER, FOR SOLUTION INTRAVENOUS at 15:11

## 2022-01-01 RX ADMIN — FENOFIBRATE 160 MG: 160 TABLET ORAL at 09:10

## 2022-01-01 RX ADMIN — POTASSIUM BICARBONATE 40 MEQ: 782 TABLET, EFFERVESCENT ORAL at 08:00

## 2022-01-01 RX ADMIN — DAPTOMYCIN 750 MG: 500 INJECTION, POWDER, LYOPHILIZED, FOR SOLUTION INTRAVENOUS at 16:35

## 2022-01-01 RX ADMIN — SODIUM CHLORIDE 999 ML/HR: 900 INJECTION, SOLUTION INTRAVENOUS at 17:29

## 2022-01-01 RX ADMIN — ACETAMINOPHEN 650 MG: 325 TABLET ORAL at 09:15

## 2022-01-01 RX ADMIN — POTASSIUM CHLORIDE 20 MEQ: 14.9 INJECTION, SOLUTION INTRAVENOUS at 16:55

## 2022-01-01 RX ADMIN — ATORVASTATIN CALCIUM 40 MG: 40 TABLET, FILM COATED ORAL at 21:08

## 2022-01-01 RX ADMIN — LIDOCAINE HYDROCHLORIDE 6 ML: 20 INJECTION, SOLUTION INFILTRATION; PERINEURAL at 10:47

## 2022-01-01 RX ADMIN — SODIUM CHLORIDE, PRESERVATIVE FREE 10 ML: 5 INJECTION INTRAVENOUS at 14:44

## 2022-01-01 RX ADMIN — SODIUM CHLORIDE: 900 IRRIGANT IRRIGATION at 08:00

## 2022-01-01 RX ADMIN — MEROPENEM 500 MG: 500 INJECTION, POWDER, FOR SOLUTION INTRAVENOUS at 21:15

## 2022-01-01 RX ADMIN — OXYCODONE 5 MG: 5 TABLET ORAL at 09:20

## 2022-01-01 RX ADMIN — MORPHINE SULFATE 4 MG: 4 INJECTION INTRAVENOUS at 19:45

## 2022-01-01 RX ADMIN — SODIUM CHLORIDE: 900 IRRIGANT IRRIGATION at 01:47

## 2022-01-01 RX ADMIN — ROPINIROLE HYDROCHLORIDE 2 MG: 2 TABLET, FILM COATED ORAL at 16:15

## 2022-01-01 RX ADMIN — SODIUM CHLORIDE: 900 IRRIGANT IRRIGATION at 00:23

## 2022-01-01 RX ADMIN — DEXTROSE MONOHYDRATE AND SODIUM CHLORIDE 75 ML/HR: 5; .9 INJECTION, SOLUTION INTRAVENOUS at 23:08

## 2022-01-01 RX ADMIN — DEXTROSE MONOHYDRATE AND SODIUM CHLORIDE 75 ML/HR: 5; .9 INJECTION, SOLUTION INTRAVENOUS at 14:25

## 2022-01-01 RX ADMIN — SODIUM CHLORIDE 250 ML: 900 INJECTION, SOLUTION INTRAVENOUS at 05:33

## 2022-01-01 RX ADMIN — MIDODRINE HYDROCHLORIDE 10 MG: 5 TABLET ORAL at 17:05

## 2022-01-01 RX ADMIN — OXYCODONE 10 MG: 5 TABLET ORAL at 17:07

## 2022-01-01 RX ADMIN — SODIUM CHLORIDE: 900 IRRIGANT IRRIGATION at 22:10

## 2022-01-01 RX ADMIN — SODIUM CHLORIDE 75 ML/HR: 900 INJECTION, SOLUTION INTRAVENOUS at 15:49

## 2022-01-01 RX ADMIN — OXYCODONE 5 MG: 5 TABLET ORAL at 02:09

## 2022-01-01 RX ADMIN — FENOFIBRATE 160 MG: 160 TABLET ORAL at 08:14

## 2022-01-01 RX ADMIN — MONTELUKAST 10 MG: 10 TABLET, FILM COATED ORAL at 08:47

## 2022-01-01 RX ADMIN — SODIUM CHLORIDE, PRESERVATIVE FREE 10 ML: 5 INJECTION INTRAVENOUS at 13:56

## 2022-01-01 RX ADMIN — SODIUM CHLORIDE, PRESERVATIVE FREE 20 ML: 5 INJECTION INTRAVENOUS at 14:00

## 2022-01-01 RX ADMIN — MORPHINE SULFATE 4 MG: 4 INJECTION INTRAVENOUS at 00:13

## 2022-01-01 RX ADMIN — ATORVASTATIN CALCIUM 40 MG: 40 TABLET, FILM COATED ORAL at 21:38

## 2022-01-01 RX ADMIN — SODIUM CHLORIDE, PRESERVATIVE FREE 10 ML: 5 INJECTION INTRAVENOUS at 05:00

## 2022-01-01 RX ADMIN — KETOROLAC TROMETHAMINE 15 MG: 30 INJECTION, SOLUTION INTRAMUSCULAR at 19:18

## 2022-01-01 RX ADMIN — SODIUM CHLORIDE 75 ML/HR: 900 INJECTION, SOLUTION INTRAVENOUS at 04:50

## 2022-01-01 RX ADMIN — CEFTAROLINE FOSAMIL 600 MG: 600 POWDER, FOR SOLUTION INTRAVENOUS at 09:06

## 2022-01-01 RX ADMIN — SODIUM CHLORIDE: 900 IRRIGANT IRRIGATION at 11:41

## 2022-01-01 RX ADMIN — OXYCODONE 5 MG: 5 TABLET ORAL at 08:47

## 2022-01-01 RX ADMIN — SODIUM CHLORIDE 25 ML/HR: 900 INJECTION, SOLUTION INTRAVENOUS at 11:58

## 2022-01-01 RX ADMIN — SODIUM CHLORIDE, PRESERVATIVE FREE 10 ML: 5 INJECTION INTRAVENOUS at 21:27

## 2022-01-01 RX ADMIN — FOLIC ACID 1 MG: 1 TABLET ORAL at 08:00

## 2022-01-01 RX ADMIN — FENOFIBRATE 160 MG: 160 TABLET, FILM COATED ORAL at 09:11

## 2022-01-01 RX ADMIN — SODIUM CHLORIDE, PRESERVATIVE FREE 10 ML: 5 INJECTION INTRAVENOUS at 05:35

## 2022-01-01 RX ADMIN — Medication 0.5 MCG/MIN: at 13:05

## 2022-01-01 RX ADMIN — FOLIC ACID 1 MG: 1 TABLET ORAL at 08:47

## 2022-01-01 RX ADMIN — SODIUM CHLORIDE, PRESERVATIVE FREE 10 ML: 5 INJECTION INTRAVENOUS at 05:27

## 2022-01-01 RX ADMIN — Medication 1 AMPULE: at 21:24

## 2022-01-01 RX ADMIN — TRAMADOL HYDROCHLORIDE 50 MG: 50 TABLET, COATED ORAL at 00:28

## 2022-01-01 RX ADMIN — MEROPENEM 500 MG: 500 INJECTION, POWDER, FOR SOLUTION INTRAVENOUS at 03:13

## 2022-01-01 RX ADMIN — MIDAZOLAM 1 MG: 1 INJECTION INTRAMUSCULAR; INTRAVENOUS at 10:39

## 2022-01-01 RX ADMIN — ROPINIROLE HYDROCHLORIDE 2 MG: 2 TABLET, FILM COATED ORAL at 08:15

## 2022-01-01 RX ADMIN — ACETAMINOPHEN 650 MG: 325 TABLET ORAL at 10:51

## 2022-01-01 RX ADMIN — ATROPA BELLADONNA AND OPIUM 1 SUPPOSITORY: 16.2; 6 SUPPOSITORY RECTAL at 01:59

## 2022-01-01 RX ADMIN — MIDODRINE HYDROCHLORIDE 5 MG: 5 TABLET ORAL at 09:06

## 2022-01-01 RX ADMIN — CEFTAROLINE FOSAMIL 600 MG: 600 POWDER, FOR SOLUTION INTRAVENOUS at 16:25

## 2022-01-01 RX ADMIN — ROPINIROLE HYDROCHLORIDE 1 MG: 1 TABLET, FILM COATED ORAL at 08:00

## 2022-01-01 RX ADMIN — ENOXAPARIN SODIUM 40 MG: 40 INJECTION SUBCUTANEOUS at 08:00

## 2022-01-01 RX ADMIN — SODIUM CHLORIDE, PRESERVATIVE FREE 10 ML: 5 INJECTION INTRAVENOUS at 22:18

## 2022-01-01 RX ADMIN — ATORVASTATIN CALCIUM 40 MG: 40 TABLET, FILM COATED ORAL at 21:34

## 2022-01-01 RX ADMIN — MONTELUKAST 10 MG: 10 TABLET, FILM COATED ORAL at 08:22

## 2022-01-01 RX ADMIN — MORPHINE SULFATE 4 MG: 4 INJECTION INTRAVENOUS at 07:47

## 2022-01-01 RX ADMIN — VANCOMYCIN HYDROCHLORIDE 1000 MG: 1 INJECTION, POWDER, LYOPHILIZED, FOR SOLUTION INTRAVENOUS at 21:26

## 2022-01-01 RX ADMIN — SODIUM CHLORIDE: 900 IRRIGANT IRRIGATION at 05:44

## 2022-01-01 RX ADMIN — TRAZODONE HYDROCHLORIDE 50 MG: 50 TABLET ORAL at 21:38

## 2022-01-01 RX ADMIN — ROPINIROLE HYDROCHLORIDE 2 MG: 2 TABLET, FILM COATED ORAL at 21:16

## 2022-01-01 RX ADMIN — TRAZODONE HYDROCHLORIDE 50 MG: 50 TABLET ORAL at 21:08

## 2022-01-01 RX ADMIN — ALBUMIN (HUMAN) 25 G: 0.25 INJECTION, SOLUTION INTRAVENOUS at 23:56

## 2022-01-01 RX ADMIN — MIDODRINE HYDROCHLORIDE 5 MG: 5 TABLET ORAL at 16:51

## 2022-01-01 RX ADMIN — FENOFIBRATE 160 MG: 160 TABLET ORAL at 09:06

## 2022-01-01 RX ADMIN — MULTIPLE VITAMINS W/ MINERALS TAB 1 TABLET: TAB at 09:10

## 2022-01-01 RX ADMIN — MIDODRINE HYDROCHLORIDE 5 MG: 5 TABLET ORAL at 08:47

## 2022-01-01 RX ADMIN — SODIUM CHLORIDE, PRESERVATIVE FREE 10 ML: 5 INJECTION INTRAVENOUS at 14:00

## 2022-01-01 RX ADMIN — PANTOPRAZOLE SODIUM 40 MG: 40 TABLET, DELAYED RELEASE ORAL at 05:02

## 2022-01-01 RX ADMIN — PANTOPRAZOLE SODIUM 40 MG: 40 TABLET, DELAYED RELEASE ORAL at 09:11

## 2022-01-01 RX ADMIN — FOLIC ACID 1 MG: 1 TABLET ORAL at 09:10

## 2022-01-01 RX ADMIN — TRAMADOL HYDROCHLORIDE 50 MG: 50 TABLET, COATED ORAL at 08:16

## 2022-01-01 RX ADMIN — Medication 1 EACH: at 02:18

## 2022-01-01 RX ADMIN — MORPHINE SULFATE 4 MG: 4 INJECTION INTRAVENOUS at 13:05

## 2022-01-01 RX ADMIN — ATROPA BELLADONNA AND OPIUM 1 SUPPOSITORY: 16.2; 6 SUPPOSITORY RECTAL at 11:46

## 2022-01-01 RX ADMIN — DEXTROSE AND SODIUM CHLORIDE 100 ML/HR: 5; 450 INJECTION, SOLUTION INTRAVENOUS at 12:13

## 2022-01-01 RX ADMIN — DEXTROSE MONOHYDRATE AND SODIUM CHLORIDE 75 ML/HR: 5; .9 INJECTION, SOLUTION INTRAVENOUS at 08:19

## 2022-01-01 RX ADMIN — CEFTAROLINE FOSAMIL 600 MG: 600 POWDER, FOR SOLUTION INTRAVENOUS at 10:27

## 2022-01-01 RX ADMIN — SODIUM CHLORIDE 25 ML/HR: 900 INJECTION, SOLUTION INTRAVENOUS at 10:25

## 2022-01-01 RX ADMIN — PANTOPRAZOLE SODIUM 40 MG: 40 TABLET, DELAYED RELEASE ORAL at 05:18

## 2022-01-01 RX ADMIN — MIDODRINE HYDROCHLORIDE 5 MG: 5 TABLET ORAL at 07:59

## 2022-01-01 RX ADMIN — Medication 500 MG: at 09:38

## 2022-01-01 RX ADMIN — FOLIC ACID 1 MG: 1 TABLET ORAL at 08:14

## 2022-01-01 RX ADMIN — MEROPENEM 500 MG: 500 INJECTION, POWDER, FOR SOLUTION INTRAVENOUS at 13:55

## 2022-01-01 RX ADMIN — SODIUM CHLORIDE, PRESERVATIVE FREE 10 ML: 5 INJECTION INTRAVENOUS at 21:08

## 2022-01-01 RX ADMIN — TRAMADOL HYDROCHLORIDE 50 MG: 50 TABLET, COATED ORAL at 14:25

## 2022-01-01 RX ADMIN — FOLIC ACID 1 MG: 1 TABLET ORAL at 08:22

## 2022-01-01 RX ADMIN — POTASSIUM CHLORIDE 20 MEQ: 14.9 INJECTION, SOLUTION INTRAVENOUS at 15:16

## 2022-01-01 RX ADMIN — Medication 500 MG: at 08:39

## 2022-01-01 RX ADMIN — MORPHINE SULFATE 4 MG: 4 INJECTION INTRAVENOUS at 02:17

## 2022-01-01 RX ADMIN — Medication 1 AMPULE: at 21:16

## 2022-01-01 RX ADMIN — SODIUM CHLORIDE, PRESERVATIVE FREE 10 ML: 5 INJECTION INTRAVENOUS at 21:05

## 2022-01-01 RX ADMIN — ROPINIROLE HYDROCHLORIDE 1 MG: 1 TABLET, FILM COATED ORAL at 08:22

## 2022-01-01 RX ADMIN — FENOFIBRATE 160 MG: 160 TABLET ORAL at 08:00

## 2022-01-01 RX ADMIN — MIDODRINE HYDROCHLORIDE 5 MG: 5 TABLET ORAL at 16:26

## 2022-01-01 RX ADMIN — Medication 1 AMPULE: at 10:27

## 2022-01-01 RX ADMIN — TRAZODONE HYDROCHLORIDE 50 MG: 50 TABLET ORAL at 22:15

## 2022-01-01 RX ADMIN — PANTOPRAZOLE SODIUM 40 MG: 40 TABLET, DELAYED RELEASE ORAL at 05:27

## 2022-01-01 RX ADMIN — ACETAMINOPHEN 650 MG: 325 TABLET ORAL at 01:03

## 2022-01-01 RX ADMIN — MULTIPLE VITAMINS W/ MINERALS TAB 1 TABLET: TAB at 08:22

## 2022-01-01 RX ADMIN — MIDAZOLAM 1 MG: 1 INJECTION INTRAMUSCULAR; INTRAVENOUS at 11:48

## 2022-01-01 RX ADMIN — SODIUM CHLORIDE 1000 ML: 900 INJECTION, SOLUTION INTRAVENOUS at 16:50

## 2022-01-01 RX ADMIN — SODIUM CHLORIDE, PRESERVATIVE FREE 10 ML: 5 INJECTION INTRAVENOUS at 15:10

## 2022-01-01 RX ADMIN — MORPHINE SULFATE 4 MG: 4 INJECTION INTRAVENOUS at 17:05

## 2022-01-01 RX ADMIN — OXYCODONE 5 MG: 5 TABLET ORAL at 21:33

## 2022-01-01 RX ADMIN — SODIUM CHLORIDE: 900 IRRIGANT IRRIGATION at 20:09

## 2022-01-01 RX ADMIN — OXYCODONE 5 MG: 5 TABLET ORAL at 16:25

## 2022-01-01 RX ADMIN — ACETAMINOPHEN 650 MG: 325 TABLET, FILM COATED ORAL at 12:05

## 2022-01-01 RX ADMIN — FLUCONAZOLE 200 MG: 100 TABLET ORAL at 09:20

## 2022-01-01 RX ADMIN — SODIUM CHLORIDE, PRESERVATIVE FREE 10 ML: 5 INJECTION INTRAVENOUS at 21:20

## 2022-01-01 RX ADMIN — FENTANYL CITRATE 50 MCG: 50 INJECTION, SOLUTION INTRAMUSCULAR; INTRAVENOUS at 10:52

## 2022-01-01 RX ADMIN — MIRTAZAPINE 7.5 MG: 15 TABLET, FILM COATED ORAL at 21:27

## 2022-01-01 RX ADMIN — TRAZODONE HYDROCHLORIDE 50 MG: 50 TABLET ORAL at 21:45

## 2022-01-01 RX ADMIN — FENOFIBRATE 160 MG: 160 TABLET, FILM COATED ORAL at 12:08

## 2022-01-01 RX ADMIN — MORPHINE SULFATE 4 MG: 4 INJECTION INTRAVENOUS at 01:21

## 2022-01-01 RX ADMIN — SODIUM CHLORIDE, PRESERVATIVE FREE 10 ML: 5 INJECTION INTRAVENOUS at 06:13

## 2022-01-01 RX ADMIN — LIDOCAINE HYDROCHLORIDE 100 MG: 20 INJECTION, SOLUTION INFILTRATION; PERINEURAL at 12:07

## 2022-01-01 RX ADMIN — FENOFIBRATE 160 MG: 160 TABLET, FILM COATED ORAL at 08:37

## 2022-01-01 RX ADMIN — SODIUM CHLORIDE 100 ML/HR: 900 INJECTION, SOLUTION INTRAVENOUS at 18:50

## 2022-01-01 RX ADMIN — IOPAMIDOL 100 ML: 755 INJECTION, SOLUTION INTRAVENOUS at 10:24

## 2022-01-01 RX ADMIN — VANCOMYCIN HYDROCHLORIDE 1250 MG: 10 INJECTION, POWDER, LYOPHILIZED, FOR SOLUTION INTRAVENOUS at 20:38

## 2022-01-01 RX ADMIN — SODIUM CHLORIDE: 900 IRRIGANT IRRIGATION at 23:38

## 2022-01-01 RX ADMIN — TRAMADOL HYDROCHLORIDE 50 MG: 50 TABLET, COATED ORAL at 13:52

## 2022-01-01 RX ADMIN — CEFTRIAXONE 1 G: 1 INJECTION, POWDER, FOR SOLUTION INTRAMUSCULAR; INTRAVENOUS at 17:21

## 2022-01-01 RX ADMIN — SODIUM CHLORIDE 1000 ML: 900 INJECTION, SOLUTION INTRAVENOUS at 19:18

## 2022-01-01 RX ADMIN — OXYCODONE 5 MG: 5 TABLET ORAL at 09:32

## 2022-01-01 RX ADMIN — DAPTOMYCIN 750 MG: 500 INJECTION, POWDER, LYOPHILIZED, FOR SOLUTION INTRAVENOUS at 16:29

## 2022-01-01 RX ADMIN — IOPAMIDOL 35 ML: 612 INJECTION, SOLUTION INTRAVENOUS at 10:57

## 2022-01-01 RX ADMIN — SIMETHICONE 80 MG: 80 TABLET, CHEWABLE ORAL at 11:58

## 2022-01-01 RX ADMIN — POLYETHYLENE GLYCOL 3350 17 G: 17 POWDER, FOR SOLUTION ORAL at 08:20

## 2022-01-01 RX ADMIN — POLYETHYLENE GLYCOL 3350 17 G: 17 POWDER, FOR SOLUTION ORAL at 08:47

## 2022-01-01 RX ADMIN — SODIUM CHLORIDE: 900 IRRIGANT IRRIGATION at 02:10

## 2022-01-01 RX ADMIN — MORPHINE SULFATE 4 MG: 4 INJECTION INTRAVENOUS at 05:39

## 2022-01-01 RX ADMIN — MAGNESIUM SULFATE HEPTAHYDRATE 2 G: 40 INJECTION, SOLUTION INTRAVENOUS at 07:54

## 2022-01-01 RX ADMIN — POLYETHYLENE GLYCOL 3350 17 G: 17 POWDER, FOR SOLUTION ORAL at 09:07

## 2022-01-01 RX ADMIN — MIDODRINE HYDROCHLORIDE 10 MG: 5 TABLET ORAL at 07:59

## 2022-01-01 RX ADMIN — ROPINIROLE HYDROCHLORIDE 1 MG: 0.5 TABLET, FILM COATED ORAL at 08:47

## 2022-01-01 RX ADMIN — SODIUM CHLORIDE 1000 ML: 900 INJECTION, SOLUTION INTRAVENOUS at 15:18

## 2022-01-01 RX ADMIN — SODIUM CHLORIDE, PRESERVATIVE FREE 10 ML: 5 INJECTION INTRAVENOUS at 05:57

## 2022-01-01 RX ADMIN — TRAZODONE HYDROCHLORIDE 50 MG: 50 TABLET ORAL at 21:15

## 2022-01-01 RX ADMIN — ATORVASTATIN CALCIUM 40 MG: 40 TABLET, FILM COATED ORAL at 21:15

## 2022-01-01 RX ADMIN — SODIUM CHLORIDE, PRESERVATIVE FREE 10 ML: 5 INJECTION INTRAVENOUS at 05:37

## 2022-01-01 RX ADMIN — OXYCODONE 5 MG: 5 TABLET ORAL at 10:13

## 2022-01-01 RX ADMIN — Medication 10 ML: at 15:51

## 2022-01-01 RX ADMIN — MORPHINE SULFATE 4 MG: 4 INJECTION INTRAVENOUS at 13:04

## 2022-01-01 RX ADMIN — DEXTROSE MONOHYDRATE AND SODIUM CHLORIDE 75 ML/HR: 5; .9 INJECTION, SOLUTION INTRAVENOUS at 05:55

## 2022-01-01 RX ADMIN — SODIUM CHLORIDE, PRESERVATIVE FREE 10 ML: 5 INJECTION INTRAVENOUS at 05:36

## 2022-01-01 RX ADMIN — OXYCODONE 5 MG: 5 TABLET ORAL at 09:22

## 2022-01-01 RX ADMIN — Medication 5 MG: at 21:08

## 2022-01-01 RX ADMIN — SODIUM CHLORIDE, PRESERVATIVE FREE 10 ML: 5 INJECTION INTRAVENOUS at 06:42

## 2022-01-01 RX ADMIN — SODIUM CHLORIDE, PRESERVATIVE FREE 10 ML: 5 INJECTION INTRAVENOUS at 22:16

## 2022-01-01 RX ADMIN — MEROPENEM 500 MG: 500 INJECTION, POWDER, FOR SOLUTION INTRAVENOUS at 09:13

## 2022-01-01 RX ADMIN — MORPHINE SULFATE 4 MG: 4 INJECTION INTRAVENOUS at 20:52

## 2022-01-01 RX ADMIN — MIDODRINE HYDROCHLORIDE 5 MG: 5 TABLET ORAL at 10:28

## 2022-01-01 RX ADMIN — MEROPENEM 500 MG: 500 INJECTION, POWDER, FOR SOLUTION INTRAVENOUS at 15:30

## 2022-01-01 RX ADMIN — SODIUM CHLORIDE, PRESERVATIVE FREE 10 ML: 5 INJECTION INTRAVENOUS at 05:25

## 2022-01-01 RX ADMIN — Medication 5 MG: at 21:15

## 2022-01-01 RX ADMIN — HYDROMORPHONE HYDROCHLORIDE 1 MG: 2 INJECTION, SOLUTION INTRAMUSCULAR; INTRAVENOUS; SUBCUTANEOUS at 11:08

## 2022-01-01 RX ADMIN — VANCOMYCIN HYDROCHLORIDE 1000 MG: 1 INJECTION, POWDER, LYOPHILIZED, FOR SOLUTION INTRAVENOUS at 21:19

## 2022-01-01 RX ADMIN — MULTIPLE VITAMINS W/ MINERALS TAB 1 TABLET: TAB at 08:47

## 2022-01-01 RX ADMIN — MONTELUKAST 10 MG: 10 TABLET, FILM COATED ORAL at 08:00

## 2022-01-01 RX ADMIN — PANTOPRAZOLE SODIUM 40 MG: 40 TABLET, DELAYED RELEASE ORAL at 08:22

## 2022-01-01 RX ADMIN — VANCOMYCIN HYDROCHLORIDE 1500 MG: 10 INJECTION, POWDER, LYOPHILIZED, FOR SOLUTION INTRAVENOUS at 05:02

## 2022-01-01 RX ADMIN — MIDODRINE HYDROCHLORIDE 5 MG: 5 TABLET ORAL at 17:55

## 2022-01-01 RX ADMIN — MORPHINE SULFATE 4 MG: 4 INJECTION INTRAVENOUS at 05:17

## 2022-01-01 RX ADMIN — SODIUM CHLORIDE, PRESERVATIVE FREE 10 ML: 5 INJECTION INTRAVENOUS at 06:21

## 2022-01-01 RX ADMIN — Medication 10 ML: at 10:24

## 2022-01-01 RX ADMIN — MIDODRINE HYDROCHLORIDE 10 MG: 5 TABLET ORAL at 11:54

## 2022-01-01 RX ADMIN — SODIUM CHLORIDE 100 ML: 9 INJECTION, SOLUTION INTRAVENOUS at 10:25

## 2022-01-01 RX ADMIN — OXYCODONE 5 MG: 5 TABLET ORAL at 17:23

## 2022-01-01 RX ADMIN — MIDODRINE HYDROCHLORIDE 5 MG: 5 TABLET ORAL at 12:10

## 2022-01-01 RX ADMIN — DEXTROSE MONOHYDRATE AND SODIUM CHLORIDE 75 ML/HR: 5; .9 INJECTION, SOLUTION INTRAVENOUS at 21:22

## 2022-01-01 RX ADMIN — FENOFIBRATE 160 MG: 160 TABLET ORAL at 08:01

## 2022-01-01 RX ADMIN — HEPARIN SODIUM 6000 UNITS: 200 INJECTION, SOLUTION INTRAVENOUS at 12:16

## 2022-01-01 RX ADMIN — ROPINIROLE HYDROCHLORIDE 2 MG: 2 TABLET, FILM COATED ORAL at 21:24

## 2022-01-01 RX ADMIN — MORPHINE SULFATE 4 MG: 4 INJECTION INTRAVENOUS at 19:06

## 2022-01-01 RX ADMIN — MIDODRINE HYDROCHLORIDE 5 MG: 5 TABLET ORAL at 17:27

## 2022-01-01 RX ADMIN — PANTOPRAZOLE SODIUM 40 MG: 40 TABLET, DELAYED RELEASE ORAL at 05:11

## 2022-01-01 RX ADMIN — ATROPA BELLADONNA AND OPIUM 1 SUPPOSITORY: 16.2; 6 SUPPOSITORY RECTAL at 00:30

## 2022-01-01 RX ADMIN — TRAZODONE HYDROCHLORIDE 50 MG: 50 TABLET ORAL at 22:17

## 2022-01-01 RX ADMIN — SODIUM CHLORIDE, PRESERVATIVE FREE 10 ML: 5 INJECTION INTRAVENOUS at 21:58

## 2022-01-01 RX ADMIN — MORPHINE SULFATE 4 MG: 4 INJECTION INTRAVENOUS at 23:12

## 2022-01-01 RX ADMIN — PANTOPRAZOLE SODIUM 40 MG: 40 TABLET, DELAYED RELEASE ORAL at 05:37

## 2022-01-01 RX ADMIN — POTASSIUM CHLORIDE 40 MEQ: 20 TABLET, EXTENDED RELEASE ORAL at 11:57

## 2022-01-01 RX ADMIN — ROPINIROLE HYDROCHLORIDE 1 MG: 1 TABLET, FILM COATED ORAL at 09:10

## 2022-01-01 RX ADMIN — OXYCODONE 5 MG: 5 TABLET ORAL at 21:27

## 2022-01-01 RX ADMIN — SODIUM CHLORIDE, PRESERVATIVE FREE 10 ML: 5 INJECTION INTRAVENOUS at 21:45

## 2022-01-01 RX ADMIN — MEROPENEM 500 MG: 500 INJECTION, POWDER, FOR SOLUTION INTRAVENOUS at 09:16

## 2022-01-01 RX ADMIN — VANCOMYCIN HYDROCHLORIDE 2000 MG: 10 INJECTION, POWDER, LYOPHILIZED, FOR SOLUTION INTRAVENOUS at 05:02

## 2022-01-01 RX ADMIN — ATROPA BELLADONNA AND OPIUM 1 SUPPOSITORY: 16.2; 6 SUPPOSITORY RECTAL at 00:23

## 2022-01-01 RX ADMIN — Medication 1 AMPULE: at 08:47

## 2022-01-01 RX ADMIN — Medication 500 MG: at 08:15

## 2022-01-01 RX ADMIN — FENOFIBRATE 160 MG: 160 TABLET, FILM COATED ORAL at 08:15

## 2022-01-01 RX ADMIN — ATORVASTATIN CALCIUM 40 MG: 40 TABLET, FILM COATED ORAL at 22:16

## 2022-01-01 RX ADMIN — OXYCODONE 5 MG: 5 TABLET ORAL at 01:41

## 2022-01-01 RX ADMIN — SODIUM CHLORIDE 500 ML: 900 INJECTION, SOLUTION INTRAVENOUS at 20:56

## 2022-01-01 RX ADMIN — TUBERCULIN PURIFIED PROTEIN DERIVATIVE 5 UNITS: 5 INJECTION, SOLUTION INTRADERMAL at 06:03

## 2022-01-01 RX ADMIN — MEROPENEM 500 MG: 500 INJECTION, POWDER, FOR SOLUTION INTRAVENOUS at 08:30

## 2022-01-01 RX ADMIN — FENOFIBRATE 160 MG: 160 TABLET, FILM COATED ORAL at 09:00

## 2022-01-01 RX ADMIN — MONTELUKAST 10 MG: 10 TABLET, FILM COATED ORAL at 08:14

## 2022-01-01 RX ADMIN — MIDODRINE HYDROCHLORIDE 5 MG: 5 TABLET ORAL at 09:38

## 2022-01-01 RX ADMIN — POLYETHYLENE GLYCOL 3350 17 G: 17 POWDER, FOR SOLUTION ORAL at 08:00

## 2022-01-01 RX ADMIN — FENOFIBRATE 160 MG: 160 TABLET ORAL at 08:47

## 2022-01-01 RX ADMIN — OXYCODONE 5 MG: 5 TABLET ORAL at 18:14

## 2022-01-01 RX ADMIN — CEFTRIAXONE 1 G: 1 INJECTION, POWDER, FOR SOLUTION INTRAMUSCULAR; INTRAVENOUS at 12:09

## 2022-01-01 RX ADMIN — IOPAMIDOL 50 ML: 612 INJECTION, SOLUTION INTRAVENOUS at 12:15

## 2022-01-01 RX ADMIN — MIDODRINE HYDROCHLORIDE 5 MG: 5 TABLET ORAL at 09:10

## 2022-01-01 RX ADMIN — SODIUM CHLORIDE, PRESERVATIVE FREE 10 ML: 5 INJECTION INTRAVENOUS at 14:40

## 2022-01-01 RX ADMIN — SODIUM CHLORIDE, SODIUM LACTATE, POTASSIUM CHLORIDE, AND CALCIUM CHLORIDE 500 ML: 600; 310; 30; 20 INJECTION, SOLUTION INTRAVENOUS at 15:22

## 2022-01-01 RX ADMIN — MIDAZOLAM 1 MG: 1 INJECTION INTRAMUSCULAR; INTRAVENOUS at 10:52

## 2022-01-01 RX ADMIN — CEFTAROLINE FOSAMIL 600 MG: 600 POWDER, FOR SOLUTION INTRAVENOUS at 23:35

## 2022-01-01 RX ADMIN — DIPHENHYDRAMINE HYDROCHLORIDE 50 MG: 50 INJECTION, SOLUTION INTRAMUSCULAR; INTRAVENOUS at 10:39

## 2022-01-01 RX ADMIN — MULTIPLE VITAMINS W/ MINERALS TAB 1 TABLET: TAB at 09:06

## 2022-01-01 RX ADMIN — DAPTOMYCIN 750 MG: 500 INJECTION, POWDER, LYOPHILIZED, FOR SOLUTION INTRAVENOUS at 16:51

## 2022-01-01 RX ADMIN — ALBUMIN (HUMAN) 25 G: 0.25 INJECTION, SOLUTION INTRAVENOUS at 12:01

## 2022-01-01 RX ADMIN — Medication 5 MG: at 21:45

## 2022-01-01 RX ADMIN — DEXTROSE AND SODIUM CHLORIDE 50 ML/HR: 5; 450 INJECTION, SOLUTION INTRAVENOUS at 03:15

## 2022-01-01 RX ADMIN — PANTOPRAZOLE SODIUM 40 MG: 40 TABLET, DELAYED RELEASE ORAL at 05:35

## 2022-01-01 RX ADMIN — OXYCODONE 5 MG: 5 TABLET ORAL at 22:01

## 2022-01-01 RX ADMIN — ATORVASTATIN CALCIUM 40 MG: 40 TABLET, FILM COATED ORAL at 21:58

## 2022-01-01 RX ADMIN — ROPINIROLE HYDROCHLORIDE 2 MG: 1 TABLET, FILM COATED ORAL at 21:27

## 2022-01-01 RX ADMIN — CEFTAROLINE FOSAMIL 600 MG: 600 POWDER, FOR SOLUTION INTRAVENOUS at 00:06

## 2022-01-01 RX ADMIN — SODIUM CHLORIDE: 900 IRRIGANT IRRIGATION at 15:00

## 2022-01-01 RX ADMIN — FENTANYL CITRATE 50 MCG: 50 INJECTION, SOLUTION INTRAMUSCULAR; INTRAVENOUS at 10:39

## 2022-01-01 RX ADMIN — CEFTAROLINE FOSAMIL 600 MG: 600 POWDER, FOR SOLUTION INTRAVENOUS at 09:07

## 2022-01-01 RX ADMIN — FLUCONAZOLE 200 MG: 100 TABLET ORAL at 08:30

## 2022-01-01 RX ADMIN — MORPHINE SULFATE 4 MG: 4 INJECTION INTRAVENOUS at 12:44

## 2022-01-01 RX ADMIN — OXYCODONE 10 MG: 5 TABLET ORAL at 18:09

## 2022-01-01 RX ADMIN — SODIUM CHLORIDE, PRESERVATIVE FREE 10 ML: 5 INJECTION INTRAVENOUS at 21:15

## 2022-01-01 RX ADMIN — OXYCODONE 5 MG: 5 TABLET ORAL at 07:59

## 2022-01-01 RX ADMIN — PANTOPRAZOLE SODIUM 40 MG: 40 TABLET, DELAYED RELEASE ORAL at 05:13

## 2022-01-01 RX ADMIN — ROPINIROLE HYDROCHLORIDE 1 MG: 1 TABLET, FILM COATED ORAL at 07:58

## 2022-01-01 RX ADMIN — ROPINIROLE HYDROCHLORIDE 1 MG: 0.5 TABLET, FILM COATED ORAL at 09:06

## 2022-01-01 RX ADMIN — MIDODRINE HYDROCHLORIDE 5 MG: 5 TABLET ORAL at 10:26

## 2022-01-01 RX ADMIN — SODIUM CHLORIDE 100 ML/HR: 900 INJECTION, SOLUTION INTRAVENOUS at 05:33

## 2022-01-01 RX ADMIN — OXYCODONE 10 MG: 5 TABLET ORAL at 09:20

## 2022-01-01 RX ADMIN — MORPHINE SULFATE 4 MG: 4 INJECTION INTRAVENOUS at 07:41

## 2022-01-01 RX ADMIN — TRAZODONE HYDROCHLORIDE 50 MG: 50 TABLET ORAL at 21:24

## 2022-01-01 RX ADMIN — SODIUM CHLORIDE 1000 ML: 900 INJECTION, SOLUTION INTRAVENOUS at 14:11

## 2022-01-01 RX ADMIN — FLUCONAZOLE 200 MG: 100 TABLET ORAL at 09:11

## 2022-01-01 RX ADMIN — ROPINIROLE HYDROCHLORIDE 1 MG: 1 TABLET, FILM COATED ORAL at 08:14

## 2022-01-01 RX ADMIN — MIDODRINE HYDROCHLORIDE 5 MG: 5 TABLET ORAL at 17:07

## 2022-01-01 RX ADMIN — GADOTERIDOL 18 ML: 279.3 INJECTION, SOLUTION INTRAVENOUS at 15:51

## 2022-01-01 RX ADMIN — MONTELUKAST 10 MG: 10 TABLET, FILM COATED ORAL at 09:06

## 2022-01-01 RX ADMIN — ACETAMINOPHEN 650 MG: 325 TABLET ORAL at 06:26

## 2022-01-01 RX ADMIN — TRAMADOL HYDROCHLORIDE 50 MG: 50 TABLET, COATED ORAL at 19:57

## 2022-01-01 RX ADMIN — Medication 1 AMPULE: at 22:00

## 2022-01-01 RX ADMIN — Medication 500 MG: at 12:08

## 2022-01-01 RX ADMIN — DEXTROSE MONOHYDRATE AND SODIUM CHLORIDE 75 ML/HR: 5; .9 INJECTION, SOLUTION INTRAVENOUS at 05:35

## 2022-01-01 RX ADMIN — HEPARIN SODIUM 2000 UNITS: 200 INJECTION, SOLUTION INTRAVENOUS at 10:47

## 2022-01-01 RX ADMIN — ROPINIROLE HYDROCHLORIDE 2 MG: 1 TABLET, FILM COATED ORAL at 20:56

## 2022-01-01 RX ADMIN — MIDODRINE HYDROCHLORIDE 5 MG: 5 TABLET ORAL at 15:49

## 2022-01-01 RX ADMIN — ROPINIROLE HYDROCHLORIDE 1 MG: 0.5 TABLET, FILM COATED ORAL at 10:28

## 2022-01-01 RX ADMIN — OXYCODONE 5 MG: 5 TABLET ORAL at 12:34

## 2022-01-01 RX ADMIN — ROPINIROLE HYDROCHLORIDE 2 MG: 2 TABLET, FILM COATED ORAL at 09:38

## 2022-01-01 RX ADMIN — SODIUM CHLORIDE, PRESERVATIVE FREE 10 ML: 5 INJECTION INTRAVENOUS at 21:17

## 2022-01-01 RX ADMIN — Medication 0.5 MCG/MIN: at 01:00

## 2022-01-01 RX ADMIN — DEXTROSE MONOHYDRATE AND SODIUM CHLORIDE 100 ML/HR: 5; .9 INJECTION, SOLUTION INTRAVENOUS at 10:15

## 2022-01-01 RX ADMIN — SODIUM CHLORIDE, PRESERVATIVE FREE 10 ML: 5 INJECTION INTRAVENOUS at 21:34

## 2022-01-01 RX ADMIN — SODIUM CHLORIDE 75 ML/HR: 900 INJECTION, SOLUTION INTRAVENOUS at 20:42

## 2022-01-01 RX ADMIN — ATORVASTATIN CALCIUM 40 MG: 40 TABLET, FILM COATED ORAL at 21:18

## 2022-01-01 RX ADMIN — MEROPENEM 500 MG: 500 INJECTION, POWDER, FOR SOLUTION INTRAVENOUS at 00:35

## 2022-01-01 RX ADMIN — PANTOPRAZOLE SODIUM 40 MG: 40 TABLET, DELAYED RELEASE ORAL at 05:23

## 2022-01-01 RX ADMIN — ROPINIROLE HYDROCHLORIDE 2 MG: 2 TABLET, FILM COATED ORAL at 09:11

## 2022-01-01 RX ADMIN — SODIUM CHLORIDE, PRESERVATIVE FREE 10 ML: 5 INJECTION INTRAVENOUS at 14:30

## 2022-01-01 RX ADMIN — MORPHINE SULFATE 4 MG: 4 INJECTION INTRAVENOUS at 14:44

## 2022-01-01 RX ADMIN — OXYCODONE 5 MG: 5 TABLET ORAL at 21:13

## 2022-01-01 RX ADMIN — SODIUM CHLORIDE, PRESERVATIVE FREE 10 ML: 5 INJECTION INTRAVENOUS at 13:21

## 2022-01-01 RX ADMIN — MONTELUKAST 10 MG: 10 TABLET, FILM COATED ORAL at 09:11

## 2022-01-01 RX ADMIN — SODIUM CHLORIDE, PRESERVATIVE FREE 10 ML: 5 INJECTION INTRAVENOUS at 05:30

## 2022-01-01 RX ADMIN — DEXTROSE MONOHYDRATE 250 ML: 100 INJECTION, SOLUTION INTRAVENOUS at 04:21

## 2022-01-01 RX ADMIN — Medication 500 MG: at 12:52

## 2022-01-01 RX ADMIN — POTASSIUM CHLORIDE 40 MEQ: 750 TABLET, EXTENDED RELEASE ORAL at 14:29

## 2022-01-01 RX ADMIN — ATROPA BELLADONNA AND OPIUM 1 SUPPOSITORY: 16.2; 6 SUPPOSITORY RECTAL at 12:09

## 2022-01-01 RX ADMIN — CEFTAROLINE FOSAMIL 600 MG: 600 POWDER, FOR SOLUTION INTRAVENOUS at 23:32

## 2022-01-01 RX ADMIN — DEXTROSE MONOHYDRATE AND SODIUM CHLORIDE 100 ML/HR: 5; .9 INJECTION, SOLUTION INTRAVENOUS at 04:58

## 2022-01-01 RX ADMIN — POLYETHYLENE GLYCOL 3350 17 G: 17 POWDER, FOR SOLUTION ORAL at 10:27

## 2022-01-01 RX ADMIN — Medication 500 MG: at 09:11

## 2022-01-01 RX ADMIN — Medication 500 MG: at 08:30

## 2022-01-01 RX ADMIN — FENOFIBRATE 160 MG: 160 TABLET, FILM COATED ORAL at 12:52

## 2022-01-01 RX ADMIN — SODIUM CHLORIDE, PRESERVATIVE FREE 10 ML: 5 INJECTION INTRAVENOUS at 06:33

## 2022-01-01 RX ADMIN — SODIUM CHLORIDE: 900 IRRIGANT IRRIGATION at 10:53

## 2022-01-01 RX ADMIN — SODIUM CHLORIDE 75 ML/HR: 900 INJECTION, SOLUTION INTRAVENOUS at 15:47

## 2022-01-01 RX ADMIN — Medication 500 MG: at 08:37

## 2022-01-01 RX ADMIN — FENOFIBRATE 160 MG: 160 TABLET, FILM COATED ORAL at 09:20

## 2022-01-01 RX ADMIN — MORPHINE SULFATE 4 MG: 4 INJECTION INTRAVENOUS at 16:48

## 2022-01-01 RX ADMIN — MEROPENEM 500 MG: 500 INJECTION, POWDER, FOR SOLUTION INTRAVENOUS at 18:57

## 2022-01-01 RX ADMIN — Medication 500 MG: at 08:41

## 2022-01-01 RX ADMIN — MIDODRINE HYDROCHLORIDE 5 MG: 5 TABLET ORAL at 08:20

## 2022-01-01 RX ADMIN — SODIUM CHLORIDE, PRESERVATIVE FREE 10 ML: 5 INJECTION INTRAVENOUS at 13:05

## 2022-01-01 RX ADMIN — SODIUM CHLORIDE, PRESERVATIVE FREE 10 ML: 5 INJECTION INTRAVENOUS at 05:01

## 2022-01-01 RX ADMIN — ROPINIROLE HYDROCHLORIDE 2 MG: 1 TABLET, FILM COATED ORAL at 20:52

## 2022-01-01 RX ADMIN — ROPINIROLE HYDROCHLORIDE 2 MG: 2 TABLET, FILM COATED ORAL at 09:20

## 2022-01-01 RX ADMIN — PANTOPRAZOLE SODIUM 40 MG: 40 TABLET, DELAYED RELEASE ORAL at 12:52

## 2022-01-01 RX ADMIN — DEXTROSE MONOHYDRATE AND SODIUM CHLORIDE 75 ML/HR: 5; .9 INJECTION, SOLUTION INTRAVENOUS at 09:32

## 2022-01-01 RX ADMIN — OXYCODONE 5 MG: 5 TABLET ORAL at 16:26

## 2022-01-01 RX ADMIN — SODIUM CHLORIDE, SODIUM LACTATE, POTASSIUM CHLORIDE, AND CALCIUM CHLORIDE 500 ML: 600; 310; 30; 20 INJECTION, SOLUTION INTRAVENOUS at 19:09

## 2022-01-01 RX ADMIN — MORPHINE SULFATE 4 MG: 4 INJECTION INTRAVENOUS at 20:25

## 2022-01-01 RX ADMIN — SODIUM CHLORIDE: 900 IRRIGANT IRRIGATION at 02:26

## 2022-01-01 RX ADMIN — MIDODRINE HYDROCHLORIDE 5 MG: 5 TABLET ORAL at 12:13

## 2022-01-01 RX ADMIN — ATROPA BELLADONNA AND OPIUM 1 SUPPOSITORY: 16.2; 6 SUPPOSITORY RECTAL at 16:48

## 2022-01-01 RX ADMIN — PANTOPRAZOLE SODIUM 40 MG: 40 TABLET, DELAYED RELEASE ORAL at 06:42

## 2022-01-01 RX ADMIN — SODIUM CHLORIDE 75 ML/HR: 900 INJECTION, SOLUTION INTRAVENOUS at 05:40

## 2022-01-01 RX ADMIN — MULTIPLE VITAMINS W/ MINERALS TAB 1 TABLET: TAB at 08:00

## 2022-01-01 RX ADMIN — ROPINIROLE HYDROCHLORIDE 2 MG: 2 TABLET, FILM COATED ORAL at 08:31

## 2022-01-01 RX ADMIN — Medication 1 AMPULE: at 09:06

## 2022-01-01 RX ADMIN — FENOFIBRATE 160 MG: 160 TABLET, FILM COATED ORAL at 08:41

## 2022-01-01 RX ADMIN — POTASSIUM BICARBONATE 40 MEQ: 782 TABLET, EFFERVESCENT ORAL at 17:11

## 2022-01-01 RX ADMIN — OXYCODONE 5 MG: 5 TABLET ORAL at 23:32

## 2022-01-01 RX ADMIN — ALBUMIN (HUMAN) 25 G: 0.25 INJECTION, SOLUTION INTRAVENOUS at 16:00

## 2022-01-01 RX ADMIN — SODIUM CHLORIDE: 900 IRRIGANT IRRIGATION at 15:56

## 2022-01-01 RX ADMIN — MORPHINE SULFATE 4 MG: 4 INJECTION INTRAVENOUS at 04:22

## 2022-01-01 RX ADMIN — SODIUM CHLORIDE, PRESERVATIVE FREE 10 ML: 5 INJECTION INTRAVENOUS at 16:06

## 2022-01-01 RX ADMIN — FENTANYL CITRATE 25 MCG: 50 INJECTION, SOLUTION INTRAMUSCULAR; INTRAVENOUS at 12:10

## 2022-01-01 RX ADMIN — SODIUM CHLORIDE, PRESERVATIVE FREE 10 ML: 5 INJECTION INTRAVENOUS at 13:32

## 2022-01-01 RX ADMIN — FOLIC ACID 1 MG: 1 TABLET ORAL at 09:06

## 2022-01-01 RX ADMIN — SODIUM CHLORIDE, PRESERVATIVE FREE 10 ML: 5 INJECTION INTRAVENOUS at 21:16

## 2022-01-01 RX ADMIN — OXYCODONE 10 MG: 5 TABLET ORAL at 06:08

## 2022-01-01 RX ADMIN — MIDODRINE HYDROCHLORIDE 5 MG: 5 TABLET ORAL at 08:22

## 2022-01-01 RX ADMIN — SODIUM CHLORIDE, PRESERVATIVE FREE 10 ML: 5 INJECTION INTRAVENOUS at 21:25

## 2022-01-01 RX ADMIN — ATORVASTATIN CALCIUM 40 MG: 40 TABLET, FILM COATED ORAL at 21:26

## 2022-01-01 RX ADMIN — ROPINIROLE HYDROCHLORIDE 2 MG: 2 TABLET, FILM COATED ORAL at 08:41

## 2022-01-01 RX ADMIN — MAGNESIUM SULFATE HEPTAHYDRATE 2 G: 40 INJECTION, SOLUTION INTRAVENOUS at 04:23

## 2022-01-01 RX ADMIN — FENOFIBRATE 160 MG: 160 TABLET ORAL at 10:28

## 2022-01-01 RX ADMIN — OXYCODONE 5 MG: 5 TABLET ORAL at 03:59

## 2022-01-01 RX ADMIN — SODIUM CHLORIDE: 900 IRRIGANT IRRIGATION at 21:33

## 2022-01-01 RX ADMIN — ROPINIROLE HYDROCHLORIDE 2 MG: 2 TABLET, FILM COATED ORAL at 08:37

## 2022-01-01 RX ADMIN — MORPHINE SULFATE 4 MG: 4 INJECTION INTRAVENOUS at 12:10

## 2022-01-01 RX ADMIN — TUBERCULIN PURIFIED PROTEIN DERIVATIVE 5 UNITS: 5 INJECTION, SOLUTION INTRADERMAL at 11:47

## 2022-01-01 RX ADMIN — SODIUM CHLORIDE 100 ML/HR: 900 INJECTION, SOLUTION INTRAVENOUS at 16:02

## 2022-01-01 RX ADMIN — FENOFIBRATE 160 MG: 160 TABLET ORAL at 08:22

## 2022-01-01 RX ADMIN — MIRTAZAPINE 7.5 MG: 15 TABLET, FILM COATED ORAL at 21:17

## 2022-01-01 RX ADMIN — CEFTAROLINE FOSAMIL 600 MG: 600 POWDER, FOR SOLUTION INTRAVENOUS at 15:09

## 2022-01-01 RX ADMIN — MULTIPLE VITAMINS W/ MINERALS TAB 1 TABLET: TAB at 17:05

## 2022-01-01 RX ADMIN — SODIUM CHLORIDE, PRESERVATIVE FREE 10 ML: 5 INJECTION INTRAVENOUS at 06:15

## 2022-01-01 RX ADMIN — SODIUM CHLORIDE, PRESERVATIVE FREE 10 ML: 5 INJECTION INTRAVENOUS at 14:23

## 2022-01-01 RX ADMIN — MIDODRINE HYDROCHLORIDE 5 MG: 5 TABLET ORAL at 11:48

## 2022-01-01 RX ADMIN — Medication 5 MG: at 22:15

## 2022-01-01 RX ADMIN — DEXTROSE MONOHYDRATE AND SODIUM CHLORIDE 75 ML/HR: 5; .9 INJECTION, SOLUTION INTRAVENOUS at 16:06

## 2022-01-01 RX ADMIN — FENOFIBRATE 160 MG: 160 TABLET, FILM COATED ORAL at 08:39

## 2022-01-01 RX ADMIN — SIMETHICONE 80 MG: 80 TABLET, CHEWABLE ORAL at 11:24

## 2022-01-01 RX ADMIN — MIRTAZAPINE 7.5 MG: 15 TABLET, FILM COATED ORAL at 21:58

## 2022-01-01 RX ADMIN — MORPHINE SULFATE 4 MG: 4 INJECTION INTRAVENOUS at 00:47

## 2022-01-01 RX ADMIN — ALBUMIN (HUMAN) 25 G: 0.25 INJECTION, SOLUTION INTRAVENOUS at 06:02

## 2022-01-01 RX ADMIN — ATORVASTATIN CALCIUM 40 MG: 40 TABLET, FILM COATED ORAL at 22:40

## 2022-01-01 RX ADMIN — Medication 500 MG: at 09:20

## 2022-01-01 RX ADMIN — MEROPENEM 500 MG: 500 INJECTION, POWDER, FOR SOLUTION INTRAVENOUS at 22:10

## 2022-01-01 RX ADMIN — MORPHINE SULFATE 4 MG: 4 INJECTION INTRAVENOUS at 20:17

## 2022-01-01 RX ADMIN — SODIUM CHLORIDE, PRESERVATIVE FREE 10 ML: 5 INJECTION INTRAVENOUS at 13:08

## 2022-01-01 RX ADMIN — PANTOPRAZOLE SODIUM 40 MG: 40 TABLET, DELAYED RELEASE ORAL at 05:33

## 2022-01-01 RX ADMIN — DEXTROSE MONOHYDRATE AND SODIUM CHLORIDE 100 ML/HR: 5; .9 INJECTION, SOLUTION INTRAVENOUS at 19:27

## 2022-01-01 RX ADMIN — PANTOPRAZOLE SODIUM 40 MG: 40 TABLET, DELAYED RELEASE ORAL at 06:32

## 2022-01-01 RX ADMIN — FOLIC ACID 1 MG: 1 TABLET ORAL at 10:28

## 2022-01-01 RX ADMIN — MIRTAZAPINE 7.5 MG: 15 TABLET, FILM COATED ORAL at 21:24

## 2022-01-01 RX ADMIN — ROPINIROLE HYDROCHLORIDE 2 MG: 2 TABLET, FILM COATED ORAL at 21:59

## 2022-01-01 RX ADMIN — ACETAMINOPHEN 650 MG: 325 TABLET ORAL at 18:53

## 2022-01-01 RX ADMIN — Medication 5 MG: at 21:34

## 2022-01-01 RX ADMIN — OXYCODONE 5 MG: 5 TABLET ORAL at 05:23

## 2022-01-01 RX ADMIN — VANCOMYCIN HYDROCHLORIDE 1000 MG: 1 INJECTION, POWDER, LYOPHILIZED, FOR SOLUTION INTRAVENOUS at 08:15

## 2022-01-01 RX ADMIN — MORPHINE SULFATE 4 MG: 4 INJECTION INTRAVENOUS at 20:00

## 2022-01-01 RX ADMIN — SODIUM CHLORIDE, PRESERVATIVE FREE 10 ML: 5 INJECTION INTRAVENOUS at 22:00

## 2022-01-01 RX ADMIN — SODIUM CHLORIDE, PRESERVATIVE FREE 10 ML: 5 INJECTION INTRAVENOUS at 21:39

## 2022-01-01 RX ADMIN — OXYCODONE 5 MG: 5 TABLET ORAL at 08:36

## 2022-01-01 RX ADMIN — MEROPENEM 500 MG: 500 INJECTION, POWDER, FOR SOLUTION INTRAVENOUS at 03:33

## 2022-01-31 PROBLEM — R31.0 GROSS HEMATURIA: Status: ACTIVE | Noted: 2022-01-01

## 2022-01-31 PROBLEM — M89.9 DISORDER OF BONE AND CARTILAGE: Status: ACTIVE | Noted: 2019-10-01

## 2022-01-31 PROBLEM — E88.09 HYPOALBUMINEMIA DUE TO PROTEIN-CALORIE MALNUTRITION (HCC): Status: ACTIVE | Noted: 2022-01-01

## 2022-01-31 PROBLEM — U07.1 COVID-19: Status: ACTIVE | Noted: 2021-01-11

## 2022-01-31 PROBLEM — D62 ACUTE BLOOD LOSS ANEMIA (ABLA): Status: ACTIVE | Noted: 2022-01-01

## 2022-01-31 PROBLEM — E46 HYPOALBUMINEMIA DUE TO PROTEIN-CALORIE MALNUTRITION (HCC): Status: ACTIVE | Noted: 2022-01-01

## 2022-01-31 PROBLEM — R53.81 DEBILITY: Status: ACTIVE | Noted: 2022-01-01

## 2022-01-31 PROBLEM — N32.89 BLADDER MASS: Status: ACTIVE | Noted: 2022-01-01

## 2022-01-31 PROBLEM — L40.50 PSORIATIC ARTHRITIS (HCC): Status: ACTIVE | Noted: 2017-06-23

## 2022-01-31 PROBLEM — R60.0 BILATERAL LOWER EXTREMITY EDEMA: Status: ACTIVE | Noted: 2019-02-21

## 2022-01-31 PROBLEM — E27.49 HYPOCORTISOLISM (HCC): Status: ACTIVE | Noted: 2022-01-01

## 2022-01-31 PROBLEM — M94.9 DISORDER OF BONE AND CARTILAGE: Status: ACTIVE | Noted: 2019-10-01

## 2022-01-31 PROBLEM — R53.2 FUNCTIONAL QUADRIPLEGIA (HCC): Status: ACTIVE | Noted: 2022-01-01

## 2022-01-31 PROBLEM — E16.2 HYPOGLYCEMIA: Status: ACTIVE | Noted: 2022-01-01

## 2022-01-31 PROBLEM — K74.60 HEPATIC CIRRHOSIS (HCC): Status: ACTIVE | Noted: 2021-01-01

## 2022-01-31 PROBLEM — D69.6 THROMBOCYTOPENIA DUE TO SEQUESTRATION (HCC): Status: ACTIVE | Noted: 2022-01-01

## 2022-01-31 PROBLEM — N30.40 CHRONIC RADIATION CYSTITIS: Status: ACTIVE | Noted: 2022-01-01

## 2022-01-31 PROBLEM — I50.30 (HFPEF) HEART FAILURE WITH PRESERVED EJECTION FRACTION (HCC): Status: ACTIVE | Noted: 2022-01-01

## 2022-01-31 NOTE — CONSULTS
Urology Consult    Patient: Melissa Aguirre MRN: [id number]  SSN: [id number]    YOB: 1945  Age: 67 y.o. Sex: male      Subjective:      Melissa Aguirre is a 67 y.o. male who presents with gross hematuria via 3-way jasso catheter and SP pain. He has been at rehab facility and he was hospitalized at [facility] prior to that. He is known to Dr. Kailey Fleming with our group. However at [facility],  he was seen by Dr. Starla Becker and underwent cysto/evacuation of clots on 1/21/22 and 1/25/22 d/t ongoing hematuria/clots. Path showed no malignancy, +inflammation/radiation cystitis. They consulted hyperbaric oxygen therapy and pt has not been evaluated yet. Per notes the pt indicated he wanted to be followed up by [facility] urology as outpatient; however he presents to our ER with gross hematuria. He currently has 24F 3 way jasso in with CBI at slow drip. Urine is red-light pink. Jasso manually irrigated with equal return of light pink urine that is clearing. Hgb 8.1. Cr 0.81. platelets 84. UA with 10-20 WBC, >100 RBC, trace bacteria. No urine culture obtained. He has hx of prostate cancer s/p RRP 5/2003 with BCR and s/p radiation in 5/2016. Has ongoing issues with radiation cystitis and hematuria. Also hx of high grade urothelial dysplasia. Surveillance cystos have since shown no recurrent disease. Most recent cysto on 7/2021 with no obvious recurrence.       Past Medical History:   Diagnosis Date    Adverse effect of anesthesia     reports \"slow to get over effects\" of anesthesia- drowsy    Allergic rhinitis     Anemia 2018    Back pain     Colon polyps     Diabetes ([address]     oral agents; rarely checks BS, reports 138 today; occasional hypo symptoms (shaky) unaware of hypo level    Elevated liver function tests     GERD (gastroesophageal reflux disease)     controlled with meds    H/O alcohol abuse     Hiatal hernia     Hypercholesterolemia     Hypertension     well controlled with meds Hypotestosteronism     Migraine     Personal history of prostate cancer     Restless leg     Sleep apnea     wears CPAP     Past Surgical History:   Procedure Laterality Date    HX COLONOSCOPY  ~2017    HX ORTHOPAEDIC Left 2017    post tibial tendon repair    HX OTHER SURGICAL Right 10/16/2018    ingrown toenail removal    HX PROSTATECTOMY        Family History   Problem Relation Age of Onset    Heart Disease Father     Emphysema Mother     Heart Disease Brother     Psychiatric Disorder Brother      Social History     Tobacco Use    Smoking status: Former Smoker     Types: Cigars     Quit date: 2003     Years since quittin.8    Smokeless tobacco: Never Used   Substance Use Topics    Alcohol use: No      Prior to Admission medications    Medication Sig Start Date End Date Taking? Authorizing Provider   brief disposable (ADULT) misc by Does Not Apply route. 20   Elias Chan MD   miscellaneous medical supply (BARD [place] INCONTIN CLAMP) misc Use as needed for incontinence 19   Elias Chan MD   nystatin (MYCOSTATIN) topical cream Apply  to affected area two (2) times a day. 18   Carlos Varela,    HYDROcodone-acetaminophen (NORCO) 5-325 mg per tablet Take 1 Tab by mouth every four (4) hours as needed for Pain. Max Daily Amount: 6 Tabs. 10/24/18   Aaron Leon,    doxepin HCl (DOXEPIN PO) Take 0.3 mL by mouth nightly. Provider, Historical   methotrexate 25 mg/mL chemo injection 20 mg by SubCUTAneous route Every Thursday. 18   Provider, Historical   triamcinolone acetonide (KENALOG) 0.1 % topical cream Apply topically 2 (two) times a day. 18   Provider, Historical   clindamycin (CLEOCIN) 300 mg capsule 300 mg three (3) times daily. 10/3/18   Provider, Historical   folic acid 314 mcg tablet Take 400 mcg by mouth daily.  Everyday except Thursday    Provider, Historical   traMADol (ULTRAM) 50 mg tablet Take 50 mg by mouth every six (6) hours as needed for Pain.    Other, MD Frederic   cinnamon bark 500 mg cap Take 500 mg by mouth daily. Provider, Historical   b complex vitamins tablet Take 1 Tab by mouth daily. Provider, Historical   fenofibrate (TRICOR) 160 mg tablet Take 160 mg by mouth daily. 1/19/15   Provider, Historical   fluticasone (FLONASE) 50 mcg/actuation nasal spray 2 Sprays by Both Nostrils route as needed. 2/9/15   Provider, Historical   losartan-hydrochlorothiazide (HYZAAR) 50-12.5 mg per tablet Take 1 Tab by mouth daily. 3/13/15   Provider, Historical   metFORMIN ER (GLUCOPHAGE XR) 500 mg tablet Take 500 mg by mouth daily (with dinner). 1/26/15   Provider, Historical   pantoprazole (PROTONIX) 40 mg tablet Take 40 mg by mouth daily. 2/9/15   Provider, Historical   rOPINIRole (REQUIP) 1 mg tablet Take 3 mg by mouth daily. One tab AM, two tabs PM 3/13/15   Provider, Historical   simvastatin (ZOCOR) 80 mg tablet Take 80 mg by mouth daily. 3/13/15   Provider, Historical   ascorbic acid, vitamin C, (VITAMIN C) 500 mg tablet Take 500 mg by mouth daily. Provider, Historical        Allergies   Allergen Reactions    Amoxicillin Rash    Lisinopril Cough     cough    Sulfa (Sulfonamide Antibiotics) Rash       Review of Systems:  A comprehensive review of systems was negative except for that written in the History of Present Illness.     Objective:     Vitals:    01/31/22 1445 01/31/22 1500 01/31/22 1515 01/31/22 1530   BP: (!) 108/55 108/63 105/63 102/61   Pulse: 72 74 75 75   Resp: 16 10 16 17   Temp:       SpO2: 97% 98% 98% 97%        Physical Exam:  GENERAL: alert, cooperative, no distress  EYE: PERRLA  THROAT & NECK: neck supple  LUNG: clear to auscultation bilaterally  HEART: regular rate and rhythm, S1, S2   ABDOMEN: soft, non-tender  EXTREMITIES:  extremities normal, atraumatic, no cyanosis or edema  SKIN: no rash or abnormalities  NEUROLOGIC: A&Ox3    Assessment:     Hospital Problems  Date Reviewed: 7/9/2021            Codes Class Noted SHERON Main hematuria ICD-10-CM: R31.0  ICD-9-CM: 599.71  1/31/2022 Unknown                Plan:     Hospitalist admitted pt. He is currently getting albumin. We will manage CBI. Continue 3 way jasso, continue CBI, titrate drip to keep clear and manually irrigate as needed. Urine culture added on. We will observe urine color overnight. He will be NPO at [place] in the event he needs cysto/evac of clots tomorrow. Currently urine is clearing nicely. Signed By: Mina Colón NP     January 31, 2022      Urine pink with CBI. I have reviewed the above note and examined the patient. I agree with the exam, assessment and plan.     Mac Herndon., MD

## 2022-01-31 NOTE — H&P
Hospitalist History and Physical   Admit Date:  2022 12:32 PM   Name:  Yoandy Younger   Age:  67 y.o. Sex:  male  :  1945   MRN:  [id number]   Room:  [facility]/    Presenting Complaint: Blood in Urine    Reason(s) for Admission: Gross hematuria [R31.0]     History of Present Illness:   Emerson Lozano is a 67 y.o. male with medical history of Cirrhosis, Prostate CA s/p RRP 2003 and radiation 2016, HFpEF, Radiation Cystitis, BENIGNO , L3 compression fracture who presented with worsening gross hematuria and suprapubic pain. Presented from rehab facility. Previously admitted at [facility] from 1/15-22 transferred there from OSH with hematuria and difficulty voiding w/ bladder mass on CT. During hospitalization at [facility], he suffered hypovolemic shock and acute encephalopathy. he was seen by Dr. Mariella Sandoval and underwent cysto/evacuation of clots on 22 and 22 d/t ongoing hematuria/clots. he was advised to follow up with [facility] urology OP but represents to ED with gross hematuria. Wife at bedside. He c/o of intermittent bladder spasms that radiate to lower back associated with burning sensation that self-resolves. Has not taken anything for it. Also c/o right shoulder pain, unable to lift arm since falling on right shoulder when he fell asleep while going to bathroom, notes hypersomnolence from BENIGNO. He rec'd 1 L NS in ED. Review of Systems:  10 systems reviewed and negative except as noted in HPI. Assessment & Plan:     Gross hematuria // Radiation cystitis - worsening despite recent cystoscopy. Urology consulted. CBI. Follow up urine culture. NPO @ midnight in the event he needs cysto/evac of clots tomorrow. ABLA - 2/2 above. Required 3 blood transfusions at [facility]. Repeat hemoglobin this evening. Transfuse <7 or hypotensive despite IVF. Chronic anemia - in setting of chronic blood loss. Check b12 and folate in AM. Replete as indicated.      BLE edema - 2/2 cirrhosis, hypoalbuminemia and chronic HFpEF. Rule out DVT. At risk due to prolonged immobility and underlying cirrhosis. (HFpEF) heart failure with preserved ejection fraction  TTE 9/21/21 EF 55-66%. G1DD. No in acute exacerbation. 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 GLU 60s. Trend GLU. Hypoglycemic protocol. H/o hypocorticalism with undiagnostic cosyntropin test at [facility]. Hepatic cirrhosis with splenomgeagly - suspected multifactorial etiology with history of AUD, quit drinking 40 years ago, obesity, MTX use. MELD 23. Has been unable to FU with GI d/t multiple acute issues. - albumin 25% q6h x 4 doses to maintain MAP >70       BENIGNO (obstructive sleep apnea) - CPAP nightly       Psoriatic arthritis ([facility] [address] (6/23/2017)    Previously on MTX for years, thought to contribute to cirrohosis       Functional quadriplegia - prolonged hospital course and remained bed bound. PT/OT.PPD. return to rehab on dispo. 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 Hypocortisolism - nondiagnostic cosyntropin test at [facility]. (14.7, 21.7 respectively)      Hypoglycemia - hypoglycemic protocol. FSBG 4xperday. Hypoalbuminemia due to protein-calorie malnutrition - minimal PO intake x ~1 month. At risk of nutritional deficiencies. L3 compression fracture - evaluated by ortho in the past, deemd nonoperable defect. H/o prostate CA - s/p radical prosectomy 2008. Bladder neck dilatation 2018. Multiple cystoscopy.          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 [facility]) ICD-10-CM: D69.6  ICD-9-CM: 287.5  1/31/2022 - Present Yes        Acute blood loss anemia (ABLA) ICD-10-CM: D62  ICD-9-CM: 285.1  1/31/2022 - Present Yes        Hypocortisolism ([facility] ICD-10-CM: E27.49  ICD-9-CM: 255.41  1/31/2022 - Present Yes        Hypoglycemia ICD-10-CM: E16.2  ICD-9-CM: 251.2  1/31/2022 - Present Yes        Hypoalbuminemia due to protein-calorie malnutrition ([facility] ICD-10-CM: E88.09, E46  ICD-9-CM: 273.8, 263.9  1/31/2022 - Present Yes        (HFpEF) heart failure with preserved ejection fraction ([facility] ICD-10-CM: I50.30  ICD-9-CM: 428.9  1/19/2022 - Present Yes        Hepatic cirrhosis ([facility] ICD-10-CM: K74.60  ICD-9-CM: 571.5  10/8/2021 - Present Yes    Overview Signed 1/31/2022  4:07 PM by Mamta Hassan DO     Last Assessment & Plan:   Formatting of this note might be different from the original.  Follow-up for edema and ascites  Avoid Tylenol  Check labs on Mondays             Psoriatic arthritis ([facility] ICD-10-CM: L40.50  ICD-9-CM: 696.0  6/23/2017 - Present Yes    Overview Signed 1/31/2022  4:07 PM by Mamta Hassan DO     Last Assessment & Plan:   Formatting of this note might be different from the original.  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  Blood sugars have been in good range without treatment  Very poor appetite  Continue supplements  Elevated BUN  Begin IV fluids today  Check BMP daily  Encourage oral fluids             GERD (gastroesophageal reflux disease) ICD-10-CM: K21.9  ICD-9-CM: 530.81  10/2/2015 - Present Yes    Overview Signed 1/31/2022  4:07 PM by Meagan Soler, DO     Last Assessment & Plan:   Formatting of this note might be different from the original.  Stable, continue current medications and treatment plan             BENIGNO (obstructive sleep apnea) ICD-10-CM: G47.33  ICD-9-CM: 327.23  10/2/2015 - Present Yes    Overview Signed 1/31/2022  4:07 PM by Meagan Soler, DO     Formatting of this note might be different from the original.  Dr. Urszula Coronel:   Formatting of this note might be different from the original.  Continue CPAP while sleeping                   Past History:  Past Medical History:   Diagnosis Date    Adverse effect of anesthesia     reports \"slow to get over effects\" of anesthesia- drowsy    Allergic rhinitis     Anemia 2018    Back pain     Colon polyps     Diabetes ([address]     oral agents; rarely checks BS, reports 138 today; occasional hypo symptoms (shaky) unaware of hypo level    Elevated liver function tests     GERD (gastroesophageal reflux disease)     controlled with meds    H/O alcohol abuse     Hiatal hernia     Hypercholesterolemia     Hypertension     well controlled with meds    Hypotestosteronism     Migraine     Personal history of prostate cancer     Restless leg     Sleep apnea     wears CPAP     Past Surgical History:   Procedure Laterality Date    HX COLONOSCOPY  ~2017    HX ORTHOPAEDIC Left 2017    post tibial tendon repair    HX OTHER SURGICAL Right 10/16/2018    ingrown toenail removal    HX PROSTATECTOMY  2003      Allergies   Allergen Reactions    Amoxicillin Rash    Lisinopril Cough     cough    Sulfa (Sulfonamide Antibiotics) Rash      Social History     Tobacco Use    Smoking status: Former Smoker     Types: Cigars     Quit date: 2003     Years since quittin.8    Smokeless tobacco: Never Used   Substance Use Topics    Alcohol use: No      Family History   Problem Relation Age of Onset    Heart Disease Father     Emphysema Mother     Heart Disease Brother     Psychiatric Disorder Brother       Family history reviewed and negative except as noted above. Immunization History   Administered Date(s) Administered    COVID-19, Pfizer Purple top, DILUTE for use, 12+ yrs, 30mcg/0.3mL dose 2021, 2021     Prior to Admit Medications:  Current Outpatient Medications   Medication Instructions    b complex vitamins tablet 1 Tablet, Oral, DAILY    brief disposable (ADULT) misc Does Not Apply    cinnamon bark 500 mg, Oral, DAILY    clindamycin (CLEOCIN) 300 mg, 3 TIMES DAILY    doxepin HCl (DOXEPIN PO) 0.3 mL, Oral, EVERY BEDTIME    fenofibrate (LOFIBRA) 160 mg, Oral, DAILY    fluticasone (FLONASE) 50 mcg/actuation nasal spray 2 Sprays, Both Nostrils, AS NEEDED    folic acid (FOLVITE) 347 mcg, Oral, DAILY, Everyday except Thursday     HYDROcodone-acetaminophen (NORCO) 5-325 mg per tablet 1 Tablet, Oral, EVERY 4 HOURS AS NEEDED    losartan-hydrochlorothiazide (HYZAAR) 50-12.5 mg per tablet 1 Tablet, Oral, DAILY    metFORMIN ER (GLUCOPHAGE XR) 500 mg, Oral, DAILY WITH DINNER    methotrexate 20 mg, SubCUTAneous, EVERY THURSDAY    miscellaneous medical supply ([facility] Use as needed for incontinence    nystatin (MYCOSTATIN) topical cream Topical, 2 TIMES DAILY    pantoprazole (PROTONIX) 40 mg, Oral, DAILY    rOPINIRole (REQUIP) 3 mg, Oral, DAILY, One tab AM, two tabs PM    simvastatin (ZOCOR) 80 mg, Oral, DAILY    traMADoL (ULTRAM) 50 mg, Oral, EVERY 6 HOURS AS NEEDED    triamcinolone acetonide (KENALOG) 0.1 % topical cream Apply topically 2 (two) times a day.     Vitamin C 500 mg, Oral, DAILY       Objective:     Patient Vitals for the past 24 hrs:   Temp Pulse Resp BP SpO2 01/31/22 1530  75 17 102/61 97 %   01/31/22 1515  75 16 105/63 98 %   01/31/22 1500  74 10 108/63 98 %   01/31/22 1445  72 16 (!) 108/55 97 %   01/31/22 1430  71 11 (!) 102/58 98 %   01/31/22 1415  77 15 (!) 110/58 98 %   01/31/22 1330  77 13 128/79 100 %   01/31/22 1315  78 17 (!) 97/59 98 %   01/31/22 1300  77 12 104/62 97 %   01/31/22 1245  77 12 (!) 99/59 96 %   01/31/22 1243 98.9 °F (37.2 °C) 76 17 100/66 97 %   01/31/22 1241  78 12  97 %   01/31/22 1238    100/66      Oxygen Therapy  O2 Sat (%): 97 % (01/31/22 1530)  Pulse via Oximetry: 75 beats per minute (01/31/22 1530)  O2 Device: None (Room air) (01/31/22 1243)    Estimated body mass index is 36.26 kg/m² as calculated from the following:    Height as of 9/1/20: 5' 11\" (1.803 m). Weight as of 9/1/20: 117.9 kg (260 lb). No intake or output data in the 24 hours ending 01/31/22 1610      Physical Exam:    Blood pressure 102/61, pulse 75, temperature 98.9 °F (37.2 °C), resp. rate 17, SpO2 97 %. Physical Exam  Vitals and nursing note reviewed. Constitutional:       Appearance: He is obese. He is ill-appearing. HENT:      Head: Normocephalic and atraumatic. Mouth/Throat:      Mouth: Mucous membranes are moist.   Cardiovascular:      Rate and Rhythm: Normal rate and regular rhythm. Comments: Diminished heart sounds   Pulmonary:      Effort: Pulmonary effort is normal.      Breath sounds: Rales present. Comments: Diminished breath sounds   Abdominal:      General: There is distension. Palpations: Abdomen is soft. Tenderness: There is no abdominal tenderness. Musculoskeletal:         General: Signs of injury present. Cervical back: Neck supple. Right lower leg: Edema present. Left lower leg: Edema present. Comments: 1-2+ pitting edema to lateral thigh region. Right shoulder tenderness with decreased ROM    Skin:     Coloration: Skin is pale. Skin is not jaundiced.       Findings: Bruising (RUE, abdomonen and LUE ) present. Comments: BLE hemosiderin staining    Neurological:      General: No focal deficit present. Mental Status: He is oriented to person, place, and time. Psychiatric:         Mood and Affect: Mood normal.         I have reviewed ordered lab tests and independently visualized imaging below:    Last 24hr Labs:  Recent Results (from the past 24 hour(s))   CBC WITH AUTOMATED DIFF    Collection Time: 01/31/22  2:15 PM   Result Value Ref Range    WBC 6.1 4.3 - 11.1 K/uL    RBC 2.67 (L) 4.23 - 5.6 M/uL    HGB 8.1 (L) 13.6 - 17.2 g/dL    HCT 24.4 (L) 41.1 - 50.3 %    MCV 91.4 79.6 - 97.8 FL    MCH 30.3 26.1 - 32.9 PG    MCHC 33.2 31.4 - 35.0 g/dL    RDW 15.9 (H) 11.9 - 14.6 %    PLATELET 84 (L) 172 - 450 K/uL    MPV 9.3 (L) 9.4 - 12.3 FL    ABSOLUTE NRBC 0.00 0.0 - 0.2 K/uL    DF AUTOMATED      NEUTROPHILS 72 43 - 78 %    LYMPHOCYTES 14 13 - 44 %    MONOCYTES 8 4.0 - 12.0 %    EOSINOPHILS 4 0.5 - 7.8 %    BASOPHILS 1 0.0 - 2.0 %    IMMATURE GRANULOCYTES 1 0.0 - 5.0 %    ABS. NEUTROPHILS 4.4 1.7 - 8.2 K/UL    ABS. LYMPHOCYTES 0.9 0.5 - 4.6 K/UL    ABS. MONOCYTES 0.5 0.1 - 1.3 K/UL    ABS. EOSINOPHILS 0.3 0.0 - 0.8 K/UL    ABS. BASOPHILS 0.0 0.0 - 0.2 K/UL    ABS. IMM. GRANS. 0.0 0.0 - 0.5 K/UL   METABOLIC PANEL, COMPREHENSIVE    Collection Time: 01/31/22  2:15 PM   Result Value Ref Range    Sodium 137 136 - 145 mmol/L    Potassium 3.8 3.5 - 5.1 mmol/L    Chloride 105 98 - 107 mmol/L    CO2 28 21 - 32 mmol/L    Anion gap 4 (L) 7 - 16 mmol/L    Glucose 67 65 - 100 mg/dL    BUN 14 8 - 23 MG/DL    Creatinine 0.81 0.8 - 1.5 MG/DL    GFR est AA >60 >60 ml/min/1.73m2    GFR est non-AA >60 >60 ml/min/1.73m2    Calcium 8.3 8.3 - 10.4 MG/DL    Bilirubin, total 0.8 0.2 - 1.1 MG/DL    ALT (SGPT) 26 12 - 65 U/L    AST (SGOT) 37 15 - 37 U/L    Alk.  phosphatase 79 50 - 136 U/L    Protein, total 4.4 (L) 6.3 - 8.2 g/dL    Albumin 2.2 (L) 3.2 - 4.6 g/dL    Globulin 2.2 (L) 2.3 - 3.5 g/dL    A-G Ratio 1.0 (L) 1.2 - 3.5     PROTHROMBIN TIME + INR    Collection Time: 01/31/22  2:15 PM   Result Value Ref Range    Prothrombin time 16.9 (H) 12.6 - 14.5 sec    INR 1.4     URINALYSIS W/ RFLX MICROSCOPIC    Collection Time: 01/31/22  2:32 PM   Result Value Ref Range    Color RED      Appearance TURBID      Specific gravity 1.020 1.001 - 1.023      pH (UA) 7.0 5.0 - 9.0      Protein 100 (A) NEG mg/dL    Glucose Negative mg/dL    Ketone Negative NEG mg/dL    Bilirubin Negative NEG      Blood LARGE (A) NEG      Urobilinogen 0.2 0.2 - 1.0 EU/dL    Nitrites Negative NEG      Leukocyte Esterase SMALL (A) NEG     URINE MICROSCOPIC    Collection Time: 01/31/22  2:32 PM   Result Value Ref Range    WBC 10-20 0 /hpf    RBC >100 0 /hpf    Epithelial cells 0-3 0 /hpf    Bacteria TRACE 0 /hpf    Casts 0 0 /lpf    Crystals, urine 0 0 /LPF    Mucus 0 0 /lpf    Other observations RESULTS VERIFIED MANUALLY         All Micro Results     Procedure Component Value Units Date/Time    CULTURE, URINE [[id number]]     Order Status: Sent Specimen: Cath Urine           Other Studies:  No results found. Medications Administered     albumin human 25% (BUMINATE) solution 25 g     Admin Date  01/31/2022 Action  New Bag Dose  25 g Route  IntraVENous Administered By  Kareen Hernandes RN          sodium chloride 0.9 % bolus infusion 1,000 mL     Admin Date  01/31/2022 Action  New Bag Dose  1,000 mL Rate  1,000 mL/hr Route  IntraVENous Administered By  Ana Tamez                Signed:  Wandy Villalobos DO    Part of this note may have been written by using a voice dictation software. The note has been proof read but may still contain some grammatical/other typographical errors.

## 2022-01-31 NOTE — ED PROVIDER NOTES
Presents with complaint of blood from his urethral meatus and in his Guajardo. He reports suprapubic abdominal pain. States there is a lot of burning and spasming. Patient has had a chronic indwelling catheter for over a month. He has had blood in his Guajardo intermittently. He was hospitalized at [facility] and discharged to rehab on the 27th. His urologist is with [facility]. He denies any fever nausea or vomiting. The history is provided by the patient. Blood in Urine   This is a new problem. The current episode started more than 1 week ago. The problem occurs every urination. The problem has been gradually worsening. The quality of the pain is described as burning and stabbing. The pain is moderate. There has been no fever. Associated symptoms include hematuria, penile discharge and abdominal pain. Pertinent negatives include no chills. His past medical history is significant for urological procedure, catheterization and urinary catheter problem.         Past Medical History:   Diagnosis Date    Adverse effect of anesthesia     reports \"slow to get over effects\" of anesthesia- drowsy    Allergic rhinitis     Anemia 2018    Back pain     Colon polyps     Diabetes ([address]     oral agents; rarely checks BS, reports 138 today; occasional hypo symptoms (shaky) unaware of hypo level    Elevated liver function tests     GERD (gastroesophageal reflux disease)     controlled with meds    H/O alcohol abuse     Hiatal hernia     Hypercholesterolemia     Hypertension     well controlled with meds    Hypotestosteronism     Migraine     Personal history of prostate cancer     Restless leg     Sleep apnea     wears CPAP       Past Surgical History:   Procedure Laterality Date    HX COLONOSCOPY  ~2017    HX ORTHOPAEDIC Left 2017    post tibial tendon repair    HX OTHER SURGICAL Right 10/16/2018    ingrown toenail removal    HX PROSTATECTOMY  2003         Family History:   Problem Relation Age of Onset    Heart Disease Father     Emphysema Mother     Heart Disease Brother     Psychiatric Disorder Brother        Social History     Socioeconomic History    Marital status:      Spouse name: Not on file    Number of children: Not on file    Years of education: Not on file    Highest education level: Not on file   Occupational History    Not on file   Tobacco Use    Smoking status: Former Smoker     Types: Cigars     Quit date: 2003     Years since quittin.8    Smokeless tobacco: Never Used   Substance and Sexual Activity    Alcohol use: No    Drug use: No    Sexual activity: Not on file   Other Topics Concern    Not on file   Social History Narrative    Not on file     Social Determinants of Health     Financial Resource Strain:     Difficulty of Paying Living Expenses: Not on file   Food Insecurity:     Worried About Running Out of Food in the Last Year: Not on file    Ruby of Food in the Last Year: Not on file   Transportation Needs:     Lack of Transportation (Medical): Not on file    Lack of Transportation (Non-Medical):  Not on file   Physical Activity:     Days of Exercise per Week: Not on file    Minutes of Exercise per Session: Not on file   Stress:     Feeling of Stress : Not on file   Social Connections:     Frequency of Communication with Friends and Family: Not on file    Frequency of Social Gatherings with Friends and Family: Not on file    Attends Christian Services: Not on file    Active Member of Clubs or Organizations: Not on file    Attends Club or Organization Meetings: Not on file    Marital Status: Not on file   Intimate Partner Violence:     Fear of Current or Ex-Partner: Not on file    Emotionally Abused: Not on file    Physically Abused: Not on file    Sexually Abused: Not on file   Housing Stability:     Unable to Pay for Housing in the Last Year: Not on file    Number of [place] in the Last Year: Not on file    Unstable Housing in the Last Year: Not on file         ALLERGIES: Amoxicillin, Lisinopril, and Sulfa (sulfonamide antibiotics)    Review of Systems   Constitutional: Negative for chills and fever. Respiratory: Negative for cough and shortness of breath. Cardiovascular: Positive for leg swelling. Negative for chest pain. Gastrointestinal: Positive for abdominal pain. Genitourinary: Positive for hematuria and penile discharge. All other systems reviewed and are negative. Vitals:    01/31/22 1238 01/31/22 1241 01/31/22 1243   BP: 100/66  100/66   Pulse:  78 76   Resp:  12 17   Temp:   98.9 °F (37.2 °C)   SpO2:  97% 97%            Physical Exam  Vitals and nursing note reviewed. Constitutional:       General: He is not in acute distress. Appearance: He is well-developed. He is obese. He is ill-appearing. He is not diaphoretic. HENT:      Head: Normocephalic and atraumatic. Eyes:      General:         Right eye: No discharge. Left eye: No discharge. Conjunctiva/sclera: Conjunctivae normal.   Cardiovascular:      Rate and Rhythm: Normal rate and regular rhythm. Pulmonary:      Effort: Pulmonary effort is normal. No respiratory distress. Breath sounds: Normal breath sounds. Abdominal:      General: There is no distension. Palpations: Abdomen is soft. Tenderness: There is no abdominal tenderness. Genitourinary:     Comments: Blood all around urethral meatus  Musculoskeletal:         General: Normal range of motion. Cervical back: Normal range of motion and neck supple. Right lower leg: Edema present. Left lower leg: Edema present. Skin:     General: Skin is warm and dry. Capillary Refill: Capillary refill takes less than 2 seconds. Neurological:      Mental Status: He is alert and oriented to person, place, and time. Cranial Nerves: No cranial nerve deficit.    Psychiatric:         Mood and Affect: Mood normal.         Behavior: Behavior normal. MDM  Number of Diagnoses or Management Options  Diagnosis management comments: Patient was at [facility] and received several units of blood secondary to his hematuria. D/w urology.   Admit to hospitalist       Amount and/or Complexity of Data Reviewed  Clinical lab tests: ordered and reviewed  Tests in the radiology section of CPT®: ordered and reviewed  Review and summarize past medical records: yes  Discuss the patient with other providers: yes    Risk of Complications, Morbidity, and/or Mortality  Presenting problems: high  Diagnostic procedures: minimal  Management options: high    Patient Progress  Patient progress: improved         Procedures

## 2022-01-31 NOTE — ED TRIAGE NOTES
Pt arrives per EMS From [facility] and [address] [place] [address]. Facility called concerned pt passing blood in indwelling jasso cath. Pt has 3 way catheter in place on arrival with continuous bladder irrigation going.

## 2022-02-01 NOTE — PROGRESS NOTES
ACUTE PHYSICAL THERAPY GOALS:  (Developed with and agreed upon by patient and/or caregiver.)    (1.) Melissa Aguirre  will move from supine to sit and sit to supine  with INDEPENDENCE within 7 treatment day(s). (2.) Melissa Aguirre will transfer from bed to chair and chair to bed with MODIFIED INDEPENDENCE using the least restrictive device within 7 treatment day(s). (3.) Melissa Aguirre will ambulate with MINIMAL ASSIST for 50 feet with the least restrictive device within 7 treatment day(s). (4.) Melissa Agurire will perform seated static and dynamic balance activities x 20 minutes with INDEPENDENCE to improve safety within 7 treatment day(s). (5.) Melissa Aguirre will perform bilateral lower extremity exercises x 20 min for HEP with INDEPENDENCE to improve strength, endurance, and functional mobility within 7 treatment day(s). PHYSICAL THERAPY ASSESSMENT: Initial Assessment, Daily Note and PM PT Treatment Day # 1      Melissa Aguirre is a 67 y.o. male   PRIMARY DIAGNOSIS: Gross hematuria  Gross hematuria [R31.0]       Reason for Referral:    ICD-10: Treatment Diagnosis: Difficulty in walking, Not elsewhere classified (R26.2)  Low Back Pain (M54.5)  [place]PAT[place]: [place] / [place] / Product Type: Medicare /     ASSESSMENT:     REHAB RECOMMENDATIONS:   Recommendation to date pending progress:  Setting:   back to [facility]  Equipment:    To Be Determined     PRIOR LEVEL OF FUNCTION:  (Prior to Hospitalization) INITIAL/CURRENT LEVEL OF FUNCTION:  (Most Recently Demonstrated)   Bed Mobility:   Unknown  Sit to Stand:   Unknown  Transfers:   Unknown  Gait/Mobility:   Unknown Bed Mobility:   Minimal Assistance  Sit to Stand:   Not tested  Transfers:   Not tested  Gait/Mobility:   Not tested     ASSESSMENT:  Mr. Preston Villar is a 67year old M who presents heamturia.  Per pt and spouse reports, pt was hospitalized at [facility] from Jan 1-27 and then discharged to [facility]. Prior to Jan 1 pt was independent, ambulating with a rollator. It is unclear how much assistance he was requiring at [facility] but reports he was walking to the bathroom with assistance. Pt has an L3 compression fracture and reports he was wearing a brace every time he got out of bed at [facility], reports brace is likely still there. Deferred any OOB mobility today given pt does not have brace here. He required Fredrick to sit at EOB. Tolerated sitting at EOB ~10 mins with CGA-Fredrick. He will benefit from returning to [facility] to continue rehab at d/c. Pt presents as functioning below his baseline, with deficits in mobility including transfers, gait, balance, and activity tolerance. Pt will benefit from skilled therapy services to address stated deficits to promote return to highest level of function, independence, and safety. Will continue to follow.      SUBJECTIVE:   Mr. Pradip Oruorke states, \"thank you\"    SOCIAL HISTORY/LIVING ENVIRONMENT: PLOF: ind, ambulates with rollator, lives with spouse, ramp to enter, hx of 3 falls  Support Systems: Spouse/Significant Other  OBJECTIVE:     PAIN: VITAL SIGNS: LINES/DRAINS:   Pre Treatment: Pain Screen  Pain Scale 1: Numeric (0 - 10)  Pain Intensity 1: 0  Post Treatment: 0   Guajardo Catheter and IV  O2 Device: None (Room air)     GROSS EVALUATION:  B LE Within Functional Limits Abnormal/ Functional Abnormal/ Non-Functional (see comments) Not Tested Comments:   AROM [x] [] [] []    PROM [] [] [] [x]    Strength [] [x] [] [] Generalized weakness   Balance [] [x] [] [] Fair sitting   Posture [] [x] [] [] Forward flexed   Sensation [] [] [] [x]    Coordination [] [] [] [x]    Tone [] [] [] [x]    Edema [] [] [] [x]    Activity Tolerance [] [x] [] [] Below baseline    [] [] [] []      COGNITION/  PERCEPTION: Intact Impaired   (see comments) Comments:   Orientation [x] []    Vision [x] []    Hearing [x] []    Command Following [x] []    Safety Awareness [x] []     [] [] MOBILITY: I Mod I S SBA CGA Min Mod Max Total  NT x2 Comments:   Bed Mobility    Rolling [] [] [] [] [] [x] [] [] [] [] []    Supine to Sit [] [] [] [] [] [x] [] [] [] [] []    Scooting [] [] [] [] [] [x] [] [] [] [] []    Sit to Supine [] [] [] [] [] [x] [] [] [] [] []    Transfers    Sit to Stand [] [] [] [] [] [] [] [] [] [x] []    Bed to Chair [] [] [] [] [] [] [] [] [] [x] []    Stand to Sit [] [] [] [] [] [] [] [] [] [x] []    I=Independent, Mod I=Modified Independent, S=Supervision, SBA=Standby Assistance, CGA=Contact Guard Assistance,   Min=Minimal Assistance, Mod=Moderate Assistance, Max=Maximal Assistance, Total=Total Assistance, NT=Not Tested  GAIT: I Mod I S SBA CGA Min Mod Max Total  NT x2 Comments:   Level of Assistance [] [] [] [] [] [] [] [] [] [x] []    Distance n/a    DME N/A    Gait Quality n/a    Weightbearing Status N/A     I=Independent, Mod I=Modified Independent, S=Supervision, SBA=Standby Assistance, CGA=Contact Guard Assistance,   Min=Minimal Assistance, Mod=Moderate Assistance, Max=Maximal Assistance, Total=Total Assistance, NT=Not Tested    [address] [place] [address] [address] [address] [facility] [address] [facility]       How much difficulty does the patient currently have. .. Unable A Lot A Little None   1. Turning over in bed (including adjusting bedclothes, sheets and blankets)? [] 1   [] 2   [x] 3   [] 4   2. Sitting down on and standing up from a chair with arms ( e.g., wheelchair, bedside commode, etc.)   [] 1   [x] 2   [] 3   [] 4   3. Moving from lying on back to sitting on the side of the bed? [] 1   [] 2   [x] 3   [] 4   How much help from another person does the patient currently need. .. Total A Lot A Little None   4. Moving to and from a bed to a chair (including a wheelchair)? [] 1   [x] 2   [] 3   [] 4   5. Need to walk in hospital room? [] 1   [x] 2   [] 3   [] 4   6. Climbing 3-5 steps with a railing?    [] 1   [x] 2   [] 3   [] 4   © 2007, Trustees of [facility], under license to [employer]. All rights reserved     Score:  Initial: 14 Most Recent: X (Date: -- )    Interpretation of Tool:  Represents activities that are increasingly more difficult (i.e. Bed mobility, Transfers, Gait). PLAN:   FREQUENCY/DURATION: PT Plan of Care: 3 times/week for duration of hospital stay or until stated goals are met, whichever comes first.    PROBLEM LIST:   (Skilled intervention is medically necessary to address:)  1. Decreased ADL/Functional Activities  2. Decreased Activity Tolerance  3. Decreased Balance  4. Decreased Gait Ability  5. Decreased Strength  6. Decreased Transfer Abilities   INTERVENTIONS PLANNED:   (Benefits and precautions of physical therapy have been discussed with the patient.)  1. Therapeutic Activity  2. Therapeutic Exercise/HEP  3. Neuromuscular Re-education  4. Gait Training  5. Education     TREATMENT:     EVALUATION: Low Complexity : (Untimed Charge)    TREATMENT:   ($$ Therapeutic Activity: 8-22 mins    )  Therapeutic Activity (19 Minutes): Therapeutic activity included Rolling, Supine to Sit, Sit to Supine, Scooting and Sitting balance  to improve functional Mobility, Strength and Activity tolerance.     TREATMENT GRID:  N/A    AFTER TREATMENT POSITION/PRECAUTIONS:  Bed, Needs within reach, RN notified and Visitors at bedside    INTERDISCIPLINARY COLLABORATION:  RN/PCT    TOTAL TREATMENT DURATION:  PT Patient Time In/Time Out  Time In: 1438  Time Out: [id number] Mekhi Nieto PT

## 2022-02-01 NOTE — PROGRESS NOTES
Chart review complete, CM met with spouse at bedside, pt currently in [place] for scan, per spouse pt is from [facility] rehab and would like for pt to return for continued therapy. Per spouse she and pt live in 1 level home with ramp to enter has rollator and standard walker for use in home, states pt is normally independent with adls, does not drive spouse provides transportation when needed for appointments, states affords medications with insurance. Demographics, insurance and PCP confirmed. Pt will return to [facility] for continued therapy, per [place] with admissions pt can return it back with by midnight on Wednesday night. If he does not return by then will need new referral.    PT/OT evaluations pending, CM will remain available to assist as needed. Care Management Interventions  PCP Verified by CM:  Yes (Dr Brandan Saul last visit Dec 2021)  MyChart Signup: No  Discharge Durable Medical Equipment: No  Physical Therapy Consult: Yes  Occupational Therapy Consult: Yes  Speech Therapy Consult: No  Support Systems: Spouse/Significant Other  Confirm Follow Up Transport: Family  The Plan for Transition of Care is Related to the Following Treatment Goals : return to inpt rehab at [facility]  The Patient and/or Patient Representative was Provided with a Choice of Provider and Agrees with the Discharge Plan?: Yes  Name of the Patient Representative Who was Provided with a Choice of Provider and Agrees with the Discharge Plan: spouse  Freedom of Choice List was Provided with Basic Dialogue that Supports the Patient's Individualized Plan of Care/Goals, Treatment Preferences and Shares the Quality Data Associated with the Providers?: Yes  Discharge Location  Patient Expects to be Discharged to[de-identified] Rehab hospital/unit acute

## 2022-02-01 NOTE — ED NOTES
Spoke with Dale Jackson RN from [facility] to update on pt condition. She stated they will hold his bed for 3 days.

## 2022-02-01 NOTE — PROGRESS NOTES
Hospitalist Progress Note   Admit Date:  2022 12:32 PM   Name:  Suzette Younger   Age:  67 y.o. Sex:  male  :  1945   MRN:  [id number]   Room:  [place] [address]    Presenting Complaint: Blood in Urine    Reason(s) for Admission: Gross hematuria [R31.0]     Hospital Course & Interval History:   Eduin Bonilla is a 67 y.o. male with medical history of Cirrhosis, Prostate CA s/p RRP 2003 and radiation 2016, HFpEF, Radiation Cystitis, BENIGNO , L3 compression fracture who presented with worsening gross hematuria and suprapubic pain. Presented from rehab facility. Previously admitted at [facility] from 1/15-22 transferred there from OSH with hematuria and difficulty voiding w/ bladder mass on CT. During hospitalization at [facility], he suffered hypovolemic shock and acute encephalopathy. Patient was seen by Dr. Riki Calvillo and underwent cysto/evacuation of clots on 22 and 22 d/t ongoing hematuria/clots. Cassidy Lees was advised to follow up with [facility] urology OP but represents to ED with gross hematuria. Wife at bedside. He c/o of intermittent bladder spasms that radiate to lower back associated with burning sensation that self-resolves. He rec'd 1 L NS in ED. Pt had CBI placed for hematuria; on presentation, hgb was 8.1 with subsequent hgb drop to 6.2; transfusion ordered. Subjective (22): \"I get pain down there when I try to pee. \"  Pleasant simple 76y.o. WM laying in bed, poor historian, admitting to increased urgency and dysuria since 2022    Review of Systems:  10 systems reviewed and negative except as noted in HPI.       Assessment & Plan:     Principal Problem:    Gross hematuria (2022)  - CBI per urology, with thanks  - cont transfusion with goal to maintain hgb >7.0  - may need cystoscopy with noted hx of recurrent prostate cancer and bladder \"mass\"    Active Problems:    Severe anemia  - etiology from gross hematuria  - h/h q6hrs x 48hrs  - transfuse if hgb <7.0 or hemodynamically unstable  - given hx of HF, lasix prior to third unit transfusion      Hypokalemia  - supplemented  - check mag levels  - f/u AM BMP      Hx of prostate cancer s/p xrt / radiation cystitis  - f/u urine study   - CT A/P without urinary tract calculi or hydro and mild bladder wall thickening  - ongoing mgmt per urology      Liver cirrhosis / chronic thrombocytopenia  - LFT wnl  - monitor closely; check ammonia  - transfuse platelets if anemia persists despite blood transfusion      Hx of HF with preserved EF  - check echo  - lasix in between blood transfusions      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, without long-term current use of insulin - - sliding scale coverage as needed  - closely monitor as glucose ranges low      GERD (gastroesophageal reflux disease)  - PPI as dosed      BENIGNO (obstructive sleep apnea) (10/2/2015)  - CPAP qhs        Dispo/Discharge Planning:    - anticipate at least 2 midnight hospitalization    Diet:  ADULT DIET Regular; 4 carb choices (60 gm/meal)  DVT PPx: SCD  Code status: Full Code    Hospital Problems as of 2/1/2022 Date Reviewed: 7/9/2021          Codes Class Noted - Resolved POA    * (Principal) Gross hematuria ICD-10-CM: R31.0  ICD-9-CM: 599.71  1/31/2022 - Present Unknown        Chronic radiation cystitis ICD-10-CM: N30.40  ICD-9-CM: 595.82  1/31/2022 - Present Yes        Functional quadriplegia ([facility] ICD-10-CM: R53.2  ICD-9-CM: 780.72  1/31/2022 - Present Yes        Thrombocytopenia due to sequestration [facility]) ICD-10-CM: D69.6  ICD-9-CM: 287.5  1/31/2022 - Present Yes        Acute blood loss anemia ([facility]) ICD-10-CM: D62  ICD-9-CM: 285.1  1/31/2022 - Present Yes        Hypocortisolism ([facility] ICD-10-CM: E27.49  ICD-9-CM: 255.41  1/31/2022 - Present Yes        Hypoglycemia ICD-10-CM: E16.2  ICD-9-CM: 251.2  1/31/2022 - Present Yes        Hypoalbuminemia due to protein-calorie malnutrition ([facility] ICD-10-CM: E88.09, E46  ICD-9-CM: 273.8, 263.9  1/31/2022 - Present Yes        (HFpEF) heart failure with preserved ejection fraction ([facility] ICD-10-CM: I50.30  ICD-9-CM: 428.9  1/19/2022 - Present Yes        Hepatic cirrhosis ([facility] ICD-10-CM: K74.60  ICD-9-CM: 571.5  10/8/2021 - Present Yes    Overview Signed 1/31/2022  4:07 PM by Rosenda Greer DO     Last Assessment & Plan:   Formatting of this note might be different from the original.  Follow-up for edema and ascites  Avoid Tylenol  Check labs on Mondays             Psoriatic arthritis [facility]) ICD-10-CM: L40.50  ICD-9-CM: 696.0  6/23/2017 - Present Yes    Overview Signed 1/31/2022  4:07 PM by Rosenda Greer DO     Last Assessment & Plan:   Formatting of this note might be different from the original.  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  Blood sugars have been in good range without treatment  Very poor appetite  Continue supplements  Elevated BUN  Begin IV fluids today  Check BMP daily  Encourage oral fluids             GERD (gastroesophageal reflux disease) ICD-10-CM: K21.9  ICD-9-CM: 530.81  10/2/2015 - Present Yes    Overview Signed 1/31/2022  4:07 PM by Rosenda Greer DO     Last Assessment & Plan:   Formatting of this note might be different from the original.  Stable, continue current medications and treatment plan             BENIGNO (obstructive sleep apnea) ICD-10-CM: G47.33  ICD-9-CM: 327.23  10/2/2015 - Present Yes    Overview Signed 1/31/2022  4:07 PM by Rosenda Greer DO     Formatting of this note might be different from the original.  Dr. Stephanie Hampton:   Formatting of this note might be different from the original.  Continue CPAP while sleeping                   Objective:     Patient Vitals for the past 24 hrs:   Temp Pulse Resp BP SpO2   02/01/22 1222 98.1 °F (36.7 °C) 62  103/64    02/01/22 0923  68 16 95/61 99 %   02/01/22 0706  74 13 93/61 97 %   02/01/22 0650  68 14 (!) 89/58 97 %   02/01/22 0625  66 17 (!) 87/52 95 %   02/01/22 0520  74 23 (!) 100/59    02/01/22 0414  73 21 (!) 87/61 99 %   02/01/22 0335  65 16 (!) 90/57 96 %   02/01/22 0258  68 15 (!) 88/62    02/01/22 0228  66 14 90/60 100 %   02/01/22 0213  71 20 (!) 87/56    02/01/22 0201  66 16 (!) 89/56    02/01/22 0131  74 18 91/63    02/01/22 0116  65 16 94/60 98 %   02/01/22 0101  70 16 (!) 98/57 96 %   02/01/22 0031  71 18 95/62 100 %   02/01/22 0016  68 16 93/60 100 %   02/01/22 0001  70 15 98/64 98 %   01/31/22 2346  64 16 94/63 97 %   01/31/22 2331  69 12 103/62 97 %   01/31/22 2316  64 19 (!) 91/58 97 %   01/31/22 2301  70 19 (!) 95/58 96 %   01/31/22 2216  76 22 103/68    01/31/22 2201  74 13 (!) 107/57    01/31/22 1915  69 13 (!) 101/57 98 %   01/31/22 1900  66 13 (!) 100/58 97 %   01/31/22 1845  71 13 (!) 97/59 99 %   01/31/22 1831  72 13 (!) 103/55 99 %   01/31/22 1801  69 12 105/60 99 %   01/31/22 1731  68 16 96/62 96 %   01/31/22 1701  75 14 105/60 98 %   01/31/22 1631  75 15 106/61 99 %   01/31/22 1601  79 13 106/69 96 %   01/31/22 1531  76 17  96 %   01/31/22 1530  75 17 102/61 97 %   01/31/22 1515  75 16 105/63 98 %   01/31/22 1500  74 10 108/63 98 %   01/31/22 1445  72 16 (!) 108/55 97 %   01/31/22 1430  71 11 (!) 102/58 98 %   01/31/22 1415  77 15 (!) 110/58 98 %   01/31/22 1330  77 13 128/79 100 %     Oxygen Therapy  O2 Sat (%): 99 % (02/01/22 0923)  Pulse via Oximetry: 73 beats per minute (02/01/22 0706)  O2 Device: None (Room air) (01/31/22 1243)    Estimated body mass index is 36.26 kg/m² as calculated from the following:    Height as of 9/1/20: 5' 11\" (1.803 m).     Weight as of 9/1/20: 117.9 kg (260 lb). Intake/Output Summary (Last 24 hours) at 2/1/2022 1320  Last data filed at 2/1/2022 0923  Gross per 24 hour   Intake 524 ml   Output 3700 ml   Net -3176 ml         Physical Exam:     Blood pressure 103/64, pulse 62, temperature 98.1 °F (36.7 °C), resp. rate 16, SpO2 99 %. General:    Well nourished. No overt distress  Head:  Normocephalic, atraumatic  Eyes:  Sclerae pale. Pupils equally round. ENT:  Nares appear normal, no drainage. Moist oral mucosa  Neck:  No restricted ROM. Trachea midline   CV:   RRR. No m/r/g. No jugular venous distension. Lungs:   CTAB. No wheezing, rhonchi, or rales. Respirations even, unlabored  Abdomen: Bowel sounds present. Soft, nontender, nondistended. Extremities: No cyanosis or clubbing. No edema  Skin:     No rashes and normal coloration. Warm and dry. Neuro:  CN II-XII grossly intact. Sensation intact. A&Ox3  Psych:  Normal mood and affect. I have reviewed ordered lab tests and independently visualized imaging below:    Recent Labs:  Recent Results (from the past 48 hour(s))   CBC WITH AUTOMATED DIFF    Collection Time: 01/31/22  2:15 PM   Result Value Ref Range    WBC 6.1 4.3 - 11.1 K/uL    RBC 2.67 (L) 4.23 - 5.6 M/uL    HGB 8.1 (L) 13.6 - 17.2 g/dL    HCT 24.4 (L) 41.1 - 50.3 %    MCV 91.4 79.6 - 97.8 FL    MCH 30.3 26.1 - 32.9 PG    MCHC 33.2 31.4 - 35.0 g/dL    RDW 15.9 (H) 11.9 - 14.6 %    PLATELET 84 (L) 518 - 450 K/uL    MPV 9.3 (L) 9.4 - 12.3 FL    ABSOLUTE NRBC 0.00 0.0 - 0.2 K/uL    DF AUTOMATED      NEUTROPHILS 72 43 - 78 %    LYMPHOCYTES 14 13 - 44 %    MONOCYTES 8 4.0 - 12.0 %    EOSINOPHILS 4 0.5 - 7.8 %    BASOPHILS 1 0.0 - 2.0 %    IMMATURE GRANULOCYTES 1 0.0 - 5.0 %    ABS. NEUTROPHILS 4.4 1.7 - 8.2 K/UL    ABS. LYMPHOCYTES 0.9 0.5 - 4.6 K/UL    ABS. MONOCYTES 0.5 0.1 - 1.3 K/UL    ABS. EOSINOPHILS 0.3 0.0 - 0.8 K/UL    ABS. BASOPHILS 0.0 0.0 - 0.2 K/UL    ABS. IMM.  GRANS. 0.0 0.0 - 0.5 K/UL   METABOLIC PANEL, COMPREHENSIVE    Collection Time: 01/31/22  2:15 PM   Result Value Ref Range    Sodium 137 136 - 145 mmol/L    Potassium 3.8 3.5 - 5.1 mmol/L    Chloride 105 98 - 107 mmol/L    CO2 28 21 - 32 mmol/L    Anion gap 4 (L) 7 - 16 mmol/L    Glucose 67 65 - 100 mg/dL    BUN 14 8 - 23 MG/DL    Creatinine 0.81 0.8 - 1.5 MG/DL    GFR est AA >60 >60 ml/min/1.73m2    GFR est non-AA >60 >60 ml/min/1.73m2    Calcium 8.3 8.3 - 10.4 MG/DL    Bilirubin, total 0.8 0.2 - 1.1 MG/DL    ALT (SGPT) 26 12 - 65 U/L    AST (SGOT) 37 15 - 37 U/L    Alk. phosphatase 79 50 - 136 U/L    Protein, total 4.4 (L) 6.3 - 8.2 g/dL    Albumin 2.2 (L) 3.2 - 4.6 g/dL    Globulin 2.2 (L) 2.3 - 3.5 g/dL    A-G Ratio 1.0 (L) 1.2 - 3.5     PROTHROMBIN TIME + INR    Collection Time: 01/31/22  2:15 PM   Result Value Ref Range    Prothrombin time 16.9 (H) 12.6 - 14.5 sec    INR 1.4     URINALYSIS W/ RFLX MICROSCOPIC    Collection Time: 01/31/22  2:32 PM   Result Value Ref Range    Color RED      Appearance TURBID      Specific gravity 1.020 1.001 - 1.023      pH (UA) 7.0 5.0 - 9.0      Protein 100 (A) NEG mg/dL    Glucose Negative mg/dL    Ketone Negative NEG mg/dL    Bilirubin Negative NEG      Blood LARGE (A) NEG      Urobilinogen 0.2 0.2 - 1.0 EU/dL    Nitrites Negative NEG      Leukocyte Esterase SMALL (A) NEG     URINE MICROSCOPIC    Collection Time: 01/31/22  2:32 PM   Result Value Ref Range    WBC 10-20 0 /hpf    RBC >100 0 /hpf    Epithelial cells 0-3 0 /hpf    Bacteria TRACE 0 /hpf    Casts 0 0 /lpf    Crystals, urine 0 0 /LPF    Mucus 0 0 /lpf    Other observations RESULTS VERIFIED MANUALLY     CULTURE, URINE    Collection Time: 01/31/22  4:15 PM    Specimen: Cath Urine   Result Value Ref Range    Special Requests: NO SPECIAL REQUESTS      Culture result:        NO GROWTH AFTER SHORT PERIOD OF INCUBATION. FURTHER RESULTS TO FOLLOW AFTER OVERNIGHT INCUBATION.    GLUCOSE, POC    Collection Time: 01/31/22  7:35 PM   Result Value Ref Range    Glucose (POC) 70 65 - 100 mg/dL    Performed by Bruno Pearson, POC    Collection Time: 02/01/22  1:02 AM   Result Value Ref Range    Glucose (POC) 66 65 - 100 mg/dL    Performed by Arturo    GLUCOSE, POC    Collection Time: 02/01/22  2:04 AM   Result Value Ref Range    Glucose (POC) 79 65 - 100 mg/dL    Performed by Jaime    METABOLIC PANEL, COMPREHENSIVE    Collection Time: 02/01/22  4:48 AM   Result Value Ref Range    Sodium 140 138 - 145 mmol/L    Potassium 3.0 (L) 3.5 - 5.1 mmol/L    Chloride 110 (H) 98 - 107 mmol/L    CO2 23 21 - 32 mmol/L    Anion gap 7 7 - 16 mmol/L    Glucose 67 65 - 100 mg/dL    BUN 12 8 - 23 MG/DL    Creatinine 0.62 (L) 0.8 - 1.5 MG/DL    GFR est AA >60 >60 ml/min/1.73m2    GFR est non-AA >60 >60 ml/min/1.73m2    Calcium 7.6 (L) 8.3 - 10.4 MG/DL    Bilirubin, total 0.7 0.2 - 1.1 MG/DL    ALT (SGPT) 18 12 - 65 U/L    AST (SGOT) 25 15 - 37 U/L    Alk.  phosphatase 57 50 - 136 U/L    Protein, total 4.1 (L) 6.3 - 8.2 g/dL    Albumin 2.1 (L) 3.2 - 4.6 g/dL    Globulin 2.0 (L) 2.3 - 3.5 g/dL    A-G Ratio 1.1 (L) 1.2 - 3.5     CBC W/O DIFF    Collection Time: 02/01/22  4:48 AM   Result Value Ref Range    WBC 2.5 (L) 4.3 - 11.1 K/uL    RBC 2.08 (L) 4.23 - 5.6 M/uL    HGB 6.2 (LL) 13.6 - 17.2 g/dL    HCT 19.3 (L) 41.1 - 50.3 %    MCV 92.8 79.6 - 97.8 FL    MCH 29.8 26.1 - 32.9 PG    MCHC 32.1 31.4 - 35.0 g/dL    RDW 15.6 (H) 11.9 - 14.6 %    PLATELET 53 (L) 159 - 450 K/uL    MPV 8.9 (L) 9.4 - 12.3 FL    ABSOLUTE NRBC 0.00 0.0 - 0.2 K/uL   PROTHROMBIN TIME + INR    Collection Time: 02/01/22  4:48 AM   Result Value Ref Range    Prothrombin time 18.0 (H) 12.6 - 14.5 sec    INR 1.5     VITAMIN B12    Collection Time: 02/01/22  4:48 AM   Result Value Ref Range    Vitamin B12 1,336 (H) 193 - 986 pg/mL   FOLATE    Collection Time: 02/01/22  4:48 AM   Result Value Ref Range    Folate 21.2 (H) 3.1 - 17.5 ng/mL   TYPE & SCREEN    Collection Time: 02/01/22  5:39 AM   Result Value Ref Range    Crossmatch Expiration 02/04/2022,2359     ABO/Rh(D) O POSITIVE     Antibody screen NEG     Comment NOTIFIED ANDREA AT [address]     Unit number [id number]     Blood component type RC LR     Unit division 00     Status of unit ISSUED     Crossmatch result Compatible    RBC, ALLOCATE    Collection Time: 02/01/22  5:45 AM   Result Value Ref Range    HISTORY CHECKED? Historical check performed    GLUCOSE, POC    Collection Time: 02/01/22  9:14 AM   Result Value Ref Range    Glucose (POC) 73 65 - 100 mg/dL    Performed by Betina Cisneros, POC    Collection Time: 02/01/22 11:59 AM   Result Value Ref Range    Glucose (POC) 67 65 - 100 mg/dL    Performed by Guillermina        All Micro Results     Procedure Component Value Units Date/Time    CULTURE, URINE [[id number]] Collected: 01/31/22 1615    Order Status: Completed Specimen: Cath Urine Updated: 02/01/22 [id number]     Special Requests: NO SPECIAL REQUESTS        Culture result:       NO GROWTH AFTER SHORT PERIOD OF INCUBATION. FURTHER RESULTS TO FOLLOW AFTER OVERNIGHT INCUBATION. Other Studies:  XR HUMERUS RT    Result Date: 1/31/2022  Right humerus series: 01/31/2022 HISTORY: severe right shoulder pain, restricted ROM s/p fall 4 views were obtained. Comparison: None Findings: There is no evidence of acute fracture or dislocation. The soft tissues are unremarkable. There is no bony destruction or other abnormality. Unremarkable exam.      DUPLEX LOWER EXT VENOUS BILAT    Result Date: 2/1/2022  Bilateral lower extremity venous ultrasound INDICATION:  Pain and swelling, both legs subacute, moderate, taking anticoagulant COMPARISON: none Technique: Grayscale, color Doppler, and spectral analysis were performed on the deep veins of each lower extremity.  FINDINGS: There is no evidence of Espinoza's cyst. Right: There is normal flow in the common femoral, deep femoral, greater saphenous, femoral and popliteal veins. Normal compression and augmentation demonstrated. One of the dual Peroneal veins demonstrates occlusive thrombus. The proximal calf veins are also otherwise patent. Left: There is normal flow in the common femoral, deep femoral, greater saphenous, and popliteal veins. Normal compression and augmentation demonstrated. A nonocclusive thrombus is seen in the femoral vein distally. The proximal calf veins are also patent as visualized. 1. Left femoral vein DVT. 2. Right lower extremity peroneal vein clot below the knee. CT ABD/PEL HEMATURIA    Result Date: 2/1/2022  CT of the Abdomen and Pelvis INDICATION:  Hematuria. Dysuria. Chronic indwelling Guajardo catheter. Multiple axial images were obtained through the abdomen and pelvis before and after intravenous injection of 100mL of Isovue 370. Radiation dose reduction techniques were used for this study: All CT scans performed at this facility use one or all of the following: Automated exposure control, adjustment of the mA and/or kVp according to patient's size, iterative reconstruction. COMPARISON: None FINDINGS: LOWER CHEST: Atelectasis left lung base. Trace left pleural effusion. Right lung base is clear. HEPATOBILIARY: Cirrhotic liver morphology without focal mass. Recannulized para umbilical vein is present. No focal mass. No calcified gallstones. PANCREAS: Normal. SPLEEN: Normal. ADRENAL GLANDS: Normal. KIDNEYS/BLADDER: No renal calculi or hydronephrosis. No enhancing renal mass. Possible subcentimeter cyst left renal cortex too small to characterize by CT but of doubtful clinical significance. Delayed imaging shows an normal appearing collecting systems and ureters. Small amount contrast noted in the bladder with indwelling Guajardo catheter. Bladder is partially decompressed. No significant bladder wall thickening or nodularity. BOWEL: No bowel obstruction or diverticulitis.  Appendix is normal. LYMPH NODES: No enlarged abdominal or pelvic lymph nodes. VASCULATURE: Scattered calcified plaque abdominal aorta without aneurysm. PELVIC ORGANS: Prior prostatectomy changes. Rectum is unremarkable. Mild stranding in the presacral region of the pelvis may reflect postradiation changes. MUSCULOSKELETAL: L3 vertebral body compression fracture. No destructive bone lesions are appreciated. 1. No urinary tract calculi or hydronephrosis. Bladder is decompressed with a Guajardo catheter. Mild bladder wall thickening could be related to incomplete distention and/or changes of chronic bladder outlet obstruction from an enlarged prostate gland. Patient is status post recent prostatectomy. UTI could appear similar. 2. Cirrhotic liver morphology. Recanalized paraumbilical vein is noted. Spleen is normal in size. 3. L3 vertebral body compression fracture. Current Meds:  Current Facility-Administered Medications   Medication Dose Route Frequency    0.9% sodium chloride infusion 250 mL  250 mL IntraVENous PRN    sodium chloride (NS) flush 5-40 mL  5-40 mL IntraVENous Q8H    sodium chloride (NS) flush 5-40 mL  5-40 mL IntraVENous PRN    acetaminophen (TYLENOL) tablet 650 mg  650 mg Oral Q4H PRN    oxyCODONE IR (ROXICODONE) tablet 5 mg  5 mg Oral Q6H PRN    opium-belladonna (B&O 16-A SUPPRETTE) 16.2-60 mg suppository 1 Suppository  1 Suppository Rectal Q12H PRN    ondansetron (ZOFRAN) injection 4 mg  4 mg IntraVENous Q4H PRN    naloxone (NARCAN) injection 0.4 mg  0.4 mg IntraVENous PRN    dextrose 40% (GLUTOSE) oral gel 1 Tube  15 g Oral PRN    glucagon (GLUCAGEN) injection 1 mg  1 mg IntraMUSCular PRN    dextrose 10% infusion 125-250 mL  125-250 mL IntraVENous PRN    insulin lispro (HUMALOG) injection   SubCUTAneous AC&HS    tuberculin injection 5 Units  5 Units IntraDERMal ONCE       Signed:  LAUREN Leonard    Part of this note may have been written by using a voice dictation software.   The note has been proof read but may still contain some grammatical/other typographical errors.

## 2022-02-01 NOTE — ED NOTES
This RN updated Dr. Memo Mandel about patient's blood pressure. This RN will contact  if MAP is below 65.

## 2022-02-02 NOTE — PROGRESS NOTES
Occupational Therapy Note:     Orders received and chart reviewed. OT will hold evaluation at this time. Awaiting IR evaluation for possible thrombectomy. OT will check back as time allows and patient is available.      Thank you,  Rodrick Rubin OTR/L

## 2022-02-02 NOTE — PROGRESS NOTES
Hospitalist Progress Note   Admit Date:  2022 12:32 PM   Name:  Rogenia Dance Butts   Age:  67 y.o. Sex:  male  :  1945   MRN:  [id number]   Room:  [place] [address]    Presenting Complaint: Blood in Urine    Reason(s) for Admission: Gross hematuria [R31.0]     Hospital Course & Interval History:   Anastasia Bustamante is a 67 y.o. male with medical history of Cirrhosis, Prostate CA s/p RRP 2003 and radiation 2016, HFpEF, Radiation Cystitis, BENIGNO , L3 compression fracture who presented with worsening gross hematuria and suprapubic pain. Presented from rehab facility. Previously admitted at [facility] from 1/15-22 transferred there from OSH with hematuria and difficulty voiding w/ bladder mass on CT. During hospitalization at [facility], he suffered hypovolemic shock and acute encephalopathy. Patient was seen by Dr. Aubree Mary and underwent cysto/evacuation of clots on 22 and 22 d/t ongoing hematuria/clots. Naveen Alaniscale was advised to follow up with [facility] urology OP but represents to ED with gross hematuria. Wife at bedside. He c/o of intermittent bladder spasms that radiate to lower back associated with burning sensation that self-resolves. He rec'd 1 L NS in ED. Pt had CBI placed for hematuria; on presentation, hgb was 8.1 with subsequent hgb drop to 6.2; transfusion ordered. Subjective (22): \"My legs have been hurting since the beginning of January. \" Pleasant simple 76y.o. WM with wife at bedside, admitting to b/l LE pain and persistent intermittent dysuria despite CBI in place    Review of Systems:  10 systems reviewed and negative except as noted in HPI.       Assessment & Plan:     Principal Problem:    Gross hematuria (2022)  - CBI per urology, with thanks  - hgb 7.4 this AM; s/p transfusion yesterday; jasso bag reveals pink urine this AM, gross hematuria resolved  - CT A/P yesterday without further clots  - suspect etiology is radiation cystitis     Active Problems:    Severe anemia  - hgb from 6.2 ~> 7.4; etiology from gross hematuria  - h/h q6hrs x 48hrs  - transfuse if hgb <7.0 or hemodynamically unstable      B/l LE DVT  - not candidate for anticoagulation given severe anemia / hematuria  - IR consulted to eval for thrombectomy      UTI  - GNR from ucx  - start empiric rocephin  - f/u final results with susceptibilities       Hypokalemia  - supplemented  - check mag levels  - f/u AM BMP      Hx of prostate cancer s/p xrt / radiation cystitis  - ongoing mgmt per urology      Liver cirrhosis / chronic thrombocytopenia  - LFT wnl  - platelet count improved this AM to 71,000; monitor      Hx of HF with preserved EF  - echo 2/1/2022 with normal EF and normal diastolic function      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, without long-term current use of insulin - sliding scale coverage as needed  - closely monitor as glucose ranges low (not on long-acting and not on scheduled pre-meal insulin)      GERD (gastroesophageal reflux disease)  - PPI as dosed      BENIGNO (obstructive sleep apnea) (10/2/2015)  - CPAP qhs        Dispo/Discharge Planning:    - anticipate at least 2 midnight hospitalization    Diet:  ADULT DIET Regular; 4 carb choices (60 gm/meal)  DVT PPx: SCD  Code status: Full Code    Hospital Problems as of 2/2/2022 Date Reviewed: 7/9/2021          Codes Class Noted - Resolved POA    * (Principal) Gross hematuria ICD-10-CM: R31.0  ICD-9-CM: 599.71  1/31/2022 - Present Unknown        Chronic radiation cystitis ICD-10-CM: N30.40  ICD-9-CM: 595.82  1/31/2022 - Present Yes        Functional quadriplegia ([facility] ICD-10-CM: R53.2  ICD-9-CM: 780.72  1/31/2022 - Present Yes        Thrombocytopenia due to sequestration [facility]) ICD-10-CM: D69.6  ICD-9-CM: 287.5  1/31/2022 - Present Yes        Acute blood loss anemia (ABLA) ICD-10-CM: D62  ICD-9-CM: 285.1  1/31/2022 - Present Yes        Hypocortisolism ([facility] ICD-10-CM: E27.49  ICD-9-CM: 255.41  1/31/2022 - Present Yes Hypoglycemia ICD-10-CM: E16.2  ICD-9-CM: 251.2  1/31/2022 - Present Yes        Hypoalbuminemia due to protein-calorie malnutrition ([facility] ICD-10-CM: E88.09, E46  ICD-9-CM: 273.8, 263.9  1/31/2022 - Present Yes        (HFpEF) heart failure with preserved ejection fraction ([facility] ICD-10-CM: I50.30  ICD-9-CM: 428.9  1/19/2022 - Present Yes        Hepatic cirrhosis ([facility] ICD-10-CM: K74.60  ICD-9-CM: 571.5  10/8/2021 - Present Yes    Overview Signed 1/31/2022  4:07 PM by Mamta Hassan DO     Last Assessment & Plan:   Formatting of this note might be different from the original.  Follow-up for edema and ascites  Avoid Tylenol  Check labs on Mondays             Psoriatic arthritis [facility]) ICD-10-CM: L40.50  ICD-9-CM: 696.0  6/23/2017 - Present Yes    Overview Signed 1/31/2022  4:07 PM by Mamta Hassan DO     Last Assessment & Plan:   Formatting of this note might be different from the original.  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  Blood sugars have been in good range without treatment  Very poor appetite  Continue supplements  Elevated BUN  Begin IV fluids today  Check BMP daily  Encourage oral fluids             GERD (gastroesophageal reflux disease) ICD-10-CM: K21.9  ICD-9-CM: 530.81  10/2/2015 - Present Yes    Overview Signed 1/31/2022  4:07 PM by Mamta Hassan DO     Last Assessment & Plan:   Formatting of this note might be different from the original.  Stable, continue current medications and treatment plan             BENIGNO (obstructive sleep apnea) ICD-10-CM: G47.33  ICD-9-CM: 327.23  10/2/2015 - Present Yes    Overview Signed 1/31/2022  4:07 PM by Kenan Reyez,      Formatting of this note might be different from the original.  Dr. Jimy Berger:   Formatting of this note might be different from the original.  Continue CPAP while sleeping                   Objective:     Patient Vitals for the past 24 hrs:   Temp Pulse Resp BP SpO2   02/02/22 0805 98.4 °F (36.9 °C) 67 16 (!) 94/59 99 %   02/02/22 0317 97.8 °F (36.6 °C) 61 18 (!) 86/54 98 %   02/01/22 2315 98.2 °F (36.8 °C) 60 16 117/65 100 %   02/01/22 1946 98.2 °F (36.8 °C) 71 18 (!) 88/54 97 %   02/01/22 1631 97.9 °F (36.6 °C) 69 16 104/60 96 %   02/01/22 1601    118/83    02/01/22 1222 98.1 °F (36.7 °C) 62  103/64      Oxygen Therapy  O2 Sat (%): 99 % (02/02/22 0805)  Pulse via Oximetry: 73 beats per minute (02/01/22 0706)  O2 Device: None (Room air) (02/02/22 0805)    Estimated body mass index is 34.95 kg/m² as calculated from the following:    Height as of this encounter: 5' 5\" (1.651 m). Weight as of this encounter: 95.3 kg (210 lb). Intake/Output Summary (Last 24 hours) at 2/2/2022 1150  Last data filed at 2/2/2022 0830  Gross per 24 hour   Intake 53281 ml   Output 76369 ml   Net -01273 ml         Physical Exam:     Blood pressure (!) 94/59, pulse 67, temperature 98.4 °F (36.9 °C), resp. rate 16, height 5' 5\" (1.651 m), weight 95.3 kg (210 lb), SpO2 99 %. General:    Well nourished. No overt distress  Head:  Normocephalic, atraumatic  Eyes:  Sclerae pale. Pupils equally round. ENT:  Nares appear normal, no drainage. Moist oral mucosa  Neck:  No restricted ROM. Trachea midline   CV:   RRR. No m/r/g. No jugular venous distension. Lungs:   CTAB. No wheezing, rhonchi, or rales. Respirations even, unlabored  Abdomen: Bowel sounds present. Soft, nontender, nondistended. Extremities: No cyanosis or clubbing. B/l LE edema with tenderness to palpation  Skin:     No rashes and normal coloration. Warm and dry. Neuro:  CN II-XII grossly intact.    Sensation intact. A&Ox3  Psych:  Normal mood and affect. I have reviewed ordered lab tests and independently visualized imaging below:    Recent Labs:  Recent Results (from the past 48 hour(s))   CBC WITH AUTOMATED DIFF    Collection Time: 01/31/22  2:15 PM   Result Value Ref Range    WBC 6.1 4.3 - 11.1 K/uL    RBC 2.67 (L) 4.23 - 5.6 M/uL    HGB 8.1 (L) 13.6 - 17.2 g/dL    HCT 24.4 (L) 41.1 - 50.3 %    MCV 91.4 79.6 - 97.8 FL    MCH 30.3 26.1 - 32.9 PG    MCHC 33.2 31.4 - 35.0 g/dL    RDW 15.9 (H) 11.9 - 14.6 %    PLATELET 84 (L) 913 - 450 K/uL    MPV 9.3 (L) 9.4 - 12.3 FL    ABSOLUTE NRBC 0.00 0.0 - 0.2 K/uL    DF AUTOMATED      NEUTROPHILS 72 43 - 78 %    LYMPHOCYTES 14 13 - 44 %    MONOCYTES 8 4.0 - 12.0 %    EOSINOPHILS 4 0.5 - 7.8 %    BASOPHILS 1 0.0 - 2.0 %    IMMATURE GRANULOCYTES 1 0.0 - 5.0 %    ABS. NEUTROPHILS 4.4 1.7 - 8.2 K/UL    ABS. LYMPHOCYTES 0.9 0.5 - 4.6 K/UL    ABS. MONOCYTES 0.5 0.1 - 1.3 K/UL    ABS. EOSINOPHILS 0.3 0.0 - 0.8 K/UL    ABS. BASOPHILS 0.0 0.0 - 0.2 K/UL    ABS. IMM. GRANS. 0.0 0.0 - 0.5 K/UL   METABOLIC PANEL, COMPREHENSIVE    Collection Time: 01/31/22  2:15 PM   Result Value Ref Range    Sodium 137 136 - 145 mmol/L    Potassium 3.8 3.5 - 5.1 mmol/L    Chloride 105 98 - 107 mmol/L    CO2 28 21 - 32 mmol/L    Anion gap 4 (L) 7 - 16 mmol/L    Glucose 67 65 - 100 mg/dL    BUN 14 8 - 23 MG/DL    Creatinine 0.81 0.8 - 1.5 MG/DL    GFR est AA >60 >60 ml/min/1.73m2    GFR est non-AA >60 >60 ml/min/1.73m2    Calcium 8.3 8.3 - 10.4 MG/DL    Bilirubin, total 0.8 0.2 - 1.1 MG/DL    ALT (SGPT) 26 12 - 65 U/L    AST (SGOT) 37 15 - 37 U/L    Alk.  phosphatase 79 50 - 136 U/L    Protein, total 4.4 (L) 6.3 - 8.2 g/dL    Albumin 2.2 (L) 3.2 - 4.6 g/dL    Globulin 2.2 (L) 2.3 - 3.5 g/dL    A-G Ratio 1.0 (L) 1.2 - 3.5     PROTHROMBIN TIME + INR    Collection Time: 01/31/22  2:15 PM   Result Value Ref Range    Prothrombin time 16.9 (H) 12.6 - 14.5 sec    INR 1.4     URINALYSIS W/ RFLX MICROSCOPIC Collection Time: 01/31/22  2:32 PM   Result Value Ref Range    Color RED      Appearance TURBID      Specific gravity 1.020 1.001 - 1.023      pH (UA) 7.0 5.0 - 9.0      Protein 100 (A) NEG mg/dL    Glucose Negative mg/dL    Ketone Negative NEG mg/dL    Bilirubin Negative NEG      Blood LARGE (A) NEG      Urobilinogen 0.2 0.2 - 1.0 EU/dL    Nitrites Negative NEG      Leukocyte Esterase SMALL (A) NEG     URINE MICROSCOPIC    Collection Time: 01/31/22  2:32 PM   Result Value Ref Range    WBC 10-20 0 /hpf    RBC >100 0 /hpf    Epithelial cells 0-3 0 /hpf    Bacteria TRACE 0 /hpf    Casts 0 0 /lpf    Crystals, urine 0 0 /LPF    Mucus 0 0 /lpf    Other observations RESULTS VERIFIED MANUALLY     CULTURE, URINE    Collection Time: 01/31/22  4:15 PM    Specimen: Cath Urine   Result Value Ref Range    Special Requests: NO SPECIAL REQUESTS      Culture result: (A)       >100,000 COLONIES/mL GRAM NEGATIVE RODS SUBCULTURE IN PROGRESS    Culture result: (A)       10,000 to 50,000 COLONIES/mL YEAST SUBCULTURE IN PROGRESS   GLUCOSE, POC    Collection Time: 01/31/22  7:35 PM   Result Value Ref Range    Glucose (POC) 70 65 - 100 mg/dL    Performed by [employer]    GLUCOSE, POC    Collection Time: 02/01/22  1:02 AM   Result Value Ref Range    Glucose (POC) 66 65 - 100 mg/dL    Performed by [employer]    GLUCOSE, POC    Collection Time: 02/01/22  2:04 AM   Result Value Ref Range    Glucose (POC) 79 65 - 100 mg/dL    Performed by Jaime    METABOLIC PANEL, COMPREHENSIVE    Collection Time: 02/01/22  4:48 AM   Result Value Ref Range    Sodium 140 138 - 145 mmol/L    Potassium 3.0 (L) 3.5 - 5.1 mmol/L    Chloride 110 (H) 98 - 107 mmol/L    CO2 23 21 - 32 mmol/L    Anion gap 7 7 - 16 mmol/L    Glucose 67 65 - 100 mg/dL    BUN 12 8 - 23 MG/DL    Creatinine 0.62 (L) 0.8 - 1.5 MG/DL    GFR est AA >60 >60 ml/min/1.73m2    GFR est non-AA >60 >60 ml/min/1.73m2    Calcium 7.6 (L) 8.3 - 10.4 MG/DL    Bilirubin, total 0.7 0.2 - 1.1 MG/DL    ALT (SGPT) 18 12 - 65 U/L    AST (SGOT) 25 15 - 37 U/L    Alk. phosphatase 57 50 - 136 U/L    Protein, total 4.1 (L) 6.3 - 8.2 g/dL    Albumin 2.1 (L) 3.2 - 4.6 g/dL    Globulin 2.0 (L) 2.3 - 3.5 g/dL    A-G Ratio 1.1 (L) 1.2 - 3.5     CBC W/O DIFF    Collection Time: 02/01/22  4:48 AM   Result Value Ref Range    WBC 2.5 (L) 4.3 - 11.1 K/uL    RBC 2.08 (L) 4.23 - 5.6 M/uL    HGB 6.2 (LL) 13.6 - 17.2 g/dL    HCT 19.3 (L) 41.1 - 50.3 %    MCV 92.8 79.6 - 97.8 FL    MCH 29.8 26.1 - 32.9 PG    MCHC 32.1 31.4 - 35.0 g/dL    RDW 15.6 (H) 11.9 - 14.6 %    PLATELET 53 (L) 852 - 450 K/uL    MPV 8.9 (L) 9.4 - 12.3 FL    ABSOLUTE NRBC 0.00 0.0 - 0.2 K/uL   PROTHROMBIN TIME + INR    Collection Time: 02/01/22  4:48 AM   Result Value Ref Range    Prothrombin time 18.0 (H) 12.6 - 14.5 sec    INR 1.5     VITAMIN B12    Collection Time: 02/01/22  4:48 AM   Result Value Ref Range    Vitamin B12 1,336 (H) 193 - 986 pg/mL   FOLATE    Collection Time: 02/01/22  4:48 AM   Result Value Ref Range    Folate 21.2 (H) 3.1 - 17.5 ng/mL   TYPE & SCREEN    Collection Time: 02/01/22  5:39 AM   Result Value Ref Range    Crossmatch Expiration 02/04/2022,6007     ABO/Rh(D) Kwabena Vallejo POSITIVE     Antibody screen NEG     Comment NOTIFIED SHLEBY AT 0640 BLOOD WAS READY ACL     Unit number [id number]     Blood component type RC LR     Unit division 00     Status of unit TRANSFUSED     Crossmatch result Compatible    RBC, ALLOCATE    Collection Time: 02/01/22  5:45 AM   Result Value Ref Range    HISTORY CHECKED?  Historical check performed    GLUCOSE, POC    Collection Time: 02/01/22  9:14 AM   Result Value Ref Range    Glucose (POC) 73 65 - 100 mg/dL    Performed by Betina Cisneros, POC    Collection Time: 02/01/22 11:59 AM   Result Value Ref Range    Glucose (POC) 67 65 - 100 mg/dL    Performed by Guillermina    HGB & HCT    Collection Time: 02/01/22  2:10 PM   Result Value Ref Range    HGB 8.0 (L) 13.6 - 17.2 g/dL    HCT 24.6 (L) 41.1 - 50.3 %   AMMONIA    Collection Time: 02/01/22  2:10 PM   Result Value Ref Range    Ammonia 27 11 - 32 UMOL/L   ECHO ADULT COMPLETE    Collection Time: 02/01/22  3:50 PM   Result Value Ref Range    LV EDV A4C 144 mL    LV ESV A4C 55 mL    LVOT Diameter 2.2 cm    LVOT Mean Gradient 3 mmHg    LVOT VTI 25.5 cm    LVOT Peak Velocity 1.1 m/s    LVOT Peak Gradient 5 mmHg    LV E' Lateral Velocity 15 cm/s    LV E' Septal Velocity 11 cm/s    LV Ejection Fraction A4C 62 %    LVOT Area 3.8 cm2    LVOT SV 96.9 ml    LA Major Axis 6.3 cm    LA Area 4C 21.7 cm2    AV Mean Velocity 1.2 m/s    AV Mean Gradient 6 mmHg    AV VTI 34.9 cm    AV Peak Velocity 1.6 m/s    AV Peak Gradient 10 mmHg    AV Area by VTI 2.8 cm2    AV Area by Peak Velocity 2.6 cm2    Aortic Root 3.8 cm    IVC Proxmal 2.5 cm    MV E Wave Deceleration Time 169.0 ms    MV A Velocity 0.82 m/s    MV E Velocity 1.03 m/s    TAPSE 2.1 (A) 1.5 - 2.0 cm    LV ESV Index A4C 27 mL/m2    LV EDV Index A4C 71 mL/m2    MV E/A 1.26     E/E' Ratio (Averaged) 8.12     E/E' Lateral 6.87     E/E' Septal 9.36     LVOT Stroke Volume Index 48.0 mL/m2    Ao Root Index 1.88 cm/m2    AV Velocity Ratio 0.69     LVOT:AV VTI Index 0.73     OSMEL/BSA VTI 1.4 cm2/m2    OSMEL/BSA Peak Velocity 1.3 cm2/m2   GLUCOSE, POC    Collection Time: 02/01/22  6:02 PM   Result Value Ref Range    Glucose (POC) 104 (H) 65 - 100 mg/dL    Performed by Guillermina    HGB & HCT    Collection Time: 02/01/22  7:39 PM   Result Value Ref Range    HGB 7.3 (L) 13.6 - 17.2 g/dL    HCT 22.5 (L) 41.1 - 50.3 %   GLUCOSE, POC    Collection Time: 02/01/22  8:46 PM   Result Value Ref Range    Glucose (POC) 72 65 - 100 mg/dL    Performed by El    GLUCOSE, POC    Collection Time: 02/02/22  7:31 AM   Result Value Ref Range    Glucose (POC) 66 65 - 100 mg/dL    Performed by Arian    PROTHROMBIN TIME + INR    Collection Time: 02/02/22 10:17 AM   Result Value Ref Range Prothrombin time 18.2 (H) 12.6 - 14.5 sec    INR 1.5     CBC WITH AUTOMATED DIFF    Collection Time: 02/02/22 10:17 AM   Result Value Ref Range    WBC 3.3 (L) 4.3 - 11.1 K/uL    RBC 2.50 (L) 4.23 - 5.6 M/uL    HGB 7.4 (L) 13.6 - 17.2 g/dL    HCT 23.1 (L) 41.1 - 50.3 %    MCV 92.4 79.6 - 97.8 FL    MCH 29.6 26.1 - 32.9 PG    MCHC 32.0 31.4 - 35.0 g/dL    RDW 15.9 (H) 11.9 - 14.6 %    PLATELET 71 (L) 518 - 450 K/uL    MPV 8.8 (L) 9.4 - 12.3 FL    ABSOLUTE NRBC 0.00 0.0 - 0.2 K/uL    DF AUTOMATED      NEUTROPHILS 65 43 - 78 %    LYMPHOCYTES 17 13 - 44 %    MONOCYTES 8 4.0 - 12.0 %    EOSINOPHILS 8 (H) 0.5 - 7.8 %    BASOPHILS 1 0.0 - 2.0 %    IMMATURE GRANULOCYTES 0 0.0 - 5.0 %    ABS. NEUTROPHILS 2.2 1.7 - 8.2 K/UL    ABS. LYMPHOCYTES 0.6 0.5 - 4.6 K/UL    ABS. MONOCYTES 0.3 0.1 - 1.3 K/UL    ABS. EOSINOPHILS 0.3 0.0 - 0.8 K/UL    ABS. BASOPHILS 0.0 0.0 - 0.2 K/UL    ABS. IMM. GRANS. 0.0 0.0 - 0.5 K/UL   METABOLIC PANEL, COMPREHENSIVE    Collection Time: 02/02/22 10:17 AM   Result Value Ref Range    Sodium 139 138 - 145 mmol/L    Potassium 3.8 3.5 - 5.1 mmol/L    Chloride 109 (H) 98 - 107 mmol/L    CO2 26 21 - 32 mmol/L    Anion gap 4 (L) 7 - 16 mmol/L    Glucose 87 65 - 100 mg/dL    BUN 12 8 - 23 MG/DL    Creatinine 0.70 (L) 0.8 - 1.5 MG/DL    GFR est AA >60 >60 ml/min/1.73m2    GFR est non-AA >60 >60 ml/min/1.73m2    Calcium 8.6 8.3 - 10.4 MG/DL    Bilirubin, total 0.9 0.2 - 1.1 MG/DL    ALT (SGPT) 20 12 - 65 U/L    AST (SGOT) 28 15 - 37 U/L    Alk.  phosphatase 62 50 - 136 U/L    Protein, total 4.6 (L) 6.3 - 8.2 g/dL    Albumin 2.5 (L) 3.2 - 4.6 g/dL    Globulin 2.1 (L) 2.3 - 3.5 g/dL    A-G Ratio 1.2 1.2 - 3.5         All Micro Results     Procedure Component Value Units Date/Time    CULTURE, URINE [[id number]]  (Abnormal) Collected: 01/31/22 1615    Order Status: Completed Specimen: Cath Urine Updated: 02/02/22 0805     Special Requests: NO SPECIAL REQUESTS        Culture result:       >100,000 COLONIES/mL Nelli Masterson NEGATIVE RODS SUBCULTURE IN PROGRESS                  10,000 to 50,000 COLONIES/mL YEAST SUBCULTURE IN PROGRESS                Other Studies:  ECHO ADULT COMPLETE    Result Date: 2/1/2022    Left Ventricle: Left ventricle size is normal. Normal wall thickness. Normal wall motion. Normal left ventricular systolic function with a visually estimated EF of 55 - 60%. Normal diastolic function.   Aortic Valve: Mild sclerosis of the aortic valve cusps.   Left Atrium: Left atrium is mildly dilated.   Technical qualifiers: Echo study was technically difficult with poor endocardial visualization.   Contrast used: Definity. Current Meds:  Current Facility-Administered Medications   Medication Dose Route Frequency    ascorbic acid (vitamin C) (VITAMIN C) tablet 500 mg  500 mg Oral DAILY    fenofibrate (LOFIBRA) tablet 160 mg  160 mg Oral DAILY    atorvastatin (LIPITOR) tablet 40 mg  40 mg Oral QHS    0.9% sodium chloride infusion 250 mL  250 mL IntraVENous PRN    pantoprazole (PROTONIX) tablet 40 mg  40 mg Oral ACB    sodium chloride (NS) flush 5-40 mL  5-40 mL IntraVENous Q8H    sodium chloride (NS) flush 5-40 mL  5-40 mL IntraVENous PRN    acetaminophen (TYLENOL) tablet 650 mg  650 mg Oral Q4H PRN    oxyCODONE IR (ROXICODONE) tablet 5 mg  5 mg Oral Q6H PRN    opium-belladonna (B&O 16-A SUPPRETTE) 16.2-60 mg suppository 1 Suppository  1 Suppository Rectal Q12H PRN    ondansetron (ZOFRAN) injection 4 mg  4 mg IntraVENous Q4H PRN    naloxone (NARCAN) injection 0.4 mg  0.4 mg IntraVENous PRN    dextrose 40% (GLUTOSE) oral gel 1 Tube  15 g Oral PRN    glucagon (GLUCAGEN) injection 1 mg  1 mg IntraMUSCular PRN    dextrose 10% infusion 125-250 mL  125-250 mL IntraVENous PRN    insulin lispro (HUMALOG) injection   SubCUTAneous AC&HS       Signed:  LAUREN Davalos    Part of this note may have been written by using a voice dictation software.   The note has been proof read but may still contain some grammatical/other typographical errors.

## 2022-02-02 NOTE — PROGRESS NOTES
Interventional Radiology Inpatient Communication    Interventional Radiology has received a consult for IVC filter placement. Any diagnostic labs to be performed on collected specimen must be entered into [facility] prior to transport to Interventional Radiology. We anticipate this procedure will be completed on 02/03/2022. Primary Care Nurse:  Lenin Rosario RN    Please ensure the following is completed:    Patient is NPO: yes    Blood thinners held: yes    Patient must have working IV: yes    Patient must be in a hospital gown with snaps and be transported via stretcher to Interventional Radiology. Please send hospital chart and labels. If the patient is unable to provide consent for the procedure, please contact Interventional Radiology at [phone].

## 2022-02-02 NOTE — PROGRESS NOTES
CM met with pt and his spouse this AM in [facility] overflow. Pt with bilateral LE DVTs diagnosed on 2/1 upon admission. Pt has h/o covid diagnosed in Jan.  CASEY in contact with Beth at [facility].

## 2022-02-02 NOTE — ROUTINE PROCESS
Bedside and Verbal shift change report given to Kailee Alex RN  (oncoming nurse) by Song Alejandre RN  (offgoing nurse). Report included the following information SBAR, Kardex, ED Summary, Procedure Summary, Intake/Output, MAR, Recent Results, Med Rec Status and Quality Measures.

## 2022-02-02 NOTE — PROGRESS NOTES
Admit Date: 1/31/2022    Subjective:     Dakota Rossi is awake/alert. CBI at slow drip, urine clear but with occasional clot, pt reports pain last night. Hgb 7.4 sp PRBC. CT AP 2/1 did NOT show clot in the bladder.     Objective:     Patient Vitals for the past 8 hrs:   BP Temp Pulse Resp SpO2   02/02/22 0805 (!) 94/59 98.4 °F (36.9 °C) 67 16 99 %     02/02 0701 - 02/02 1900  In: 4000   Out: 3784 [Urine:3220]  01/31 1901 - 02/02 0700  In: 0604 [I.V.:100]  Out: 25888 [Urine:01424]    Physical Exam:  GENERAL: alert, cooperative, no distress  LUNG: clear to auscultation bilaterally  HEART: regular rate and rhythm, S1, S2   ABDOMEN: soft, non-tender  NEUROLOGIC: AOx3    Data Review   Recent Results (from the past 24 hour(s))   GLUCOSE, POC    Collection Time: 02/01/22 11:59 AM   Result Value Ref Range    Glucose (POC) 67 65 - 100 mg/dL    Performed by Guillermina    HGB & HCT    Collection Time: 02/01/22  2:10 PM   Result Value Ref Range    HGB 8.0 (L) 13.6 - 17.2 g/dL    HCT 24.6 (L) 41.1 - 50.3 %   AMMONIA    Collection Time: 02/01/22  2:10 PM   Result Value Ref Range    Ammonia 27 11 - 32 UMOL/L   ECHO ADULT COMPLETE    Collection Time: 02/01/22  3:50 PM   Result Value Ref Range    LV EDV A4C 144 mL    LV ESV A4C 55 mL    LVOT Diameter 2.2 cm    LVOT Mean Gradient 3 mmHg    LVOT VTI 25.5 cm    LVOT Peak Velocity 1.1 m/s    LVOT Peak Gradient 5 mmHg    LV E' Lateral Velocity 15 cm/s    LV E' Septal Velocity 11 cm/s    LV Ejection Fraction A4C 62 %    LVOT Area 3.8 cm2    LVOT SV 96.9 ml    LA Major Axis 6.3 cm    LA Area 4C 21.7 cm2    AV Mean Velocity 1.2 m/s    AV Mean Gradient 6 mmHg    AV VTI 34.9 cm    AV Peak Velocity 1.6 m/s    AV Peak Gradient 10 mmHg    AV Area by VTI 2.8 cm2    AV Area by Peak Velocity 2.6 cm2    Aortic Root 3.8 cm    IVC Proxmal 2.5 cm    MV E Wave Deceleration Time 169.0 ms    MV A Velocity 0.82 m/s    MV E Velocity 1.03 m/s    TAPSE 2.1 (A) 1.5 - 2.0 cm    LV ESV Index A4C 27 mL/m2    LV EDV Index A4C 71 mL/m2    MV E/A 1.26     E/E' Ratio (Averaged) 8.12     E/E' Lateral 6.87     E/E' Septal 9.36     LVOT Stroke Volume Index 48.0 mL/m2    Ao Root Index 1.88 cm/m2    AV Velocity Ratio 0.69     LVOT:AV VTI Index 0.73     OSMEL/BSA VTI 1.4 cm2/m2    OSMEL/BSA Peak Velocity 1.3 cm2/m2   GLUCOSE, POC    Collection Time: 02/01/22  6:02 PM   Result Value Ref Range    Glucose (POC) 104 (H) 65 - 100 mg/dL    Performed by Guillermina    HGB & HCT    Collection Time: 02/01/22  7:39 PM   Result Value Ref Range    HGB 7.3 (L) 13.6 - 17.2 g/dL    HCT 22.5 (L) 41.1 - 50.3 %   GLUCOSE, POC    Collection Time: 02/01/22  8:46 PM   Result Value Ref Range    Glucose (POC) 72 65 - 100 mg/dL    Performed by El    GLUCOSE, POC    Collection Time: 02/02/22  7:31 AM   Result Value Ref Range    Glucose (POC) 66 65 - 100 mg/dL    Performed by Arian    PROTHROMBIN TIME + INR    Collection Time: 02/02/22 10:17 AM   Result Value Ref Range    Prothrombin time 18.2 (H) 12.6 - 14.5 sec    INR 1.5     CBC WITH AUTOMATED DIFF    Collection Time: 02/02/22 10:17 AM   Result Value Ref Range    WBC 3.3 (L) 4.3 - 11.1 K/uL    RBC 2.50 (L) 4.23 - 5.6 M/uL    HGB 7.4 (L) 13.6 - 17.2 g/dL    HCT 23.1 (L) 41.1 - 50.3 %    MCV 92.4 79.6 - 97.8 FL    MCH 29.6 26.1 - 32.9 PG    MCHC 32.0 31.4 - 35.0 g/dL    RDW 15.9 (H) 11.9 - 14.6 %    PLATELET 71 (L) 411 - 450 K/uL    MPV 8.8 (L) 9.4 - 12.3 FL    ABSOLUTE NRBC 0.00 0.0 - 0.2 K/uL    DF AUTOMATED      NEUTROPHILS 65 43 - 78 %    LYMPHOCYTES 17 13 - 44 %    MONOCYTES 8 4.0 - 12.0 %    EOSINOPHILS 8 (H) 0.5 - 7.8 %    BASOPHILS 1 0.0 - 2.0 %    IMMATURE GRANULOCYTES 0 0.0 - 5.0 %    ABS. NEUTROPHILS 2.2 1.7 - 8.2 K/UL    ABS. LYMPHOCYTES 0.6 0.5 - 4.6 K/UL    ABS. MONOCYTES 0.3 0.1 - 1.3 K/UL    ABS. EOSINOPHILS 0.3 0.0 - 0.8 K/UL    ABS. BASOPHILS 0.0 0.0 - 0.2 K/UL    ABS. IMM.  GRANS. 0.0 0.0 - 0.5 K/UL   METABOLIC PANEL, COMPREHENSIVE Collection Time: 02/02/22 10:17 AM   Result Value Ref Range    Sodium 139 138 - 145 mmol/L    Potassium 3.8 3.5 - 5.1 mmol/L    Chloride 109 (H) 98 - 107 mmol/L    CO2 26 21 - 32 mmol/L    Anion gap 4 (L) 7 - 16 mmol/L    Glucose 87 65 - 100 mg/dL    BUN 12 8 - 23 MG/DL    Creatinine 0.70 (L) 0.8 - 1.5 MG/DL    GFR est AA >60 >60 ml/min/1.73m2    GFR est non-AA >60 >60 ml/min/1.73m2    Calcium 8.6 8.3 - 10.4 MG/DL    Bilirubin, total 0.9 0.2 - 1.1 MG/DL    ALT (SGPT) 20 12 - 65 U/L    AST (SGOT) 28 15 - 37 U/L    Alk.  phosphatase 62 50 - 136 U/L    Protein, total 4.6 (L) 6.3 - 8.2 g/dL    Albumin 2.5 (L) 3.2 - 4.6 g/dL    Globulin 2.1 (L) 2.3 - 3.5 g/dL    A-G Ratio 1.2 1.2 - 3.5         Assessment:     Principal Problem:    Gross hematuria (1/31/2022)    Active Problems:    (HFpEF) heart failure with preserved ejection fraction (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 (10/2/2015)      Overview: Last Assessment & Plan:       Formatting of this note might be different from the original.      Blood sugars have been in good range without treatment      Very poor appetite      Continue supplements      Elevated BUN      Begin IV fluids today      Check BMP daily      Encourage oral fluids      GERD (gastroesophageal reflux disease) (10/2/2015)      Overview: Last Assessment & Plan:       Formatting of this note might be different from the original.      Stable, continue current medications and treatment plan      Hepatic cirrhosis ([facility] (10/8/2021)      Overview: Last Assessment & Plan:       Formatting of this note might be different from the original.      Follow-up for edema and ascites      Avoid Tylenol      Check labs on Mondays      BENIGNO (obstructive sleep apnea) (10/2/2015)      Overview: Formatting of this note might be different from the original.      Dr. Vivek Valentin: Formatting of this note might be different from the original.      Continue CPAP while sleeping      Psoriatic arthritis ([address] (6/23/2017)      Overview: Last Assessment & Plan:       Formatting of this note might be different from the original.      Continue his current medications      Pain much improved      Chronic radiation cystitis (1/31/2022)      Functional quadriplegia ([address] (1/31/2022)      Thrombocytopenia due to sequestration ([address] (1/31/2022)      Acute blood loss anemia (ABLA) (1/31/2022)      Hypocortisolism ([address] (1/31/2022)      Hypoglycemia (1/31/2022)      Hypoalbuminemia due to protein-calorie malnutrition ([address] (1/31/2022)      67 y.o. male who presents with gross hematuria via 3-way jasso catheter and SP pain. Recently underwent cysto/evacuation of clots on 1/21/22 and 1/25/22 d/t ongoing hematuria/clots at [facility]. Path showed no malignancy, +inflammation/radiation cystitis. They consulted hyperbaric oxygen therapy and pt has not been evaluated yet. He presented to [facility] ER on 1/31 with c/o gross hematuria with 3 way jasso in place. Urine cleared nicely with manual irrigation and only few clots noted. He had drop in hgb and therefore CT AP was ordered and showed no evidence of clot in the bladder per report. Todays hgb is 7.4. He reports pain in his catheter last night, has occasional clot in the tubing. He has hx of prostate cancer s/p RRP 5/2003 with BCR and s/p radiation in 5/2016. Has ongoing issues with radiation cystitis and hematuria. Plan: Will manually irrigate his catheter to ensure no clot retention. Otherwise, we will continue CBI and wean. Addendum:  Manually irrigated x2 with equal return of clear urine. CBI weaned to off. Will monitor. RN to reconnect if he develops hematuria/clot retention. RN aware that he can manually irrigate PRN if needed as well.         Serena Gabriel, NP  [facility]    Urology Attending Physician Note:     [facility] Date: 1/31/2022    Subjective:     Catheter clear pink off CBI. Has blood clots in LE - IR consulted. Hb 7.4 - stable after blood transfusion.      Objective:     Patient Vitals for the past 8 hrs:   BP Temp Pulse Resp SpO2   02/02/22 1230 107/62 98.4 °F (36.9 °C) 74 20 99 %   02/02/22 0805 (!) 94/59 98.4 °F (36.9 °C) 67 16 99 %     02/02 0701 - 02/02 1900  In: 4000   Out: 8000 [Urine:3220]  01/31 1901 - 02/02 0700  In: 2830 [I.V.:100]  Out: 24061 [Urine:77922]    Physical Exam:   Visit Vitals  /62   Pulse 74   Temp 98.4 °F (36.9 °C)   Resp 20   Ht 5' 5\" (1.651 m)   Wt 210 lb (95.3 kg)   SpO2 99%   BMI 34.95 kg/m²        GENERAL: No acute distress, Awake, Alert, Oriented X 3, Gait normal  CARDIAC: regular rate and rhythm  CHEST AND LUNG: Easy work of breathing, clear to auscultation bilaterally, no cyanosis  ABDOMEN: soft, non tender, non-distended, positive bowel sounds, no organomegaly, no palpable masses, no guarding, no rebound tenderness  : Clear pink jasso urine off CBI          Data Review   Recent Results (from the past 24 hour(s))   HGB & HCT    Collection Time: 02/01/22  2:10 PM   Result Value Ref Range    HGB 8.0 (L) 13.6 - 17.2 g/dL    HCT 24.6 (L) 41.1 - 50.3 %   AMMONIA    Collection Time: 02/01/22  2:10 PM   Result Value Ref Range    Ammonia 27 11 - 32 UMOL/L   ECHO ADULT COMPLETE    Collection Time: 02/01/22  3:50 PM   Result Value Ref Range    LV EDV A4C 144 mL    LV ESV A4C 55 mL    LVOT Diameter 2.2 cm    LVOT Mean Gradient 3 mmHg    LVOT VTI 25.5 cm    LVOT Peak Velocity 1.1 m/s    LVOT Peak Gradient 5 mmHg    LV E' Lateral Velocity 15 cm/s    LV E' Septal Velocity 11 cm/s    LV Ejection Fraction A4C 62 %    LVOT Area 3.8 cm2    LVOT SV 96.9 ml    LA Major Axis 6.3 cm    LA Area 4C 21.7 cm2    AV Mean Velocity 1.2 m/s    AV Mean Gradient 6 mmHg    AV VTI 34.9 cm    AV Peak Velocity 1.6 m/s    AV Peak Gradient 10 mmHg    AV Area by VTI 2.8 cm2    AV Area by Peak Velocity 2.6 cm2    Aortic Root 3.8 cm    IVC Proxmal 2.5 cm    MV E Wave Deceleration Time 169.0 ms    MV A Velocity 0.82 m/s    MV E Velocity 1.03 m/s    TAPSE 2.1 (A) 1.5 - 2.0 cm    LV ESV Index A4C 27 mL/m2    LV EDV Index A4C 71 mL/m2    MV E/A 1.26     E/E' Ratio (Averaged) 8.12     E/E' Lateral 6.87     E/E' Septal 9.36     LVOT Stroke Volume Index 48.0 mL/m2    Ao Root Index 1.88 cm/m2    AV Velocity Ratio 0.69     LVOT:AV VTI Index 0.73     OSMEL/BSA VTI 1.4 cm2/m2    OSMEL/BSA Peak Velocity 1.3 cm2/m2   GLUCOSE, POC    Collection Time: 02/01/22  6:02 PM   Result Value Ref Range    Glucose (POC) 104 (H) 65 - 100 mg/dL    Performed by Guillermina    HGB & HCT    Collection Time: 02/01/22  7:39 PM   Result Value Ref Range    HGB 7.3 (L) 13.6 - 17.2 g/dL    HCT 22.5 (L) 41.1 - 50.3 %   GLUCOSE, POC    Collection Time: 02/01/22  8:46 PM   Result Value Ref Range    Glucose (POC) 72 65 - 100 mg/dL    Performed by El    GLUCOSE, POC    Collection Time: 02/02/22  7:31 AM   Result Value Ref Range    Glucose (POC) 66 65 - 100 mg/dL    Performed by Arian    PROTHROMBIN TIME + INR    Collection Time: 02/02/22 10:17 AM   Result Value Ref Range    Prothrombin time 18.2 (H) 12.6 - 14.5 sec    INR 1.5     CBC WITH AUTOMATED DIFF    Collection Time: 02/02/22 10:17 AM   Result Value Ref Range    WBC 3.3 (L) 4.3 - 11.1 K/uL    RBC 2.50 (L) 4.23 - 5.6 M/uL    HGB 7.4 (L) 13.6 - 17.2 g/dL    HCT 23.1 (L) 41.1 - 50.3 %    MCV 92.4 79.6 - 97.8 FL    MCH 29.6 26.1 - 32.9 PG    MCHC 32.0 31.4 - 35.0 g/dL    RDW 15.9 (H) 11.9 - 14.6 %    PLATELET 71 (L) 266 - 450 K/uL    MPV 8.8 (L) 9.4 - 12.3 FL    ABSOLUTE NRBC 0.00 0.0 - 0.2 K/uL    DF AUTOMATED      NEUTROPHILS 65 43 - 78 %    LYMPHOCYTES 17 13 - 44 %    MONOCYTES 8 4.0 - 12.0 %    EOSINOPHILS 8 (H) 0.5 - 7.8 %    BASOPHILS 1 0.0 - 2.0 %    IMMATURE GRANULOCYTES 0 0.0 - 5.0 %    ABS. NEUTROPHILS 2.2 1.7 - 8.2 K/UL    ABS. LYMPHOCYTES 0.6 0.5 - 4.6 K/UL    ABS.  MONOCYTES 0.3 0.1 - 1.3 K/UL    ABS. EOSINOPHILS 0.3 0.0 - 0.8 K/UL    ABS. BASOPHILS 0.0 0.0 - 0.2 K/UL    ABS. IMM. GRANS. 0.0 0.0 - 0.5 K/UL   METABOLIC PANEL, COMPREHENSIVE    Collection Time: 02/02/22 10:17 AM   Result Value Ref Range    Sodium 139 138 - 145 mmol/L    Potassium 3.8 3.5 - 5.1 mmol/L    Chloride 109 (H) 98 - 107 mmol/L    CO2 26 21 - 32 mmol/L    Anion gap 4 (L) 7 - 16 mmol/L    Glucose 87 65 - 100 mg/dL    BUN 12 8 - 23 MG/DL    Creatinine 0.70 (L) 0.8 - 1.5 MG/DL    GFR est AA >60 >60 ml/min/1.73m2    GFR est non-AA >60 >60 ml/min/1.73m2    Calcium 8.6 8.3 - 10.4 MG/DL    Bilirubin, total 0.9 0.2 - 1.1 MG/DL    ALT (SGPT) 20 12 - 65 U/L    AST (SGOT) 28 15 - 37 U/L    Alk.  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      Blood sugars have been in good range without treatment      Very poor appetite      Continue supplements      Elevated BUN      Begin IV fluids today      Check BMP daily      Encourage oral fluids      GERD (gastroesophageal reflux disease) (10/2/2015)      Overview: Last Assessment & Plan:       Formatting of this note might be different from the original.      Stable, continue current medications and treatment plan      Hepatic cirrhosis ([facility] (10/8/2021)      Overview: Last Assessment & Plan: Formatting of this note might be different from the original.      Follow-up for edema and ascites      Avoid Tylenol      Check labs on Mondays      BENIGNO (obstructive sleep apnea) (10/2/2015)      Overview: Formatting of this note might be different from the original.      Dr. Edvin Hamilton:       Formatting of this note might be different from the original.      Continue CPAP while sleeping      Psoriatic arthritis ([address] (6/23/2017)      Overview: Last Assessment & Plan:       Formatting of this note might be different from the original.      Continue his current medications      Pain much improved      Chronic radiation cystitis (1/31/2022)      Functional quadriplegia (HCC) (1/31/2022)      Thrombocytopenia due to sequestration ([address] (1/31/2022)      Acute blood loss anemia (ABLA) (1/31/2022)      Hypocortisolism ([address] (1/31/2022)      Hypoglycemia (1/31/2022)      Hypoalbuminemia due to protein-calorie malnutrition ([address] (1/31/2022)      Admitted for radiation cystitis, anemia, hematuria. Clearing with CBI    Plan:     -Leave off CBI  -Hand irrigate PRN  -No urologic intervention at this time  -REst of care per primary team  -If can maintain clear pink off CBI, will try to remove jasso tomorrow      In addition to my documentation above, I have also reviewed the nurse practitioner note and personally examined the patient. I agree with the HPI, exam, assessment and plan. Hira Larry M.D.     [facility]  [address]  Phone: [phone]  Fax: [phone]

## 2022-02-02 NOTE — ROUTINE PROCESS
Pt BGL 66.  Pt asymptomatic for hypoglycemia. Breakfast tray provided. Will recheck BGL after pt eats.

## 2022-02-03 NOTE — PROGRESS NOTES
ACUTE PHYSICAL THERAPY GOALS:  (Developed with and agreed upon by patient and/or caregiver.)  (1.) Janusz Aguillon  will move from supine to sit and sit to supine  with INDEPENDENCE within 7 treatment day(s). (2.) Janusz Aguillon will transfer from bed to chair and chair to bed with MODIFIED INDEPENDENCE using the least restrictive device within 7 treatment day(s). (3.) Janusz Aguillon will ambulate with MINIMAL ASSIST for 50 feet with the least restrictive device within 7 treatment day(s). (4.) Janusz Aguillon will perform seated static and dynamic balance activities x 20 minutes with INDEPENDENCE to improve safety within 7 treatment day(s). (5.) Janusz Aguillon will perform bilateral lower extremity exercises x 20 min for HEP with INDEPENDENCE to improve strength, endurance, and functional mobility within 7 treatment day(s). PHYSICAL THERAPY: Daily Note and PM Treatment Day # 2    Janusz Aguillon is a 67 y.o. male   PRIMARY DIAGNOSIS: Gross hematuria  Gross hematuria [R31.0]         ASSESSMENT:     REHAB RECOMMENDATIONS: CURRENT LEVEL OF FUNCTION:  (Most Recently Demonstrated)   Recommendation to date pending progress:  Setting:   back to encompass  Equipment:    To Be Determined Bed Mobility:   Minimal Assistance x 2  Sit to Stand:   Minimal Assistance x 2  Transfers:   Not tested  Gait/Mobility:   Minimal Assistance x 2     ASSESSMENT:  Mr. Pancho Kern was received supine in bed, agreeable to therapy. Wife has pt's back brace today. He required Fredrick of 2 to sit at EOB via log roll technique. TLSO donned in sitting. STS practiced x 2, pt required Fredrick of 2 and RW. Pt able to take a few side steps and marching steps in place but further mobility limited by pt fatigue and back pain. Returned to supine with Fredrick of 2. Good progress, will continue to follow.       SUBJECTIVE:   Mr. Pancho Kern states, \"I haven't been out of bed in days\"    SOCIAL HISTORY/ LIVING ENVIRONMENT: see eval  Support Systems: Spouse/Significant Other  OBJECTIVE:     PAIN: VITAL SIGNS: LINES/DRAINS:   Pre Treatment: Pain Screen  Pain Scale 1: Numeric (0 - 10)  Pain Intensity 1: 0  Post Treatment: 0   3 way catheter  O2 Device: None (Room air)     MOBILITY: I Mod I S SBA CGA Min Mod Max Total  NT x2 Comments:   Bed Mobility    Rolling [] [] [] [] [] [x] [] [] [] [] [x]    Supine to Sit [] [] [] [] [] [x] [] [] [] [] [x]    Scooting [] [] [] [] [] [x] [] [] [] [] [x]    Sit to Supine [] [] [] [] [] [x] [] [] [] [] [x]    Transfers    Sit to Stand [] [] [] [] [] [x] [] [] [] [] [x]    Bed to Chair [] [] [] [] [] [] [] [] [] [x] []    Stand to Sit [] [] [] [] [] [x] [] [] [] [] [x]    I=Independent, Mod I=Modified Independent, S=Supervision, SBA=Standby Assistance, CGA=Contact Guard Assistance,   Min=Minimal Assistance, Mod=Moderate Assistance, Max=Maximal Assistance, Total=Total Assistance, NT=Not Tested    BALANCE: Good Fair+ Fair Fair- Poor NT Comments   Sitting Static [] [x] [] [] [] []    Sitting Dynamic [] [] [x] [] [] []              Standing Static [] [] [x] [] [] []    Standing Dynamic [] [] [] [x] [] []      GAIT: I Mod I S SBA CGA Min Mod Max Total  NT x2 Comments:   Level of Assistance [] [] [] [] [] [x] [] [] [] [] [x]    Distance Side steps, marching in place    DME None    Gait Quality Unsteady, fatigues easily    Weightbearing  Status N/A     I=Independent, Mod I=Modified Independent, S=Supervision, SBA=Standby Assistance, CGA=Contact Guard Assistance,   Min=Minimal Assistance, Mod=Moderate Assistance, Max=Maximal Assistance, Total=Total Assistance, NT=Not Tested    PLAN:   FREQUENCY/DURATION: PT Plan of Care: 3 times/week for duration of hospital stay or until stated goals are met, whichever comes first.  TREATMENT:     TREATMENT:   ($$ Therapeutic Activity: 38-52 mins    )  Co-Treatment PT/OT necessary due to patient's decreased overall endurance/tolerance levels, as well as need for high level skilled assistance to complete functional transfers/mobility and functional tasks  Therapeutic Activity (38 Minutes): Therapeutic activity included Rolling, Supine to Sit, Sit to Supine, Scooting, Ambulation on level ground, Sitting balance  and Standing balance to improve functional Mobility, Strength and Activity tolerance.     TREATMENT GRID:  N/A    AFTER TREATMENT POSITION/PRECAUTIONS:  Bed, Needs within reach, RN notified and Visitors at bedside    INTERDISCIPLINARY COLLABORATION:  RN/PCT and OT/MONTOYA    TOTAL TREATMENT DURATION:  PT Patient Time In/Time Out  Time In: 1503  Time Out: [address] PT

## 2022-02-03 NOTE — PROCEDURES
Department of Interventional Radiology  [phone]        Interventional Radiology Brief Procedure Note    Patient: Emerson Lozano MRN: [id number]  SSN: [id number]    YOB: 1945  Age: 67 y.o. Sex: male      Date of Procedure: 2/3/2022    Pre-Procedure Diagnosis: Bi LE DVT, L >> R. Hematuria. ABLA. Post-Procedure Diagnosis: SAME    Procedure(s): Venogram w Retrievable IVCF placement. Brief Description of Procedure: RIJV access. Performed By: Breanna Chowdary MD     Assistants: None    Anesthesia:Moderate Sedation    Estimated Blood Loss: Less than 10ml    Specimens:  None    Implants:  Cook Celect IVCF    Findings: Unremarkable. Complications: None    Recommendations: Elevate HOB. Follow Up: My office will arrange follow up.      Signed By: Breanna Chowdary MD     February 3, 2022

## 2022-02-03 NOTE — PROGRESS NOTES
TRANSFER - OUT REPORT:  Barbara Kohler called me back for report    Verbal report given to Barbara Kohler on Shelley Varghese  being transferred to [facility] for routine progression of care       Report consisted of patients Situation, Background, Assessment and   Recommendations(SBAR). Information from the following report(s) SBAR, Kardex, Procedure Summary and MAR was reviewed with the receiving nurse. Lines:   Peripheral IV 01/31/22 Right Antecubital (Active)   Site Assessment Clean, dry, & intact 02/02/22 2000   Phlebitis Assessment 0 02/02/22 2000   Infiltration Assessment 0 02/02/22 2000   Dressing Status Clean, dry, & intact 02/02/22 2000   Dressing Type Tape;Transparent 02/02/22 2000   Hub Color/Line Status Flushed;Patent; Infusing 02/02/22 2000   Alcohol Cap Used No 02/02/22 2000        Opportunity for questions and clarification was provided.       Patient transported with:   Transport

## 2022-02-03 NOTE — PROGRESS NOTES
Hospitalist Progress Note   Admit Date:  2022 12:32 PM   Name:  Jeannie Younger   Age:  67 y.o. Sex:  male  :  1945   MRN:  [id number]   Room:  [facility]    Presenting Complaint: Blood in Urine    Reason(s) for Admission: Gross hematuria [R31.0]     Hospital Course & Interval History:   Mr. Preciado Do a 67 y. o. male with medical history of Cirrhosis, Prostate CA s/p RRP 2003 and radiation 2016, HFpEF, Radiation Cystitis, BENIGNO , L3 compression fracture who presented with worsening gross hematuria and suprapubic pain. Presented from rehab facility. Previously admitted at [facility] from 1/15-22 transferred there from OSH with hematuria and difficulty voiding w/ bladder mass on CT. During hospitalization at [facility], he suffered hypovolemic shock and acute encephalopathy.  Patient was seen by Dr. Keysha Stearns and underwent cysto/evacuation of clots on 22 and 22 d/t ongoing hematuria/clots.  He was advised to follow up with [facility] urology OP but represents to ER with gross hematuria. Wife at bedside. He c/o of intermittent bladder spasms that radiate to lower back associated with burning sensation that self-resolves. He rec'd 1 L NS in ER.      Pt had CBI placed for hematuria; on presentation, hgb was 8.1 with subsequent hgb drop to 6.2; transfusion ordered. Subjective (22): No AEO. IVC filter placed today by IR. Patient seen afterward and is comfortable in no distress. Spouse at bedside. Follow culture, labs in the morning. Urology recs appreciated.     Assessment & Plan:     Principal Problem:    Gross hematuria (2022)  - suspect etiology is radiation cystitis   - CBI per urology  - s/p transfusion ; gross hematuria resolved  - CT A/P  without further clots  - Urology to add alum to CBI; patient has passed some clots today  - Monitor closely     Active Problems:    ABLA  - hgb from 6.2 ~> 7.4; etiology from gross hematuria  - 7.6 on ; monitor a.m. 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, without long-term current use of insulin   - sliding scale coverage as needed  - closely monitor as glucose ranges low (not on long-acting and not on scheduled pre-meal insulin)       GERD (gastroesophageal reflux disease)  - PPI        BENIGNO (obstructive sleep apnea) (10/2/2015)  - CPAP qhs      Obesity Class 2  - Adds to care complexity      Dispo/Discharge Plannin-3 midnights    Diet:  ADULT DIET Regular; 4 carb choices (60 gm/meal)  DVT PPx: SCD  Code status: Full Code    Hospital Problems as of 2/3/2022 Date Reviewed: 2021          Codes Class Noted - Resolved POA    * (Principal) Gross hematuria ICD-10-CM: R31.0  ICD-9-CM: 599.71  2022 - Present Unknown        Chronic radiation cystitis ICD-10-CM: N30.40  ICD-9-CM: 595.82  2022 - Present Yes        Functional quadriplegia ([facility] ICD-10-CM: R53.2  ICD-9-CM: 780.72  2022 - Present Yes        Thrombocytopenia due to sequestration [facility]) ICD-10-CM: D69.6  ICD-9-CM: 287.5  2022 - Present Yes        Acute blood loss anemia (ABLA) ICD-10-CM: D62  ICD-9-CM: 285.1  2022 - Present Yes        Hypocortisolism ([facility] ICD-10-CM: E27.49  ICD-9-CM: 255.41  2022 - Present Yes        Hypoglycemia ICD-10-CM: E16.2  ICD-9-CM: 251.2  2022 - Present Yes        Hypoalbuminemia due to protein-calorie malnutrition ([facility] ICD-10-CM: E88.09, E46  ICD-9-CM: 273.8, 263.9  1/31/2022 - Present Yes        (HFpEF) heart failure with preserved ejection fraction ([facility] ICD-10-CM: I50.30  ICD-9-CM: 428.9  1/19/2022 - Present Yes        Hepatic cirrhosis ([facility] ICD-10-CM: K74.60  ICD-9-CM: 571.5  10/8/2021 - Present Yes    Overview Signed 1/31/2022  4:07 PM by Meagan Soler, DO     Last Assessment & Plan:   Formatting of this note might be different from the original.  Follow-up for edema and ascites  Avoid Tylenol  Check labs on Mondays             Psoriatic arthritis [facility]) ICD-10-CM: L40.50  ICD-9-CM: 696.0  6/23/2017 - Present Yes    Overview Signed 1/31/2022  4:07 PM by Meagan Soler, DO     Last Assessment & Plan:   Formatting of this note might be different from the original.  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  Blood sugars have been in good range without treatment  Very poor appetite  Continue supplements  Elevated BUN  Begin IV fluids today  Check BMP daily  Encourage oral fluids             GERD (gastroesophageal reflux disease) ICD-10-CM: K21.9  ICD-9-CM: 530.81  10/2/2015 - Present Yes    Overview Signed 1/31/2022  4:07 PM by Meagan Soler, DO     Last Assessment & Plan:   Formatting of this note might be different from the original.  Stable, continue current medications and treatment plan             BENIGNO (obstructive sleep apnea) ICD-10-CM: G47.33  ICD-9-CM: 327.23  10/2/2015 - Present Yes    Overview Signed 1/31/2022  4:07 PM by Meagan Sloer, DO     Formatting of this note might be different from the original.  Dr. Urszula Coronel:   Formatting of this note might be different from the original.  Continue CPAP while sleeping                   Objective:     Patient Vitals for the past 24 hrs:   Temp Pulse Resp BP SpO2   02/03/22 1220 98.5 °F (36.9 °C) 61 18 108/75 99 %   02/03/22 1150  61 16 110/69 94 %   02/03/22 1139  60 16 104/63 96 %   02/03/22 1129  60 16 102/65 96 %   02/03/22 1119  60 16 (!) 101/59 93 %   02/03/22 1109  60 16 (!) 96/54 97 %   02/03/22 1101  (!) 58 11 (!) 90/57 97 %   02/03/22 1056  62 12 (!) 82/56 98 %   02/03/22 1052  67 14 93/62 99 %   02/03/22 1047  61 15 102/60 98 %   02/03/22 1042  69 15 133/68 100 %   02/03/22 1037  64 21 115/67 97 %   02/03/22 1034  79 18  97 %   02/03/22 0759 97.8 °F (36.6 °C) 69 16 116/70 98 %   02/03/22 0332 97.8 °F (36.6 °C) 71 20 107/69 99 %   02/02/22 2252 99 °F (37.2 °C) 71 16 102/71 96 %   02/02/22 1957 98.7 °F (37.1 °C) 71 16 (!) 90/55 96 %     Oxygen Therapy  O2 Sat (%): 99 % (02/03/22 1220)  Pulse via Oximetry: 59 beats per minute (02/03/22 1101)  O2 Device: None (Room air) (02/03/22 1220)  O2 Flow Rate (L/min): 3 l/min (02/03/22 1101)  ETCO2 (mmHg):  (not detecting) (02/03/22 1034)    Estimated body mass index is 39.77 kg/m² as calculated from the following:    Height as of this encounter: 5' 5\" (1.651 m). Weight as of this encounter: 108.4 kg (239 lb). Intake/Output Summary (Last 24 hours) at 2/3/2022 1603  Last data filed at 2/3/2022 1300  Gross per 24 hour   Intake 3650 ml   Output 9250 ml   Net -5600 ml         Physical Exam:   Blood pressure 108/75, pulse 61, temperature 98.5 °F (36.9 °C), resp. rate 18, height 5' 5\" (1.651 m), weight 108.4 kg (239 lb), SpO2 99 %. General:    No overt distress  Head:  Normocephalic, atraumatic  Eyes:  Sclerae appear normal.  Pupils equally round. ENT:  Nares appear normal, no drainage. Moist oral mucosa  Neck:  No restricted ROM. Trachea midline   CV:   RRR. No m/r/g. Lungs: No wheezing. Respirations even, unlabored on room air.   Abdomen: Soft, nondistended. Extremities: No cyanosis or clubbing. Skin:     No rashes and normal coloration. Neuro:  CN II-XII grossly intact. A&Ox3  Psych:  Normal mood and affect. I have reviewed ordered lab tests and independently visualized imaging below:    Recent Labs:  Recent Results (from the past 48 hour(s))   GLUCOSE, POC    Collection Time: 02/01/22  6:02 PM   Result Value Ref Range    Glucose (POC) 104 (H) 65 - 100 mg/dL    Performed by Guillermina    HGB & HCT    Collection Time: 02/01/22  7:39 PM   Result Value Ref Range    HGB 7.3 (L) 13.6 - 17.2 g/dL    HCT 22.5 (L) 41.1 - 50.3 %   GLUCOSE, POC    Collection Time: 02/01/22  8:46 PM   Result Value Ref Range    Glucose (POC) 72 65 - 100 mg/dL    Performed by El    GLUCOSE, POC    Collection Time: 02/02/22  7:31 AM   Result Value Ref Range    Glucose (POC) 66 65 - 100 mg/dL    Performed by Arian    PROTHROMBIN TIME + INR    Collection Time: 02/02/22 10:17 AM   Result Value Ref Range    Prothrombin time 18.2 (H) 12.6 - 14.5 sec    INR 1.5     CBC WITH AUTOMATED DIFF    Collection Time: 02/02/22 10:17 AM   Result Value Ref Range    WBC 3.3 (L) 4.3 - 11.1 K/uL    RBC 2.50 (L) 4.23 - 5.6 M/uL    HGB 7.4 (L) 13.6 - 17.2 g/dL    HCT 23.1 (L) 41.1 - 50.3 %    MCV 92.4 79.6 - 97.8 FL    MCH 29.6 26.1 - 32.9 PG    MCHC 32.0 31.4 - 35.0 g/dL    RDW 15.9 (H) 11.9 - 14.6 %    PLATELET 71 (L) 588 - 450 K/uL    MPV 8.8 (L) 9.4 - 12.3 FL    ABSOLUTE NRBC 0.00 0.0 - 0.2 K/uL    DF AUTOMATED      NEUTROPHILS 65 43 - 78 %    LYMPHOCYTES 17 13 - 44 %    MONOCYTES 8 4.0 - 12.0 %    EOSINOPHILS 8 (H) 0.5 - 7.8 %    BASOPHILS 1 0.0 - 2.0 %    IMMATURE GRANULOCYTES 0 0.0 - 5.0 %    ABS. NEUTROPHILS 2.2 1.7 - 8.2 K/UL    ABS. LYMPHOCYTES 0.6 0.5 - 4.6 K/UL    ABS. MONOCYTES 0.3 0.1 - 1.3 K/UL    ABS. EOSINOPHILS 0.3 0.0 - 0.8 K/UL    ABS. BASOPHILS 0.0 0.0 - 0.2 K/UL    ABS. IMM.  GRANS. 0.0 0.0 - 0.5 K/UL   METABOLIC PANEL, COMPREHENSIVE    Collection Time: 02/02/22 10:17 AM   Result Value Ref Range    Sodium 139 138 - 145 mmol/L    Potassium 3.8 3.5 - 5.1 mmol/L    Chloride 109 (H) 98 - 107 mmol/L    CO2 26 21 - 32 mmol/L    Anion gap 4 (L) 7 - 16 mmol/L    Glucose 87 65 - 100 mg/dL    BUN 12 8 - 23 MG/DL    Creatinine 0.70 (L) 0.8 - 1.5 MG/DL    GFR est AA >60 >60 ml/min/1.73m2    GFR est non-AA >60 >60 ml/min/1.73m2    Calcium 8.6 8.3 - 10.4 MG/DL    Bilirubin, total 0.9 0.2 - 1.1 MG/DL    ALT (SGPT) 20 12 - 65 U/L    AST (SGOT) 28 15 - 37 U/L    Alk.  phosphatase 62 50 - 136 U/L    Protein, total 4.6 (L) 6.3 - 8.2 g/dL    Albumin 2.5 (L) 3.2 - 4.6 g/dL    Globulin 2.1 (L) 2.3 - 3.5 g/dL    A-G Ratio 1.2 1.2 - 3.5     GLUCOSE, POC    Collection Time: 02/02/22 11:58 AM   Result Value Ref Range    Glucose (POC) 84 65 - 100 mg/dL    Performed by Arian    HGB & HCT    Collection Time: 02/02/22  3:43 PM   Result Value Ref Range    HGB 7.8 (L) 13.6 - 17.2 g/dL    HCT 24.2 (L) 41.1 - 50.3 %   PLEASE READ & DOCUMENT PPD TEST IN 24 HRS    Collection Time: 02/02/22  4:30 PM   Result Value Ref Range    PPD Negative Negative    mm Induration 0 0 - 5 mm   GLUCOSE, POC    Collection Time: 02/02/22  5:02 PM   Result Value Ref Range    Glucose (POC) 73 65 - 100 mg/dL    Performed by Arian    GLUCOSE, POC    Collection Time: 02/02/22  9:48 PM   Result Value Ref Range    Glucose (POC) 121 (H) 65 - 100 mg/dL    Performed by [facility]    PLEASE READ & DOCUMENT PPD TEST IN 72 HRS    Collection Time: 02/03/22  6:03 AM   Result Value Ref Range    PPD      mm Induration     GLUCOSE, POC    Collection Time: 02/03/22  6:07 AM   Result Value Ref Range    Glucose (POC) 78 65 - 100 mg/dL    Performed by Aundrea    CBC WITH AUTOMATED DIFF    Collection Time: 02/03/22 12:54 PM   Result Value Ref Range    WBC 3.2 (L) 4.3 - 11.1 K/uL    RBC 2.51 (L) 4.23 - 5.6 M/uL    HGB 7.6 (L) 13.6 - 17.2 g/dL    HCT 23.3 (L) 41.1 - 50.3 %    MCV 92.8 79.6 - 97.8 FL    MCH 30.3 26.1 - 32.9 PG    MCHC 32.6 31.4 - 35.0 g/dL    RDW 15.5 (H) 11.9 - 14.6 %    PLATELET 85 (L) 026 - 450 K/uL    MPV 8.8 (L) 9.4 - 12.3 FL    ABSOLUTE NRBC 0.00 0.0 - 0.2 K/uL    DF AUTOMATED      NEUTROPHILS 61 43 - 78 %    LYMPHOCYTES 20 13 - 44 %    MONOCYTES 9 4.0 - 12.0 %    EOSINOPHILS 9 (H) 0.5 - 7.8 %    BASOPHILS 1 0.0 - 2.0 %    IMMATURE GRANULOCYTES 1 0.0 - 5.0 %    ABS. NEUTROPHILS 1.9 1.7 - 8.2 K/UL    ABS. LYMPHOCYTES 0.6 0.5 - 4.6 K/UL    ABS. MONOCYTES 0.3 0.1 - 1.3 K/UL    ABS. EOSINOPHILS 0.3 0.0 - 0.8 K/UL    ABS. BASOPHILS 0.0 0.0 - 0.2 K/UL    ABS. IMM.  GRANS. 0.0 0.0 - 0.5 K/UL   METABOLIC PANEL, BASIC    Collection Time: 02/03/22 12:54 PM   Result Value Ref Range    Sodium 142 136 - 145 mmol/L    Potassium 3.9 3.5 - 5.1 mmol/L    Chloride 112 (H) 98 - 107 mmol/L    CO2 25 21 - 32 mmol/L    Anion gap 5 (L) 7 - 16 mmol/L    Glucose 66 65 - 100 mg/dL    BUN 10 8 - 23 MG/DL    Creatinine 0.60 (L) 0.8 - 1.5 MG/DL    GFR est AA >60 >60 ml/min/1.73m2    GFR est non-AA >60 >60 ml/min/1.73m2    Calcium 8.8 8.3 - 10.4 MG/DL   GLUCOSE, POC    Collection Time: 02/03/22  1:18 PM   Result Value Ref Range    Glucose (POC) 58 (L) 65 - 100 mg/dL    Performed by Chadwick    GLUCOSE, POC    Collection Time: 02/03/22  1:38 PM   Result Value Ref Range    Glucose (POC) 71 65 - 100 mg/dL    Performed by Bekah Lutz        All Micro Results     Procedure Component Value Units Date/Time    CULTURE, URINE [[id number]]  (Abnormal) Collected: 01/31/22 1615    Order Status: Completed Specimen: Cath Urine Updated: 02/03/22 4609     Special Requests: NO SPECIAL REQUESTS        Culture result:       >100,000 COLONIES/mL GRAM NEGATIVE RODS IDENTIFICATION AND SUSCEPTIBILITY TO FOLLOW                  10,000 to 50,000 COLONIES/mL IDENTIFICATION TO FOLLOW                Other Studies:  IR IVC FILTER    Result Date: 2/3/2022  PROCEDURE:  Retrievable Inferior Vena Cava (IVC) Filter Insertion Procedural Personnel Attending physician(s): Bee Mora M.D. Pre-procedure diagnosis:  Pleasant 70-year-old man with radiation cystitis for the treatment of prostate cancer, now with hematuria requiring continuous bladder irrigation status post 2 recent cystoscopies with clot evacuation. Hematuria is a contraindication to anticoagulation. LEFT lower extremity DVT (femoral vein) and RIGHT lower extremity DVT (peroneal vein). History of hypovolemic shock and acute blood loss anemia in the last month. Bilateral lower extremity pain for over a month likely relates to when the thrombus formed. Congestive heart failure, diabetes mellitus, gastroesophageal reflux disease, cirrhosis, obesity, obstructive sleep apnea, psoriatic arthritis. Post-procedure diagnosis:  Same Indication:  Venous thromboembolism and contraindication to anticoagulation Complications: No immediate complications. Insertion of Cook Celect inferior vena cava filter in the infrarenal IVC. Plan:  One hour bedrest recovery. Office follow-up in about 3 months to reassess and establish need for IVC filter retrieval.  Retrieval intent (MIPS): The filter is potentially retrievable. _______________________________________________________________ PROCEDURE SUMMARY: - IVC filter insertion under fluoroscopic guidance - Additional procedure(s):  Left pelvic and abdominal venography. PROCEDURE DETAILS: Pre-procedure Consent:  Informed written and oral consent for the procedure was obtained after explanation of risks (including, but not limitted to:  hemorrhage, infection, filter fracture, vascular injury) benefits and alternatives. The patient's questions were answered to their satisfaction. They stated understanding and requested that we proceed.   Following the procedure I had a detailed conversations with the patient's wife explaining the benefits of IVC filter placement as well as the follow-up plan with intention to retrieve. Final verification:  A time-out identifying the patient and planned procedure was performed prior to this procedure. Preparation:  Maximal sterile barrier technique (including:  cap, mask, sterile gown, sterile gloves, sterile drape, hand hygiene, sterile ultrasound cover, sterile ultrasound gel, 2% chlorhexidine cutaneous antisepsis) was used. Anesthesia/sedation Level of anesthesia/sedation:  Moderate sedation (Conscious Sedation) Anesthesia/sedation administered by: Independent trained observer under attending supervision with continuous monitoring of the patient?s level of consciousness and physiologic status Total intra-service sedation time (minutes):  23 Access Local anesthesia was administered. The vessel was sonographically evaluated and determined to be patent. Real time ultrasound was used to visualize needle entry into the vessel and a permanent image was stored. Vein accessed:  Right internal jugular vein Access technique:  Micropuncture set with 21 gauge needle Venography Venography of the abdomen and pelvis was performed to evaluate the iliac vein patency as well as IVC size, anatomy and patency. Catheter tip position for venography:  Left common iliac vein Venography findings:  Patent IVC with normal diameter. Filter placement The filter delivery sheath was advanced and the filter was deployed under fluoroscopic guidance. Filter placed:  Cook Celect Post-placement imaging Post-placement imaging technique:  frontal spot film Filter position:  Infrarenal IVC Additional findings:  None Closure The sheath was removed and hemostasis was achieved with manual compression. A sterile dressing was applied.  Contrast Contrast agent: Isovue-300 Contrast volume (mL): 35 Radiation Dose Reference Air Kerma (Gayathri Piper):  113  mGy Dose Area Product/Kerma Area Product (DAP/JORDON/PKA):  9050  cGy-cm2 Fluoroscopy Exposure Time:  1.1 minutes Fluorographic Images:  1 venogram Additional Details Specimens removed: None Estimated blood loss (mL):  Less than 10 Standardized report: [email or web address] Attestation Signer name:  Eva Yeh M.D. I attest that I personally performed the entire procedure. I reviewed the stored images and agree with the report as written. Current Meds:  Current Facility-Administered Medications   Medication Dose Route Frequency    alum 1% bladder irrigation in sodium chloride 0.9% 3000 ml   Bladder Instillation CONTINUOUS    rOPINIRole (REQUIP) tablet 2 mg  2 mg Oral DAILY    melatonin tablet 5 mg  5 mg Oral QHS    traZODone (DESYREL) tablet 50 mg  50 mg Oral QHS    ascorbic acid (vitamin C) (VITAMIN C) tablet 500 mg  500 mg Oral DAILY    fenofibrate (LOFIBRA) tablet 160 mg  160 mg Oral DAILY    atorvastatin (LIPITOR) tablet 40 mg  40 mg Oral QHS    cefTRIAXone (ROCEPHIN) 1 g in 0.9% sodium chloride (MBP/ADV) 50 mL MBP  1 g IntraVENous Q24H    0.9% sodium chloride infusion 250 mL  250 mL IntraVENous PRN    pantoprazole (PROTONIX) tablet 40 mg  40 mg Oral ACB    sodium chloride (NS) flush 5-40 mL  5-40 mL IntraVENous Q8H    sodium chloride (NS) flush 5-40 mL  5-40 mL IntraVENous PRN    acetaminophen (TYLENOL) tablet 650 mg  650 mg Oral Q4H PRN    oxyCODONE IR (ROXICODONE) tablet 5 mg  5 mg Oral Q6H PRN    opium-belladonna (B&O 16-A SUPPRETTE) 16.2-60 mg suppository 1 Suppository  1 Suppository Rectal Q12H PRN    ondansetron (ZOFRAN) injection 4 mg  4 mg IntraVENous Q4H PRN    naloxone (NARCAN) injection 0.4 mg  0.4 mg IntraVENous PRN    dextrose 40% (GLUTOSE) oral gel 1 Tube  15 g Oral PRN    glucagon (GLUCAGEN) injection 1 mg  1 mg IntraMUSCular PRN    dextrose 10% infusion 125-250 mL  125-250 mL IntraVENous PRN    insulin lispro (HUMALOG) injection   SubCUTAneous AC&HS       Signed:  Bharath Blunt NP    Part of this note may have been written by using a voice dictation software.   The note has been proof read but may still contain some grammatical/other typographical errors.

## 2022-02-03 NOTE — PROGRESS NOTES
OT orders received and chart reviewed. Attempted to see pt for initial OT evaluation. Pt currently off floor for procedure. Will follow up and attempt to see pt as schedule permits and as pt is medically appropriate to be seen.     Thank you,     Jessica Sutton OTR/L

## 2022-02-03 NOTE — PROGRESS NOTES
Admit Date: 1/31/2022    Subjective:     Darrin Minaya is currently off the floor. He had IVCF placement in IR. Dr. Claudene Mixer saw/evaluated pt this morning prior to procedure. Urine noted to be blood tinged. Objective:     Patient Vitals for the past 8 hrs:   BP Temp Pulse Resp SpO2 Height Weight   02/03/22 1150 110/69 -- 61 16 94 % -- --   02/03/22 1139 104/63 -- 60 16 96 % -- --   02/03/22 1129 102/65 -- 60 16 96 % -- --   02/03/22 1119 (!) 101/59 -- 60 16 93 % -- --   02/03/22 1109 (!) 96/54 -- 60 16 97 % -- --   02/03/22 1101 (!) 90/57 -- (!) 58 11 97 % -- --   02/03/22 1056 (!) 82/56 -- 62 12 98 % -- --   02/03/22 1052 93/62 -- 67 14 99 % -- --   02/03/22 1047 102/60 -- 61 15 98 % -- --   02/03/22 1042 133/68 -- 69 15 100 % -- --   02/03/22 1037 115/67 -- 64 21 97 % -- --   02/03/22 1034 -- -- 79 18 97 % -- --   02/03/22 0759 116/70 97.8 °F (36.6 °C) 69 16 98 % 5' 5\" (1.651 m) 239 lb (108.4 kg)   02/03/22 0610 -- -- -- -- -- -- 239 lb 1.6 oz (108.5 kg)     No intake/output data recorded. 02/01 1901 - 02/03 0700  In: 07035 [P.O.:600;  I.V.:50]  Out: 10451 [Urine:14794]    Physical Exam:  GENERAL: alert, cooperative, no distress  LUNG: clear to auscultation bilaterally  HEART: regular rate and rhythm, S1, S2   ABDOMEN: soft, non-tender  NEUROLOGIC: AOx3      Data Review   Recent Results (from the past 24 hour(s))   GLUCOSE, POC    Collection Time: 02/02/22 11:58 AM   Result Value Ref Range    Glucose (POC) 84 65 - 100 mg/dL    Performed by Arian    HGB & HCT    Collection Time: 02/02/22  3:43 PM   Result Value Ref Range    HGB 7.8 (L) 13.6 - 17.2 g/dL    HCT 24.2 (L) 41.1 - 50.3 %   PLEASE READ & DOCUMENT PPD TEST IN 24 HRS    Collection Time: 02/02/22  4:30 PM   Result Value Ref Range    PPD Negative Negative    mm Induration 0 0 - 5 mm   GLUCOSE, POC    Collection Time: 02/02/22  5:02 PM   Result Value Ref Range    Glucose (POC) 73 65 - 100 mg/dL    Performed by Quinten GLUCOSE, POC    Collection Time: 02/02/22  9:48 PM   Result Value Ref Range    Glucose (POC) 121 (H) 65 - 100 mg/dL    Performed by [id number] PPD TEST IN 72 HRS    Collection Time: 02/03/22  6:03 AM   Result Value Ref Range    PPD      mm Induration     GLUCOSE, POC    Collection Time: 02/03/22  6:07 AM   Result Value Ref Range    Glucose (POC) 78 65 - 100 mg/dL    Performed by Aundrea        Assessment:     Principal Problem:    Gross hematuria (1/31/2022)    Active Problems:    (HFpEF) heart failure with preserved ejection fraction ([facility] (1/19/2022)      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      Blood sugars have been in good range without treatment      Very poor appetite      Continue supplements      Elevated BUN      Begin IV fluids today      Check BMP daily      Encourage oral fluids      GERD (gastroesophageal reflux disease) (10/2/2015)      Overview: Last Assessment & Plan:       Formatting of this note might be different from the original.      Stable, continue current medications and treatment plan      Hepatic cirrhosis ([facility] (10/8/2021)      Overview: Last Assessment & Plan:       Formatting of this note might be different from the original.      Follow-up for edema and ascites      Avoid Tylenol      Check labs on Mondays      BENIGNO (obstructive sleep apnea) (10/2/2015)      Overview: Formatting of this note might be different from the original.      Dr. Cherylene Limes:       Sneha Carlos of this note might be different from the original.      Continue CPAP while sleeping      Psoriatic arthritis ([facility] (6/23/2017)      Overview: Last Assessment & Plan:       Formatting of this note might be different from the original.      Continue his current medications      Pain much improved      Chronic radiation cystitis (1/31/2022)      Functional quadriplegia (HCC) (1/31/2022)      Thrombocytopenia due to sequestration ([facility] (1/31/2022)      Acute blood loss anemia (ABLA) (1/31/2022)      Hypocortisolism ([address] (1/31/2022)      Hypoglycemia (1/31/2022)      Hypoalbuminemia due to protein-calorie malnutrition ([facility] (1/31/2022)         67 y.o. male who presents with gross hematuria via 3-way jasso catheter and SP pain. Recently underwent cysto/evacuation of clots on 1/21/22 and 1/25/22 d/t ongoing hematuria/clots at [facility]. Path showed no malignancy, +inflammation/radiation cystitis. They consulted hyperbaric oxygen therapy and pt has not been evaluated yet. He presented to [facility] ER on 1/31 with c/o gross hematuria with 3 way jasso in place. Urine cleared nicely with manual irrigation and only few clots noted. He had drop in hgb and therefore CT AP was ordered and showed no evidence of clot in the bladder per report. Todays hgb is 7.4. He reports pain in his catheter last night, has occasional clot in the tubing. He has hx of prostate cancer s/p RRP 5/2003 with BCR and s/p radiation in 5/2016. Has ongoing issues with radiation cystitis and hematuria. Found to have bilateral LE DVT- not candidate for [facility] d/t anemia/hematuria- sp IVCF in IR today. 2/3-hgb 7.8. Cr 0.70, urine culture with GNR. Plan:     -Please manually irrigate catheter really well and then start alum CBI. Continue alum irrigation and manually irrigate with NS as needed for clot retention.     -Continue IV rocephin, follow up culture.    -Rest of care per primary team mgmt. Rober Curry NP  [facility]    Urology Attending Physician Note:     Admit Date: 1/31/2022    Subjective:     Patient reports worsening hematuria, light cherry with only occasional clot after stopping CBI yesterday and therefore CBI resumed. Currently clear pink. Going to IR today for procedure. Objective:     Patient Vitals for the past 8 hrs:   BP Temp Pulse Resp SpO2 Height Weight   02/03/22 1150 110/69 -- 61 16 94 % -- --   02/03/22 1139 104/63 -- 60 16 96 % -- --   02/03/22 1129 102/65 -- 60 16 96 % -- --   02/03/22 1119 (!) 101/59 -- 60 16 93 % -- --   02/03/22 1109 (!) 96/54 -- 60 16 97 % -- --   02/03/22 1101 (!) 90/57 -- (!) 58 11 97 % -- --   02/03/22 1056 (!) 82/56 -- 62 12 98 % -- --   02/03/22 1052 93/62 -- 67 14 99 % -- --   02/03/22 1047 102/60 -- 61 15 98 % -- --   02/03/22 1042 133/68 -- 69 15 100 % -- --   02/03/22 1037 115/67 -- 64 21 97 % -- --   02/03/22 1034 -- -- 79 18 97 % -- --   02/03/22 0759 116/70 97.8 °F (36.6 °C) 69 16 98 % 5' 5\" (1.651 m) 239 lb (108.4 kg)   02/03/22 0610 -- -- -- -- -- -- 239 lb 1.6 oz (108.5 kg)     No intake/output data recorded. 02/01 1901 - 02/03 0700  In: 37633 [P.O.:600; I.V.:50]  Out: 61720 [Urine:33241]    Physical Exam:   Visit Vitals  /69   Pulse 61   Temp 97.8 °F (36.6 °C)   Resp 16   Ht 5' 5\" (1.651 m)   Wt 239 lb (108.4 kg)   SpO2 94%   BMI 39.77 kg/m²        GENERAL: No acute distress, Awake, Alert, Oriented X 3  CARDIAC: regular rate and rhythm  CHEST AND LUNG: Easy work of breathing,  ABDOMEN: soft, non tender, non-distended  : Guajardo with clear pink urine on moderate CBI drip.         Data Review   Recent Results (from the past 24 hour(s))   HGB & HCT    Collection Time: 02/02/22  3:43 PM   Result Value Ref Range    HGB 7.8 (L) 13.6 - 17.2 g/dL    HCT 24.2 (L) 41.1 - 50.3 %   PLEASE READ & DOCUMENT PPD TEST IN 24 HRS    Collection Time: 02/02/22  4:30 PM   Result Value Ref Range    PPD Negative Negative    mm Induration 0 0 - 5 mm   GLUCOSE, POC    Collection Time: 02/02/22  5:02 PM   Result Value Ref Range    Glucose (POC) 73 65 - 100 mg/dL    Performed by Arian    GLUCOSE, POC    Collection Time: 02/02/22  9:48 PM   Result Value Ref Range    Glucose (POC) 121 (H) 65 - 100 mg/dL    Performed by Yuriy Interiano PPD TEST IN 72 HRS    Collection Time: 02/03/22  6:03 AM   Result Value Ref Range    PPD      mm Induration     GLUCOSE, POC    Collection Time: 02/03/22  6:07 AM   Result Value Ref Range    Glucose (POC) 78 65 - 100 mg/dL    Performed by Aundrea            Assessment:     Principal Problem:    Gross hematuria (1/31/2022)    Active Problems:    (HFpEF) heart failure with preserved ejection fraction ([facility] (1/19/2022)      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      Blood sugars have been in good range without treatment      Very poor appetite      Continue supplements      Elevated BUN      Begin IV fluids today      Check BMP daily      Encourage oral fluids      GERD (gastroesophageal reflux disease) (10/2/2015)      Overview: Last Assessment & Plan:       Formatting of this note might be different from the original.      Stable, continue current medications and treatment plan      Hepatic cirrhosis ([facility] (10/8/2021)      Overview: Last Assessment & Plan:       Formatting of this note might be different from the original.      Follow-up for edema and ascites      Avoid Tylenol      Check labs on Mondays      BENIGNO (obstructive sleep apnea) (10/2/2015)      Overview: Formatting of this note might be different from the original.      Dr. Heena Hameed:       Jennifer April of this note might be different from the original.      Continue CPAP while sleeping      Psoriatic arthritis ([facility] (6/23/2017)      Overview: Last Assessment & Plan:       Formatting of this note might be different from the original.      Continue his current medications      Pain much improved      Chronic radiation cystitis (1/31/2022)      Functional quadriplegia (HCC) (1/31/2022)      Thrombocytopenia due to sequestration ([facility] (1/31/2022)      Acute blood loss anemia (ABLA) (1/31/2022)      Hypocortisolism ([address] (1/31/2022)      Hypoglycemia (1/31/2022)      Hypoalbuminemia due to protein-calorie malnutrition ([facility] (1/31/2022)      Admitted for radiation cystitis refractory to conservative management options    Plan:     -Will change bladder irrigation to alum today to see if this improves hematuria  -Hand irrigate PRN clots  -Monitor electrolytes  -Rest of care per primary team.   -Will follow       In addition to my documentation above, I have also reviewed the nurse practitioner note and personally examined the patient. I agree with the HPI, exam, assessment and plan. Hira Eugene M.D.     [facility]  [address]  Phone: [phone]  Fax: [phone]

## 2022-02-03 NOTE — PROGRESS NOTES
TRANSFER - OUT REPORT:    Verbal report given to Anne Anderson on Fitz Mention  being transferred to IR prep/recovery room #5 for routine progression of care       Report consisted of patients Situation, Background, Assessment and   Recommendations(SBAR). Information from the following report(s) SBAR, Kardex, Procedure Summary and MAR was reviewed with the receiving nurse. Lines:   Peripheral IV 01/31/22 Right Antecubital (Active)   Site Assessment Clean, dry, & intact 02/02/22 2000   Phlebitis Assessment 0 02/02/22 2000   Infiltration Assessment 0 02/02/22 2000   Dressing Status Clean, dry, & intact 02/02/22 2000   Dressing Type Tape;Transparent 02/02/22 2000   Hub Color/Line Status Flushed;Patent; Infusing 02/02/22 2000   Alcohol Cap Used No 02/02/22 2000        Opportunity for questions and clarification was provided.       Patient transported with:   Registered Nurse

## 2022-02-03 NOTE — PROGRESS NOTES
ACUTE OT GOALS:  (Developed with and agreed upon by patient and/or caregiver.)  1. Patient will complete lower body bathing and dressing with Min A and adaptive equipment as needed. 2. Patient will complete toileting with Min A and adaptive equipment as needed. 3. Patient will verbalize and demonstrate spinal precautions with 100% accuracy during performance of ADLs. 4. Patient will complete functional transfers with CGA and adaptive equipment as needed. 5. Patient will complete standing grooming ADL with SBA and good dynamic standing balance. Timeframe: 7 visits     OCCUPATIONAL THERAPY ASSESSMENT: Initial Assessment, Daily Note and PM OT Treatment Day # 1     [place] Back Brace for Out of Bed Mobility; Spinal Precautions    Usman Potts is a 67 y.o. male   PRIMARY DIAGNOSIS: Gross hematuria  Gross hematuria [R31.0]       Reason for Referral:  Generalized Weakness  ICD-10: Treatment Diagnosis: Generalized Muscle Weakness (M62.81)  Difficulty in walking, Not elsewhere classified (R26.2)  [place]PATIENT: [place] / Plan[place] PART A [place] B / Product Type: Medicare /   ASSESSMENT:     REHAB RECOMMENDATIONS:   Recommendation to date pending progress:  Setting:   Short-term Rehab  Equipment:    To Be Determined     PRIOR LEVEL OF FUNCTION:  (Prior to Hospitalization)  INITIAL/CURRENT LEVEL OF FUNCTION:  (Based on today's evaluation)   Bathing:   Receiving assist at rehab facility, level of assist unknown. Dressing:   Receiving assist at rehab facility, level of assist unknown. Feeding/Grooming:   Receiving assist at rehab facility, level of assist unknown. Toileting:   Receiving assist at rehab facility, level of assist unknown. Functional Mobility:   Receiving assist at rehab facility, level of assist unknown. Bathing:   Moderate Assistance  Dressing:   Moderate Assistance  Feeding/Grooming:   Set Up in seated.    Toileting:   Total Assistance  Functional Mobility:   Minimal Assistance x 2 x RW     ASSESSMENT:  Mr. Vianey Pittman was admitted from [facility] facility for hematuria. Pt with hx of L3 compression fx and reports wearing back brace with out of bed mobility at facility. Presents with deficits in overall strength, balance, and activity tolerance for performance of ADLs and functional mobility. Requires Min A x 2 to complete supine <> sit with use of log roll technique. Requires Max A to don [place] back brace in seated. RequiresTotal A to complete sock management. Of note, pt presents with intermittent posterior lean, resulting in pt requiring SBA to CGA to maintain upright seated balance and brush teeth seated EOB. Requires Min A x 2 x RW to complete sit <> stand. Upon standing, pt presents with posterior lean; cued to hold head upright, push through RW with BUE, and bring pelvis forward to improve standing posture and balance. Upon standing, pt observed to have completed small bowel movement and requires Total A to complete perineal wiping. Requires Min A x 2 x RW to complete additional sit <> stand and complete side steps from R to L side of bed. Requires Min A x 2 to complete sit <> supine with use of log roll technique. Pt would benefit from continued skilled OT to increase independence and safety for performance of ADLs and functional mobility. SUBJECTIVE:   Mr. Vianey Pittman states, \"I don't think I can stand again. \"     SOCIAL HISTORY/LIVING ENVIRONMENT: Pt admitted from [address] Reports he was receiving assistance for ADLs and functional mobility, although level of assist is unknown. Prior to admission to rehab facility, pt was living with spouse in home with ramp to enter. Pt was Independent for ambulation. Pt was Mod I to Independent for performance of most ADLs; does endorse minimal assist from spouse for transfer into tub and to complete LB dressing. Mod I for functional mobility with use of rollator. Reports 3 falls.    Support Systems: Spouse/Significant Other    OBJECTIVE:     PAIN: VITAL SIGNS: LINES/DRAINS:   Pre Treatment: Pain Screen  Pain Scale 1: Numeric (0 - 10)  Pain Intensity 1: 0  Post Treatment: Unchanged   Guajardo Catheter and 3 way catheter  O2 Device: None (Room air)     GROSS EVALUATION:  BUE Within Functional Limits Abnormal/ Functional Abnormal/ Non-Functional (see comments) Not Tested Comments:   AROM [x] [] [] []    PROM [] [] [] [x]    Strength [] [x] [] [] BUE generalized weakness    Balance [] [x] [] []    Posture [] [x] [] []    Sensation [x] [] [] []    Coordination [] [] [] [x]    Tone [] [] [] [x]    Edema [] [] [] [x]    Activity Tolerance [] [x] [] []     [] [] [] []      COGNITION/  PERCEPTION: Intact Impaired   (see comments) Comments:   Orientation [x] []    Vision [x] []    Hearing [x] []    Judgment/ Insight [x] []    Attention [x] []    Memory [x] []    Command Following [x] []    Emotional Regulation [x] []     [] []      ACTIVITIES OF DAILY LIVING: I Mod I S SBA CGA Min Mod Max Total NT Comments   BASIC ADLs:              Bathing/ Showering [] [] [] [] [] [] [] [] [] [x]    Toileting [] [] [] [] [] [] [] [] [] [x]    Dressing [] [] [] [] [] [] [] [] [x] [] Total A sock management. Feeding [] [] [] [] [] [] [] [] [] [x]    Grooming [] [] [] [] [] [] [] [] [] [x]    Personal Device Care [] [] [] [] [] [] [] [] [] [x]    Functional Mobility [] [] [] [] [] [x] [] [] [] [] Assist x 2 all mobility.     I=Independent, Mod I=Modified Independent, S=Supervision, SBA=Standby Assistance, CGA=Contact Guard Assistance,   Min=Minimal Assistance, Mod=Moderate Assistance, Max=Maximal Assistance, Total=Total Assistance, NT=Not Tested    MOBILITY: I Mod I S SBA CGA Min Mod Max Total  NT x2 Comments:   Supine to sit [] [] [] [] [] [x] [] [] [] [] [x]    Sit to supine [] [] [] [] [] [x] [] [] [] [] [x]    Sit to stand [] [] [] [] [] [x] [] [] [] [] [x] X RW   Bed to chair [] [] [] [] [] [] [] [] [] [x] [] Due to decreased dynamic standing balance with side steps and pt in Endo with no chair available. I=Independent, Mod I=Modified Independent, S=Supervision, SBA=Standby Assistance, CGA=Contact Guard Assistance,   Min=Minimal Assistance, Mod=Moderate Assistance, Max=Maximal Assistance, Total=Total Assistance, NT=Not [place] [address]   Daily Activity Inpatient Short Form        How much help from another person does the patient currently need. .. Total A Lot A Little None   1. Putting on and taking off regular lower body clothing? [x] 1   [] 2   [] 3   [] 4   2. Bathing (including washing, rinsing, drying)? [] 1   [x] 2   [] 3   [] 4   3. Toileting, which includes using toilet, bedpan or urinal?   [x] 1   [] 2   [] 3   [] 4   4. Putting on and taking off regular upper body clothing? [] 1   [] 2   [x] 3   [] 4   5. Taking care of personal grooming such as brushing teeth? [] 1   [] 2   [x] 3   [] 4   6. Eating meals? [] 1   [] 2   [x] 3   [] 4   © 2007, Trustees of [address] [place] [address], under license to [employer]. All rights reserved     Score:  Initial: 13, completed, 2/3/2022 Most Recent: X (Date: -- )   Interpretation of Tool:  Represents activities that are increasingly more difficult (i.e. Bed mobility, Transfers, Gait). PLAN:   FREQUENCY/DURATION: OT Plan of Care: 3 times/week for duration of hospital stay or until stated goals are met, whichever comes first.    PROBLEM LIST:   (Skilled intervention is medically necessary to address:)  1. Decreased ADL/Functional Activities  2. Decreased Activity Tolerance  3. Decreased Balance  4. Decreased Gait Ability  5. Decreased Strength  6. Decreased Transfer Abilities  7. Increased Pain with mobility. INTERVENTIONS PLANNED:   (Benefits and precautions of occupational therapy have been discussed with the patient.)  1. Self Care Training  2. Therapeutic Activity  3. Therapeutic Exercise/HEP  4. Neuromuscular Re-education  5.  Education     TREATMENT: EVALUATION: Low Complexity : (Untimed Charge)    TREATMENT:   Co-Treatment PT/OT necessary due to patient's decreased overall endurance/tolerance levels, as well as need for high level skilled assistance to complete functional transfers/mobility and functional tasks  Self Care (15 Minutes): Self care including Toileting, Lower Body Dressing, Grooming and Energy Conservation Training to increase independence, decrease level of assistance required and increase activity tolerance. .  Neuromuscular Re-education (10 Minutes): Neuromuscular Re-education included Balance Training, Coordination training, Postural training and Sitting balance training to improve Balance, Coordination and Postural Control.     TREATMENT GRID:  N/A    AFTER TREATMENT POSITION/PRECAUTIONS:  Bed, Needs within reach, RN notified and Visitors at bedside    INTERDISCIPLINARY COLLABORATION:  RN/PCT, PT/PTA and OT/MONTOYA    TOTAL TREATMENT DURATION:  OT Patient Time In/Time Out  Time In: 1504  Time Out: [address] [place], OTR/L

## 2022-02-04 NOTE — PROGRESS NOTES
ACUTE PHYSICAL THERAPY GOALS:  (Developed with and agreed upon by patient and/or caregiver.)  (1.) Fauzia Nugent  will move from supine to sit and sit to supine  with INDEPENDENCE within 7 treatment day(s). (2.) Fauzia Nugent will transfer from bed to chair and chair to bed with MODIFIED INDEPENDENCE using the least restrictive device within 7 treatment day(s). (3.) Fauzia Nugent will ambulate with MINIMAL ASSIST for 50 feet with the least restrictive device within 7 treatment day(s). (4.) Fauzia Nugent will perform seated static and dynamic balance activities x 20 minutes with INDEPENDENCE to improve safety within 7 treatment day(s). (5.) Fauzia Nugent will perform bilateral lower extremity exercises x 20 min for HEP with INDEPENDENCE to improve strength, endurance, and functional mobility within 7 treatment day(s). PHYSICAL THERAPY: Daily Note and PM Treatment Day # 3    Fauzia Nugent is a 67 y.o. male   PRIMARY DIAGNOSIS: Gross hematuria  Gross hematuria [R31.0]         ASSESSMENT:     REHAB RECOMMENDATIONS: CURRENT LEVEL OF FUNCTION:  (Most Recently Demonstrated)   Recommendation to date pending progress:  Setting:   back to [facility]  Equipment:    To Be Determined Bed Mobility:   Moderate Assistance x 2  Sit to Stand:   Minimal Assistance x 2  Transfers:   Not tested  Gait/Mobility:   Minimal Assistance x 2     ASSESSMENT:  Mr. Breezy Andino was received supine in bed, agreeable to therapy. Wife has pt's back brace today. He required modA of 2 to sit at EOB via log roll technique. TLSO donned in sitting. STS and marching in place with RW and Fredrick of 2. After sitting pt with increased pain causing him to have poor sitting balance, requiring maxA of 2 to return to supine safely. Some progress, will continue to follow.       SUBJECTIVE:   Mr. Breezy Andino states, \"I'm okay\"    SOCIAL HISTORY/ LIVING ENVIRONMENT: see eval  Support Systems: Spouse/Significant Other  OBJECTIVE:     PAIN: VITAL SIGNS: LINES/DRAINS:   Pre Treatment: Pain Screen  Pain Scale 1: Numeric (0 - 10)  Pain Intensity 1: 0  Post Treatment: 0   3 way catheter  O2 Device: None (Room air)     MOBILITY: I Mod I S SBA CGA Min Mod Max Total  NT x2 Comments:   Bed Mobility    Rolling [] [] [] [] [] [x] [] [] [] [] [x]    Supine to Sit [] [] [] [] [] [] [x] [] [] [] [x]    Scooting [] [] [] [] [] [x] [] [] [] [] [x]    Sit to Supine [] [] [] [] [] [] [] [x] [] [] [x]    Transfers    Sit to Stand [] [] [] [] [] [x] [] [] [] [] [x]    Bed to Chair [] [] [] [] [] [] [] [] [] [x] []    Stand to Sit [] [] [] [] [] [x] [] [] [] [] [x]    I=Independent, Mod I=Modified Independent, S=Supervision, SBA=Standby Assistance, CGA=Contact Guard Assistance,   Min=Minimal Assistance, Mod=Moderate Assistance, Max=Maximal Assistance, Total=Total Assistance, NT=Not Tested    BALANCE: Good Fair+ Fair Fair- Poor NT Comments   Sitting Static [] [x] [] [] [] []    Sitting Dynamic [] [] [x] [] [] []              Standing Static [] [] [x] [] [] []    Standing Dynamic [] [] [] [x] [] []      GAIT: I Mod I S SBA CGA Min Mod Max Total  NT x2 Comments:   Level of Assistance [] [] [] [] [] [x] [] [] [] [] [x]    Distance marching in place    DME None    Gait Quality Unsteady, fatigues easily    Weightbearing  Status N/A     I=Independent, Mod I=Modified Independent, S=Supervision, SBA=Standby Assistance, CGA=Contact Guard Assistance,   Min=Minimal Assistance, Mod=Moderate Assistance, Max=Maximal Assistance, Total=Total Assistance, NT=Not Tested    PLAN:   FREQUENCY/DURATION: PT Plan of Care: 3 times/week for duration of hospital stay or until stated goals are met, whichever comes first.  TREATMENT:     TREATMENT:   ($$ Therapeutic Activity: 23-37 mins    )  Therapeutic Activity (24 Minutes):  Therapeutic activity included Rolling, Supine to Sit, Sit to Supine, Scooting, Ambulation on level ground, Sitting balance  and Standing balance to improve functional Mobility, Strength and Activity tolerance.     TREATMENT GRID:  N/A    AFTER TREATMENT POSITION/PRECAUTIONS:  Bed, Needs within reach, RN notified and Visitors at bedside    INTERDISCIPLINARY COLLABORATION:  RN/PCT and Rehab Attendant     TOTAL TREATMENT DURATION:  PT Patient Time In/Time Out  Time In: 1358  Time Out: [address], PT

## 2022-02-04 NOTE — ROUTINE PROCESS
Bedside and Verbal shift change report given to Della Renae RN (oncoming nurse) by Mary Petit RN (offgoing nurse). Report included the following information SBAR, Kardex, Intake/Output, MAR and Recent Results.

## 2022-02-04 NOTE — PROGRESS NOTES
CM met with pt and his spouse to discuss ongoing plans for discharge. Both are in agreement for pt to return to [facility]. CM faxed pt's information to [facility] to begin referral for readmission upon discharge. Addendum: [phone]  CASEY informed by Patricia Richardson that pt has been medically accepted at [facility]. DCP: [facility].

## 2022-02-04 NOTE — PROGRESS NOTES
END OF SHIFT NOTE:    Intake/Output  02/03 1901 - 02/04 0700  In: 05577 [P.O.:240]  Out: 9676 [Urine:9675]   Voiding: YES  Catheter: YES  Drain:              Stool:  1 occurrences. Stool Assessment  Stool Color: Brown (02/03/22 1443)  Stool Appearance: Loose (02/03/22 2127)  Stool Amount: Medium (02/03/22 1443)  Stool Source/Status: Rectum (02/02/22 2252)    Emesis:  0 occurrences. VITAL SIGNS  Patient Vitals for the past 12 hrs:   Temp Pulse Resp BP SpO2   02/04/22 0353 98.1 °F (36.7 °C) 75 18 114/61 99 %   02/04/22 0020 97.9 °F (36.6 °C) 76 18 94/65 100 %   02/03/22 1936 98.7 °F (37.1 °C) 86 18 99/60 97 %       Pain Assessment  Pain 1  Pain Scale 1: Numeric (0 - 10) (02/04/22 0150)  Pain Intensity 1: 0 (02/04/22 0150)  Patient Stated Pain Goal: 0 (02/04/22 0150)  Pain Location 1: Abdomen;Groin (02/03/22 0000)  Pain Orientation 1: Anterior (02/01/22 1816)  Pain Description 1: Aching;Burning;Constant (02/03/22 0000)  Pain Intervention(s) 1: Medication (see MAR) (02/01/22 1816)    Ambulating  No    Additional Information:   CBI continued fast drip;flowing OK til early AM had to [facility] hand irrigate for small clots. BS this AM 67 asymptomatic;orange juice given. To recheck BS. Shift report given to oncoming nurse [facility] at the bedside.     Mel Gamez RN

## 2022-02-04 NOTE — PROGRESS NOTES
Hospitalist Progress Note   Admit Date:  2022 12:32 PM   Name:  Devin Younger   Age:  67 y.o. Sex:  male  :  1945   MRN:  [id number]   Room:  [place] [address]    Presenting Complaint: Blood in Urine    Reason(s) for Admission: Gross hematuria [R31.0]     Hospital Course & Interval History:   Mr. Natan Carrillo a 68 y/o male with a medical history of cirrhosis, prostate CA s/p RRP 2003 and radiation 2016, HFpEF, Radiation Cystitis, BENIGNO , and L3 compression fracture who presented with worsening gross hematuria and suprapubic pain. Presented from rehab facility. Previously admitted at [facility] from 1/15-22 transferred there from OSH with hematuria and difficulty voiding w/ bladder mass on CT. During hospitalization at [facility], he suffered hypovolemic shock and acute encephalopathy.  Patient was seen by Dr. Kenny Larsen and underwent cysto/evacuation of clots on 22 and 22 d/t ongoing hematuria/clots.  He was advised to follow up with [facility] urology OP but represents to ER with gross hematuria. Wife at bedside. He c/o of intermittent bladder spasms that radiate to lower back associated with burning sensation that self-resolves. He rec'd 1 L NS in ER.      Pt had CBI placed for hematuria; on presentation, hgb was 8.1 with subsequent hgb drop to 6.2; transfusion ordered. Bilateral LE DVT found and patient had IVC filter placed on . Based on culture results ID placed recommendations on  and has signed off. Subjective (22): Patient seen with spouse at bedside. He c/o intermittent pain but he is not in distress. Discussed plan with Urology. ID evaluated. Continuing CBI with alum; monitoring for resolution of bleeding.      Assessment & Plan:     Principal Problem:  # Gross hematuria (2022)  - suspect etiology is radiation cystitis   - CT A/P  without further clots  - Urology to add alum to CBI to hopefully clear the bleeding  - Spoke with Urology and they would hope to remove this Guajardo in ~2 days     Active Problems:  # ABLA  - hgb from 6.2 ~> 7.4; etiology from gross hematuria  - s/p transfusion   - 8.3 on ; improving  - transfuse if hgb <7.0 or hemodynamically unstable     # Bilateral LE DVT  - not candidate for anticoagulation given severe anemia / hematuria  - IR placed IVC filter on      # Bacteriuria  - UCx K. pneumoniae and C.  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, without long-term current use of insulin   - correction scale coverage  - closely monitor as glucose ranges low (not on long-acting and not on scheduled pre-meal insulin)     # GERD (gastroesophageal reflux disease)  - PPI      # BENIGNO (obstructive sleep apnea) (10/2/2015)  - CPAP qhs    # Obesity Class 2  - Adds to care complexity      Dispo/Discharge Plannin-3 midnights to STR per PT/OT    Diet:  ADULT DIET Dysphagia - Pureed; 4 carb choices (60 gm/meal)  DVT PPx: SCD  Code status: Full Code    Hospital Problems as of 2022 Date Reviewed: 2021          Codes Class Noted - Resolved POA    * (Principal) Gross hematuria ICD-10-CM: R31.0  ICD-9-CM: 599.71  2022 - Present Unknown        Chronic radiation cystitis ICD-10-CM: N30.40  ICD-9-CM: 595.82  2022 - Present Yes        Functional quadriplegia ([address] ICD-10-CM: R53.2  ICD-9-CM: 780.72  2022 - Present Yes        Thrombocytopenia due to sequestration [facility]) ICD-10-CM: D69.6  ICD-9-CM: 287.5  2022 - Present Yes        Acute blood loss anemia (ABLA) ICD-10-CM: D62  ICD-9-CM: 285.1  1/31/2022 - Present Yes        Hypocortisolism ([facility] ICD-10-CM: E27.49  ICD-9-CM: 255.41  1/31/2022 - Present Yes        Hypoglycemia ICD-10-CM: E16.2  ICD-9-CM: 251.2  1/31/2022 - Present Yes        Hypoalbuminemia due to protein-calorie malnutrition ([facility] ICD-10-CM: E88.09, E46  ICD-9-CM: 273.8, 263.9  1/31/2022 - Present Yes        (HFpEF) heart failure with preserved ejection fraction ([facility] ICD-10-CM: I50.30  ICD-9-CM: 428.9  1/19/2022 - Present Yes        Hepatic cirrhosis ([facility] ICD-10-CM: K74.60  ICD-9-CM: 571.5  10/8/2021 - Present Yes    Overview Signed 1/31/2022  4:07 PM by Rebecca Degroot DO     Last Assessment & Plan:   Formatting of this note might be different from the original.  Follow-up for edema and ascites  Avoid Tylenol  Check labs on Mondays             Psoriatic arthritis [facility]) ICD-10-CM: L40.50  ICD-9-CM: 696.0  6/23/2017 - Present Yes    Overview Signed 1/31/2022  4:07 PM by Rebecca Degroot DO     Last Assessment & Plan:   Formatting of this note might be different from the original.  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  Blood sugars have been in good range without treatment  Very poor appetite  Continue supplements  Elevated BUN  Begin IV fluids today  Check BMP daily  Encourage oral fluids             GERD (gastroesophageal reflux disease) ICD-10-CM: K21.9  ICD-9-CM: 530.81  10/2/2015 - Present Yes    Overview Signed 1/31/2022  4:07 PM by Rebecca Degroot DO     Last Assessment & Plan:   Formatting of this note might be different from the original.  Chris, continue current medications and treatment plan             BENIGNO (obstructive sleep apnea) ICD-10-CM: G47.33  ICD-9-CM: 327.23  10/2/2015 - Present Yes    Overview Signed 1/31/2022  4:07 PM by Johanna Chiang DO     Formatting of this note might be different from the original.  Dr. Yanick Pedersen:   Formatting of this note might be different from the original.  Continue CPAP while sleeping                   Objective:     Patient Vitals for the past 24 hrs:   Temp Pulse Resp BP SpO2   02/04/22 1121 98 °F (36.7 °C) 78 18 98/63 94 %   02/04/22 0723 97.5 °F (36.4 °C) 72 17 102/62 99 %   02/04/22 0353 98.1 °F (36.7 °C) 75 18 114/61 99 %   02/04/22 0020 97.9 °F (36.6 °C) 76 18 94/65 100 %   02/03/22 1936 98.7 °F (37.1 °C) 86 18 99/60 97 %   02/03/22 1640 98.5 °F (36.9 °C) 76 18 110/72 100 %     Oxygen Therapy  O2 Sat (%): 94 % (02/04/22 1121)  Pulse via Oximetry: 59 beats per minute (02/03/22 1101)  O2 Device: None (Room air) (02/04/22 0353)  O2 Flow Rate (L/min): 3 l/min (02/03/22 1101)  ETCO2 (mmHg):  (not detecting) (02/03/22 1034)    Estimated body mass index is 39.71 kg/m² as calculated from the following:    Height as of this encounter: 5' 5\" (1.651 m). Weight as of this encounter: 108.2 kg (238 lb 9.6 oz). Intake/Output Summary (Last 24 hours) at 2/4/2022 1308  Last data filed at 2/4/2022 1239  Gross per 24 hour   Intake 50342 ml   Output 42605 ml   Net -4561 ml         Physical Exam:   Blood pressure 98/63, pulse 78, temperature 98 °F (36.7 °C), resp. rate 18, height 5' 5\" (1.651 m), weight 108.2 kg (238 lb 9.6 oz), SpO2 94 %. General:    No overt distress  Head:  Normocephalic, atraumatic  Eyes:  Sclerae appear normal.  Pupils equally round. ENT:  Nares appear normal, no drainage. Moist oral mucosa  Neck:  No restricted ROM. Trachea midline   CV:   RRR. No m/r/g. Lungs: No wheezing. Respirations even, unlabored on room air. Abdomen:   Soft, nondistended.   Extremities: No cyanosis or clubbing. Skin:     No rashes and normal coloration. Neuro:  CN II-XII grossly intact. A&Ox3  Psych:  Normal mood and affect. I have reviewed ordered lab tests and independently visualized imaging below:    Recent Labs:  Recent Results (from the past 48 hour(s))   HGB & HCT    Collection Time: 02/02/22  3:43 PM   Result Value Ref Range    HGB 7.8 (L) 13.6 - 17.2 g/dL    HCT 24.2 (L) 41.1 - 50.3 %   PLEASE READ & DOCUMENT PPD TEST IN 24 HRS    Collection Time: 02/02/22  4:30 PM   Result Value Ref Range    PPD Negative Negative    mm Induration 0 0 - 5 mm   GLUCOSE, POC    Collection Time: 02/02/22  5:02 PM   Result Value Ref Range    Glucose (POC) 73 65 - 100 mg/dL    Performed by Arian    GLUCOSE, POC    Collection Time: 02/02/22  9:48 PM   Result Value Ref Range    Glucose (POC) 121 (H) 65 - 100 mg/dL    Performed by [facility]    PLEASE READ & DOCUMENT PPD TEST IN 72 HRS    Collection Time: 02/03/22  6:03 AM   Result Value Ref Range    PPD      mm Induration     GLUCOSE, POC    Collection Time: 02/03/22  6:07 AM   Result Value Ref Range    Glucose (POC) 78 65 - 100 mg/dL    Performed by [employer]    CBC WITH AUTOMATED DIFF    Collection Time: 02/03/22 12:54 PM   Result Value Ref Range    WBC 3.2 (L) 4.3 - 11.1 K/uL    RBC 2.51 (L) 4.23 - 5.6 M/uL    HGB 7.6 (L) 13.6 - 17.2 g/dL    HCT 23.3 (L) 41.1 - 50.3 %    MCV 92.8 79.6 - 97.8 FL    MCH 30.3 26.1 - 32.9 PG    MCHC 32.6 31.4 - 35.0 g/dL    RDW 15.5 (H) 11.9 - 14.6 %    PLATELET 85 (L) 889 - 450 K/uL    MPV 8.8 (L) 9.4 - 12.3 FL    ABSOLUTE NRBC 0.00 0.0 - 0.2 K/uL    DF AUTOMATED      NEUTROPHILS 61 43 - 78 %    LYMPHOCYTES 20 13 - 44 %    MONOCYTES 9 4.0 - 12.0 %    EOSINOPHILS 9 (H) 0.5 - 7.8 %    BASOPHILS 1 0.0 - 2.0 %    IMMATURE GRANULOCYTES 1 0.0 - 5.0 %    ABS. NEUTROPHILS 1.9 1.7 - 8.2 K/UL    ABS. LYMPHOCYTES 0.6 0.5 - 4.6 K/UL    ABS. MONOCYTES 0.3 0.1 - 1.3 K/UL    ABS.  EOSINOPHILS 0.3 0.0 - 0.8 K/UL    ABS. BASOPHILS 0.0 0.0 - 0.2 K/UL    ABS. IMM. GRANS. 0.0 0.0 - 0.5 K/UL   METABOLIC PANEL, BASIC    Collection Time: 02/03/22 12:54 PM   Result Value Ref Range    Sodium 142 136 - 145 mmol/L    Potassium 3.9 3.5 - 5.1 mmol/L    Chloride 112 (H) 98 - 107 mmol/L    CO2 25 21 - 32 mmol/L    Anion gap 5 (L) 7 - 16 mmol/L    Glucose 66 65 - 100 mg/dL    BUN 10 8 - 23 MG/DL    Creatinine 0.60 (L) 0.8 - 1.5 MG/DL    GFR est AA >60 >60 ml/min/1.73m2    GFR est non-AA >60 >60 ml/min/1.73m2    Calcium 8.8 8.3 - 10.4 MG/DL   MISC.  LAB TEST    Collection Time: 02/03/22 12:54 PM   Result Value Ref Range    Test Description: MAGNESIUM     Reference Lab: [facility]     Results: RESULTS SCANNED IN [facility]     GLUCOSE, POC    Collection Time: 02/03/22  1:18 PM   Result Value Ref Range    Glucose (POC) 58 (L) 65 - 100 mg/dL    Performed by Chadwick    GLUCOSE, POC    Collection Time: 02/03/22  1:38 PM   Result Value Ref Range    Glucose (POC) 71 65 - 100 mg/dL    Performed by Francia Donate    GLUCOSE, POC    Collection Time: 02/03/22  4:06 PM   Result Value Ref Range    Glucose (POC) 79 65 - 100 mg/dL    Performed by Francia Donate    GLUCOSE, POC    Collection Time: 02/03/22  9:54 PM   Result Value Ref Range    Glucose (POC) 101 (H) 65 - 100 mg/dL    Performed by [facility]    PLEASE READ & DOCUMENT PPD TEST IN 48 HRS    Collection Time: 02/04/22  6:03 AM   Result Value Ref Range    PPD Negative Negative    mm Induration 0 0 - 5 mm   GLUCOSE, POC    Collection Time: 02/04/22  6:25 AM   Result Value Ref Range    Glucose (POC) 68 65 - 100 mg/dL    Performed by [facility]    CBC W/O DIFF    Collection Time: 02/04/22  9:04 AM   Result Value Ref Range    WBC 4.9 4.3 - 11.1 K/uL    RBC 2.78 (L) 4.23 - 5.6 M/uL    HGB 8.3 (L) 13.6 - 17.2 g/dL    HCT 25.5 (L) 41.1 - 50.3 %    MCV 91.7 79.6 - 97.8 FL    MCH 29.9 26.1 - 32.9 PG    MCHC 32.5 31.4 - 35.0 g/dL    RDW 15.7 (H) 11.9 - 14.6 %    PLATELET 675 (L) 150 - 450 K/uL    MPV 8.8 (L) 9.4 - 12.3 FL    ABSOLUTE NRBC 0.00 0.0 - 0.2 K/uL   GLUCOSE, POC    Collection Time: 02/04/22 12:11 PM   Result Value Ref Range    Glucose (POC) 107 (H) 65 - 100 mg/dL    Performed by Bhavik        All Micro Results     Procedure Component Value Units Date/Time    CULTURE, URINE [[id number]]  (Abnormal)  (Susceptibility) Collected: 01/31/22 1615    Order Status: Completed Specimen: Cath Urine Updated: 02/04/22 1159     Special Requests: NO SPECIAL REQUESTS        Culture result:       >100,000 COLONIES/mL KLEBSIELLA PNEUMONIAE ** (EXTENDED SPECTRUM BETA LACTAMASE ) ** ** MULTI-DRUG RESISTANT ORGANISM **                  10,000 to 50,000 COLONIES/mL CANDIDA TROPICALIS                  RESULTS VERIFIED, PHONED TO AND READ BACK BY  MAGNO BROWN AT 9177 ON 2/4/22, KYLER            Other Studies:  No results found.     Current Meds:  Current Facility-Administered Medications   Medication Dose Route Frequency    [START ON 2/5/2022] fluconazole (DIFLUCAN) tablet 200 mg  200 mg Oral DAILY    meropenem (MERREM) 500 mg in 0.9% sodium chloride (MBP/ADV) 50 mL MBP  0.5 g IntraVENous Q6H    alum 1% bladder irrigation in sodium chloride 0.9% 3000 ml   Bladder Instillation CONTINUOUS    rOPINIRole (REQUIP) tablet 2 mg  2 mg Oral DAILY    melatonin tablet 5 mg  5 mg Oral QHS    traZODone (DESYREL) tablet 50 mg  50 mg Oral QHS    oxyCODONE IR (ROXICODONE) tablet 10 mg  10 mg Oral Q6H PRN    ascorbic acid (vitamin C) (VITAMIN C) tablet 500 mg  500 mg Oral DAILY    fenofibrate (LOFIBRA) tablet 160 mg  160 mg Oral DAILY    atorvastatin (LIPITOR) tablet 40 mg  40 mg Oral QHS    0.9% sodium chloride infusion 250 mL  250 mL IntraVENous PRN    pantoprazole (PROTONIX) tablet 40 mg  40 mg Oral ACB    sodium chloride (NS) flush 5-40 mL  5-40 mL IntraVENous Q8H    sodium chloride (NS) flush 5-40 mL  5-40 mL IntraVENous PRN    acetaminophen (TYLENOL) tablet 650 mg  650 mg Oral Q4H PRN    oxyCODONE IR (ROXICODONE) tablet 5 mg  5 mg Oral Q6H PRN    opium-belladonna (B&O 16-A SUPPRETTE) 16.2-60 mg suppository 1 Suppository  1 Suppository Rectal Q12H PRN    ondansetron (ZOFRAN) injection 4 mg  4 mg IntraVENous Q4H PRN    naloxone (NARCAN) injection 0.4 mg  0.4 mg IntraVENous PRN    dextrose 40% (GLUTOSE) oral gel 1 Tube  15 g Oral PRN    glucagon (GLUCAGEN) injection 1 mg  1 mg IntraMUSCular PRN    dextrose 10% infusion 125-250 mL  125-250 mL IntraVENous PRN    insulin lispro (HUMALOG) injection   SubCUTAneous AC&HS       Signed:  Brett Villalobos NP    Part of this note may have been written by using a voice dictation software. The note has been proof read but may still contain some grammatical/other typographical errors.

## 2022-02-04 NOTE — PROGRESS NOTES
Physician Progress Note      Donald Walker  CSN #:                  [id number]  :                       1945  ADMIT DATE:       2022 12:32 PM  [address] [place] DATE:  RESPONDING  PROVIDER #:        Haris Cid NP          QUERY TEXT:    Pt admitted with gross hematuria and UTI. Pt noted to have indwelling urinary catheter POA and had a recent cystoscopy on 2022. After study, can the suspected etiology of the patient's UTI be further specified as: The medical record reflects the following:  Risk Factors: 67 yr, recent cystoscopy on 2022. Hx radiation cystitis  Clinical Indicators: urine culture with klebsiella ESBL,  suprapubic pain on a admission  Treatment: Levaquin,  Options provided:  -- UTI due to indwelling urinary catheter POA  -- UTI due to recent cystoscopy  -- UTI due to, please specify. -- Other - I will add my own diagnosis  -- Disagree - Not applicable / Not valid  -- Disagree - Clinically unable to determine / Unknown  -- Refer to Clinical Documentation Reviewer    PROVIDER RESPONSE TEXT:    This may not represent an actual UTI. We now think bacteriuria and are treating as such.     Query created by: Lamar Echols on 2022 12:30 PM      Electronically signed by:  Haris Cid NP 2022 4:26 PM

## 2022-02-04 NOTE — CONSULTS
Infectious Disease Consult    Today's Date: 2/4/2022   Admit Date: 1/31/2022    Impression:   · ESBL Klebsiella and C. Tropicalis in urine culture (1/31)  · Likely bacteriuria in the setting of indwelling 3 way catheter for CBI inserted at [facility] on 1/25 after cystouretoscopy and fulguration, but given extent of procedural history and post radiation changes will treat with short course of antibiotics  · Gross hematuria: s/p cystocopy x 2 (1/15, 1/25)  · Post radiation changes after prostate cancer and prostatectomy (2003)    Plan:   · Stop Levaquin today. · Start IV Merrem x 3 days. · Start Fluconazole 200 mg PO BID x 3 days. · ID will not continue to follow, but please call with any questions or concerns. Anti-infectives:   · CTX 2/2-2/3  · LVQ 2/4-    Subjective:   Date of Consultation:  February 4, 2022  Referring Physician: Eddie Ramos NP    Patient is a 67 y.o. male who presented to ED on 1/31 with gross hematuria and suprapubic pain. He has a PMH of Prostate cancer s/p prostateectomy(2003) BENIGNO, Hepatic cirrhosis, Jeannine Forrester recently presented to [facility] on 1/15/22- 1/27 related to hypotension, AMS and DIMITRI. Patient originally presented to [facility] ED on 1/1/2021 for evaluation of hematuria and difficulty urinating. CT revealed bladder mass that measures 6.2 x 4.9 x 5.8 cm. Patient was transferred to [facility] and evaluated by urology. Urology has placed a 3 way catheter and performed daily irrigations. Patient was discharged to [facility] and followed by Urology. RR was called on 1/15 and he was readmitted to hospitalist service. He was found to have gross hematuria s/p cystocopy on 1/15 and s/p 2nd cystoscopy and fulguration 1/25. And he was discharged with CBI per urology recs and outpatient follow up. Urology was consulted this admission and CT obtained on 2/1 with no evidence of clot. They continued CBI as ordered. He had urine culture obtained in ED now with ESBL klebsiella and c.  Tropicalis in the setting of indwelling 3 way catheter. He has been getting IV CTX. ID has been consulted for treatment assistance. Patient seen resting in bed with wife at bedside. He reports no complaints of n/v/d/f/c/s. States he has ongoing discomfort with urinary catheter.        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, without long-term current use of insulin (HCC) E08.59    Disorder of bone and cartilage M89.9, M94.9    Debility R53.81    GERD (gastroesophageal reflux disease) K21.9    Hepatic cirrhosis (HCC) K74.60    Hypercholesteremia E78.00    Normocytic anemia D64.9    BENIGNO (obstructive sleep apnea) G47.33    Psoriatic arthritis (HCC) L40.50    Restless leg syndrome G25.81    Chronic radiation cystitis N30.40    Functional quadriplegia (HCC) R53.2    Thrombocytopenia due to sequestration (HCC) D69.6    Acute blood loss anemia (ABLA) D62    Hypocortisolism (HCC) E27.49    Hypoglycemia E16.2    Hypoalbuminemia due to protein-calorie malnutrition (HCC) E88.09, E46     Past Medical History:   Diagnosis Date    Adverse effect of anesthesia     reports \"slow to get over effects\" of anesthesia- drowsy    Allergic rhinitis     Anemia 2018    Back pain     Colon polyps     Diabetes (HCC)     oral agents; rarely checks BS, reports 138 today; occasional hypo symptoms (shaky) unaware of hypo level    Elevated liver function tests     GERD (gastroesophageal reflux disease)     controlled with meds    H/O alcohol abuse     Hiatal hernia     Hypercholesterolemia     Hypertension     well controlled with meds    Hypotestosteronism     Migraine     Personal history of prostate cancer     Restless leg     Sleep apnea     wears CPAP      Family History   Problem Relation Age of Onset    Heart Disease Father     Emphysema Mother     Heart Disease Brother     Psychiatric Disorder Brother       Social History     Tobacco Use    Smoking status: Former Smoker     Types: Cigars     Quit date: 2003     Years since quittin.8    Smokeless tobacco: Never Used   Substance Use Topics    Alcohol use: No     Past Surgical History:   Procedure Laterality Date    HX COLONOSCOPY  ~2017    HX ORTHOPAEDIC Left 2017    post tibial tendon repair    HX OTHER SURGICAL Right 10/16/2018    ingrown toenail removal    HX PROSTATECTOMY      IR IVC FILTER  2/3/2022      Prior to Admission medications    Medication Sig Start Date End Date Taking? Authorizing Provider   brief disposable (ADULT) misc by Does Not Apply route. 20  Yes Kain Munson MD   miscellaneous medical supply ([employer] INCONTIN [employer]) misc Use as needed for incontinence 19  Yes Kain Munson MD   nystatin (MYCOSTATIN) topical cream Apply  to affected area two (2) times a day. 18  Yes Francesco Public, DO   doxepin HCl (DOXEPIN PO) Take 0.3 mL by mouth nightly. Yes Provider, Historical   triamcinolone acetonide (KENALOG) 0.1 % topical cream Apply topically 2 (two) times a day. 18  Yes Provider, Historical   clindamycin (CLEOCIN) 300 mg capsule 300 mg three (3) times daily. 10/3/18  Yes Provider, Historical   folic acid 248 mcg tablet Take 400 mcg by mouth daily. Everyday except Thursday   Yes Provider, Historical   traMADol (ULTRAM) 50 mg tablet Take 50 mg by mouth every six (6) hours as needed for Pain. Yes Other, MD Frederic   cinnamon bark 500 mg cap Take 500 mg by mouth daily. Yes Provider, Historical   b complex vitamins tablet Take 1 Tab by mouth daily.    Yes Provider, Historical   fenofibrate (TRICOR) 160 mg tablet Take 160 mg by mouth daily. 1/19/15  Yes Provider, Historical   fluticasone (FLONASE) 50 mcg/actuation nasal spray 2 Sprays by Both Nostrils route as needed. 2/9/15  Yes Provider, Historical   losartan-hydrochlorothiazide (HYZAAR) 50-12.5 mg per tablet Take 1 Tab by mouth daily. 3/13/15  Yes Provider, Historical   metFORMIN ER (GLUCOPHAGE XR) 500 mg tablet Take 500 mg by mouth daily (with dinner). 1/26/15  Yes Provider, Historical   pantoprazole (PROTONIX) 40 mg tablet Take 40 mg by mouth daily. 2/9/15  Yes Provider, Historical   rOPINIRole (REQUIP) 1 mg tablet Take 3 mg by mouth daily. One tab AM, two tabs PM 3/13/15  Yes Provider, Historical   simvastatin (ZOCOR) 80 mg tablet Take 80 mg by mouth daily. 3/13/15  Yes Provider, Historical   ascorbic acid, vitamin C, (VITAMIN C) 500 mg tablet Take 500 mg by mouth daily. Yes Provider, Historical   HYDROcodone-acetaminophen (NORCO) 5-325 mg per tablet Take 1 Tab by mouth every four (4) hours as needed for Pain. Max Daily Amount: 6 Tabs. 10/24/18   Chris Leon,    methotrexate 25 mg/mL chemo injection 20 mg by SubCUTAneous route Every Thursday. Patient not taking: Reported on 2022   Provider, Historical       Allergies   Allergen Reactions    Amoxicillin Rash    Lisinopril Cough     cough    Sulfa (Sulfonamide Antibiotics) Rash        Review of Systems:  A comprehensive review of systems was negative except for that written in the History of Present Illness. Objective:     Visit Vitals  /62 (BP 1 Location: Right upper arm, BP Patient Position: At rest;Lying)   Pulse 72   Temp 97.5 °F (36.4 °C)   Resp 17   Ht 5' 5\" (1.651 m)   Wt 108.2 kg (238 lb 9.6 oz)   SpO2 99%   BMI 39.71 kg/m²     Temp (24hrs), Av.2 °F (36.8 °C), Min:97.5 °F (36.4 °C), Max:98.7 °F (37.1 °C)       Lines:  Peripheral IV:       Physical Exam:    General:  Alert, cooperative, well noursished, well developed, appears stated age   Eyes:  Sclera anicteric.  Pupils equally round and reactive to light. Mouth/Throat: Mucous membranes normal, oral pharynx clear   Neck: Supple   Lungs:   Clear to auscultation bilaterally, good effort   CV:  Regular rate and rhythm,no murmur, click, rub or gallop   Abdomen:   Soft, non-tender. bowel sounds normal. non-distended   Extremities: No cyanosis or edema   Skin: Skin color, texture, turgor normal. no acute rash or lesions   Lymph nodes: Cervical and supraclavicular normal   Musculoskeletal: No swelling or deformity   Lines/Devices:  Intact, no erythema, drainage or tenderness; Guajardo catheter with CBI and hematuria seen   Psych: Alert and oriented, normal mood affect given the setting       Data Review:     CBC:  Recent Labs     02/04/22  0904 02/03/22  1254 02/02/22  1543 02/02/22  1017 02/02/22  1017   WBC 4.9 3.2*  --   --  3.3*   GRANS  --  61  --   --  65   MONOS  --  9  --   --  8   EOS  --  9*  --   --  8*   ANEU  --  1.9  --   --  2.2   ABL  --  0.6  --   --  0.6   HGB 8.3* 7.6* 7.8*   < > 7.4*   HCT 25.5* 23.3* 24.2*   < > 23.1*   * 85*  --   --  71*    < > = values in this interval not displayed.        BMP:  Recent Labs     02/03/22  1254 02/02/22  1017   CREA 0.60* 0.70*   BUN 10 12    139   K 3.9 3.8   * 109*   CO2 25 26   AGAP 5* 4*   GLU 66 87       LFTS:  Recent Labs     02/02/22  1017   TBILI 0.9   ALT 20   AP 62   TP 4.6*   ALB 2.5*       Microbiology:     All Micro Results     Procedure Component Value Units Date/Time    CULTURE, URINE [[id number]]  (Abnormal)  (Susceptibility) Collected: 01/31/22 1619    Order Status: Completed Specimen: Cath Urine Updated: 02/04/22 9217     Special Requests: NO SPECIAL REQUESTS        Culture result:       >100,000 COLONIES/mL KLEBSIELLA PNEUMONIAE ** (EXTENDED SPECTRUM BETA LACTAMASE ) ** ** MULTI-DRUG RESISTANT ORGANISM **                  10,000 to 50,000 COLONIES/mL CANDIDA TROPICALIS                Imaging:   Reviewed    Signed By: AZAR Mendoza     February 4, 2022

## 2022-02-04 NOTE — PROGRESS NOTES
Admit Date: 1/31/2022    Subjective:     Hima Echavarria is resting. Had IVC filter placed 2/3. Urine is pink with mild clot in the tubing. Pt reports he had a rough night and they had to irrigate the catheter. Currently he has NS CBI running in as the alum bag is coming up from pharmacy. The nurse and I manually irrigated the catheter with equal return of clear pink urine and no clots. Objective:     Patient Vitals for the past 8 hrs:   BP Temp Pulse Resp SpO2 Weight   02/04/22 0723 102/62 97.5 °F (36.4 °C) 72 17 99 % --   02/04/22 0507 -- -- -- -- -- 238 lb 9.6 oz (108.2 kg)   02/04/22 0353 114/61 98.1 °F (36.7 °C) 75 18 99 % --     02/04 0701 - 02/04 1900  In: -   Out: 4910 [ROILA:9381]  02/02 1901 - 02/04 0700  In: 16714 [P.O.:480]  Out: 70373 [Urine:46324]    Physical Exam:  GENERAL: alert, cooperative, no distress  LUNG: clear to auscultation bilaterally  HEART: regular rate and rhythm, S1, S2   ABDOMEN: soft, non-tender  NEUROLOGIC: AOx3    Data Review   Recent Results (from the past 24 hour(s))   CBC WITH AUTOMATED DIFF    Collection Time: 02/03/22 12:54 PM   Result Value Ref Range    WBC 3.2 (L) 4.3 - 11.1 K/uL    RBC 2.51 (L) 4.23 - 5.6 M/uL    HGB 7.6 (L) 13.6 - 17.2 g/dL    HCT 23.3 (L) 41.1 - 50.3 %    MCV 92.8 79.6 - 97.8 FL    MCH 30.3 26.1 - 32.9 PG    MCHC 32.6 31.4 - 35.0 g/dL    RDW 15.5 (H) 11.9 - 14.6 %    PLATELET 85 (L) 988 - 450 K/uL    MPV 8.8 (L) 9.4 - 12.3 FL    ABSOLUTE NRBC 0.00 0.0 - 0.2 K/uL    DF AUTOMATED      NEUTROPHILS 61 43 - 78 %    LYMPHOCYTES 20 13 - 44 %    MONOCYTES 9 4.0 - 12.0 %    EOSINOPHILS 9 (H) 0.5 - 7.8 %    BASOPHILS 1 0.0 - 2.0 %    IMMATURE GRANULOCYTES 1 0.0 - 5.0 %    ABS. NEUTROPHILS 1.9 1.7 - 8.2 K/UL    ABS. LYMPHOCYTES 0.6 0.5 - 4.6 K/UL    ABS. MONOCYTES 0.3 0.1 - 1.3 K/UL    ABS. EOSINOPHILS 0.3 0.0 - 0.8 K/UL    ABS. BASOPHILS 0.0 0.0 - 0.2 K/UL    ABS. IMM.  GRANS. 0.0 0.0 - 0.5 K/UL   METABOLIC PANEL, BASIC    Collection Time: 02/03/22 12:54 PM   Result Value Ref Range    Sodium 142 136 - 145 mmol/L    Potassium 3.9 3.5 - 5.1 mmol/L    Chloride 112 (H) 98 - 107 mmol/L    CO2 25 21 - 32 mmol/L    Anion gap 5 (L) 7 - 16 mmol/L    Glucose 66 65 - 100 mg/dL    BUN 10 8 - 23 MG/DL    Creatinine 0.60 (L) 0.8 - 1.5 MG/DL    GFR est AA >60 >60 ml/min/1.73m2    GFR est non-AA >60 >60 ml/min/1.73m2    Calcium 8.8 8.3 - 10.4 MG/DL   GLUCOSE, POC    Collection Time: 02/03/22  1:18 PM   Result Value Ref Range    Glucose (POC) 58 (L) 65 - 100 mg/dL    Performed by Chadwick    GLUCOSE, POC    Collection Time: 02/03/22  1:38 PM   Result Value Ref Range    Glucose (POC) 71 65 - 100 mg/dL    Performed by Breanna Velazquez    GLUCOSE, POC    Collection Time: 02/03/22  4:06 PM   Result Value Ref Range    Glucose (POC) 79 65 - 100 mg/dL    Performed by Breanna Velazquez    GLUCOSE, POC    Collection Time: 02/03/22  9:54 PM   Result Value Ref Range    Glucose (POC) 101 (H) 65 - 100 mg/dL    Performed by Rad    PLEASE READ & DOCUMENT PPD TEST IN 48 HRS    Collection Time: 02/04/22  6:03 AM   Result Value Ref Range    PPD Negative Negative    mm Induration 0 0 - 5 mm   GLUCOSE, POC    Collection Time: 02/04/22  6:25 AM   Result Value Ref Range    Glucose (POC) 68 65 - 100 mg/dL    Performed by Rad    CBC W/O DIFF    Collection Time: 02/04/22  9:04 AM   Result Value Ref Range    WBC 4.9 4.3 - 11.1 K/uL    RBC 2.78 (L) 4.23 - 5.6 M/uL    HGB 8.3 (L) 13.6 - 17.2 g/dL    HCT 25.5 (L) 41.1 - 50.3 %    MCV 91.7 79.6 - 97.8 FL    MCH 29.9 26.1 - 32.9 PG    MCHC 32.5 31.4 - 35.0 g/dL    RDW 15.7 (H) 11.9 - 14.6 %    PLATELET 872 (L) 912 - 450 K/uL    MPV 8.8 (L) 9.4 - 12.3 FL    ABSOLUTE NRBC 0.00 0.0 - 0.2 K/uL       Assessment:     Principal Problem:    Gross hematuria (1/31/2022)    Active Problems:    (HFpEF) heart failure with preserved ejection fraction ([facility] [address] (1/19/2022)      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      Blood sugars have been in good range without treatment      Very poor appetite      Continue supplements      Elevated BUN      Begin IV fluids today      Check BMP daily      Encourage oral fluids      GERD (gastroesophageal reflux disease) (10/2/2015)      Overview: Last Assessment & Plan:       Formatting of this note might be different from the original.      Stable, continue current medications and treatment plan      Hepatic cirrhosis ([address] (10/8/2021)      Overview: Last Assessment & Plan:       Formatting of this note might be different from the original.      Follow-up for edema and ascites      Avoid Tylenol      Check labs on Mondays      BENIGNO (obstructive sleep apnea) (10/2/2015)      Overview: Formatting of this note might be different from the original.      Dr. Naa Chow:       Marzette Spillers of this note might be different from the original.      Continue CPAP while sleeping      Psoriatic arthritis ([address] (6/23/2017)      Overview: Last Assessment & Plan:       Formatting of this note might be different from the original.      Continue his current medications      Pain much improved      Chronic radiation cystitis (1/31/2022)      Functional quadriplegia ([address] (1/31/2022)      Thrombocytopenia due to sequestration ([address] (1/31/2022)      Acute blood loss anemia (ABLA) (1/31/2022)      Hypocortisolism ([address] (1/31/2022)      Hypoglycemia (1/31/2022)      Hypoalbuminemia due to protein-calorie malnutrition ([address] (1/31/2022)      67 y.o. male who presents with gross hematuria via 3-way jasso catheter and SP pain. Recently underwent cysto/evacuation of clots on 1/21/22 and 1/25/22 d/t ongoing hematuria/clots at [facility]. Path showed no malignancy, +inflammation/radiation cystitis.   They consulted hyperbaric oxygen therapy and pt has not been evaluated yet. He presented to [facility] ER on 1/31 with c/o gross hematuria with 3 way jasso in place. Urine cleared nicely with manual irrigation and only few clots noted. He had drop in hgb and therefore CT AP was ordered and showed no evidence of clot in the bladder per report. Todays hgb is 7.4. He reports pain in his catheter last night, has occasional clot in the tubing. He has hx of prostate cancer s/p RRP 5/2003 with BCR and s/p radiation in 5/2016. Has ongoing issues with radiation cystitis and hematuria. Found to have bilateral LE DVT- not candidate for [facility] d/t anemia/hematuria- sp IVCF in IR today. 2/3-hgb 7.8. Cr 0.70, urine culture with GNR.      2/4- hgb 8.3, Cr 0.60, urine culture with klebsiella ESBL       Plan:     Abx switched to levaquin based on sensitivities on culture; ID consult noted and they will manage. Continue alum CBI and titrate drip to keep clear. Will likely need alum for at least 48 hours. Hematuria improving. Hand irrigate clots PRN. Rest of care per primary team.         Agustín Mesa NP  [facility]    I have reviewed the above note. I agree with the HPI, exam, assessment and plan as outlined by the nurse practitioner. Hira Rutldege M.D.     [facility]  [address], [facility]  Phone: [phone]  Fax: [phone]

## 2022-02-05 NOTE — PROGRESS NOTES
Patient rested on and off throughout shift. Alum irrigant continues to infuse via 3-way jasso draining brown urine with sediment. Roxicodone given once during shift prn pain; see MAR. No distress noted.

## 2022-02-05 NOTE — PROGRESS NOTES
Reviewed notes for new spiritual concerns      Per notes:        LORETTA RAMIREZ    FULL CODE    NO DIRECTIVES

## 2022-02-05 NOTE — PROGRESS NOTES
The patient's urine was reasonably clear this morning on moderate CBI. I did irrigate out some particulate matter but no clots. He is having occasional bladder spasms. Hemoglobin 7.8 which is down from 8.3 yesterday. For now we will continue current care.

## 2022-02-05 NOTE — PROGRESS NOTES
Patient transferred to room 629. CBI infusing via 3-way jasso. Awaiting Alum irrigant bag to be delivered from pharmacy per transferring nurse. Urine appears brown with sediment noted. Scattered ecchymosis to skin. Penile and scrotal edema noted as well. No distress at this time.

## 2022-02-05 NOTE — PROGRESS NOTES
Problem: Falls - Risk of  Goal: *Absence of Falls  Description: Document Clearence Skates Fall Risk and appropriate interventions in the flowsheet.   Outcome: Progressing Towards Goal  Note: Fall Risk Interventions:  Mobility Interventions: Patient to call before getting OOB         Medication Interventions: Patient to call before getting OOB,Teach patient to arise slowly    Elimination Interventions: Call light in reach,Patient to call for help with toileting needs

## 2022-02-05 NOTE — PROGRESS NOTES
Hospitalist Progress Note   Admit Date:  2022 12:32 PM   Name:  eMet Younger   Age:  67 y.o. Sex:  male  :  1945   MRN:  [id number]   Room:  [facility] [place]/    Presenting Complaint: Blood in Urine    Reason(s) for Admission: Gross hematuria [R31.0]     Hospital Course & Interval History:   Jelly Tillman is a 67 y.o. male with medical history of Cirrhosis, Prostate CA s/p RRP 2003 and radiation 2016, HFpEF, Radiation Cystitis, BENIGNO , L3 compression fracture who presented with worsening gross hematuria and suprapubic pain. Presented from rehab facility. Previously admitted at [facility] from 1/15-22 transferred there from OSH with hematuria and difficulty voiding w/ bladder mass on CT. During hospitalization at [facility], he suffered hypovolemic shock and acute encephalopathy. Patient was seen by Dr. Liat Ly and underwent cysto/evacuation of clots on 22 and 22 d/t ongoing hematuria/clots. Kristie Youngblood was advised to follow up with [facility] urology OP but represents to ED with gross hematuria. Wife at bedside. He c/o of intermittent bladder spasms that radiate to lower back associated with burning sensation that self-resolves. He rec'd 1 L NS in ED. Pt had CBI placed for hematuria; on presentation, hgb was 8.1 with subsequent hgb drop to 6.2; transfusion ordered. Subjective (22): \"It hurts down there. \"  Uncomfortable-appearing 76y.o. WM in bed c/o dysuria; RN at bedside planning on manual jasso flush as CBI not running    Review of Systems:  10 systems reviewed and negative except as noted in HPI.       Assessment & Plan:     Principal Problem:    Gross hematuria (2022)  - appreciate urology's continued assistance  - continued need for irrigation as pt c/o pain which is attributed to clots and particulates   - CBI continued  - hgb dropped 8.3 ~> 7.8 though jasso bag content clear  - transfuse if hgb <7.0    Active Problems:    Severe anemia  - etiology from gross hematuria / radiation cystitis  - transfuse if hgb <7.0 or hemodynamically unstable  - f/u AM CBC      B/l LE DVT  - s/p IVC filter placement 2/3/2022; appreciate IR assistance, outpatient f/u      ESBL Klebsiella UTI / candida UTI  - Merrem EOT 2/7/2022  - diflucan EOT 2/7/2022      Hypokalemia  - f/u AM BMP      Hx of prostate cancer s/p xrt / radiation cystitis  - ongoing mgmt per urology      Liver cirrhosis / chronic thrombocytopenia  - LFT wnl  - platelet count improving since admission; f/u AM CBC      Hx of HF with preserved EF  - echo 2/1/2022 with normal EF and normal diastolic function      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 on discharge    Diet:  ADULT DIET Dysphagia - Pureed; 4 carb choices (60 gm/meal)  ADULT ORAL NUTRITION SUPPLEMENT Breakfast, Dinner; Diabetic Supplement  DVT PPx: SCD  Code status: Full Code    Hospital Problems as of 2/5/2022 Date Reviewed: 7/9/2021          Codes Class Noted - Resolved POA    * (Principal) Gross hematuria ICD-10-CM: R31.0  ICD-9-CM: 599.71  1/31/2022 - Present Unknown        Chronic radiation cystitis ICD-10-CM: N30.40  ICD-9-CM: 595.82  1/31/2022 - Present Yes        Functional quadriplegia ([facility] [address] ICD-10-CM: R53.2  ICD-9-CM: 780.72  1/31/2022 - Present Yes        Thrombocytopenia due to sequestration [facility]) ICD-10-CM: D69.6  ICD-9-CM: 287.5  1/31/2022 - Present Yes        Acute blood loss anemia ([facility]) ICD-10-CM: D62  ICD-9-CM: 285.1  1/31/2022 - Present Yes        Hypocortisolism ([facility] [address] ICD-10-CM: E27.49  ICD-9-CM: 255.41  1/31/2022 - Present Yes        Hypoglycemia ICD-10-CM: E16.2  ICD-9-CM: 251.2  1/31/2022 - Present Yes        Hypoalbuminemia due to protein-calorie malnutrition ([facility] ICD-10-CM: E88.09, E46  ICD-9-CM: 273.8, 263.9  1/31/2022 - Present Yes        (HFpEF) heart failure with preserved ejection fraction ([facility] ICD-10-CM: I50.30  ICD-9-CM: 428.9  1/19/2022 - Present Yes        Hepatic cirrhosis ([facility] ICD-10-CM: K74.60  ICD-9-CM: 571.5  10/8/2021 - Present Yes    Overview Signed 1/31/2022  4:07 PM by Ocie Severe, DO     Last Assessment & Plan:   Formatting of this note might be different from the original.  Follow-up for edema and ascites  Avoid Tylenol  Check labs on Mondays             Psoriatic arthritis [facility]) ICD-10-CM: L40.50  ICD-9-CM: 696.0  6/23/2017 - Present Yes    Overview Signed 1/31/2022  4:07 PM by Ocie Severe, DO     Last Assessment & Plan:   Formatting of this note might be different from the original.  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  Blood sugars have been in good range without treatment  Very poor appetite  Continue supplements  Elevated BUN  Begin IV fluids today  Check BMP daily  Encourage oral fluids             GERD (gastroesophageal reflux disease) ICD-10-CM: K21.9  ICD-9-CM: 530.81  10/2/2015 - Present Yes    Overview Signed 1/31/2022  4:07 PM by Ocie Severe, DO     Last Assessment & Plan:   Formatting of this note might be different from the original.  Stable, continue current medications and treatment plan             BENIGNO (obstructive sleep apnea) ICD-10-CM: G47.33  ICD-9-CM: 327.23  10/2/2015 - Present Yes    Overview Signed 1/31/2022  4:07 PM by Ocie Severe, DO     Formatting of this note might be different from the original.  Dr. Maurisio Hunter    Last Assessment & Plan:   Formatting of this note might be different from the original.  Continue CPAP while sleeping                   Objective:     Patient Vitals for the past 24 hrs:   Temp Pulse Resp BP SpO2   02/05/22 1118 97.8 °F (36.6 °C) 82 20 (!) 97/57 97 %   02/05/22 0733 98.2 °F (36.8 °C) 85 16 94/65 98 %   02/05/22 0321 97.8 °F (36.6 °C) 83 12 (!) 103/58 97 %   02/04/22 2259 97.8 °F (36.6 °C) 87 14 (!) 103/57 97 %   02/04/22 2006 98.5 °F (36.9 °C) 89 18 (!) 140/86 97 %   02/04/22 1556 98.6 °F (37 °C) 74 18 100/67 97 %     Oxygen Therapy  O2 Sat (%): 97 % (02/05/22 1118)  Pulse via Oximetry: 59 beats per minute (02/03/22 1101)  O2 Device: None (Room air) (02/04/22 0830)  O2 Flow Rate (L/min): 3 l/min (02/03/22 1101)  ETCO2 (mmHg):  (not detecting) (02/03/22 1034)    Estimated body mass index is 39.39 kg/m² as calculated from the following:    Height as of this encounter: 5' 5\" (1.651 m). Weight as of this encounter: 107.4 kg (236 lb 11.2 oz). Intake/Output Summary (Last 24 hours) at 2/5/2022 1431  Last data filed at 2/5/2022 1356  Gross per 24 hour   Intake 54218 ml   Output 42462 ml   Net -3880 ml         Physical Exam:     Blood pressure (!) 97/57, pulse 82, temperature 97.8 °F (36.6 °C), resp. rate 20, height 5' 5\" (1.651 m), weight 107.4 kg (236 lb 11.2 oz), SpO2 97 %. General:    Agitated / uncomfortable this AM c/o bladder pain  Head:  Normocephalic, atraumatic  Eyes:  Sclerae pale. Pupils equally round. ENT:  Nares appear normal, no drainage. Moist oral mucosa  Neck:  No restricted ROM. Trachea midline   CV:   RRR. No m/r/g. No jugular venous distension. Lungs:   CTAB. No wheezing, rhonchi, or rales. Respirations even, unlabored  Abdomen: Bowel sounds present. Soft, nontender, nondistended. Extremities: No cyanosis or clubbing. B/l LE edema with tenderness to palpation  Skin:     No rashes and normal coloration. Warm and dry. Neuro:  CN II-XII grossly intact.    Sensation intact.   A&Ox3  Psych:  Uncomfortable / agitated       I have reviewed ordered lab tests and independently visualized imaging below:    Recent Labs:  Recent Results (from the past 48 hour(s))   GLUCOSE, POC    Collection Time: 02/03/22  4:06 PM   Result Value Ref Range    Glucose (POC) 79 65 - 100 mg/dL    Performed by Tete Rosario    GLUCOSE, POC    Collection Time: 02/03/22  9:54 PM   Result Value Ref Range    Glucose (POC) 101 (H) 65 - 100 mg/dL    [facility]    PLEASE READ & DOCUMENT PPD TEST IN 48 HRS    Collection Time: 02/04/22  6:03 AM   Result Value Ref Range    PPD Negative Negative    mm Induration 0 0 - 5 mm   GLUCOSE, POC    Collection Time: 02/04/22  6:25 AM   Result Value Ref Range    Glucose (POC) 68 65 - 100 mg/dL    Performed by Rad    CBC W/O DIFF    Collection Time: 02/04/22  9:04 AM   Result Value Ref Range    WBC 4.9 4.3 - 11.1 K/uL    RBC 2.78 (L) 4.23 - 5.6 M/uL    HGB 8.3 (L) 13.6 - 17.2 g/dL    HCT 25.5 (L) 41.1 - 50.3 %    MCV 91.7 79.6 - 97.8 FL    MCH 29.9 26.1 - 32.9 PG    MCHC 32.5 31.4 - 35.0 g/dL    RDW 15.7 (H) 11.9 - 14.6 %    PLATELET 699 (L) 066 - 450 K/uL    MPV 8.8 (L) 9.4 - 12.3 FL    ABSOLUTE NRBC 0.00 0.0 - 0.2 K/uL   GLUCOSE, POC    Collection Time: 02/04/22 12:11 PM   Result Value Ref Range    Glucose (POC) 107 (H) 65 - 100 mg/dL    Performed by Bhavik    GLUCOSE, POC    Collection Time: 02/04/22  4:41 PM   Result Value Ref Range    Glucose (POC) 77 65 - 100 mg/dL    Performed by Tete Rosario    GLUCOSE, POC    Collection Time: 02/04/22  9:06 PM   Result Value Ref Range    Glucose (POC) 93 65 - 100 mg/dL    Performed by Leatha    HGB & HCT    Collection Time: 02/05/22  5:58 AM   Result Value Ref Range    HGB 7.8 (L) 13.6 - 17.2 g/dL    HCT 24.2 (L) 41.1 - 50.3 %   GLUCOSE, POC    Collection Time: 02/05/22  7:05 AM   Result Value Ref Range    Glucose (POC) 75 65 - 100 mg/dL    Performed by Delmy Napier GLUCOSE, POC    Collection Time: 02/05/22 10:42 AM   Result Value Ref Range    Glucose (POC) 91 65 - 100 mg/dL    Performed by Chula Yeung        All Micro Results     Procedure Component Value Units Date/Time    CULTURE, URINE [[id number]]  (Abnormal)  (Susceptibility) Collected: 01/31/22 1615    Order Status: Completed Specimen: Cath Urine Updated: 02/04/22 2763     Special Requests: NO SPECIAL REQUESTS        Culture result:       >100,000 COLONIES/mL KLEBSIELLA PNEUMONIAE ** (EXTENDED SPECTRUM BETA LACTAMASE ) ** ** MULTI-DRUG RESISTANT ORGANISM **                  10,000 to 50,000 COLONIES/mL CANDIDA TROPICALIS                  RESULTS VERIFIED, PHONED TO AND READ BACK BY  MAGNO BROWN AT 2039 ON 2/4/22, KYLER            Other Studies:  No results found.     Current Meds:  Current Facility-Administered Medications   Medication Dose Route Frequency    fluconazole (DIFLUCAN) tablet 200 mg  200 mg Oral DAILY    meropenem (MERREM) 500 mg in 0.9% sodium chloride (MBP/ADV) 50 mL MBP  0.5 g IntraVENous Q6H    alum 1% bladder irrigation in sodium chloride 0.9% 3000 ml   Bladder Instillation CONTINUOUS    rOPINIRole (REQUIP) tablet 2 mg  2 mg Oral DAILY    melatonin tablet 5 mg  5 mg Oral QHS    traZODone (DESYREL) tablet 50 mg  50 mg Oral QHS    oxyCODONE IR (ROXICODONE) tablet 10 mg  10 mg Oral Q6H PRN    ascorbic acid (vitamin C) (VITAMIN C) tablet 500 mg  500 mg Oral DAILY    fenofibrate (LOFIBRA) tablet 160 mg  160 mg Oral DAILY    atorvastatin (LIPITOR) tablet 40 mg  40 mg Oral QHS    0.9% sodium chloride infusion 250 mL  250 mL IntraVENous PRN    pantoprazole (PROTONIX) tablet 40 mg  40 mg Oral ACB    sodium chloride (NS) flush 5-40 mL  5-40 mL IntraVENous Q8H    sodium chloride (NS) flush 5-40 mL  5-40 mL IntraVENous PRN    acetaminophen (TYLENOL) tablet 650 mg  650 mg Oral Q4H PRN    oxyCODONE IR (ROXICODONE) tablet 5 mg  5 mg Oral Q6H PRN    opium-belladonna (B&O 16-A SUPPRETTE) 16.2-60 mg suppository 1 Suppository  1 Suppository Rectal Q12H PRN    ondansetron (ZOFRAN) injection 4 mg  4 mg IntraVENous Q4H PRN    naloxone (NARCAN) injection 0.4 mg  0.4 mg IntraVENous PRN    dextrose 40% (GLUTOSE) oral gel 1 Tube  15 g Oral PRN    glucagon (GLUCAGEN) injection 1 mg  1 mg IntraMUSCular PRN    dextrose 10% infusion 125-250 mL  125-250 mL IntraVENous PRN    insulin lispro (HUMALOG) injection   SubCUTAneous AC&HS       Signed:  LAUREN Cabrera    Part of this note may have been written by using a voice dictation software. The note has been proof read but may still contain some grammatical/other typographical errors.

## 2022-02-06 NOTE — PROGRESS NOTES
Hourly rounds performed, as well as prn calls (every 15 mins! ! ), to have this RN come and look at cbi. CBI running wide open with no issues or complications. When arriving into room every 15 mins pt was complaining of bladder spasms, B&O given once as pt's wife told him to take no pain medicine. Pt has been up all night. Hand irrigation was performed twice at beginning of shift. Urine is still dark in color and has brown sediment. Will continue to monitor and give report to oncoming nurse.

## 2022-02-06 NOTE — PROGRESS NOTES
Hospitalist Progress Note   Admit Date:  2022 12:32 PM   Name:  Gege Younger   Age:  67 y.o. Sex:  male  :  1945   MRN:  [id number]   Room:  [facility]/    Presenting Complaint: Blood in Urine    Reason(s) for Admission: Gross hematuria [R31.0]     Hospital Course & Interval History:   Kiana More is a 67 y.o. male with medical history of Cirrhosis, Prostate CA s/p RRP 2003 and radiation 2016, HFpEF, Radiation Cystitis, BENIGNO , L3 compression fracture who presented with worsening gross hematuria and suprapubic pain. Presented from rehab facility. Previously admitted at [facility] from 1/15-22 transferred there from OSH with hematuria and difficulty voiding w/ bladder mass on CT. During hospitalization at [facility], he suffered hypovolemic shock and acute encephalopathy. Patient was seen by Dr. Manpreet Adrian and underwent cysto/evacuation of clots on 22 and 22 d/t ongoing hematuria/clots. Faisal Casiano was advised to follow up with [facility] urology OP but represents to ED with gross hematuria. Wife at bedside. He c/o of intermittent bladder spasms that radiate to lower back associated with burning sensation that self-resolves. He rec'd 1 L NS in ED. Pt had CBI placed for hematuria; on presentation, hgb was 8.1 with subsequent hgb drop to 6.2; s/p transfusion     Subjective (22):  \"I'm trying to make it through the day. \"  Pleasant 76y.o. WM sitting up in bed eating breakfast, much more comfortable this AM; CBI cont'd    Review of Systems:  10 systems reviewed and negative except as noted in HPI.       Assessment & Plan:     Principal Problem:    Gross hematuria (2022)  - appreciate urology's continued assistance  - CBI continued, hgb 8.0 this AM, urine without nona blood    Active Problems:    Severe anemia  - etiology from gross hematuria / radiation cystitis  - hgb stable      B/l LE DVT  - s/p IVC filter placement 2/3/2022; appreciate IR assistance, outpatient f/u      ESBL Klebsiella UTI / candida UTI  - Merrem EOT 2/7/2022  - diflucan EOT 2/7/2022      Hypokalemia  - resolved; K+ 4.2 this AM      Hx of prostate cancer s/p xrt / radiation cystitis  - ongoing mgmt per urology      Liver cirrhosis / chronic thrombocytopenia  - LFT wnl  - platelet count improving since admission; platelet [phone] this AM      Hx of HF with preserved EF  - echo 2/1/2022 with normal EF and normal diastolic function      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  - URIB8L 4.7   - would not resume metformin on dc  - off insulin coverage   - glucose ranges stable      GERD (gastroesophageal reflux disease)  - PPI as dosed      BENIGNO (obstructive sleep apnea)  - CPAP qhs        Dispo/Discharge Planning:    - return to [facility] on discharge once stable per urology and finish abx course    Diet:  ADULT DIET Dysphagia - Pureed; 4 carb choices (60 gm/meal)  ADULT ORAL NUTRITION SUPPLEMENT Breakfast, Dinner; Diabetic Supplement  DVT PPx: SCD  Code status: Full Code    Hospital Problems as of 2/6/2022 Date Reviewed: 7/9/2021          Codes Class Noted - Resolved POA    * (Principal) Gross hematuria ICD-10-CM: R31.0  ICD-9-CM: 599.71  1/31/2022 - Present Unknown        Chronic radiation cystitis ICD-10-CM: N30.40  ICD-9-CM: 595.82  1/31/2022 - Present Yes        Functional quadriplegia ([facility] ICD-10-CM: R53.2  ICD-9-CM: 780.72  1/31/2022 - Present Yes        Thrombocytopenia due to sequestration [facility]) ICD-10-CM: D69.6  ICD-9-CM: 287.5  1/31/2022 - Present Yes        Acute blood loss anemia ([facility]) ICD-10-CM: D62  ICD-9-CM: 285.1  1/31/2022 - Present Yes        Hypocortisolism ([facility] ICD-10-CM: E27.49  ICD-9-CM: 255.41  1/31/2022 - Present Yes        Hypoglycemia ICD-10-CM: E16.2  ICD-9-CM: 251.2  1/31/2022 - Present Yes        Hypoalbuminemia due to protein-calorie malnutrition ([address] ICD-10-CM: E88.09, E46  ICD-9-CM: 273.8, 263.9  1/31/2022 - Present Yes        (HFpEF) heart failure with preserved ejection fraction ([facility] ICD-10-CM: I50.30  ICD-9-CM: 428.9  1/19/2022 - Present Yes        Hepatic cirrhosis ([facility] ICD-10-CM: K74.60  ICD-9-CM: 571.5  10/8/2021 - Present Yes    Overview Signed 1/31/2022  4:07 PM by Olga Pimentel DO     Last Assessment & Plan:   Formatting of this note might be different from the original.  Follow-up for edema and ascites  Avoid Tylenol  Check labs on Mondays             Psoriatic arthritis [facility]) ICD-10-CM: L40.50  ICD-9-CM: 696.0  6/23/2017 - Present Yes    Overview Signed 1/31/2022  4:07 PM by Olga Pimentel DO     Last Assessment & Plan:   Formatting of this note might be different from the original.  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  Blood sugars have been in good range without treatment  Very poor appetite  Continue supplements  Elevated BUN  Begin IV fluids today  Check BMP daily  Encourage oral fluids             GERD (gastroesophageal reflux disease) ICD-10-CM: K21.9  ICD-9-CM: 530.81  10/2/2015 - Present Yes    Overview Signed 1/31/2022  4:07 PM by Olga Pimentel DO     Last Assessment & Plan:   Formatting of this note might be different from the original.  Stable, continue current medications and treatment plan             BENIGNO (obstructive sleep apnea) ICD-10-CM: G47.33  ICD-9-CM: 327.23  10/2/2015 - Present Yes    Overview Signed 1/31/2022  4:07 PM by Olga Pimentel DO     Formatting of this note might be different from the original.  Dr. Yolande Justin:   Formatting of this note might be different from the original.  Continue CPAP while sleeping                   Objective: Patient Vitals for the past 24 hrs:   Temp Pulse Resp BP SpO2   02/06/22 0733 97.8 °F (36.6 °C) 82 18 114/67 98 %   02/06/22 0414 97.9 °F (36.6 °C) 79 14 (!) 101/57 95 %   02/06/22 0026 97.9 °F (36.6 °C) 88 16 107/64 97 %   02/05/22 2019 97.9 °F (36.6 °C) 80 16 (!) 107/56 98 %   02/05/22 1533 97.8 °F (36.6 °C) 72 20 91/61 96 %   02/05/22 1118 97.8 °F (36.6 °C) 82 20 (!) 97/57 97 %     Oxygen Therapy  O2 Sat (%): 98 % (02/06/22 0733)  Pulse via Oximetry: 59 beats per minute (02/03/22 1101)  O2 Device: None (Room air) (02/06/22 0733)  O2 Flow Rate (L/min): 3 l/min (02/03/22 1101)  ETCO2 (mmHg):  (not detecting) (02/03/22 1034)    Estimated body mass index is 39.39 kg/m² as calculated from the following:    Height as of this encounter: 5' 5\" (1.651 m). Weight as of this encounter: 107.4 kg (236 lb 11.2 oz). Intake/Output Summary (Last 24 hours) at 2/6/2022 1053  Last data filed at 2/6/2022 0733  Gross per 24 hour   Intake 83837 ml   Output 25182 ml   Net -1410 ml         Physical Exam:     Blood pressure 114/67, pulse 82, temperature 97.8 °F (36.6 °C), resp. rate 18, height 5' 5\" (1.651 m), weight 107.4 kg (236 lb 11.2 oz), SpO2 98 %. General:    Pleasant comfortable male sitting up eating breakfast  Head:  Normocephalic, atraumatic  Eyes:  Sclerae pale. Pupils equally round. ENT:  Nares appear normal, no drainage. Moist oral mucosa  Neck:  No restricted ROM. Trachea midline   CV:   RRR. No m/r/g. No jugular venous distension. Lungs:   CTAB. No wheezing, rhonchi, or rales. Respirations even, unlabored  Abdomen:   Hypoactive BS x 4, soft, nontender  Extremities: No cyanosis or clubbing. B/l LE edema with tenderness to palpation  Skin:     No rashes and normal coloration. Warm and dry. Neuro:  CN II-XII grossly intact. Sensation intact.   A&Ox3  Psych:  Mood stable / pleasant       I have reviewed ordered lab tests and independently visualized imaging below:    Recent Labs:  Recent Results (from the past 48 hour(s))   GLUCOSE, POC    Collection Time: 02/04/22 12:11 PM   Result Value Ref Range    Glucose (POC) 107 (H) 65 - 100 mg/dL    Performed by Bhavik    GLUCOSE, POC    Collection Time: 02/04/22  4:41 PM   Result Value Ref Range    Glucose (POC) 77 65 - 100 mg/dL    Performed by River Epperson    GLUCOSE, POC    Collection Time: 02/04/22  9:06 PM   Result Value Ref Range    Glucose (POC) 93 65 - 100 mg/dL    Performed by Leatha    HGB & HCT    Collection Time: 02/05/22  5:58 AM   Result Value Ref Range    HGB 7.8 (L) 13.6 - 17.2 g/dL    HCT 24.2 (L) 41.1 - 50.3 %   HEMOGLOBIN A1C WITH EAG    Collection Time: 02/05/22  5:58 AM   Result Value Ref Range    Hemoglobin A1c 4.7 4.20 - 6.30 %    Est. average glucose Cannot be calculated mg/dL   GLUCOSE, POC    Collection Time: 02/05/22  7:05 AM   Result Value Ref Range    Glucose (POC) 75 65 - 100 mg/dL    Performed by Danielell Pair    GLUCOSE, POC    Collection Time: 02/05/22 10:42 AM   Result Value Ref Range    Glucose (POC) 91 65 - 100 mg/dL    Performed by Danielell Pair    CBC WITH AUTOMATED DIFF    Collection Time: 02/06/22  5:36 AM   Result Value Ref Range    WBC 6.4 4.3 - 11.1 K/uL    RBC 2.68 (L) 4.23 - 5.6 M/uL    HGB 8.0 (L) 13.6 - 17.2 g/dL    HCT 24.6 (L) 41.1 - 50.3 %    MCV 91.8 79.6 - 97.8 FL    MCH 29.9 26.1 - 32.9 PG    MCHC 32.5 31.4 - 35.0 g/dL    RDW 15.7 (H) 11.9 - 14.6 %    PLATELET 249 217 - 515 K/uL    MPV 8.8 (L) 9.4 - 12.3 FL    ABSOLUTE NRBC 0.00 0.0 - 0.2 K/uL    DF AUTOMATED      NEUTROPHILS 65 43 - 78 %    LYMPHOCYTES 14 13 - 44 %    MONOCYTES 12 4.0 - 12.0 %    EOSINOPHILS 8 (H) 0.5 - 7.8 %    BASOPHILS 1 0.0 - 2.0 %    IMMATURE GRANULOCYTES 1 0.0 - 5.0 %    ABS. NEUTROPHILS 4.1 1.7 - 8.2 K/UL    ABS. LYMPHOCYTES 0.9 0.5 - 4.6 K/UL    ABS. MONOCYTES 0.7 0.1 - 1.3 K/UL    ABS. EOSINOPHILS 0.5 0.0 - 0.8 K/UL    ABS. BASOPHILS 0.1 0.0 - 0.2 K/UL    ABS. IMM.  GRANS. 0.0 0.0 - 0.5 K/UL   METABOLIC PANEL, BASIC    Collection Time: 02/06/22  5:36 AM   Result Value Ref Range    Sodium 138 138 - 145 mmol/L    Potassium 4.2 3.5 - 5.1 mmol/L    Chloride 109 (H) 98 - 107 mmol/L    CO2 26 21 - 32 mmol/L    Anion gap 3 (L) 7 - 16 mmol/L    Glucose 92 65 - 100 mg/dL    BUN 24 (H) 8 - 23 MG/DL    Creatinine 0.90 0.8 - 1.5 MG/DL    GFR est AA >60 >60 ml/min/1.73m2    GFR est non-AA >60 >60 ml/min/1.73m2    Calcium 8.8 8.3 - 10.4 MG/DL       All Micro Results     Procedure Component Value Units Date/Time    CULTURE, URINE [[id number]]  (Abnormal)  (Susceptibility) Collected: 01/31/22 8937    Order Status: Completed Specimen: Cath Urine Updated: 02/04/22 3005     Special Requests: NO SPECIAL REQUESTS        Culture result:       >100,000 COLONIES/mL KLEBSIELLA PNEUMONIAE ** (EXTENDED SPECTRUM BETA LACTAMASE ) ** ** MULTI-DRUG RESISTANT ORGANISM **                  10,000 to 50,000 COLONIES/mL CANDIDA TROPICALIS                  RESULTS VERIFIED, PHONED TO AND READ BACK BY  MAGNO BROWN AT 1158 ON 2/4/22, KYLER            Other Studies:  No results found.     Current Meds:  Current Facility-Administered Medications   Medication Dose Route Frequency    fluconazole (DIFLUCAN) tablet 200 mg  200 mg Oral DAILY    meropenem (MERREM) 500 mg in 0.9% sodium chloride (MBP/ADV) 50 mL MBP  0.5 g IntraVENous Q6H    alum 1% bladder irrigation in sodium chloride 0.9% 3000 ml   Bladder Instillation CONTINUOUS    rOPINIRole (REQUIP) tablet 2 mg  2 mg Oral DAILY    melatonin tablet 5 mg  5 mg Oral QHS    traZODone (DESYREL) tablet 50 mg  50 mg Oral QHS    oxyCODONE IR (ROXICODONE) tablet 10 mg  10 mg Oral Q6H PRN    ascorbic acid (vitamin C) (VITAMIN C) tablet 500 mg  500 mg Oral DAILY    fenofibrate (LOFIBRA) tablet 160 mg  160 mg Oral DAILY    atorvastatin (LIPITOR) tablet 40 mg  40 mg Oral QHS    0.9% sodium chloride infusion 250 mL  250 mL IntraVENous PRN    pantoprazole (PROTONIX) tablet 40 mg  40 mg Oral ACB    sodium chloride (NS) flush 5-40 mL  5-40 mL IntraVENous Q8H    sodium chloride (NS) flush 5-40 mL  5-40 mL IntraVENous PRN    acetaminophen (TYLENOL) tablet 650 mg  650 mg Oral Q4H PRN    oxyCODONE IR (ROXICODONE) tablet 5 mg  5 mg Oral Q6H PRN    opium-belladonna (B&O 16-A SUPPRETTE) 16.2-60 mg suppository 1 Suppository  1 Suppository Rectal Q12H PRN    ondansetron (ZOFRAN) injection 4 mg  4 mg IntraVENous Q4H PRN    naloxone (NARCAN) injection 0.4 mg  0.4 mg IntraVENous PRN    dextrose 40% (GLUTOSE) oral gel 1 Tube  15 g Oral PRN    glucagon (GLUCAGEN) injection 1 mg  1 mg IntraMUSCular PRN    dextrose 10% infusion 125-250 mL  125-250 mL IntraVENous PRN       Signed:  LAUREN Thompson    Part of this note may have been written by using a voice dictation software. The note has been proof read but may still contain some grammatical/other typographical errors.

## 2022-02-06 NOTE — PROGRESS NOTES
Problem: Patient Education: Go to Patient Education Activity  Goal: Patient/Family Education  Outcome: Progressing Towards Goal     Problem: Falls - Risk of  Goal: *Absence of Falls  Description: Document Mirna Amezcuater Fall Risk and appropriate interventions in the flowsheet. Outcome: Progressing Towards Goal  Note: Fall Risk Interventions:  Mobility Interventions: Patient to call before getting OOB,Bed/chair exit alarm         Medication Interventions: Patient to call before getting OOB    Elimination Interventions: Call light in reach,Patient to call for help with toileting needs              Problem: Patient Education: Go to Patient Education Activity  Goal: Patient/Family Education  Outcome: Progressing Towards Goal     Problem: Patient Education: Go to Patient Education Activity  Goal: Patient/Family Education  Outcome: Progressing Towards Goal     Problem: Pressure Injury - Risk of  Goal: *Prevention of pressure injury  Description: Document Matthew Scale and appropriate interventions in the flowsheet.   Outcome: Progressing Towards Goal  Note: Pressure Injury Interventions:  Sensory Interventions: Assess changes in LOC    Moisture Interventions: Apply protective barrier, creams and emollients    Activity Interventions: PT/OT evaluation    Mobility Interventions: PT/OT evaluation    Nutrition Interventions: Document food/fluid/supplement intake    Friction and Shear Interventions: Apply protective barrier, creams and emollients                Problem: Patient Education: Go to Patient Education Activity  Goal: Patient/Family Education  Outcome: Progressing Towards Goal     Problem: Infection - Risk of, Urinary Catheter-Associated Urinary Tract Infection  Goal: *Absence of infection signs and symptoms  Outcome: Progressing Towards Goal     Problem: Patient Education: Go to Patient Education Activity  Goal: Patient/Family Education  Outcome: Progressing Towards Goal     Problem: Anemia Care Plan (Adult and Pediatric)  Goal: *Labs within defined limits  Outcome: Progressing Towards Goal  Goal: *Tolerates increased activity  Outcome: Progressing Towards Goal     Problem: Patient Education: Go to Patient Education Activity  Goal: Patient/Family Education  Outcome: Progressing Towards Goal     Problem: Diabetes Self-Management  Goal: *Disease process and treatment process  Description: Define diabetes and identify own type of diabetes; list 3 options for treating diabetes. Outcome: Progressing Towards Goal  Goal: *Incorporating nutritional management into lifestyle  Description: Describe effect of type, amount and timing of food on blood glucose; list 3 methods for planning meals. Outcome: Progressing Towards Goal  Goal: *Incorporating physical activity into lifestyle  Description: State effect of exercise on blood glucose levels. Outcome: Progressing Towards Goal  Goal: *Developing strategies to promote health/change behavior  Description: Define the ABC's of diabetes; identify appropriate screenings, schedule and personal plan for screenings. Outcome: Progressing Towards Goal  Goal: *Using medications safely  Description: State effect of diabetes medications on diabetes; name diabetes medication taking, action and side effects. Outcome: Progressing Towards Goal  Goal: *Monitoring blood glucose, interpreting and using results  Description: Identify recommended blood glucose targets  and personal targets. Outcome: Progressing Towards Goal  Goal: *Prevention, detection, treatment of acute complications  Description: List symptoms of hyper- and hypoglycemia; describe how to treat low blood sugar and actions for lowering  high blood glucose level. Outcome: Progressing Towards Goal  Goal: *Prevention, detection and treatment of chronic complications  Description: Define the natural course of diabetes and describe the relationship of blood glucose levels to long term complications of diabetes.   Outcome: Progressing Towards Goal  Goal: *Developing strategies to address psychosocial issues  Description: Describe feelings about living with diabetes; identify support needed and support network  Outcome: Progressing Towards Goal     Problem: Patient Education: Go to Patient Education Activity  Goal: Patient/Family Education  Outcome: Progressing Towards Goal     Problem: General Medical Care Plan  Goal: *Vital signs within specified parameters  Outcome: Progressing Towards Goal  Goal: *Labs within defined limits  Outcome: Progressing Towards Goal  Goal: *Absence of infection signs and symptoms  Outcome: Progressing Towards Goal  Goal: *Optimal pain control at patient's stated goal  Outcome: Progressing Towards Goal  Goal: *Skin integrity maintained  Outcome: Progressing Towards Goal  Goal: *Fluid volume balance  Outcome: Progressing Towards Goal  Goal: *Optimize nutritional status  Outcome: Progressing Towards Goal  Goal: *Anxiety reduced or absent  Outcome: Progressing Towards Goal  Goal: *Progressive mobility and function (eg: ADL's)  Outcome: Progressing Towards Goal     Problem: Patient Education: Go to Patient Education Activity  Goal: Patient/Family Education  Outcome: Progressing Towards Goal     Problem: Falls - Risk of  Goal: *Absence of Falls  Description: Document Jose Fall Risk and appropriate interventions in the flowsheet.   Outcome: Progressing Towards Goal  Note: Fall Risk Interventions:  Mobility Interventions: Patient to call before getting OOB,Bed/chair exit alarm         Medication Interventions: Patient to call before getting OOB    Elimination Interventions: Call light in reach,Patient to call for help with toileting needs              Problem: Patient Education: Go to Patient Education Activity  Goal: Patient/Family Education  Outcome: Progressing Towards Goal     Problem: Risk for Spread of Infection  Goal: Prevent transmission of infectious organism to others  Description: Prevent the transmission of infectious organisms to other patients, staff members, and visitors.   Outcome: Progressing Towards Goal     Problem: Patient Education:  Go to Education Activity  Goal: Patient/Family Education  Outcome: Progressing Towards Goal

## 2022-02-06 NOTE — PROGRESS NOTES
Pt resting, medicated with Oxycodone at 1815, denies any needs at this time. On CBI w/alum with brown, sediment colored urine. Bed in low and locked position, call light and personal items within reach. Will continue to monitor and give bedside report to oncoming nurse.

## 2022-02-07 NOTE — PROGRESS NOTES
Attempted to remove jasso catheter. 30 ml deflated. Difficulty removing as I was meeting resistance. Due to hx of hematuria Halie Cantrell, NP called to bedside. Jasso catheter removed by NP. Output BRB post removal. Large amount of blood noted and NP made aware. Orders to reinsert catheter at this time.

## 2022-02-07 NOTE — PROGRESS NOTES
Chart reviewed by HARIKA PEÑA for discharge planning. Patient transferred to 6th floor. Per previous assigned RN CM, patient to discharge to [facility]. No CM needs expressed or identified at this time. CM following.

## 2022-02-07 NOTE — PROGRESS NOTES
Admit Date: 1/31/2022    Subjective:     Vern Stallings is resting. Urine is tray with sediment with alum CBI at slow drip. Objective:     Patient Vitals for the past 8 hrs:   BP Temp Pulse Resp SpO2   02/07/22 0728 134/87 98.4 °F (36.9 °C) (!) 109 20 96 %   02/07/22 0349 (!) 138/94 98.7 °F (37.1 °C) (!) 116 20 93 %     No intake/output data recorded. 02/05 1901 - 02/07 0700  In: 18320   Out: 43175 [Urine:22954]    Physical Exam:  GENERAL: alert, cooperative, no distress  LUNG: clear to auscultation bilaterally  HEART: regular rate and rhythm, S1, S2   ABDOMEN: soft, non-tender  NEUROLOGIC: AOx3      Data Review   Recent Results (from the past 24 hour(s))   CBC WITH AUTOMATED DIFF    Collection Time: 02/07/22  5:07 AM   Result Value Ref Range    WBC 12.5 (H) 4.3 - 11.1 K/uL    RBC 2.99 (L) 4.23 - 5.6 M/uL    HGB 8.7 (L) 13.6 - 17.2 g/dL    HCT 26.5 (L) 41.1 - 50.3 %    MCV 88.6 79.6 - 97.8 FL    MCH 29.1 26.1 - 32.9 PG    MCHC 32.8 31.4 - 35.0 g/dL    RDW 15.4 (H) 11.9 - 14.6 %    PLATELET 270 398 - 202 K/uL    MPV 8.3 (L) 9.4 - 12.3 FL    ABSOLUTE NRBC 0.00 0.0 - 0.2 K/uL    DF AUTOMATED      NEUTROPHILS 79 (H) 43 - 78 %    LYMPHOCYTES 7 (L) 13 - 44 %    MONOCYTES 12 4.0 - 12.0 %    EOSINOPHILS 0 (L) 0.5 - 7.8 %    BASOPHILS 1 0.0 - 2.0 %    IMMATURE GRANULOCYTES 1 0.0 - 5.0 %    ABS. NEUTROPHILS 9.9 (H) 1.7 - 8.2 K/UL    ABS. LYMPHOCYTES 0.9 0.5 - 4.6 K/UL    ABS. MONOCYTES 1.5 (H) 0.1 - 1.3 K/UL    ABS. EOSINOPHILS 0.0 0.0 - 0.8 K/UL    ABS. BASOPHILS 0.1 0.0 - 0.2 K/UL    ABS. IMM.  GRANS. 0.1 0.0 - 0.5 K/UL   METABOLIC PANEL, BASIC    Collection Time: 02/07/22  5:07 AM   Result Value Ref Range    Sodium 138 136 - 145 mmol/L    Potassium 4.6 3.5 - 5.1 mmol/L    Chloride 109 (H) 98 - 107 mmol/L    CO2 20 (L) 21 - 32 mmol/L    Anion gap 9 7 - 16 mmol/L    Glucose 111 (H) 65 - 100 mg/dL    BUN 33 (H) 8 - 23 MG/DL    Creatinine 1.50 0.8 - 1.5 MG/DL    GFR est AA 58 (L) >60 ml/min/1.73m2    GFR est non-AA 48 (L) >60 ml/min/1.73m2    Calcium 9.4 8.3 - 10.4 MG/DL       Assessment:     Principal Problem:    Gross hematuria (1/31/2022)    Active Problems:    (HFpEF) heart failure with preserved ejection fraction ([address] (1/19/2022)      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      Blood sugars have been in good range without treatment      Very poor appetite      Continue supplements      Elevated BUN      Begin IV fluids today      Check BMP daily      Encourage oral fluids      GERD (gastroesophageal reflux disease) (10/2/2015)      Overview: Last Assessment & Plan:       Formatting of this note might be different from the original.      Stable, continue current medications and treatment plan      Hepatic cirrhosis ([facility] (10/8/2021)      Overview: Last Assessment & Plan:       Formatting of this note might be different from the original.      Follow-up for edema and ascites      Avoid Tylenol      Check labs on Mondays      BENIGNO (obstructive sleep apnea) (10/2/2015)      Overview: Formatting of this note might be different from the original.      Dr. Eli Ovalle:       Donaldo Ehrich of this note might be different from the original.      Continue CPAP while sleeping      Psoriatic arthritis ([facility] (6/23/2017)      Overview: Last Assessment & Plan:       Formatting of this note might be different from the original.      Continue his current medications      Pain much improved      Chronic radiation cystitis (1/31/2022)      Functional quadriplegia ([address] (1/31/2022)      Thrombocytopenia due to sequestration ([address] (1/31/2022)      Acute blood loss anemia (ABLA) (1/31/2022)      Hypocortisolism ([address] (1/31/2022)      Hypoglycemia (1/31/2022)      Hypoalbuminemia due to protein-calorie malnutrition ([address] (1/31/2022)      67 y. o. male who presents with gross hematuria via 3-way jasso catheter and SP pain. Recently underwent cysto/evacuation of clots on 1/21/22 and 1/25/22 d/t ongoing hematuria/clots at [facility] showed no malignancy, +inflammation/radiation cystitis. Kiana Ortega consulted hyperbaric oxygen therapy and pt has not been evaluated yet.  He presented to [facility] ER on 1/31 with c/o gross hematuria with 3 way jasso in place.  Urine cleared nicely with manual irrigation and only few clots noted.  He had drop in hgb and therefore CT AP was ordered and showed no evidence of clot in the bladder per report.  Todays hgb is 7.4. Kristie Youngblood reports pain in his catheter last night, has occasional clot in the tubing. He has hx of prostate cancer s/p RRP 5/2003 with BCR and s/p radiation in 5/2016.  Has ongoing issues with radiation cystitis and hematuria.  Found to have bilateral LE DVT- not candidate for [facility] d/t anemia/hematuria- sp IVCF in IR today.      2/3-hgb 7.8. Cr 0.70, urine culture with GNR.        2/4- hgb 8.3, Cr 0.60, urine culture with klebsiella ESBL, ID consulted- on meropenem. 2/7- has had alum CBI all weekend. Urine clearing. On meropenem. hgb 8.7. WBC 12.5. Cr 1.50. Plan:     Remove jasso catheter. Monitor voiding. We will watch pt's progress. He will need OP hyperbaric oxygen therapy, we will call them for referral.       Seble Dutta NP  [facility]    Urology Attending Physician Note:     Admit Date: 1/31/2022    Subjective:     No acute events overnight. Catheter with tray sediment this AM.  No clots. Off irrigation. Hb stable      Objective:     Patient Vitals for the past 8 hrs:   BP Temp Pulse Resp SpO2   02/07/22 1147 110/61 98.7 °F (37.1 °C) 100 21 96 %   02/07/22 0728 134/87 98.4 °F (36.9 °C) (!) 109 20 96 %     No intake/output data recorded.   02/05 1901 - 02/07 0700  In: 17126   Out: 29307 [Urine:84993]    Physical Exam:   Visit Vitals  /61   Pulse 100   Temp 98.7 °F (37.1 °C)   Resp 21   Ht 5' 5\" (1.651 m)   Wt 236 lb 11.2 oz (107.4 kg)   SpO2 96%   BMI 39.39 kg/m²        GENERAL: No acute distress, Awake, Alert, Oriented X 3  CARDIAC: regular rate and rhythm  CHEST AND LUNG: Easy work of breathing,  ABDOMEN: soft, non tender, non-distended,  : Guajardo with tray urine with sediment. Data Review   Recent Results (from the past 24 hour(s))   CBC WITH AUTOMATED DIFF    Collection Time: 02/07/22  5:07 AM   Result Value Ref Range    WBC 12.5 (H) 4.3 - 11.1 K/uL    RBC 2.99 (L) 4.23 - 5.6 M/uL    HGB 8.7 (L) 13.6 - 17.2 g/dL    HCT 26.5 (L) 41.1 - 50.3 %    MCV 88.6 79.6 - 97.8 FL    MCH 29.1 26.1 - 32.9 PG    MCHC 32.8 31.4 - 35.0 g/dL    RDW 15.4 (H) 11.9 - 14.6 %    PLATELET 863 280 - 214 K/uL    MPV 8.3 (L) 9.4 - 12.3 FL    ABSOLUTE NRBC 0.00 0.0 - 0.2 K/uL    DF AUTOMATED      NEUTROPHILS 79 (H) 43 - 78 %    LYMPHOCYTES 7 (L) 13 - 44 %    MONOCYTES 12 4.0 - 12.0 %    EOSINOPHILS 0 (L) 0.5 - 7.8 %    BASOPHILS 1 0.0 - 2.0 %    IMMATURE GRANULOCYTES 1 0.0 - 5.0 %    ABS. NEUTROPHILS 9.9 (H) 1.7 - 8.2 K/UL    ABS. LYMPHOCYTES 0.9 0.5 - 4.6 K/UL    ABS. MONOCYTES 1.5 (H) 0.1 - 1.3 K/UL    ABS. EOSINOPHILS 0.0 0.0 - 0.8 K/UL    ABS. BASOPHILS 0.1 0.0 - 0.2 K/UL    ABS. IMM.  GRANS. 0.1 0.0 - 0.5 K/UL   METABOLIC PANEL, BASIC    Collection Time: 02/07/22  5:07 AM   Result Value Ref Range    Sodium 138 136 - 145 mmol/L    Potassium 4.6 3.5 - 5.1 mmol/L    Chloride 109 (H) 98 - 107 mmol/L    CO2 20 (L) 21 - 32 mmol/L    Anion gap 9 7 - 16 mmol/L    Glucose 111 (H) 65 - 100 mg/dL    BUN 33 (H) 8 - 23 MG/DL    Creatinine 1.50 0.8 - 1.5 MG/DL    GFR est AA 58 (L) >60 ml/min/1.73m2    GFR est non-AA 48 (L) >60 ml/min/1.73m2    Calcium 9.4 8.3 - 10.4 MG/DL           Assessment:     Principal Problem:    Gross hematuria (1/31/2022)    Active Problems:    (HFpEF) heart failure with preserved ejection fraction ([facility] (1/19/2022)      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      Blood sugars have been in good range without treatment      Very poor appetite      Continue supplements      Elevated BUN      Begin IV fluids today      Check BMP daily      Encourage oral fluids      GERD (gastroesophageal reflux disease) (10/2/2015)      Overview: Last Assessment & Plan:       Formatting of this note might be different from the original.      Stable, continue current medications and treatment plan      Hepatic cirrhosis ([address] (10/8/2021)      Overview: Last Assessment & Plan:       Formatting of this note might be different from the original.      Follow-up for edema and ascites      Avoid Tylenol      Check labs on Mondays      BENIGNO (obstructive sleep apnea) (10/2/2015)      Overview: Formatting of this note might be different from the original.      Dr. Kendrick Sanders:       Daren Ray of this note might be different from the original.      Continue CPAP while sleeping      Psoriatic arthritis ([address] (6/23/2017)      Overview: Last Assessment & Plan:       Formatting of this note might be different from the original.      Continue his current medications      Pain much improved      Chronic radiation cystitis (1/31/2022)      Functional quadriplegia ([address] (1/31/2022)      Thrombocytopenia due to sequestration ([address] (1/31/2022)      Acute blood loss anemia (ABLA) (1/31/2022)      Hypocortisolism ([address] (1/31/2022)      Hypoglycemia (1/31/2022)      Hypoalbuminemia due to protein-calorie malnutrition ([address] (1/31/2022)      67year old male with radiation cystitis.   Clearing with alum irrigatoin    Plan:     -Guajardo out today  -Rest of care per primary team.    -If does well without catheter this PM/tonight, could be ready for discharge tomorrow from urology perspective. In addition to my documentation above, I have also reviewed the nurse practitioner note and personally examined the patient. I agree with the HPI, exam, assessment and plan. Hira White M.D.     [facility]  [place] [address] [address] [facility]  Phone: [phone]  Fax: [phone]

## 2022-02-07 NOTE — PROGRESS NOTES
ACUTE OT GOALS:  (Developed with and agreed upon by patient and/or caregiver.)  1. Patient will complete lower body bathing and dressing with Min A and adaptive equipment as needed. 2. Patient will complete toileting with Min A and adaptive equipment as needed. 3. Patient will verbalize and demonstrate spinal precautions with 100% accuracy during performance of ADLs. 4. Patient will complete functional transfers with CGA and adaptive equipment as needed. 5. Patient will complete standing grooming ADL with SBA and good dynamic standing balance.      Timeframe: 7 visits     OCCUPATIONAL THERAPY: Daily Note OT Treatment Day # 2     [place] Back Brace for Out of Bed Mobility; Spinal Precautions  Francisco Boothe is a 67 y.o. male   PRIMARY DIAGNOSIS: Gross hematuria  Gross hematuria [R31.0]       [place] / [place] / Product Type: Medicare /   ASSESSMENT:     REHAB RECOMMENDATIONS: CURRENT LEVEL OF FUNCTION:  (Most Recently Demonstrated)   Recommendation to date pending progress:  Setting:   Short-term Rehab  Equipment:    To Be Determined Bathing:   Not tested  Dressing:   Not tested  Feeding/Grooming:   Not tested  Toileting:   Not tested  Functional Mobility:   Maximal Assistance x 2     ASSESSMENT:  Mr. Simon Every presents supine in the bed with supportive wife at bedside. Pt is very pleasant and cooperative. Pt educated on log roll needing maximal assistance x 2 to sit edge of bed. Lumbar brace was donned with maximal assistance. Pt attempted standing multiple times at the edge of the bed needing to block his feet from sliding forward. Pt needed maximal assistance x 2 to stand and take a few side steps to the head of the bed. Pt with posterior lean during standing tasks. Pt fatigued fairly quickly and returned back to supine at end of session. Pt to continue per plan of care.       SUBJECTIVE:   Mr. Simon Every states, \"I'm tired\"    SOCIAL HISTORY/LIVING ENVIRONMENT:   Support Systems: Spouse/Significant Other    OBJECTIVE:     PAIN: VITAL SIGNS: LINES/DRAINS:   Pre Treatment: Pain Screen  Pain Scale 1: Numeric (0 - 10)  Pain Intensity 1: 0  Post Treatment: 0   IV  O2 Device: None (Room air)     ACTIVITIES OF DAILY LIVING: I Mod I S SBA CGA Min Mod Max Total NT Comments   BASIC ADLs:              Bathing/ Showering [] [] [] [] [] [] [] [] [] [x]    Toileting [] [] [] [] [] [] [] [] [] [x]    Dressing [] [] [] [] [] [] [] [] [] [x]    Feeding [] [] [] [] [] [] [] [] [] [x]    Grooming [] [] [] [] [] [] [] [] [] [x]    Personal Device Care [] [] [] [] [] [] [] [] [] [x]    Functional Mobility [] [] [] [] [] [] [] [x] [] [] X 2 RW   I=Independent, Mod I=Modified Independent, S=Supervision, SBA=Standby Assistance, CGA=Contact Guard Assistance,   Min=Minimal Assistance, Mod=Moderate Assistance, Max=Maximal Assistance, Total=Total Assistance, NT=Not Tested    MOBILITY: I Mod I S SBA CGA Min Mod Max Total  NT x2 Comments:   Supine to sit [] [] [] [] [] [] [] [x] [] [] [x]    Sit to supine [] [] [] [] [] [] [] [x] [] [] [x]    Sit to stand [] [] [] [] [] [] [] [x] [] [] [x]    Bed to chair [] [] [] [] [] [] [] [] [] [x] []    I=Independent, Mod I=Modified Independent, S=Supervision, SBA=Standby Assistance, CGA=Contact Guard Assistance,   Min=Minimal Assistance, Mod=Moderate Assistance, Max=Maximal Assistance, Total=Total Assistance, NT=Not Tested    BALANCE: Good Fair+ Fair Fair- Poor NT Comments   Sitting Static [] [] [x] [] [] []    Sitting Dynamic [] [] [] [x] [] []              Standing Static [] [] [] [] [x] []    Standing Dynamic [] [] [] [] [x] []      PLAN:   FREQUENCY/DURATION: OT Plan of Care: 3 times/week for duration of hospital stay or until stated goals are met, whichever comes first.    TREATMENT:   TREATMENT:   ( $$ Neuromuscular Re-Education: 23-37 mins   )  Co-Treatment PT/OT necessary due to patient's decreased overall endurance/tolerance levels, as well as need for high level skilled assistance to complete functional transfers/mobility and functional tasks  Neuromuscular Re-education (26 Minutes): Neuromuscular Re-education included Balance Training, Coordination training, Postural training, Sitting balance training and Standing balance training to improve Balance, Coordination and Postural Control.     TREATMENT GRID:  N/A    AFTER TREATMENT POSITION/PRECAUTIONS:  Bed, Needs within reach, RN notified and Visitors at bedside    INTERDISCIPLINARY COLLABORATION:  RN/PCT, PT/PTA and OT/MONTOYA    TOTAL TREATMENT DURATION:  OT Patient Time In/Time Out  Time In: 1459  Time Out: 2[address] [facility], OT

## 2022-02-07 NOTE — PROGRESS NOTES
ACUTE PHYSICAL THERAPY GOALS:  (Developed with and agreed upon by patient and/or caregiver.)  (1.) Bernett Epley  will move from supine to sit and sit to supine  with INDEPENDENCE within 7 treatment day(s). (2.) Bernett Epley will transfer from bed to chair and chair to bed with MODIFIED INDEPENDENCE using the least restrictive device within 7 treatment day(s). (3.) Bernett Epley will ambulate with MINIMAL ASSIST for 50 feet with the least restrictive device within 7 treatment day(s). (4.) Bernett Epley will perform seated static and dynamic balance activities x 20 minutes with INDEPENDENCE to improve safety within 7 treatment day(s). (5.) Bernett Epley will perform bilateral lower extremity exercises x 20 min for HEP with INDEPENDENCE to improve strength, endurance, and functional mobility within 7 treatment day(s). PHYSICAL THERAPY: Daily Note and PM Treatment Day # 4    Bernett Epley is a 67 y.o. male   PRIMARY DIAGNOSIS: Gross hematuria  Gross hematuria [R31.0]         ASSESSMENT:     REHAB RECOMMENDATIONS: CURRENT LEVEL OF FUNCTION:  (Most Recently Demonstrated)   Recommendation to date pending progress:  Setting:   back to [facility]  Equipment:    To Be Determined Bed Mobility:   Maximal Assistance x 2  Sit to Stand:   Moderate Assistance x 2 to Max  Transfers:   Not tested  Gait/Mobility:   Maximal Assistance x 2 for side stepping     ASSESSMENT:  Mr. Jamaal Schafer was supine in bed with wife present on arrival and willing to work with PT/OT. Pt Max A x2 for log rolling for supine>sit transfer and Max>CGA for maintaining seated EOB balance. Pt required Max A for donning back brace and max/mod Ax2 for STS to RW and Max Ax2 for side stepping to [facility]. Pt required seated rest break before another attempt with standing to RW with slightly less assistance required (mod A x2). Patient Max A x2 for sit>supine transfer. Pt left supine in bed with needs in reach and visitor present. No significant progress today secondary to fatigue. PT to continue to follow.       SUBJECTIVE:   Mr. Josue Rivera states, \"I'm shaking like a leaf in a hail storm\"    SOCIAL HISTORY/ LIVING ENVIRONMENT: see eval  Support Systems: Spouse/Significant Other  OBJECTIVE:     PAIN: VITAL SIGNS: LINES/DRAINS:   Pre Treatment:    Post Treatment: 0   None  O2 Device: None (Room air)     MOBILITY: I Mod I S SBA CGA Min Mod Max Total  NT x2 Comments:   Bed Mobility    Rolling [] [] [] [] [] [] [] [x] [] [] [x]    Supine to Sit [] [] [] [] [] [] [] [x] [] [] [x]    Scooting [] [] [] [] [] [] [] [x] [] [] [x]    Sit to Supine [] [] [] [] [] [] [] [x] [] [] [x]    Transfers    Sit to Stand [] [] [] [] [] [] [x] [x] [] [] [x]    Bed to Chair [] [] [] [] [] [] [] [] [] [x] []    Stand to Sit [] [] [] [] [] [] [x] [x] [] [] [x]    I=Independent, Mod I=Modified Independent, S=Supervision, SBA=Standby Assistance, CGA=Contact Guard Assistance,   Min=Minimal Assistance, Mod=Moderate Assistance, Max=Maximal Assistance, Total=Total Assistance, NT=Not Tested    BALANCE: Good Fair+ Fair Fair- Poor NT Comments   Sitting Static [] [] [x] [x] [] []    Sitting Dynamic [] [] [] [x] [] []              Standing Static [] [] [] [] [x] []    Standing Dynamic [] [] [] [] [x] []      GAIT: I Mod I S SBA CGA Min Mod Max Total  NT x2 Comments:   Level of Assistance [] [] [] [] [] [] [] [x] [] [] [x]    Distance Side steps left    DME Rolling Walker    Gait Quality Unsteady, fatigues easily    Weightbearing  Status N/A     I=Independent, Mod I=Modified Independent, S=Supervision, SBA=Standby Assistance, CGA=Contact Guard Assistance,   Min=Minimal Assistance, Mod=Moderate Assistance, Max=Maximal Assistance, Total=Total Assistance, NT=Not Tested    PLAN:   FREQUENCY/DURATION: PT Plan of Care: 3 times/week for duration of hospital stay or until stated goals are met, whichever comes first.  TREATMENT:     TREATMENT:   ($$ Therapeutic Activity: 23-37 mins )  Co-Treatment PT/OT necessary due to patient's decreased overall endurance/tolerance levels, as well as need for high level skilled assistance to complete functional transfers/mobility and functional tasks  Therapeutic Activity (24 Minutes): Therapeutic activity included Rolling, Supine to Sit, Sit to Supine, Scooting, Lateral Scooting, Ambulation on level ground, Sitting balance  and Standing balance to improve functional Mobility, Strength and Activity tolerance.     TREATMENT GRID:  N/A    AFTER TREATMENT POSITION/PRECAUTIONS:  Bed, Needs within reach and Visitors at bedside    INTERDISCIPLINARY COLLABORATION:  RN/PCT, PT/PTA and OT/MONTOYA    TOTAL TREATMENT DURATION:  PT Patient Time In/Time Out  Time In: 1459  Time Out: [address]

## 2022-02-07 NOTE — PROGRESS NOTES
Hospitalist Progress Note   Admit Date:  2022 12:32 PM   Name:  Rajiv Younger   Age:  67 y.o. Sex:  male  :  1945   MRN:  [id number]   Room:  [facility]    Presenting Complaint: Blood in Urine    Reason(s) for Admission: Gross hematuria [R31.0]     Hospital Course & Interval History:   Rasheed Alarcon is a 67 y.o. male with medical history of Cirrhosis, Prostate CA s/p RRP 2003 and radiation 2016, HFpEF, Radiation Cystitis, BENIGNO , L3 compression fracture who presented with worsening gross hematuria and suprapubic pain. Presented from rehab facility. Previously admitted at [facility] from 1/15-22 transferred there from OSH with hematuria and difficulty voiding w/ bladder mass on CT. During hospitalization at [facility], he suffered hypovolemic shock and acute encephalopathy. Patient was seen by Dr. Brianna Dove and underwent cysto/evacuation of clots on 22 and 22 d/t ongoing hematuria/clots. Elayne Amado was advised to follow up with [facility] urology OP but represents to ED with gross hematuria. Wife at bedside. He c/o of intermittent bladder spasms that radiate to lower back associated with burning sensation that self-resolves. He rec'd 1 L NS in ED. Pt had CBI placed for hematuria; on presentation, hgb was 8.1 with subsequent hgb drop to 6.2; s/p transfusion     Subjective (22): \"I feel ok today. \"  Pleasant MO 76y.o. WM sitting up in bed eating breakfast without complaints    Review of Systems:  10 systems reviewed and negative except as noted in HPI.       Assessment & Plan:     Principal Problem:    Gross hematuria (2022)  - attributed to radiation cystitis  - resolved; urine clear  - appreciate urology's continued assistance  - CBI to be dc'd today per urology    Active Problems:    Severe anemia  - h/h remains stable post transfusion with hematuria resolved  - f/u AM CBC      B/l LE DVT  - s/p IVC filter placement 2/3/2022; appreciate IR assistance, outpatient f/u      ESBL Klebsiella UTI / candida UTI  - Merrem EOT 2/7/2022  - diflucan EOT 2/7/2022      Hypokalemia  - resolved; K+ 4.2 this AM      DIMITRI / dehydration  - gentle IVF  - f/u AM creatinine  - not on nephrotoxic rx      Hx of prostate cancer s/p xrt / radiation cystitis  - ongoing mgmt per urology      Liver cirrhosis / chronic thrombocytopenia  - LFT wnl  - thrombocytosis resolved; platelet count on admission 53,000; this ,000      Hx of HF with preserved EF  - echo 2/1/2022 with normal EF and normal diastolic function      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  - IOMY4Z 4.7   - would not resume metformin on dc  - off PRN insulin coverage   - glucose ranges stable      GERD (gastroesophageal reflux disease)  - PPI as dosed      BENIGNO (obstructive sleep apnea)  - CPAP qhs        Dispo/Discharge Planning:    - return to [facility] on discharge once stable per urology and finish abx course  - anticipate dc next 24-48hrs if remains clinically stable    Diet:  ADULT DIET Dysphagia - Pureed; 4 carb choices (60 gm/meal)  ADULT ORAL NUTRITION SUPPLEMENT Breakfast, Dinner; Diabetic Supplement  DVT PPx: SCD  Code status: Full Code    Hospital Problems as of 2/7/2022 Date Reviewed: 7/9/2021          Codes Class Noted - Resolved POA    * (Principal) Gross hematuria ICD-10-CM: R31.0  ICD-9-CM: 599.71  1/31/2022 - Present Unknown        Chronic radiation cystitis ICD-10-CM: N30.40  ICD-9-CM: 595.82  1/31/2022 - Present Yes        Functional quadriplegia ([facility] ICD-10-CM: R53.2  ICD-9-CM: 780.72  1/31/2022 - Present Yes        Thrombocytopenia due to sequestration [facility]) ICD-10-CM: D69.6  ICD-9-CM: 287.5  1/31/2022 - Present Yes        Acute blood loss anemia (ABLA) ICD-10-CM: D62  ICD-9-CM: 285.1  1/31/2022 - Present Yes        Hypocortisolism ([facility] ICD-10-CM: E27.49  ICD-9-CM: 255.41  1/31/2022 - Present Yes        Hypoglycemia ICD-10-CM: E16.2  ICD-9-CM: 251.2  1/31/2022 - Present Yes Hypoalbuminemia due to protein-calorie malnutrition [facility]) ICD-10-CM: E88.09, E46  ICD-9-CM: 273.8, 263.9  1/31/2022 - Present Yes        (HFpEF) heart failure with preserved ejection fraction ([facility] ICD-10-CM: I50.30  ICD-9-CM: 428.9  1/19/2022 - Present Yes        Hepatic cirrhosis ([facility] ICD-10-CM: K74.60  ICD-9-CM: 571.5  10/8/2021 - Present Yes    Overview Signed 1/31/2022  4:07 PM by Benitez File, DO     Last Assessment & Plan:   Formatting of this note might be different from the original.  Follow-up for edema and ascites  Avoid Tylenol  Check labs on Mondays             Psoriatic arthritis [facility]) ICD-10-CM: L40.50  ICD-9-CM: 696.0  6/23/2017 - Present Yes    Overview Signed 1/31/2022  4:07 PM by Benitez File, DO     Last Assessment & Plan:   Formatting of this note might be different from the original.  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  Blood sugars have been in good range without treatment  Very poor appetite  Continue supplements  Elevated BUN  Begin IV fluids today  Check BMP daily  Encourage oral fluids             GERD (gastroesophageal reflux disease) ICD-10-CM: K21.9  ICD-9-CM: 530.81  10/2/2015 - Present Yes    Overview Signed 1/31/2022  4:07 PM by Benitez File, DO     Last Assessment & Plan:   Formatting of this note might be different from the original.  Stable, continue current medications and treatment plan             BENIGNO (obstructive sleep apnea) ICD-10-CM: G47.33  ICD-9-CM: 327.23  10/2/2015 - Present Yes    Overview Signed 1/31/2022  4:07 PM by Benitez File, DO     Formatting of this note might be different from the original.  Dr. Harris Potts:   Formatting of this note might be different from the original.  Continue CPAP while sleeping                   Objective:     Patient Vitals for the past 24 hrs:   Temp Pulse Resp BP SpO2   02/07/22 1147 98.7 °F (37.1 °C) 100 21 110/61 96 %   02/07/22 0728 98.4 °F (36.9 °C) (!) 109 20 134/87 96 %   02/07/22 0349 98.7 °F (37.1 °C) (!) 116 20 (!) 138/94 93 %   02/06/22 2328 98.8 °F (37.1 °C) (!) 124 20 (!) 142/84 93 %   02/06/22 1914 98.8 °F (37.1 °C) (!) 118 20 (!) 143/84 94 %   02/06/22 1602 99.2 °F (37.3 °C) 92 16 110/73 93 %     Oxygen Therapy  O2 Sat (%): 96 % (02/07/22 1147)  Pulse via Oximetry: 59 beats per minute (02/03/22 1101)  O2 Device: None (Room air) (02/06/22 2328)  O2 Flow Rate (L/min): 3 l/min (02/03/22 1101)  ETCO2 (mmHg):  (not detecting) (02/03/22 1034)    Estimated body mass index is 39.39 kg/m² as calculated from the following:    Height as of this encounter: 5' 5\" (1.651 m). Weight as of this encounter: 107.4 kg (236 lb 11.2 oz). Intake/Output Summary (Last 24 hours) at 2/7/2022 1414  Last data filed at 2/6/2022 2346  Gross per 24 hour   Intake 3000 ml   Output 3000 ml   Net 0 ml         Physical Exam:     Blood pressure 110/61, pulse 100, temperature 98.7 °F (37.1 °C), resp. rate 21, height 5' 5\" (1.651 m), weight 107.4 kg (236 lb 11.2 oz), SpO2 96 %. General:    Pleasant comfortable male sitting up eating breakfast  Head:  Normocephalic, atraumatic  Eyes:  Sclerae pale. Pupils equally round. ENT:  Nares appear normal, no drainage. Moist oral mucosa  Neck:  No restricted ROM. Trachea midline   CV:   RRR. No m/r/g. No jugular venous distension. Lungs:   CTAB. No wheezing, rhonchi, or rales. Respirations even, unlabored  Abdomen:   Hypoactive BS x 4, soft, nontender  Extremities: No cyanosis or clubbing. B/l LE edema with tenderness to palpation  Skin:     No rashes and normal coloration. Warm and dry.     Neuro:  CN II-XII grossly intact. Sensation intact. A&Ox3  Psych:  Mood stable / pleasant       I have reviewed ordered lab tests and independently visualized imaging below:    Recent Labs:  Recent Results (from the past 48 hour(s))   CBC WITH AUTOMATED DIFF    Collection Time: 02/06/22  5:36 AM   Result Value Ref Range    WBC 6.4 4.3 - 11.1 K/uL    RBC 2.68 (L) 4.23 - 5.6 M/uL    HGB 8.0 (L) 13.6 - 17.2 g/dL    HCT 24.6 (L) 41.1 - 50.3 %    MCV 91.8 79.6 - 97.8 FL    MCH 29.9 26.1 - 32.9 PG    MCHC 32.5 31.4 - 35.0 g/dL    RDW 15.7 (H) 11.9 - 14.6 %    PLATELET 043 294 - 376 K/uL    MPV 8.8 (L) 9.4 - 12.3 FL    ABSOLUTE NRBC 0.00 0.0 - 0.2 K/uL    DF AUTOMATED      NEUTROPHILS 65 43 - 78 %    LYMPHOCYTES 14 13 - 44 %    MONOCYTES 12 4.0 - 12.0 %    EOSINOPHILS 8 (H) 0.5 - 7.8 %    BASOPHILS 1 0.0 - 2.0 %    IMMATURE GRANULOCYTES 1 0.0 - 5.0 %    ABS. NEUTROPHILS 4.1 1.7 - 8.2 K/UL    ABS. LYMPHOCYTES 0.9 0.5 - 4.6 K/UL    ABS. MONOCYTES 0.7 0.1 - 1.3 K/UL    ABS. EOSINOPHILS 0.5 0.0 - 0.8 K/UL    ABS. BASOPHILS 0.1 0.0 - 0.2 K/UL    ABS. IMM.  GRANS. 0.0 0.0 - 0.5 K/UL   METABOLIC PANEL, BASIC    Collection Time: 02/06/22  5:36 AM   Result Value Ref Range    Sodium 138 138 - 145 mmol/L    Potassium 4.2 3.5 - 5.1 mmol/L    Chloride 109 (H) 98 - 107 mmol/L    CO2 26 21 - 32 mmol/L    Anion gap 3 (L) 7 - 16 mmol/L    Glucose 92 65 - 100 mg/dL    BUN 24 (H) 8 - 23 MG/DL    Creatinine 0.90 0.8 - 1.5 MG/DL    GFR est AA >60 >60 ml/min/1.73m2    GFR est non-AA >60 >60 ml/min/1.73m2    Calcium 8.8 8.3 - 10.4 MG/DL   CBC WITH AUTOMATED DIFF    Collection Time: 02/07/22  5:07 AM   Result Value Ref Range    WBC 12.5 (H) 4.3 - 11.1 K/uL    RBC 2.99 (L) 4.23 - 5.6 M/uL    HGB 8.7 (L) 13.6 - 17.2 g/dL    HCT 26.5 (L) 41.1 - 50.3 %    MCV 88.6 79.6 - 97.8 FL    MCH 29.1 26.1 - 32.9 PG    MCHC 32.8 31.4 - 35.0 g/dL    RDW 15.4 (H) 11.9 - 14.6 %    PLATELET 717 719 - 789 K/uL    MPV 8.3 (L) 9.4 - 12.3 FL    ABSOLUTE NRBC 0.00 0.0 - 0.2 K/uL    DF AUTOMATED      NEUTROPHILS 79 (H) 43 - 78 %    LYMPHOCYTES 7 (L) 13 - 44 %    MONOCYTES 12 4.0 - 12.0 %    EOSINOPHILS 0 (L) 0.5 - 7.8 %    BASOPHILS 1 0.0 - 2.0 %    IMMATURE GRANULOCYTES 1 0.0 - 5.0 %    ABS. NEUTROPHILS 9.9 (H) 1.7 - 8.2 K/UL    ABS. LYMPHOCYTES 0.9 0.5 - 4.6 K/UL    ABS. MONOCYTES 1.5 (H) 0.1 - 1.3 K/UL    ABS. EOSINOPHILS 0.0 0.0 - 0.8 K/UL    ABS. BASOPHILS 0.1 0.0 - 0.2 K/UL    ABS. IMM. GRANS. 0.1 0.0 - 0.5 K/UL   METABOLIC PANEL, BASIC    Collection Time: 02/07/22  5:07 AM   Result Value Ref Range    Sodium 138 136 - 145 mmol/L    Potassium 4.6 3.5 - 5.1 mmol/L    Chloride 109 (H) 98 - 107 mmol/L    CO2 20 (L) 21 - 32 mmol/L    Anion gap 9 7 - 16 mmol/L    Glucose 111 (H) 65 - 100 mg/dL    BUN 33 (H) 8 - 23 MG/DL    Creatinine 1.50 0.8 - 1.5 MG/DL    GFR est AA 58 (L) >60 ml/min/1.73m2    GFR est non-AA 48 (L) >60 ml/min/1.73m2    Calcium 9.4 8.3 - 10.4 MG/DL       All Micro Results     Procedure Component Value Units Date/Time    CULTURE, URINE [[id number]]  (Abnormal)  (Susceptibility) Collected: 01/31/22 1615    Order Status: Completed Specimen: Cath Urine Updated: 02/04/22 7840     Special Requests: NO SPECIAL REQUESTS        Culture result:       >100,000 COLONIES/mL KLEBSIELLA PNEUMONIAE ** (EXTENDED SPECTRUM BETA LACTAMASE ) ** ** MULTI-DRUG RESISTANT ORGANISM **                  10,000 to 50,000 COLONIES/mL CANDIDA TROPICALIS                  RESULTS VERIFIED, PHONED TO AND READ BACK BY  MAGNO BROWN AT 1158 ON 2/4/22, KYLER            Other Studies:  No results found.     Current Meds:  Current Facility-Administered Medications   Medication Dose Route Frequency    lidocaine (URO-JET/ GLYDO) 2 % jelly   Urethral ONCE    rOPINIRole (REQUIP) tablet 2 mg  2 mg Oral DAILY    melatonin tablet 5 mg  5 mg Oral QHS    traZODone (DESYREL) tablet 50 mg  50 mg Oral QHS    oxyCODONE IR (ROXICODONE) tablet 10 mg  10 mg Oral Q6H PRN    ascorbic acid (vitamin C) (VITAMIN C) tablet 500 mg 500 mg Oral DAILY    fenofibrate (LOFIBRA) tablet 160 mg  160 mg Oral DAILY    atorvastatin (LIPITOR) tablet 40 mg  40 mg Oral QHS    0.9% sodium chloride infusion 250 mL  250 mL IntraVENous PRN    pantoprazole (PROTONIX) tablet 40 mg  40 mg Oral ACB    sodium chloride (NS) flush 5-40 mL  5-40 mL IntraVENous Q8H    sodium chloride (NS) flush 5-40 mL  5-40 mL IntraVENous PRN    acetaminophen (TYLENOL) tablet 650 mg  650 mg Oral Q4H PRN    oxyCODONE IR (ROXICODONE) tablet 5 mg  5 mg Oral Q6H PRN    opium-belladonna (B&O 16-A SUPPRETTE) 16.2-60 mg suppository 1 Suppository  1 Suppository Rectal Q12H PRN    ondansetron (ZOFRAN) injection 4 mg  4 mg IntraVENous Q4H PRN    naloxone (NARCAN) injection 0.4 mg  0.4 mg IntraVENous PRN    dextrose 40% (GLUTOSE) oral gel 1 Tube  15 g Oral PRN    glucagon (GLUCAGEN) injection 1 mg  1 mg IntraMUSCular PRN    dextrose 10% infusion 125-250 mL  125-250 mL IntraVENous PRN       Signed:  LAUREN Caban    Part of this note may have been written by using a voice dictation software. The note has been proof read but may still contain some grammatical/other typographical errors.

## 2022-02-08 NOTE — PROGRESS NOTES
LR bolus infusing. H/H pending. Patient has had poor appetite today. Did not eat any of his meal. Did drink 3 ensures. Patient verbalized he just doesn't have an appetite. Incontinent to brief. Output remain bloody however is dark red/brown vs bright red post jasso removal.  Hourly rounds performed this shift. Bed lowered and locked, side rails x2, and call light in reach. All patient needs are met at this time. Bedside shift report will be given to oncoming nurse.

## 2022-02-08 NOTE — PROGRESS NOTES
Comprehensive Nutrition Assessment    Type and Reason for Visit: Initial,RD nutrition re-screen/LOS    Nutrition Recommendations/Plan:   Meals and Snacks:  Continue current diet. Encouraged pt to try additional items, offered suggestion puree at baseline. Question trial minced and moist if no aspiration concerns. Nutrition Supplement Therapy:   Medical food supplement therapy:  Change Glucerna Shake three times per day (this provides 220 kcal and 10 grams protein per bottle) and Magic Cup twice per day (this provides 290 kcal and 9 grams protein per serving)      Malnutrition Assessment:  Malnutrition Status: At risk for malnutrition (specify) (compromised oral intake throughout admission)    Nutrition Assessment:   Nutrition History: Hx per pt and wife at bedside. Pt's teeth were lost at [facility] per fmaily. At [facility] pt was on minced and moist then puree due to difficulties chewing. Wife reports never evaled by SLP. PO intake reported to fluctuate, unable to determine quantifiable hx. Nutrition Background: medical history of Cirrhosis, Prostate CA s/p RRP 5/2003 and radiation 2016, HFpEF, Radiation Cystitis, BENIGNO , L3 compression fracture who presented with worsening gross hematuria and suprapubic pain. Presented from rehab facility. Admitted with gross hematuria, severe anemia, LE DVT s/p IVC filter 2/3/22, hypokalemia, DIMITRI/dehydration. Nutrition Interval:  Visit with pt and wife at bedside. Pt with indirect responses to inquiries about oral intake. He is noted by nursing to primarily consume oral supplements and request items such as broth or soup in place of meals. Upon further question he eventually reveals he only consumes items from puree trays that are puree texture at baseline. He relates this to the items not tasting bad but the appearance being different from what's anticipated. Family indicates minimal success with moist and minced at facility prior to admit.   Discussed with Salvador Gray RN. Nutrition Related Findings:   no nona muscle or fat loss      Current Nutrition Therapies:  ADULT DIET Dysphagia - Pureed; 4 carb choices (60 gm/meal)  ADULT ORAL NUTRITION SUPPLEMENT Breakfast, Dinner; Diabetic Supplement    Current Intake:   Average Meal Intake: 1-25% Average Supplement Intake: 51-75%      Anthropometric Measures:  Height: 5' 5\" (165.1 cm)  Current Body Wt: 107.4 kg (236 lb 12.4 oz) (2/5), Weight source: Bed scale  BMI: 39.4, Obese class 2 (BMI 35.0-39. 9)  Admission Body Weight: 210 lb 1.6 oz (no wt source)  Ideal Body Weight (lbs) (Calculated): 136 lbs (62 kg), 174.1 %  Usual Body Wt:  (reported to fluctuate)        Edema: LLE: 4+; Pitting (2/8/2022  7:10 AM)  RLE: 4+; Pitting (2/8/2022  7:10 AM)     Estimated Daily Nutrient Needs:  Energy (kcal/day): 5749-9369 (Kcal/kg (30-35), Weight Used: Ideal (62 kg))  Protein (g/day): 62-74 (1-1.2 g/kg) Weight Used: (Ideal)  Fluid (ml/day):   (1 ml/kcal)    Nutrition Diagnosis:   · Inadequate oral intake related to  (chewing difficulty, compromised appetite) as evidenced by  (lost dentures, minimal puree acceptance, po <50% needs)    Nutrition Interventions:   Food and/or Nutrient Delivery: Continue current diet,Modify oral nutrition supplement     Coordination of Nutrition Care: Continue to monitor while inpatient,Interdisciplinary rounds    Goals: Active Goal: Meet >75% needs by nutrition follow-up    Nutrition Monitoring and Evaluation:      Food/Nutrient Intake Outcomes: Food and nutrient intake,Supplement intake  Physical Signs/Symptoms Outcomes: Biochemical data,Chewing or swallowing,GI status,Meal time behavior,Weight    Discharge Planning:     Too soon to determine    Jan Crespo RD, YASIRN   Contact: [phone]      Disaster Mode Active

## 2022-02-08 NOTE — PROGRESS NOTES
ACUTE PHYSICAL THERAPY GOALS:  (Developed with and agreed upon by patient and/or caregiver.)  (1.) Fitz Rosenberg  will move from supine to sit and sit to supine  with INDEPENDENCE within 7 treatment day(s). (2.) Fitz Rosenberg will transfer from bed to chair and chair to bed with MODIFIED INDEPENDENCE using the least restrictive device within 7 treatment day(s). (3.) Fitz Rosenberg will ambulate with MINIMAL ASSIST for 50 feet with the least restrictive device within 7 treatment day(s). (4.) Fitz Rosenberg will perform seated static and dynamic balance activities x 20 minutes with INDEPENDENCE to improve safety within 7 treatment day(s). (5.) Fitz Rosenberg will perform bilateral lower extremity exercises x 20 min for HEP with INDEPENDENCE to improve strength, endurance, and functional mobility within 7 treatment day(s). PHYSICAL THERAPY: Daily Note and PM Treatment Day # 5    Fitz Rosenberg is a 67 y.o. male   PRIMARY DIAGNOSIS: Gross hematuria  Gross hematuria [R31.0]         ASSESSMENT:     REHAB RECOMMENDATIONS: CURRENT LEVEL OF FUNCTION:  (Most Recently Demonstrated)   Recommendation to date pending progress:  Setting:   back to [facility]  Equipment:    To Be Determined Bed Mobility:   Maximal Assistance x 2  Sit to Stand:   Moderate Assistance x 2 to Max  Transfers:   Not tested  Gait/Mobility:   Not tested     ASSESSMENT:  Mr. Kaitlin Tobar was supine in bed with wife present on arrival and willing to work with PT. Pt required max A x2 for bed mobility due to increased pain. Once seated EOB pt performed S2S transfer to reposition and then was in excruciating pain and needed to return to supine. In supine pt performed therex with increased assistance and VC for breathing throughout. No progress made today. PT will continue to follow.       SUBJECTIVE:   Mr. Kaitlin Tobar states, \"I'll give it a shot\"    SOCIAL HISTORY/ LIVING ENVIRONMENT: see eval  Support Systems: Spouse/Significant Other  OBJECTIVE:     PAIN: VITAL SIGNS: LINES/DRAINS:   Pre Treatment: Pain Screen  Pain Scale 1: Numeric (0 - 10)  Pain Intensity 1: 6  Pain Location 1: Pelvis  Pain Orientation 1: Lower  Post Treatment: 6   Purewick  O2 Device: None (Room air)     MOBILITY: I Mod I S SBA CGA Min Mod Max Total  NT x2 Comments:   Bed Mobility    Rolling [] [] [] [] [] [] [] [x] [] [] [x]    Supine to Sit [] [] [] [] [] [] [] [x] [] [] [x]    Scooting [] [] [] [] [] [] [] [x] [] [] [x]    Sit to Supine [] [] [] [] [] [] [] [x] [] [] [x]    Transfers    Sit to Stand [] [] [] [] [] [] [x] [x] [] [] [x]    Bed to Chair [] [] [] [] [] [] [] [] [] [x] []    Stand to Sit [] [] [] [] [] [] [] [] [] [x] []    I=Independent, Mod I=Modified Independent, S=Supervision, SBA=Standby Assistance, CGA=Contact Guard Assistance,   Min=Minimal Assistance, Mod=Moderate Assistance, Max=Maximal Assistance, Total=Total Assistance, NT=Not Tested    BALANCE: Good Fair+ Fair Fair- Poor NT Comments   Sitting Static [] [] [x] [x] [] []    Sitting Dynamic [] [] [] [x] [] []              Standing Static [] [] [] [] [x] []    Standing Dynamic [] [] [] [] [] [x]      GAIT: I Mod I S SBA CGA Min Mod Max Total  NT x2 Comments:   Level of Assistance [] [] [] [] [] [] [] [] [] [x] []    Distance     DME N/A    Gait Quality     Weightbearing  Status N/A     I=Independent, Mod I=Modified Independent, S=Supervision, SBA=Standby Assistance, CGA=Contact Guard Assistance,   Min=Minimal Assistance, Mod=Moderate Assistance, Max=Maximal Assistance, Total=Total Assistance, NT=Not Tested    PLAN:   FREQUENCY/DURATION: PT Plan of Care: 3 times/week for duration of hospital stay or until stated goals are met, whichever comes first.  TREATMENT:     TREATMENT:   ($$ Therapeutic Activity: 8-22 mins  $$ Therapeutic Exercises: 8-22 mins    )  Therapeutic Activity (8 Minutes):  Therapeutic activity included Rolling, Supine to Sit, Sit to Supine, Scooting, Lateral Scooting and Sitting balance  to improve functional Mobility, Strength and Activity tolerance. Therapeutic Exercise (18 Minutes): Therapeutic exercises noted below to improve functional activity tolerance, AROM, strength and mobility.      TREATMENT GRID:   Date:  2/8/22 Date:   Date:     Activity/Exercise Parameters Parameters Parameters   Supine heel slides x10 AA     Supine ABD slides 2x10 AA     Quad sets 2x10     glute sets 2x10     AP 2x10                       AFTER TREATMENT POSITION/PRECAUTIONS:  Bed, Needs within reach and Visitors at bedside    INTERDISCIPLINARY COLLABORATION:  RN/PCT and PT/PTA    TOTAL TREATMENT DURATION:  PT Patient Time In/Time Out  Time In: 0224  Time Out: [place], PT

## 2022-02-08 NOTE — PROGRESS NOTES
Hourly rounds complete this shift, no new complaints at this time, Patient continues to void in his brief reddish brown sediment urine, 1 unit of PRBC given, lab to re-collect H/H, bed in low, locked position, call light and bedside table within reach,  all needs met. Will continue to monitor Report to day shift nurse.

## 2022-02-08 NOTE — PROGRESS NOTES
Pt declined to wear CPAP today - pt is in no distress at this time      02/07/22 2345   Oxygen Therapy   O2 Sat (%) 94 %   Pulse via Oximetry 66 beats per minute   O2 Device None (Room air)

## 2022-02-08 NOTE — PROGRESS NOTES
Dr. Howard Goldman and Dr. Caitlyn Garcia updated on patients condition, orders 500 ml bolus and  one unit of blood. Lab and Blood bank notified.

## 2022-02-08 NOTE — PROGRESS NOTES
Hospitalist Progress Note   Admit Date:  2022 12:32 PM   Name:  Joseph Younger   Age:  67 y.o. Sex:  male  :  1945   MRN:  [id number]   Room:  [facility]    Presenting Complaint: Blood in Urine    Reason(s) for Admission: Gross hematuria [R31.0]     Hospital Course & Interval History:   Rufus Munoz is a 67 y.o. male with medical history of Cirrhosis, Prostate CA s/p RRP 2003 and radiation 2016, HFpEF, Radiation Cystitis, BENIGNO , L3 compression fracture who presented with worsening gross hematuria and suprapubic pain. Presented from rehab facility. Previously admitted at [facility] from 1/15-22 transferred there from OSH with hematuria and difficulty voiding w/ bladder mass on CT. During hospitalization at [facility], he suffered hypovolemic shock and acute encephalopathy. Patient was seen by Dr. Kailey Regan and underwent cysto/evacuation of clots on 22 and 22 d/t ongoing hematuria/clots. [facility] was advised to follow up with [facility] urology OP but represents to ED with gross hematuria. Wife at bedside. He c/o of intermittent bladder spasms that radiate to lower back associated with burning sensation that self-resolves. He rec'd 1 L NS in ED. Pt had CBI placed for hematuria; on presentation, hgb was 8.1 with subsequent hgb drop to 6.2; s/p transfusion     22 - Pt had jasso removed with some trauma 22. After removal, hematuria resumed, hgb dropped to 7.0, and pt become hypotensive. BP has improved after IVF & Hgb is now at 7.5 on 22 after 1 unit PRBC. Subjective (22): \"I feel better today. \"  Pleasant MO 76y.o. WM sitting up in bed watching TV without complaints. Wife at bedside. RN notified me at 15:35 pt will hypotension; did not improve much with IVF bolus. STAT CBC now, may have dropped hgb again; NS IVF open wide, goal MAP >65    Review of Systems:  10 systems reviewed and negative except as noted in HPI.       Assessment & Plan:     Principal Problem: Gross hematuria (1/31/2022)  - attributed to radiation cystitis  - appreciate urology's continued assistance  - CBI dc'd & jasso removed 2/7/22 with some trauma; hematuria resumed & h/h dropped but are improving s/p blood transfusion    Active Problems:    Severe anemia  - h/h dropped s/p CBI & jasso removal; remains stable post transfusion earlier this AM with hematuria resolved  - f/u AM CBC      DIMITRI / dehydration  - gentle IVF cont'd  - not on nephrotoxic rx  - if renal fxn worsens or doesn't improve with IVF, consider abd US to eval for obstructive etiology  - appreciate urology's input; if increased post void residual may need jasso replaced       Hx of prostate cancer s/p xrt / radiation cystitis  - ongoing mgmt per urology      B/l LE DVT  - s/p IVC filter placement 2/3/2022; appreciate IR assistance, outpatient f/u      ESBL Klebsiella UTI / candida UTI  - s/p Merrem  - s/p diflucan        Hypokalemia  - resolved; K+ 4.2 this AM      Liver cirrhosis / chronic thrombocytopenia  - LFT wnl 2/2/2022  - thrombocytosis resolved though platelet count dropped from 285,000 ~> 142,000; closely monitor      Hx of HF with preserved EF  - echo 2/1/2022 with normal EF and normal diastolic function  - monitor closely for acute pulm edema with ongoing IVF and blood transfusion      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  - [id number] 4.7   - would not resume metformin on dc as blood glucose levels have been stable; he is not requiring sliding scale coverage      GERD (gastroesophageal reflux disease)  - PPI as dosed      BENIGNO (obstructive sleep apnea)  - CPAP qhs        Dispo/Discharge Planning:    - return to [facility] on discharge once stable per urology    Diet:  ADULT DIET Dysphagia - Pureed; 4 carb choices (60 gm/meal)  ADULT ORAL NUTRITION SUPPLEMENT Breakfast, Dinner; Diabetic Supplement  DVT PPx: SCD  Code status: Full Code    Hospital Problems as of 2/8/2022 Date Reviewed: 7/9/2021 Codes Class Noted - Resolved POA    * (Principal) Gross hematuria ICD-10-CM: R31.0  ICD-9-CM: 599.71  1/31/2022 - Present Unknown        Chronic radiation cystitis ICD-10-CM: N30.40  ICD-9-CM: 595.82  1/31/2022 - Present Yes        Functional quadriplegia ([facility] ICD-10-CM: R53.2  ICD-9-CM: 780.72  1/31/2022 - Present Yes        Thrombocytopenia due to sequestration [facility]) ICD-10-CM: D69.6  ICD-9-CM: 287.5  1/31/2022 - Present Yes        Acute blood loss anemia (ABLA) ICD-10-CM: D62  ICD-9-CM: 285.1  1/31/2022 - Present Yes        Hypocortisolism ([facility] ICD-10-CM: E27.49  ICD-9-CM: 255.41  1/31/2022 - Present Yes        Hypoglycemia ICD-10-CM: E16.2  ICD-9-CM: 251.2  1/31/2022 - Present Yes        Hypoalbuminemia due to protein-calorie malnutrition ([facility] ICD-10-CM: E88.09, E46  ICD-9-CM: 273.8, 263.9  1/31/2022 - Present Yes        (HFpEF) heart failure with preserved ejection fraction ([facility] ICD-10-CM: I50.30  ICD-9-CM: 428.9  1/19/2022 - Present Yes        Hepatic cirrhosis ([facility] ICD-10-CM: K74.60  ICD-9-CM: 571.5  10/8/2021 - Present Yes    Overview Signed 1/31/2022  4:07 PM by Albino Moore DO     Last Assessment & Plan:   Formatting of this note might be different from the original.  Follow-up for edema and ascites  Avoid Tylenol  Check labs on Mondays             Psoriatic arthritis ([facility] ICD-10-CM: L40.50  ICD-9-CM: 696.0  6/23/2017 - Present Yes    Overview Signed 1/31/2022  4:07 PM by Albino Cooler, DO     Last Assessment & Plan:   Formatting of this note might be different from the original.  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  Blood sugars have been in good range without treatment  Very poor appetite  Continue supplements  Elevated BUN  Begin IV fluids today  Check BMP daily  Encourage oral fluids             GERD (gastroesophageal reflux disease) ICD-10-CM: K21.9  ICD-9-CM: 530.81  10/2/2015 - Present Yes    Overview Signed 1/31/2022  4:07 PM by Nasir Booker DO     Last Assessment & Plan:   Formatting of this note might be different from the original.  Stable, continue current medications and treatment plan             BENIGNO (obstructive sleep apnea) ICD-10-CM: G47.33  ICD-9-CM: 327.23  10/2/2015 - Present Yes    Overview Signed 1/31/2022  4:07 PM by Nasir Booker DO     Formatting of this note might be different from the original.  Dr. Jacobo Granado:   Early Arnold of this note might be different from the original.  Continue CPAP while sleeping                   Objective:     Patient Vitals for the past 24 hrs:   Temp Pulse Resp BP SpO2   02/08/22 0613 97.9 °F (36.6 °C) 76 18 102/61 92 %   02/08/22 0447 97.3 °F (36.3 °C) 72  (!) 84/55 92 %   02/08/22 0446 97.6 °F (36.4 °C) 71 16 (!) 84/55 92 %   02/08/22 0420 98.2 °F (36.8 °C) 87 18 107/61 91 %   02/07/22 2345 97.9 °F (36.6 °C) 78 18 90/60 94 %   02/07/22 2008  83  (!) 86/55    02/07/22 1952 97.9 °F (36.6 °C) 92 18 125/69 93 %   02/07/22 1805  85  (!) 93/53    02/07/22 1548  85 18 (!) 96/52    02/07/22 1547 97.9 °F (36.6 °C) 87 19 (!) 85/50 95 %   02/07/22 1147 98.7 °F (37.1 °C) 100 21 110/61 96 %     Oxygen Therapy  O2 Sat (%): 92 % (02/08/22 0613)  Pulse via Oximetry: 66 beats per minute (02/07/22 2345)  O2 Device: None (Room air) (02/08/22 0710)  O2 Flow Rate (L/min): 3 l/min (02/03/22 1101)  ETCO2 (mmHg):  (not detecting) (02/03/22 1034)    Estimated body mass index is 39.39 kg/m² as calculated from the following:    Height as of this encounter: 5' 5\" (1.651 m). Weight as of this encounter: 107.4 kg (236 lb 11.2 oz).     Intake/Output Summary (Last 24 hours) at 2/8/2022 1045  Last data filed at 2/8/2022 0640  Gross per 24 hour   Intake 602 ml   Output    Net 602 ml         Physical Exam:     Blood pressure 102/61, pulse 76, temperature 97.9 °F (36.6 °C), resp. rate 18, height 5' 5\" (1.651 m), weight 107.4 kg (236 lb 11.2 oz), SpO2 92 %. General:    Pleasant comfortable male sitting up watching TV  Head:  Normocephalic, atraumatic  Eyes:  Sclerae pale. Pupils equally round. ENT:  Nares appear normal, no drainage. Moist oral mucosa  Neck:  No restricted ROM. Trachea midline   CV:   RRR. No m/r/g. No jugular venous distension. Lungs:   CTAB. No wheezing, rhonchi, or rales. Respirations even, unlabored  Abdomen:   Hypoactive BS x 4, soft, nontender  Extremities: No cyanosis or clubbing. B/l LE edema with tenderness to palpation  Skin:     No rashes and normal coloration. Warm and dry. Neuro:  CN II-XII grossly intact. Sensation intact. A&Ox3  Psych:  Mood stable / pleasant       I have reviewed ordered lab tests and independently visualized imaging below:    Recent Labs:  Recent Results (from the past 48 hour(s))   CBC WITH AUTOMATED DIFF    Collection Time: 02/07/22  5:07 AM   Result Value Ref Range    WBC 12.5 (H) 4.3 - 11.1 K/uL    RBC 2.99 (L) 4.23 - 5.6 M/uL    HGB 8.7 (L) 13.6 - 17.2 g/dL    HCT 26.5 (L) 41.1 - 50.3 %    MCV 88.6 79.6 - 97.8 FL    MCH 29.1 26.1 - 32.9 PG    MCHC 32.8 31.4 - 35.0 g/dL    RDW 15.4 (H) 11.9 - 14.6 %    PLATELET 382 712 - 462 K/uL    MPV 8.3 (L) 9.4 - 12.3 FL    ABSOLUTE NRBC 0.00 0.0 - 0.2 K/uL    DF AUTOMATED      NEUTROPHILS 79 (H) 43 - 78 %    LYMPHOCYTES 7 (L) 13 - 44 %    MONOCYTES 12 4.0 - 12.0 %    EOSINOPHILS 0 (L) 0.5 - 7.8 %    BASOPHILS 1 0.0 - 2.0 %    IMMATURE GRANULOCYTES 1 0.0 - 5.0 %    ABS. NEUTROPHILS 9.9 (H) 1.7 - 8.2 K/UL    ABS. LYMPHOCYTES 0.9 0.5 - 4.6 K/UL    ABS. MONOCYTES 1.5 (H) 0.1 - 1.3 K/UL    ABS. EOSINOPHILS 0.0 0.0 - 0.8 K/UL    ABS. BASOPHILS 0.1 0.0 - 0.2 K/UL    ABS. IMM. GRANS. 0.1 0.0 - 0.5 K/UL   METABOLIC PANEL, BASIC    Collection Time: 02/07/22  5:07 AM   Result Value Ref Range    Sodium 138 136 - 145 mmol/L    Potassium 4.6 3.5 - 5.1 mmol/L    Chloride 109 (H) 98 - 107 mmol/L    CO2 20 (L) 21 - 32 mmol/L    Anion gap 9 7 - 16 mmol/L    Glucose 111 (H) 65 - 100 mg/dL    BUN 33 (H) 8 - 23 MG/DL    Creatinine 1.50 0.8 - 1.5 MG/DL    GFR est AA 58 (L) >60 ml/min/1.73m2    GFR est non-AA 48 (L) >60 ml/min/1.73m2    Calcium 9.4 8.3 - 10.4 MG/DL   HGB & HCT    Collection Time: 02/07/22  7:40 PM   Result Value Ref Range    HGB 7.0 (L) 13.6 - 17.2 g/dL    HCT 21.4 (L) 41.1 - 50.3 %   RBC, ALLOCATE    Collection Time: 02/07/22  8:45 PM   Result Value Ref Range    HISTORY CHECKED? Historical check performed    TYPE & SCREEN    Collection Time: 02/08/22 12:25 AM   Result Value Ref Range    Crossmatch Expiration 02/11/2022,2359     ABO/Rh(D) O POSITIVE     Antibody screen NEG     Unit number [id number]     Blood component type RC LR     Unit division 00     Status of unit ISSUED     Crossmatch result Compatible    CBC WITH AUTOMATED DIFF    Collection Time: 02/08/22  9:36 AM   Result Value Ref Range    WBC 7.7 4.3 - 11.1 K/uL    RBC 2.58 (L) 4.23 - 5.6 M/uL    HGB 7.5 (L) 13.6 - 17.2 g/dL    HCT 23.3 (L) 41.1 - 50.3 %    MCV 90.3 79.6 - 97.8 FL    MCH 29.1 26.1 - 32.9 PG    MCHC 32.2 31.4 - 35.0 g/dL    RDW 16.0 (H) 11.9 - 14.6 %    PLATELET 658 (L) 333 - 450 K/uL    MPV 8.5 (L) 9.4 - 12.3 FL    ABSOLUTE NRBC 0.00 0.0 - 0.2 K/uL    DF AUTOMATED      NEUTROPHILS 71 43 - 78 %    LYMPHOCYTES 13 13 - 44 %    MONOCYTES 10 4.0 - 12.0 %    EOSINOPHILS 4 0.5 - 7.8 %    BASOPHILS 0 0.0 - 2.0 %    IMMATURE GRANULOCYTES 1 0.0 - 5.0 %    ABS. NEUTROPHILS 5.5 1.7 - 8.2 K/UL    ABS. LYMPHOCYTES 1.0 0.5 - 4.6 K/UL    ABS. MONOCYTES 0.8 0.1 - 1.3 K/UL    ABS. EOSINOPHILS 0.3 0.0 - 0.8 K/UL    ABS. BASOPHILS 0.0 0.0 - 0.2 K/UL    ABS. IMM.  GRANS. 0.1 0.0 - 0.5 K/UL       All Micro Results     Procedure Component Value Units Date/Time    CULTURE, URINE [[id number]]  (Abnormal)  (Susceptibility) Collected: 01/31/22 1615    Order Status: Completed Specimen: Cath Urine Updated: 02/04/22 1159     Special Requests: NO SPECIAL REQUESTS        Culture result:       >100,000 COLONIES/mL KLEBSIELLA PNEUMONIAE ** (EXTENDED SPECTRUM BETA LACTAMASE ) ** ** MULTI-DRUG RESISTANT ORGANISM **                  10,000 to 50,000 COLONIES/mL CANDIDA TROPICALIS                  RESULTS VERIFIED, PHONED TO AND READ BACK BY  MAGNO BROWN AT 3664 ON 2/4/22, KYLER            Other Studies:  No results found.     Current Meds:  Current Facility-Administered Medications   Medication Dose Route Frequency    zinc oxide-cod liver oil (DESITIN) 40 % paste   Topical PRN    0.9% sodium chloride infusion  75 mL/hr IntraVENous CONTINUOUS    0.9% sodium chloride infusion 250 mL  250 mL IntraVENous PRN    rOPINIRole (REQUIP) tablet 2 mg  2 mg Oral DAILY    melatonin tablet 5 mg  5 mg Oral QHS    traZODone (DESYREL) tablet 50 mg  50 mg Oral QHS    oxyCODONE IR (ROXICODONE) tablet 10 mg  10 mg Oral Q6H PRN    ascorbic acid (vitamin C) (VITAMIN C) tablet 500 mg  500 mg Oral DAILY    fenofibrate (LOFIBRA) tablet 160 mg  160 mg Oral DAILY    atorvastatin (LIPITOR) tablet 40 mg  40 mg Oral QHS    0.9% sodium chloride infusion 250 mL  250 mL IntraVENous PRN    pantoprazole (PROTONIX) tablet 40 mg  40 mg Oral ACB    sodium chloride (NS) flush 5-40 mL  5-40 mL IntraVENous Q8H    sodium chloride (NS) flush 5-40 mL  5-40 mL IntraVENous PRN    acetaminophen (TYLENOL) tablet 650 mg  650 mg Oral Q4H PRN    oxyCODONE IR (ROXICODONE) tablet 5 mg  5 mg Oral Q6H PRN    opium-belladonna (B&O 16-A SUPPRETTE) 16.2-60 mg suppository 1 Suppository  1 Suppository Rectal Q12H PRN    ondansetron (ZOFRAN) injection 4 mg  4 mg IntraVENous Q4H PRN    naloxone (NARCAN) injection 0.4 mg  0.4 mg IntraVENous PRN    dextrose 40% (GLUTOSE) oral gel 1 Tube  15 g Oral PRN    glucagon (GLUCAGEN) injection 1 mg  1 mg IntraMUSCular PRN    dextrose 10% infusion 125-250 mL  125-250 mL IntraVENous PRN       Signed:  Maisha Orona       Addendum:   Pt seen and examined independently and plan of care formulated with PA student. Anemia worsened though hematuria this AM resolved. S/p 1 unit transfused; renal fxn not improved with IVF, worrisome for obstructive uropathy. Signed By: LAUREN Rogers     February 8, 2022          Part of this note may have been written by using a voice dictation software. The note has been proof read but may still contain some grammatical/other typographical errors.

## 2022-02-08 NOTE — PROGRESS NOTES
Admit Date: 1/31/2022    Subjective:     Geoff German is resting. Voiding s/p jasso removal- urine tray colored with sediment. Objective:     Patient Vitals for the past 8 hrs:   BP Temp Pulse Resp SpO2   02/08/22 0613 102/61 97.9 °F (36.6 °C) 76 18 92 %   02/08/22 0447 (!) 84/55 97.3 °F (36.3 °C) 72 -- 92 %   02/08/22 0446 (!) 84/55 97.6 °F (36.4 °C) 71 16 92 %   02/08/22 0420 107/61 98.2 °F (36.8 °C) 87 18 91 %     No intake/output data recorded. 02/06 1901 - 02/08 0700  In: 3602 [P.O.:240]  Out: 3000 [Urine:3000]    Physical Exam:  GENERAL: alert, cooperative, no distress  LUNG: clear to auscultation bilaterally  HEART: regular rate and rhythm, S1, S2  ABDOMEN: soft, non-tender  NEUROLOGIC: AOx3    Data Review   Recent Results (from the past 24 hour(s))   HGB & HCT    Collection Time: 02/07/22  7:40 PM   Result Value Ref Range    HGB 7.0 (L) 13.6 - 17.2 g/dL    HCT 21.4 (L) 41.1 - 50.3 %   RBC, ALLOCATE    Collection Time: 02/07/22  8:45 PM   Result Value Ref Range    HISTORY CHECKED?  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      Blood sugars have been in good range without treatment      Very poor appetite      Continue supplements      Elevated BUN      Begin IV fluids today      Check BMP daily      Encourage oral fluids      GERD (gastroesophageal reflux disease) (10/2/2015)      Overview: Last Assessment & Plan:       Formatting of this note might be different from the original.      Stable, continue current medications and treatment plan      Hepatic cirrhosis ([address] (10/8/2021)      Overview: Last Assessment & Plan:       Formatting of this note might be different from the original.      Follow-up for edema and ascites      Avoid Tylenol      Check labs on Mondays      BENIGNO (obstructive sleep apnea) (10/2/2015)      Overview: Formatting of this note might be different from the original.      Dr. Luna Ends:       Delma Bella of this note might be different from the original.      Continue CPAP while sleeping      Psoriatic arthritis ([address] (6/23/2017)      Overview: Last Assessment & Plan:       Formatting of this note might be different from the original.      Continue his current medications      Pain much improved      Chronic radiation cystitis (1/31/2022)      Functional quadriplegia ([address] (1/31/2022)      Thrombocytopenia due to sequestration ([address] (1/31/2022)      Acute blood loss anemia (ABLA) (1/31/2022)      Hypocortisolism ([address] (1/31/2022)      Hypoglycemia (1/31/2022)      Hypoalbuminemia due to protein-calorie malnutrition ([address] (1/31/2022)         67 y.o. male who presents with gross hematuria via 3-way jasso catheter and SP pain. Recently underwent cysto/evacuation of clots on 1/21/22 and 1/25/22 d/t ongoing hematuria/clots at [facility]. Path showed no malignancy, +inflammation/radiation cystitis. They consulted hyperbaric oxygen therapy and pt has not been evaluated yet. He presented to [facility] ER on 1/31 with c/o gross hematuria with 3 way jasso in place. Urine cleared nicely with manual irrigation and only few clots noted. He had drop in hgb and therefore CT AP was ordered and showed no evidence of clot in the bladder per report. Todays hgb is 7.4.   He reports pain in his catheter last night, has occasional clot in the tubing. He has hx of prostate cancer s/p RRP 5/2003 with BCR and s/p radiation in 5/2016. Has ongoing issues with radiation cystitis and hematuria. Found to have bilateral LE DVT- not candidate for [facility] d/t anemia/hematuria- sp IVCF in IR today. 2/3-hgb 7.8. Cr 0.70, urine culture with GNR.       2/4- hgb 8.3, Cr 0.60, urine culture with klebsiella ESBL, ID consulted- on meropenem. 2/7- has had alum CBI all weekend. Urine clearing. On meropenem. hgb 8.7. WBC 12.5. Cr 1.50. Guajardo removed with some trauma d/t large catheter size. 2/8- pt voiding consistently. Urine is tray colored with old sediment. Hgb 7.0. Cr 1.50. Plan:     Check PVR to ensure pt emptying well. Please enter results in flowsheets. Darrin Rondon NP  [facility]    Urology Attending Physician Note:     Admit Date: 1/31/2022    Subjective: Guajardo removed yesterday. Continues to void into brief tray urine with brown sediment consistent with old blood. No further clot retention. No complaints this AM.    On review, BP was low this AM and Hb 7. Getting blood transfusion today. Objective:     Patient Vitals for the past 8 hrs:   BP Temp Pulse Resp SpO2   02/08/22 0613 102/61 97.9 °F (36.6 °C) 76 18 92 %   02/08/22 0447 (!) 84/55 97.3 °F (36.3 °C) 72 -- 92 %   02/08/22 0446 (!) 84/55 97.6 °F (36.4 °C) 71 16 92 %   02/08/22 0420 107/61 98.2 °F (36.8 °C) 87 18 91 %     No intake/output data recorded.   02/06 1901 - 02/08 0700  In: 3602 [P.O.:240]  Out: 3000 [Urine:3000]    Physical Exam:   Visit Vitals  /61   Pulse 76   Temp 97.9 °F (36.6 °C)   Resp 18   Ht 5' 5\" (1.651 m)   Wt 236 lb 11.2 oz (107.4 kg)   SpO2 92%   BMI 39.39 kg/m²        GENERAL: No acute distress, Awake, Alert, Oriented X 3  CARDIAC: regular rate and rhythm  CHEST AND LUNG: Easy work of breathing  ABDOMEN: soft, non tender, non-distended,  : Tray urine with brown sedment in diaper. Data Review   Recent Results (from the past 24 hour(s))   HGB & HCT    Collection Time: 02/07/22  7:40 PM   Result Value Ref Range    HGB 7.0 (L) 13.6 - 17.2 g/dL    HCT 21.4 (L) 41.1 - 50.3 %   RBC, ALLOCATE    Collection Time: 02/07/22  8:45 PM   Result Value Ref Range    HISTORY CHECKED? Historical check performed    TYPE & SCREEN    Collection Time: 02/08/22 12:25 AM   Result Value Ref Range    Crossmatch Expiration 02/11/2022,2359     ABO/Rh(D) O POSITIVE     Antibody screen NEG     Unit number [id number]     Blood component type RC LR     Unit division 00     Status of unit ISSUED     Crossmatch result Compatible    CBC WITH AUTOMATED DIFF    Collection Time: 02/08/22  9:36 AM   Result Value Ref Range    WBC 7.7 4.3 - 11.1 K/uL    RBC 2.58 (L) 4.23 - 5.6 M/uL    HGB 7.5 (L) 13.6 - 17.2 g/dL    HCT 23.3 (L) 41.1 - 50.3 %    MCV 90.3 79.6 - 97.8 FL    MCH 29.1 26.1 - 32.9 PG    MCHC 32.2 31.4 - 35.0 g/dL    RDW 16.0 (H) 11.9 - 14.6 %    PLATELET 802 (L) 257 - 450 K/uL    MPV 8.5 (L) 9.4 - 12.3 FL    ABSOLUTE NRBC 0.00 0.0 - 0.2 K/uL    DF AUTOMATED      NEUTROPHILS 71 43 - 78 %    LYMPHOCYTES 13 13 - 44 %    MONOCYTES 10 4.0 - 12.0 %    EOSINOPHILS 4 0.5 - 7.8 %    BASOPHILS 0 0.0 - 2.0 %    IMMATURE GRANULOCYTES 1 0.0 - 5.0 %    ABS. NEUTROPHILS 5.5 1.7 - 8.2 K/UL    ABS. LYMPHOCYTES 1.0 0.5 - 4.6 K/UL    ABS. MONOCYTES 0.8 0.1 - 1.3 K/UL    ABS. EOSINOPHILS 0.3 0.0 - 0.8 K/UL    ABS. BASOPHILS 0.0 0.0 - 0.2 K/UL    ABS. IMM.  GRANS. 0.1 0.0 - 0.5 K/UL   METABOLIC PANEL, BASIC    Collection Time: 02/08/22  9:36 AM   Result Value Ref Range    Sodium 142 138 - 145 mmol/L    Potassium 4.2 3.5 - 5.1 mmol/L    Chloride 113 (H) 98 - 107 mmol/L    CO2 24 21 - 32 mmol/L    Anion gap 5 (L) 7 - 16 mmol/L    Glucose 73 65 - 100 mg/dL    BUN 46 (H) 8 - 23 MG/DL    Creatinine 1.50 0.8 - 1.5 MG/DL    GFR est AA 58 (L) >60 ml/min/1.73m2    GFR est non-AA 48 (L) >60 ml/min/1.73m2 Calcium 8.7 8.3 - 10.4 MG/DL           Assessment:     Principal Problem:    Gross hematuria (1/31/2022)    Active Problems:    (HFpEF) heart failure with preserved ejection fraction ([address] (1/19/2022)      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      Blood sugars have been in good range without treatment      Very poor appetite      Continue supplements      Elevated BUN      Begin IV fluids today      Check BMP daily      Encourage oral fluids      GERD (gastroesophageal reflux disease) (10/2/2015)      Overview: Last Assessment & Plan:       Formatting of this note might be different from the original.      Stable, continue current medications and treatment plan      Hepatic cirrhosis ([address] (10/8/2021)      Overview: Last Assessment & Plan:       Formatting of this note might be different from the original.      Follow-up for edema and ascites      Avoid Tylenol      Check labs on Mondays      BENIGNO (obstructive sleep apnea) (10/2/2015)      Overview: Formatting of this note might be different from the original.      Dr. Omayra Stafford:       Bharat Alvarado of this note might be different from the original.      Continue CPAP while sleeping      Psoriatic arthritis ([address] (6/23/2017)      Overview: Last Assessment & Plan:       Formatting of this note might be different from the original.      Continue his current medications      Pain much improved      Chronic radiation cystitis (1/31/2022)      Functional quadriplegia ([address] (1/31/2022)      Thrombocytopenia due to sequestration ([address] (1/31/2022)      Acute blood loss anemia (ABLA) (1/31/2022)      Hypocortisolism ([address] (1/31/2022)      Hypoglycemia (1/31/2022)      Hypoalbuminemia due to protein-calorie malnutrition ([address] (1/31/2022)      Admitted for anemia and radiation cystitis. Plan:     -Continue to allow patient to void on his own  -Bladder scan patient after voids to see if he is retaining. Document in chart. If patient goes into urinary retention or becomes uncomfortable, please notify urology  -Will continue to leave catheter out for now and allow spontaneous voiding as catheter can worsen radiation cystitis by rubbing in bladder and exacerbating any bleed. -Trend labs. Transfuse PRN  -Will follow       In addition to my documentation above, I have also reviewed the nurse practitioner note and personally examined the patient. I agree with the HPI, exam, assessment and plan. Hira Estrada M.D.     [facility]  [address]  Phone: [phone]  Fax: [phone]

## 2022-02-08 NOTE — PROGRESS NOTES
Pt is alert and oriented x4. Pt in bed, resting. Bed in low position, wheels locked, call light within reach, instructed to call with any needs. Hourly rounds completed this shift. NAD noted. VSS. Pt has c/o pain, treated per MAR. IV is infusing, and dressing is clean, dry, and intact. Urine output remains dark brown, resembles old blood. PVR residual minimal.  Low BP noted, hospitalist notified, see new orders. Report to be given to night shift nurse.

## 2022-02-09 NOTE — PROGRESS NOTES
Pt declined to wear CPAP today - pt is in no distress at this time      02/08/22 2044   Oxygen Therapy   O2 Sat (%) 94 %   Pulse via Oximetry 65 beats per minute   O2 Device None (Room air)

## 2022-02-09 NOTE — PROGRESS NOTES
Chart reviewed by HARIKA PEÑA for discharge planning. The patient is not medically stable for discharge. CM informed [facility] Kishan Samano [phone] of [facility]. Urology following. No CM needs expressed or identified at this time. CM continues to follow plan of care.

## 2022-02-09 NOTE — PROGRESS NOTES
Hospitalist Progress Note   Admit Date:  2022 12:32 PM   Name:  Arnold Younger   Age:  67 y.o. Sex:  male  :  1945   MRN:  [id number]   Room:  [facility]/    Presenting Complaint: Blood in Urine    Reason(s) for Admission: Gross hematuria [R31.0]     Hospital Course & Interval History:   Geoff German is a 67 y.o. male with medical history of Cirrhosis, Prostate CA s/p RRP 2003 and radiation 2016, HFpEF, Radiation Cystitis, BENIGNO , L3 compression fracture who presented with worsening gross hematuria and suprapubic pain. Presented from rehab facility. Previously admitted at [facility] from 1/15-22 transferred there from OSH with hematuria and difficulty voiding w/ bladder mass on CT. During hospitalization at [facility], he suffered hypovolemic shock and acute encephalopathy. Patient was seen by Dr. Wilber Johnson and underwent cysto/evacuation of clots on 22 and 22 d/t ongoing hematuria/clots. Rob Eastman was advised to follow up with [facility] urology OP but represents to ED with gross hematuria. Wife at bedside. He c/o of intermittent bladder spasms that radiate to lower back associated with burning sensation that self-resolves. He rec'd 1 L NS in ED. Pt had CBI placed for hematuria; on presentation, hgb was 8.1 with subsequent hgb drop to 6.2; s/p transfusion     22 - Pt had jasso removed with some trauma 22. After removal, hematuria resumed, hgb dropped to 7.0, and pt become hypotensive. BP has improved after IVF & Hgb is now at 7.5 on 22 after 1 unit PRBC. RN notified me at 15:35 pt was hypotensive; did not improve much with IVF bolus. STAT CBC ordered & Hgb 7.4. Given NS IVF open wide, goal MAP >65.      22  - Pt is still voiding on his own without jasso but complained of pelvic pain and pressure. He was informed to let nurse know if pain worsens. Hgb rechecked this AM and was 7.3. Pt remains soft with ongoing IVF (MAP 64).  He will be started on midodrine to maintain his BP.     Subjective (02/09/22): \"I am feeling more pain today in my belly. \"  Pleasant MO 76y.o. WM sitting up in bed. Wife at bedside. Review of Systems:  10 systems reviewed and negative except as noted in HPI.     Assessment & Plan:     Principal Problem:    Gross hematuria (1/31/2022)  - attributed to radiation cystitis  - appreciate urology's continued assistance  - CBI dc'd & jasso removed 2/7/22; hematuria resumed post jasso removal & h/h dropped but did improve s/p repeat blood transfusion 2/8/22; Hgb remains low at 7.3 (2/9/22)  - cont voiding on own; per RN, no PVR    Active Problems:    Severe anemia  - h/h dropped s/p CBI & jasso removal; remains stable post transfusion 2/8/222 with hematuria resolved; Hgb of 7.3 this AM  - f/u AM CBC      Hypotension  - suspect multifactorial in setting of anemia and dehydration  - goal to maintain MAP >65  - add midodrine tid; decrease IVF flow from 100ml/hr to 75ml/hr      DIMITRI / dehydration  - Creatinine decreased to 1.0 and BUN at 40 is trending down on 2/9/22  - not on nephrotoxic rx  - if BP improves with addition of midodrine, will wean off IVF       Hx of prostate cancer s/p xrt / radiation cystitis  - ongoing mgmt per urology      B/l LE DVT  - s/p IVC filter placement 2/3/2022; appreciate IR assistance, outpatient f/u      ESBL Klebsiella UTI / candida UTI  - s/p Merrem  - s/p diflucan        Hypokalemia  - resolved; K+ 3.7 this AM      Liver cirrhosis / chronic thrombocytopenia  - LFT wnl 2/2/2022  - platelet count 283,642 today; closely monitor      Hx of HF with preserved EF  - echo 2/1/2022 with normal EF and normal diastolic function  - monitor closely with ongoing IVF      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  - [id number] 4.7; would not resume metformin on discharge  - dc fingersticks as glucose levels have been normal      GERD (gastroesophageal reflux disease)  - PPI as dosed      BENIGNO (obstructive sleep apnea)  - CPAP qhs        Dispo/Discharge Planning:    - return to [facility] on discharge once stable per urology    Diet:  ADULT DIET Dysphagia - Pureed; 4 carb choices (60 gm/meal)  ADULT ORAL NUTRITION SUPPLEMENT Breakfast, Dinner; Diabetic Supplement  ADULT ORAL NUTRITION SUPPLEMENT Lunch, Dinner; Frozen Supplement  DVT PPx: SCD  Code status: Full Code    Hospital Problems as of 2/9/2022 Date Reviewed: 7/9/2021          Codes Class Noted - Resolved POA    * (Principal) Gross hematuria ICD-10-CM: R31.0  ICD-9-CM: 599.71  1/31/2022 - Present Unknown        Chronic radiation cystitis ICD-10-CM: N30.40  ICD-9-CM: 595.82  1/31/2022 - Present Yes        Functional quadriplegia ([facility] ICD-10-CM: R53.2  ICD-9-CM: 780.72  1/31/2022 - Present Yes        Thrombocytopenia due to sequestration ([facility] ICD-10-CM: D69.6  ICD-9-CM: 287.5  1/31/2022 - Present Yes        Acute blood loss anemia (ABLA) ICD-10-CM: D62  ICD-9-CM: 285.1  1/31/2022 - Present Yes        Hypocortisolism ([facility] ICD-10-CM: E27.49  ICD-9-CM: 255.41  1/31/2022 - Present Yes        Hypoglycemia ICD-10-CM: E16.2  ICD-9-CM: 251.2  1/31/2022 - Present Yes        Hypoalbuminemia due to protein-calorie malnutrition ([facility] ICD-10-CM: E88.09, E46  ICD-9-CM: 273.8, 263.9  1/31/2022 - Present Yes        (HFpEF) heart failure with preserved ejection fraction ([facility] ICD-10-CM: I50.30  ICD-9-CM: 428.9  1/19/2022 - Present Yes        Hepatic cirrhosis ([facility] ICD-10-CM: K74.60  ICD-9-CM: 571.5  10/8/2021 - Present Yes    Overview Signed 1/31/2022  4:07 PM by Missy Peraza DO     Last Assessment & Plan:   Formatting of this note might be different from the original.  Follow-up for edema and ascites  Avoid Tylenol  Check labs on Mondays             Psoriatic arthritis [facility]) ICD-10-CM: L40.50  ICD-9-CM: 696.0  6/23/2017 - Present Yes    Overview Signed 1/31/2022  4:07 PM by Missy Peraza DO     Last Assessment & Plan:   Formatting of this note might be different from the original.  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  Blood sugars have been in good range without treatment  Very poor appetite  Continue supplements  Elevated BUN  Begin IV fluids today  Check BMP daily  Encourage oral fluids             GERD (gastroesophageal reflux disease) ICD-10-CM: K21.9  ICD-9-CM: 530.81  10/2/2015 - Present Yes    Overview Signed 1/31/2022  4:07 PM by Darreld Kocher, DO     Last Assessment & Plan:   Formatting of this note might be different from the original.  Stable, continue current medications and treatment plan             BENIGNO (obstructive sleep apnea) ICD-10-CM: G47.33  ICD-9-CM: 327.23  10/2/2015 - Present Yes    Overview Signed 1/31/2022  4:07 PM by Darreld Kocher, DO     Formatting of this note might be different from the original.  Dr. Marlene Barnes:   Irina Orourke of this note might be different from the original.  Continue CPAP while sleeping                   Objective:     Patient Vitals for the past 24 hrs:   Temp Pulse Resp BP SpO2   02/09/22 1116 97.7 °F (36.5 °C) 70 17 95/60 95 %   02/09/22 0750 97.7 °F (36.5 °C) 60 18 (!) 90/51 98 %   02/09/22 0518 97.8 °F (36.6 °C) 74 18 (!) 89/57 95 %   02/09/22 0005 97.5 °F (36.4 °C) 64 16 (!) 91/57 94 %   02/08/22 2044     94 %   02/08/22 2007 98 °F (36.7 °C) 67 16 92/61 93 %   02/08/22 1849 98.2 °F (36.8 °C) 74 16 110/63 96 %   02/08/22 1642    100/64    02/08/22 1605  74  (!) 86/56    02/08/22 1145 98.4 °F (36.9 °C) 76 18 (!) 93/55 94 %     Oxygen Therapy  O2 Sat (%): 95 % (02/09/22 1116)  Pulse via Oximetry: 65 beats per minute (02/08/22 2044)  O2 Device: None (Room air) (02/09/22 0708)  O2 Flow Rate (L/min): 3 l/min (02/03/22 1101)  ETCO2 (mmHg):  (not detecting) (02/03/22 1034)    Estimated body mass index is 39.39 kg/m² as calculated from the following:    Height as of this encounter: 5' 5\" (1.651 m). Weight as of this encounter: 107.4 kg (236 lb 11.2 oz). No intake or output data in the 24 hours ending 02/09/22 1137      Physical Exam:     Blood pressure 95/60, pulse 70, temperature 97.7 °F (36.5 °C), resp. rate 17, height 5' 5\" (1.651 m), weight 107.4 kg (236 lb 11.2 oz), SpO2 95 %. General:    Pleasant, mildly uncomfortable male sitting up in bed  Head:  Normocephalic, atraumatic  Eyes:  Sclerae pale. Pupils equally round. ENT:  Nares appear normal, no drainage. Moist oral mucosa  Neck:  No restricted ROM. Trachea midline   CV:   RRR. No m/r/g. No jugular venous distension. Lungs:   CTAB. No wheezing, rhonchi, or rales. Respirations even, unlabored  Abdomen:   Hypoactive BS x 4, soft, nontender to deep palpation b/l LQ  Extremities: No cyanosis or clubbing. B/l LE edema with tenderness to palpation  Skin:     No rashes and normal coloration. Warm and dry. Neuro:  CN II-XII grossly intact. Sensation intact. A&Ox3  Psych:  Mood stable / pleasant       I have reviewed ordered lab tests and independently visualized imaging below:    Recent Labs:  Recent Results (from the past 48 hour(s))   HGB & HCT    Collection Time: 02/07/22  7:40 PM   Result Value Ref Range    HGB 7.0 (L) 13.6 - 17.2 g/dL    HCT 21.4 (L) 41.1 - 50.3 %   RBC, ALLOCATE    Collection Time: 02/07/22  8:45 PM   Result Value Ref Range    HISTORY CHECKED?  Historical check performed    TYPE & SCREEN    Collection Time: 02/08/22 12:25 AM   Result Value Ref Range    Crossmatch Expiration 02/11/2022,2359     ABO/Rh(D) O POSITIVE     Antibody screen NEG     Unit number [id number]     Blood component type  LR     Unit division 00     Status of unit TRANSFUSED Crossmatch result Compatible    CBC WITH AUTOMATED DIFF    Collection Time: 02/08/22  9:36 AM   Result Value Ref Range    WBC 7.7 4.3 - 11.1 K/uL    RBC 2.58 (L) 4.23 - 5.6 M/uL    HGB 7.5 (L) 13.6 - 17.2 g/dL    HCT 23.3 (L) 41.1 - 50.3 %    MCV 90.3 79.6 - 97.8 FL    MCH 29.1 26.1 - 32.9 PG    MCHC 32.2 31.4 - 35.0 g/dL    RDW 16.0 (H) 11.9 - 14.6 %    PLATELET 709 (L) 242 - 450 K/uL    MPV 8.5 (L) 9.4 - 12.3 FL    ABSOLUTE NRBC 0.00 0.0 - 0.2 K/uL    DF AUTOMATED      NEUTROPHILS 71 43 - 78 %    LYMPHOCYTES 13 13 - 44 %    MONOCYTES 10 4.0 - 12.0 %    EOSINOPHILS 4 0.5 - 7.8 %    BASOPHILS 0 0.0 - 2.0 %    IMMATURE GRANULOCYTES 1 0.0 - 5.0 %    ABS. NEUTROPHILS 5.5 1.7 - 8.2 K/UL    ABS. LYMPHOCYTES 1.0 0.5 - 4.6 K/UL    ABS. MONOCYTES 0.8 0.1 - 1.3 K/UL    ABS. EOSINOPHILS 0.3 0.0 - 0.8 K/UL    ABS. BASOPHILS 0.0 0.0 - 0.2 K/UL    ABS. IMM.  GRANS. 0.1 0.0 - 0.5 K/UL   METABOLIC PANEL, BASIC    Collection Time: 02/08/22  9:36 AM   Result Value Ref Range    Sodium 142 138 - 145 mmol/L    Potassium 4.2 3.5 - 5.1 mmol/L    Chloride 113 (H) 98 - 107 mmol/L    CO2 24 21 - 32 mmol/L    Anion gap 5 (L) 7 - 16 mmol/L    Glucose 73 65 - 100 mg/dL    BUN 46 (H) 8 - 23 MG/DL    Creatinine 1.50 0.8 - 1.5 MG/DL    GFR est AA 58 (L) >60 ml/min/1.73m2    GFR est non-AA 48 (L) >60 ml/min/1.73m2    Calcium 8.7 8.3 - 10.4 MG/DL   HGB & HCT    Collection Time: 02/08/22  4:17 PM   Result Value Ref Range    HGB 7.4 (L) 13.6 - 17.2 g/dL    HCT 22.5 (L) 41.1 - 50.3 %   CBC WITH AUTOMATED DIFF    Collection Time: 02/09/22  5:26 AM   Result Value Ref Range    WBC 3.9 (L) 4.3 - 11.1 K/uL    RBC 2.51 (L) 4.23 - 5.6 M/uL    HGB 7.3 (L) 13.6 - 17.2 g/dL    HCT 22.6 (L) 41.1 - 50.3 %    MCV 90.0 79.6 - 97.8 FL    MCH 29.1 26.1 - 32.9 PG    MCHC 32.3 31.4 - 35.0 g/dL    RDW 15.9 (H) 11.9 - 14.6 %    PLATELET 896 (L) 025 - 450 K/uL    MPV 9.0 (L) 9.4 - 12.3 FL    ABSOLUTE NRBC 0.00 0.0 - 0.2 K/uL    DF AUTOMATED      NEUTROPHILS 61 43 - 78 % LYMPHOCYTES 21 13 - 44 %    MONOCYTES 10 4.0 - 12.0 %    EOSINOPHILS 7 0.5 - 7.8 %    BASOPHILS 1 0.0 - 2.0 %    IMMATURE GRANULOCYTES 1 0.0 - 5.0 %    ABS. NEUTROPHILS 2.3 1.7 - 8.2 K/UL    ABS. LYMPHOCYTES 0.8 0.5 - 4.6 K/UL    ABS. MONOCYTES 0.4 0.1 - 1.3 K/UL    ABS. EOSINOPHILS 0.3 0.0 - 0.8 K/UL    ABS. BASOPHILS 0.0 0.0 - 0.2 K/UL    ABS. IMM. GRANS. 0.0 0.0 - 0.5 K/UL   METABOLIC PANEL, BASIC    Collection Time: 02/09/22  5:26 AM   Result Value Ref Range    Sodium 141 136 - 145 mmol/L    Potassium 3.7 3.5 - 5.1 mmol/L    Chloride 114 (H) 98 - 107 mmol/L    CO2 21 21 - 32 mmol/L    Anion gap 6 (L) 7 - 16 mmol/L    Glucose 96 65 - 100 mg/dL    BUN 40 (H) 8 - 23 MG/DL    Creatinine 1.00 0.8 - 1.5 MG/DL    GFR est AA >60 >60 ml/min/1.73m2    GFR est non-AA >60 >60 ml/min/1.73m2    Calcium 8.3 8.3 - 10.4 MG/DL       All Micro Results     Procedure Component Value Units Date/Time    CULTURE, URINE [[id number]]  (Abnormal)  (Susceptibility) Collected: 01/31/22 1615    Order Status: Completed Specimen: Cath Urine Updated: 02/04/22 1151     Special Requests: NO SPECIAL REQUESTS        Culture result:       >100,000 COLONIES/mL KLEBSIELLA PNEUMONIAE ** (EXTENDED SPECTRUM BETA LACTAMASE ) ** ** MULTI-DRUG RESISTANT ORGANISM **                  10,000 to 50,000 COLONIES/mL CANDIDA TROPICALIS                  RESULTS VERIFIED, PHONED TO AND READ BACK BY  MAGNO BROWN AT 1158 ON 2/4/22, KYLER            Other Studies:  No results found.     Current Meds:  Current Facility-Administered Medications   Medication Dose Route Frequency    midodrine (PROAMATINE) tablet 5 mg  5 mg Oral TID WITH MEALS    zinc oxide-cod liver oil (DESITIN) 40 % paste   Topical PRN    traMADoL (ULTRAM) tablet 50 mg  50 mg Oral Q6H PRN    0.9% sodium chloride infusion  100 mL/hr IntraVENous CONTINUOUS    0.9% sodium chloride infusion 250 mL  250 mL IntraVENous PRN    rOPINIRole (REQUIP) tablet 2 mg  2 mg Oral DAILY    melatonin tablet 5 mg  5 mg Oral QHS    traZODone (DESYREL) tablet 50 mg  50 mg Oral QHS    ascorbic acid (vitamin C) (VITAMIN C) tablet 500 mg  500 mg Oral DAILY    fenofibrate (LOFIBRA) tablet 160 mg  160 mg Oral DAILY    atorvastatin (LIPITOR) tablet 40 mg  40 mg Oral QHS    0.9% sodium chloride infusion 250 mL  250 mL IntraVENous PRN    pantoprazole (PROTONIX) tablet 40 mg  40 mg Oral ACB    sodium chloride (NS) flush 5-40 mL  5-40 mL IntraVENous Q8H    sodium chloride (NS) flush 5-40 mL  5-40 mL IntraVENous PRN    acetaminophen (TYLENOL) tablet 650 mg  650 mg Oral Q4H PRN    oxyCODONE IR (ROXICODONE) tablet 5 mg  5 mg Oral Q6H PRN    opium-belladonna (B&O 16-A SUPPRETTE) 16.2-60 mg suppository 1 Suppository  1 Suppository Rectal Q12H PRN    ondansetron (ZOFRAN) injection 4 mg  4 mg IntraVENous Q4H PRN    naloxone (NARCAN) injection 0.4 mg  0.4 mg IntraVENous PRN    dextrose 40% (GLUTOSE) oral gel 1 Tube  15 g Oral PRN    glucagon (GLUCAGEN) injection 1 mg  1 mg IntraMUSCular PRN    dextrose 10% infusion 125-250 mL  125-250 mL IntraVENous PRN       Signed:  [facility]       Addendum:   Pt seen and examined independently and plan of care formulated with PA student. H/h stable, urology actively following. Newly started midodrine, wean off IVF as tolerated. Very debilitated, STR on dc    Signed By: LAUREN Monique     February 9, 2022          Part of this note may have been written by using a voice dictation software. The note has been proof read but may still contain some grammatical/other typographical errors.

## 2022-02-09 NOTE — PROGRESS NOTES
Problem: Falls - Risk of  Goal: *Absence of Falls  Description: Document Bobbetta Hoop Fall Risk and appropriate interventions in the flowsheet. Outcome: Progressing Towards Goal  Note: Fall Risk Interventions:  Mobility Interventions: Bed/chair exit alarm,OT consult for ADLs,Patient to call before getting OOB,PT Consult for mobility concerns,PT Consult for assist device competence         Medication Interventions: Bed/chair exit alarm,Patient to call before getting OOB,Teach patient to arise slowly    Elimination Interventions: Bed/chair exit alarm,Call light in reach,Elevated toilet seat,Patient to call for help with toileting needs,Stay With Me (per policy),Toilet paper/wipes in reach              Problem: Falls - Risk of  Goal: *Absence of Falls  Description: Document Bobbetta Hoop Fall Risk and appropriate interventions in the flowsheet. Outcome: Progressing Towards Goal  Note: Fall Risk Interventions:  Mobility Interventions: Bed/chair exit alarm,OT consult for ADLs,Patient to call before getting OOB,PT Consult for mobility concerns,PT Consult for assist device competence         Medication Interventions: Bed/chair exit alarm,Patient to call before getting OOB,Teach patient to arise slowly    Elimination Interventions: Bed/chair exit alarm,Call light in reach,Elevated toilet seat,Patient to call for help with toileting needs,Stay With Me (per policy),Toilet paper/wipes in reach              Problem: Pressure Injury - Risk of  Goal: *Prevention of pressure injury  Description: Document Matthew Scale and appropriate interventions in the flowsheet.   Outcome: Progressing Towards Goal  Note: Pressure Injury Interventions:  Sensory Interventions: Assess changes in LOC,Avoid rigorous massage over bony prominences,Check visual cues for pain,Discuss PT/OT consult with provider,Float heels,Keep linens dry and wrinkle-free,Maintain/enhance activity level,Minimize linen layers    Moisture Interventions: Absorbent underpads,Apply protective barrier, creams and emollients,Check for incontinence Q2 hours and as needed,Internal/External urinary devices,Limit adult briefs,Maintain skin hydration (lotion/cream),Minimize layers    Activity Interventions: Increase time out of bed,Pressure redistribution bed/mattress(bed type),PT/OT evaluation    Mobility Interventions: Assess need for specialty bed,Float heels,HOB 30 degrees or less,Pressure redistribution bed/mattress (bed type),PT/OT evaluation,Turn and reposition approx.  every two hours(pillow and wedges)    Nutrition Interventions: Document food/fluid/supplement intake    Friction and Shear Interventions: Apply protective barrier, creams and emollients,Foam dressings/transparent film/skin sealants

## 2022-02-09 NOTE — PROGRESS NOTES
Admit Date: 1/31/2022    Subjective:     Isidoro Navarrete is resting. Voiding s/p jasso removal- urine tray/red with sediment. PVRs yesterday 0 ml. Objective:     Patient Vitals for the past 8 hrs:   BP Temp Pulse Resp SpO2   02/09/22 1116 95/60 97.7 °F (36.5 °C) 70 17 95 %   02/09/22 0750 (!) 90/51 97.7 °F (36.5 °C) 60 18 98 %   02/09/22 0518 (!) 89/57 97.8 °F (36.6 °C) 74 18 95 %     No intake/output data recorded. 02/07 1901 - 02/09 0700  In: 362   Out: -     Physical Exam:  GENERAL: alert, cooperative, no distress  LUNG: clear to auscultation bilaterally  HEART: regular rate and rhythm, S1, S2   ABDOMEN: soft, non-tender  NEUROLOGIC: AOx3    Data Review   Recent Results (from the past 24 hour(s))   HGB & HCT    Collection Time: 02/08/22  4:17 PM   Result Value Ref Range    HGB 7.4 (L) 13.6 - 17.2 g/dL    HCT 22.5 (L) 41.1 - 50.3 %   CBC WITH AUTOMATED DIFF    Collection Time: 02/09/22  5:26 AM   Result Value Ref Range    WBC 3.9 (L) 4.3 - 11.1 K/uL    RBC 2.51 (L) 4.23 - 5.6 M/uL    HGB 7.3 (L) 13.6 - 17.2 g/dL    HCT 22.6 (L) 41.1 - 50.3 %    MCV 90.0 79.6 - 97.8 FL    MCH 29.1 26.1 - 32.9 PG    MCHC 32.3 31.4 - 35.0 g/dL    RDW 15.9 (H) 11.9 - 14.6 %    PLATELET 279 (L) 885 - 450 K/uL    MPV 9.0 (L) 9.4 - 12.3 FL    ABSOLUTE NRBC 0.00 0.0 - 0.2 K/uL    DF AUTOMATED      NEUTROPHILS 61 43 - 78 %    LYMPHOCYTES 21 13 - 44 %    MONOCYTES 10 4.0 - 12.0 %    EOSINOPHILS 7 0.5 - 7.8 %    BASOPHILS 1 0.0 - 2.0 %    IMMATURE GRANULOCYTES 1 0.0 - 5.0 %    ABS. NEUTROPHILS 2.3 1.7 - 8.2 K/UL    ABS. LYMPHOCYTES 0.8 0.5 - 4.6 K/UL    ABS. MONOCYTES 0.4 0.1 - 1.3 K/UL    ABS. EOSINOPHILS 0.3 0.0 - 0.8 K/UL    ABS. BASOPHILS 0.0 0.0 - 0.2 K/UL    ABS. IMM.  GRANS. 0.0 0.0 - 0.5 K/UL   METABOLIC PANEL, BASIC    Collection Time: 02/09/22  5:26 AM   Result Value Ref Range    Sodium 141 136 - 145 mmol/L    Potassium 3.7 3.5 - 5.1 mmol/L    Chloride 114 (H) 98 - 107 mmol/L    CO2 21 21 - 32 mmol/L    Anion gap 6 (L) 7 - 16 mmol/L    Glucose 96 65 - 100 mg/dL    BUN 40 (H) 8 - 23 MG/DL    Creatinine 1.00 0.8 - 1.5 MG/DL    GFR est AA >60 >60 ml/min/1.73m2    GFR est non-AA >60 >60 ml/min/1.73m2    Calcium 8.3 8.3 - 10.4 MG/DL       Assessment:     Principal Problem:    Gross hematuria (1/31/2022)    Active Problems:    (HFpEF) heart failure with preserved ejection fraction ([facility] (1/19/2022)      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      Blood sugars have been in good range without treatment      Very poor appetite      Continue supplements      Elevated BUN      Begin IV fluids today      Check BMP daily      Encourage oral fluids      GERD (gastroesophageal reflux disease) (10/2/2015)      Overview: Last Assessment & Plan:       Formatting of this note might be different from the original.      Stable, continue current medications and treatment plan      Hepatic cirrhosis ([address] (10/8/2021)      Overview: Last Assessment & Plan:       Formatting of this note might be different from the original.      Follow-up for edema and ascites      Avoid Tylenol      Check labs on Mondays      BENIGNO (obstructive sleep apnea) (10/2/2015)      Overview: Formatting of this note might be different from the original.      Dr. Rosanna German:       Simonne Shone of this note might be different from the original.      Continue CPAP while sleeping      Psoriatic arthritis ([facility] (6/23/2017)      Overview: Last Assessment & Plan:       Formatting of this note might be different from the original.      Continue his current medications      Pain much improved      Chronic radiation cystitis (1/31/2022)      Functional quadriplegia ([facility] (1/31/2022)      Thrombocytopenia due to sequestration ([facility] (1/31/2022)      Acute blood loss anemia (ABLA) (1/31/2022)      Hypocortisolism ([facility] (1/31/2022)      Hypoglycemia (1/31/2022)      Hypoalbuminemia due to protein-calorie malnutrition ([facility] (1/31/2022)      67 y.o. male who presents with gross hematuria via 3-way jasso catheter and SP pain. Recently underwent cysto/evacuation of clots on 1/21/22 and 1/25/22 d/t ongoing hematuria/clots at [facility]. Path showed no malignancy, +inflammation/radiation cystitis. They consulted hyperbaric oxygen therapy and pt has not been evaluated yet. He presented to [facility] ER on 1/31 with c/o gross hematuria with 3 way jasso in place. Urine cleared nicely with manual irrigation and only few clots noted. He had drop in hgb and therefore CT AP was ordered and showed no evidence of clot in the bladder per report. Todays hgb is 7.4. He reports pain in his catheter last night, has occasional clot in the tubing. He has hx of prostate cancer s/p RRP 5/2003 with BCR and s/p radiation in 5/2016. Has ongoing issues with radiation cystitis and hematuria. Found to have bilateral LE DVT- not candidate for [facility] d/t anemia/hematuria- sp IVCF in IR today. 2/3-hgb 7.8. Cr 0.70, urine culture with GNR.       2/4- hgb 8.3, Cr 0.60, urine culture with klebsiella ESBL, ID consulted- on meropenem. 2/7- has had alum CBI all weekend. Urine clearing. On meropenem. hgb 8.7. WBC 12.5. Cr 1.50. Jasso removed with some trauma d/t large catheter size. 2/8- pt voiding consistently. Urine is tray colored with old sediment. Hgb 7.0. Cr 1.50.     2/9-pt voiding. Urine with mild blood tinge/sediment, PVR 0 ml 2/8. Cr 1.00. Hgb 7. 3. labs improved. Plan:     -Continue to allow patient to void on his own  -Bladder scan patient after voids to see if he is retaining. Document in chart.   If patient goes into urinary retention or becomes uncomfortable, please notify urology  -Will continue to leave catheter out for now and allow spontaneous voiding as catheter can worsen radiation cystitis by rubbing in bladder and exacerbating any bleed. -Trend labs. Transfuse PRN  -Will follow       Nicko Celaya NP  [facility]    I have reviewed the above note. I agree with the HPI, exam, assessment and plan as outlined by the nurse practitioner. Hira Hernandez M.D.     [facility]  [address], [facility]  Phone: [phone]  Fax: [phone]

## 2022-02-10 NOTE — PROGRESS NOTES
Problem: Falls - Risk of  Goal: *Absence of Falls  Description: Document Clearence Skates Fall Risk and appropriate interventions in the flowsheet. Outcome: Progressing Towards Goal  Note: Fall Risk Interventions:  Mobility Interventions: Bed/chair exit alarm,OT consult for ADLs,Patient to call before getting OOB,PT Consult for mobility concerns,PT Consult for assist device competence         Medication Interventions: Bed/chair exit alarm    Elimination Interventions: Bed/chair exit alarm,Call light in reach,Patient to call for help with toileting needs,Toilet paper/wipes in reach,Toileting schedule/hourly rounds              Problem: Patient Education: Go to Patient Education Activity  Goal: Patient/Family Education  Outcome: Resolved/Met     Problem: Falls - Risk of  Goal: *Absence of Falls  Description: Document Clearence Skates Fall Risk and appropriate interventions in the flowsheet. Outcome: Progressing Towards Goal  Note: Fall Risk Interventions:  Mobility Interventions: Bed/chair exit alarm,OT consult for ADLs,Patient to call before getting OOB,PT Consult for mobility concerns,PT Consult for assist device competence         Medication Interventions: Bed/chair exit alarm    Elimination Interventions: Bed/chair exit alarm,Call light in reach,Patient to call for help with toileting needs,Toilet paper/wipes in reach,Toileting schedule/hourly rounds              Problem: Patient Education: Go to Patient Education Activity  Goal: Patient/Family Education  Outcome: Resolved/Met     Problem: Pressure Injury - Risk of  Goal: *Prevention of pressure injury  Description: Document Matthew Scale and appropriate interventions in the flowsheet.   Outcome: Progressing Towards Goal  Note: Pressure Injury Interventions:  Sensory Interventions: Assess changes in LOC,Avoid rigorous massage over bony prominences,Check visual cues for pain,Discuss PT/OT consult with provider,Float heels,Keep linens dry and wrinkle-free,Maintain/enhance activity level,Minimize linen layers    Moisture Interventions: Absorbent underpads,Check for incontinence Q2 hours and as needed    Activity Interventions: PT/OT evaluation,Pressure redistribution bed/mattress(bed type),Increase time out of bed    Mobility Interventions: PT/OT evaluation,Pressure redistribution bed/mattress (bed type)    Nutrition Interventions: Document food/fluid/supplement intake    Friction and Shear Interventions: Apply protective barrier, creams and emollients,Foam dressings/transparent film/skin sealants                Problem: Patient Education: Go to Patient Education Activity  Goal: Patient/Family Education  Outcome: Progressing Towards Goal     Problem: Infection - Risk of, Urinary Catheter-Associated Urinary Tract Infection  Goal: *Absence of infection signs and symptoms  Outcome: Resolved/Met  Note: Guajardo catheter was removed when ordered and no longer necessary. Problem: Patient Education: Go to Patient Education Activity  Goal: Patient/Family Education  Outcome: Resolved/Met     Problem: Anemia Care Plan (Adult and Pediatric)  Goal: *Labs within defined limits  Outcome: Not Progressing Towards Goal  Note: Hgb 7.3 today technically stable but MD would like a greater increase. Problem: Patient Education: Go to Patient Education Activity  Goal: Patient/Family Education  Outcome: Resolved/Met     Problem: General Medical Care Plan  Goal: *Vital signs within specified parameters  Outcome: Progressing Towards Goal  Note: Midodrine added to [facility] and effective. Pt now at textbook BP. Problem: Risk for Spread of Infection  Goal: Prevent transmission of infectious organism to others  Description: Prevent the transmission of infectious organisms to other patients, staff members, and visitors. Outcome: Progressing Towards Goal  Note: Appropriate PPE will be donned to prevent spread of infxn. Isolation gear placed at door.      Problem: Patient Education:  Go to Education Activity  Goal: Patient/Family Education  Outcome: Resolved/Met

## 2022-02-10 NOTE — PROGRESS NOTES
Admit Date: 1/31/2022    Subjective:     Fitz Mention is resting. Voiding s/p jasso removal- urine tray/red with sediment. PVR this morning is 25 ml. Objective:     Patient Vitals for the past 8 hrs:   BP Temp Pulse Resp SpO2   02/10/22 0758 (!) 101/59 97.2 °F (36.2 °C) 66 16 95 %   02/10/22 0353 116/68 98.2 °F (36.8 °C) 70 18 97 %   02/10/22 0351 -- -- 73 -- 100 %     No intake/output data recorded. 02/08 1901 - 02/10 0700  In: 120 [P.O.:120]  Out: -     Physical Exam:  GENERAL: alert, cooperative, no distress  LUNG: clear to auscultation bilaterally  HEART: regular rate and rhythm, S1, S2   ABDOMEN: soft, non-tender  NEUROLOGIC: AOx3      Data Review   Recent Results (from the past 24 hour(s))   CBC WITH AUTOMATED DIFF    Collection Time: 02/10/22  6:56 AM   Result Value Ref Range    WBC 3.1 (L) 4.3 - 11.1 K/uL    RBC 2.48 (L) 4.23 - 5.6 M/uL    HGB 7.3 (L) 13.6 - 17.2 g/dL    HCT 22.7 (L) 41.1 - 50.3 %    MCV 91.5 79.6 - 97.8 FL    MCH 29.4 26.1 - 32.9 PG    MCHC 32.2 31.4 - 35.0 g/dL    RDW 15.8 (H) 11.9 - 14.6 %    PLATELET 716 (L) 287 - 450 K/uL    MPV 8.6 (L) 9.4 - 12.3 FL    ABSOLUTE NRBC 0.00 0.0 - 0.2 K/uL    DF AUTOMATED      NEUTROPHILS 60 43 - 78 %    LYMPHOCYTES 20 13 - 44 %    MONOCYTES 11 4.0 - 12.0 %    EOSINOPHILS 7 0.5 - 7.8 %    BASOPHILS 1 0.0 - 2.0 %    IMMATURE GRANULOCYTES 1 0.0 - 5.0 %    ABS. NEUTROPHILS 1.9 1.7 - 8.2 K/UL    ABS. LYMPHOCYTES 0.6 0.5 - 4.6 K/UL    ABS. MONOCYTES 0.4 0.1 - 1.3 K/UL    ABS. EOSINOPHILS 0.2 0.0 - 0.8 K/UL    ABS. BASOPHILS 0.0 0.0 - 0.2 K/UL    ABS. IMM.  GRANS. 0.0 0.0 - 0.5 K/UL   METABOLIC PANEL, BASIC    Collection Time: 02/10/22  6:56 AM   Result Value Ref Range    Sodium 141 136 - 145 mmol/L    Potassium 3.9 3.5 - 5.1 mmol/L    Chloride 115 (H) 98 - 107 mmol/L    CO2 19 (L) 21 - 32 mmol/L    Anion gap 7 7 - 16 mmol/L    Glucose 76 65 - 100 mg/dL    BUN 34 (H) 8 - 23 MG/DL    Creatinine 0.70 (L) 0.8 - 1.5 MG/DL    GFR est AA >60 >60 ml/min/1.73m2    GFR est non-AA >60 >60 ml/min/1.73m2    Calcium 8.0 (L) 8.3 - 10.4 MG/DL       Assessment:     Principal Problem:    Gross hematuria (1/31/2022)    Active Problems:    (HFpEF) heart failure with preserved ejection fraction ([facility] [address] (1/19/2022)      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      Blood sugars have been in good range without treatment      Very poor appetite      Continue supplements      Elevated BUN      Begin IV fluids today      Check BMP daily      Encourage oral fluids      GERD (gastroesophageal reflux disease) (10/2/2015)      Overview: Last Assessment & Plan:       Formatting of this note might be different from the original.      Stable, continue current medications and treatment plan      Hepatic cirrhosis ([facility] [address] (10/8/2021)      Overview: Last Assessment & Plan:       Formatting of this note might be different from the original.      Follow-up for edema and ascites      Avoid Tylenol      Check labs on Mondays      BENIGNO (obstructive sleep apnea) (10/2/2015)      Overview: Formatting of this note might be different from the original.      Dr. Naa Chow:       Marzette Spillers of this note might be different from the original.      Continue CPAP while sleeping      Psoriatic arthritis ([facility] [address] (6/23/2017)      Overview: Last Assessment & Plan:       Formatting of this note might be different from the original.      Continue his current medications      Pain much improved      Chronic radiation cystitis (1/31/2022)      Functional quadriplegia ([address] (1/31/2022)      Thrombocytopenia due to sequestration ([address] (1/31/2022)      Acute blood loss anemia (ABLA) (1/31/2022)      Hypocortisolism ([address] (1/31/2022)      Hypoglycemia (1/31/2022)      Hypoalbuminemia due to protein-calorie malnutrition ([facility] [address] (1/31/2022)      67 y.o. male who presents with gross hematuria via 3-way jasso catheter and SP pain. Recently underwent cysto/evacuation of clots on 1/21/22 and 1/25/22 d/t ongoing hematuria/clots at [facility]. Path showed no malignancy, +inflammation/radiation cystitis. They consulted hyperbaric oxygen therapy and pt has not been evaluated yet. He presented to [facility] ER on 1/31 with c/o gross hematuria with 3 way jasso in place. Urine cleared nicely with manual irrigation and only few clots noted. He had drop in hgb and therefore CT AP was ordered and showed no evidence of clot in the bladder per report. Todays hgb is 7.4. He reports pain in his catheter last night, has occasional clot in the tubing. He has hx of prostate cancer s/p RRP 5/2003 with BCR and s/p radiation in 5/2016. Has ongoing issues with radiation cystitis and hematuria. Found to have bilateral LE DVT- not candidate for [facility] d/t anemia/hematuria- sp IVCF in IR today. 2/3-hgb 7.8. Cr 0.70, urine culture with GNR.       2/4- hgb 8.3, Cr 0.60, urine culture with klebsiella ESBL, ID consulted- on meropenem. 2/7- has had alum CBI all weekend. Urine clearing. On meropenem. hgb 8.7. WBC 12.5. Cr 1.50. Jasso removed with some trauma d/t large catheter size. 2/8- pt voiding consistently. Urine is tray colored with old sediment. Hgb 7.0. Cr 1.50.      2/9-pt voiding. Urine with mild blood tinge/sediment, PVR 0 ml 2/8. Cr 1.00. Hgb 7. 3. labs improved. 2/10- PVR 25 ml. Pt has no voiding complaints. Hgb stable. Cr 0.70. Plan:     Pt voiding with low PVR. No urinary complaints. He has been referred to outpt hyperbaric oxygen therapy and they will set this up for him. He will then follow up with Dr. Danny Sewell, will get appt in about 4-5 weeks. We will sign off.       Snacho Molina NP  [facility]    Urology Attending Physician Note:     Admit Date: 1/31/2022    Subjective:     No acute events. Continues to void on his own with bladder scans < 100. Most recent PVR 25 cc. Does have intermittent bladder spasms. No fevers/chills. Urine clear pink with intermittent sediment from alum. Objective:     Patient Vitals for the past 8 hrs:   BP Temp Pulse Resp SpO2   02/10/22 0758 (!) 101/59 97.2 °F (36.2 °C) 66 16 95 %   02/10/22 0353 116/68 98.2 °F (36.8 °C) 70 18 97 %   02/10/22 0351 -- -- 73 -- 100 %     No intake/output data recorded. 02/08 1901 - 02/10 0700  In: 120 [P.O.:120]  Out: -     Physical Exam:   Visit Vitals  BP (!) 101/59 (BP 1 Location: Right upper arm, BP Patient Position: Semi fowlers)   Pulse 66   Temp 97.2 °F (36.2 °C)   Resp 16   Ht 5' 5\" (1.651 m)   Wt 236 lb 11.2 oz (107.4 kg)   SpO2 95%   BMI 39.39 kg/m²        GENERAL: No acute distress, Awake, Alert, Oriented X 3,  CARDIAC: regular rate and rhythm  CHEST AND LUNG: Easy work of breathing  ABDOMEN: soft, non tender, non-distended,  : urine clear pink with intermittent sediment in brief. Data Review   Recent Results (from the past 24 hour(s))   CBC WITH AUTOMATED DIFF    Collection Time: 02/10/22  6:56 AM   Result Value Ref Range    WBC 3.1 (L) 4.3 - 11.1 K/uL    RBC 2.48 (L) 4.23 - 5.6 M/uL    HGB 7.3 (L) 13.6 - 17.2 g/dL    HCT 22.7 (L) 41.1 - 50.3 %    MCV 91.5 79.6 - 97.8 FL    MCH 29.4 26.1 - 32.9 PG    MCHC 32.2 31.4 - 35.0 g/dL    RDW 15.8 (H) 11.9 - 14.6 %    PLATELET 051 (L) 476 - 450 K/uL    MPV 8.6 (L) 9.4 - 12.3 FL    ABSOLUTE NRBC 0.00 0.0 - 0.2 K/uL    DF AUTOMATED      NEUTROPHILS 60 43 - 78 %    LYMPHOCYTES 20 13 - 44 %    MONOCYTES 11 4.0 - 12.0 %    EOSINOPHILS 7 0.5 - 7.8 %    BASOPHILS 1 0.0 - 2.0 %    IMMATURE GRANULOCYTES 1 0.0 - 5.0 %    ABS. NEUTROPHILS 1.9 1.7 - 8.2 K/UL    ABS. LYMPHOCYTES 0.6 0.5 - 4.6 K/UL    ABS. MONOCYTES 0.4 0.1 - 1.3 K/UL    ABS. EOSINOPHILS 0.2 0.0 - 0.8 K/UL    ABS. BASOPHILS 0.0 0.0 - 0.2 K/UL    ABS. IMM.  GRANS. 0.0 0.0 - 0.5 K/UL   METABOLIC PANEL, BASIC    Collection Time: 02/10/22  6:56 AM   Result Value Ref Range    Sodium 141 136 - 145 mmol/L    Potassium 3.9 3.5 - 5.1 mmol/L    Chloride 115 (H) 98 - 107 mmol/L    CO2 19 (L) 21 - 32 mmol/L    Anion gap 7 7 - 16 mmol/L    Glucose 76 65 - 100 mg/dL    BUN 34 (H) 8 - 23 MG/DL    Creatinine 0.70 (L) 0.8 - 1.5 MG/DL    GFR est AA >60 >60 ml/min/1.73m2    GFR est non-AA >60 >60 ml/min/1.73m2    Calcium 8.0 (L) 8.3 - 10.4 MG/DL           Assessment:     Principal Problem:    Gross hematuria (1/31/2022)    Active Problems:    (HFpEF) heart failure with preserved ejection fraction ([facility] (1/19/2022)      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      Blood sugars have been in good range without treatment      Very poor appetite      Continue supplements      Elevated BUN      Begin IV fluids today      Check BMP daily      Encourage oral fluids      GERD (gastroesophageal reflux disease) (10/2/2015)      Overview: Last Assessment & Plan:       Formatting of this note might be different from the original.      Stable, continue current medications and treatment plan      Hepatic cirrhosis ([facility] (10/8/2021)      Overview: Last Assessment & Plan:       Formatting of this note might be different from the original.      Follow-up for edema and ascites      Avoid Tylenol      Check labs on Mondays      BENIGNO (obstructive sleep apnea) (10/2/2015)      Overview: Formatting of this note might be different from the original.      Dr. Salima Cash:       Formatting of this note might be different from the original.      Continue CPAP while sleeping      Psoriatic arthritis ([facility] (6/23/2017)      Overview: Last Assessment & Plan:       Formatting of this note might be different from the original.      Continue his current medications      Pain much improved      Chronic radiation cystitis (1/31/2022)      Functional quadriplegia (HCC) (1/31/2022)      Thrombocytopenia due to sequestration ([address] (1/31/2022)      Acute blood loss anemia (ABLA) (1/31/2022)      Hypocortisolism ([address] (1/31/2022)      Hypoglycemia (1/31/2022)      Hypoalbuminemia due to protein-calorie malnutrition ([address] (1/31/2022)      Admitted for anemia and radiation cystitis. Voiding on his own with out catheter. Still with light hematuria but no significant clots to obstruct voiding. I do not think hematuria is significant enough to be contributing to patient's chronic anemia at this time. Given radiation cystitis, he likely will continue to have intermittent bouts of hematuria, which is okay as long as he is able to void. Plan:     -COntinue to allow spontaneous voids. Patient has been managing well without catheter for a few days now.  -Will arrange outpatient hyperbaric oxygen treatments for radiation cystitis. They do not perform this as inpatient, unfortunately. Wound center at [facility] contacted and given patient's info to set this up for patient. I am hopeful that hematuria will improve further with treatments.   -Will arrange outpatient follow up with me in about 4 weeks   -Will sign off. Call with questions. In addition to my documentation above, I have also reviewed the nurse practitioner note and personally examined the patient. I agree with the HPI, exam, assessment and plan. Hira Morris M.D.     [facility]  [place] [address] [address] [facility]  Phone: [phone]  Fax: [phone]

## 2022-02-10 NOTE — PROGRESS NOTES
ACUTE OT GOALS:  (Developed with and agreed upon by patient and/or caregiver.)  1. Patient will complete lower body bathing and dressing with Min A and adaptive equipment as needed. 2. Patient will complete toileting with Min A and adaptive equipment as needed. 3. Patient will verbalize and demonstrate spinal precautions with 100% accuracy during performance of ADLs. 4. Patient will complete functional transfers with CGA and adaptive equipment as needed. 5. Patient will complete standing grooming ADL with SBA and good dynamic standing balance.      Timeframe: 7 visits     OCCUPATIONAL THERAPY: Daily Note OT Treatment Day # 3     [place] Back Brace for Out of Bed Mobility; Spinal Precautions  Anastasia Bustamante is a 67 y.o. male   PRIMARY DIAGNOSIS: Gross hematuria  Gross hematuria [R31.0]       [place] / [place] / Product Type: Medicare /   ASSESSMENT:     REHAB RECOMMENDATIONS: CURRENT LEVEL OF FUNCTION:  (Most Recently Demonstrated)   Recommendation to date pending progress:  Setting:   Short-term Rehab  Equipment:    To Be Determined Bathing:   Not tested  Dressing:   Not tested  Feeding/Grooming:   Not tested  Toileting:   Maximal Assistance  Functional Mobility:   Maximal Assistance x 2     ASSESSMENT:  Mr. Mary Jane Rinaldi presents supine in the bed. Pt's legs are external rotated in the bed with increased edema in the legs and scrotum. Pt reports a significant amount of pain in the legs today which limited him with all activity. Pt worked on log rolling and sitting to the edge of the bed needing maximal assistance x 2. Pt with posterior lean and legs extended out with cueing for more upright posture and to pull feet back. Pt's brace donned and pt worked on standing at the edge of the bed with maximal assistance x 2. Pt not able to stand long and pain became so excruciating, especially with second attempt. Pt was assisted back to supine.  Pt worked on rolling side to side and bridging for pad placement and brief change. Pt very debilitated at this time. Pt to continue per plan of care.       SUBJECTIVE:   Mr. Nanette Solares states, \"My legs are killing me\"    SOCIAL HISTORY/LIVING ENVIRONMENT:   Support Systems: Spouse/Significant Other    OBJECTIVE:     PAIN: VITAL SIGNS: LINES/DRAINS:   Pre Treatment: Pain Screen  Pain Scale 1: FLACC  Pain Intensity 1: 5  Pain Location 1: Leg;Scrotum  Pain Intervention(s) 1: Repositioned  Post Treatment: 0   IV  O2 Device: None (Room air)     ACTIVITIES OF DAILY LIVING: I Mod I S SBA CGA Min Mod Max Total NT Comments   BASIC ADLs:              Bathing/ Showering [] [] [] [] [] [] [] [] [] [x]    Toileting [] [] [] [] [] [] [] [x] [] []    Dressing [] [] [] [] [] [] [] [] [] [x]    Feeding [] [] [] [] [] [] [] [] [] [x]    Grooming [] [] [] [] [] [] [] [] [] [x]    Personal Device Care [] [] [] [] [] [] [] [] [] [x]    Functional Mobility [] [] [] [] [] [] [] [x] [] [] X 2 RW sit to stand   I=Independent, Mod I=Modified Independent, S=Supervision, SBA=Standby Assistance, CGA=Contact Guard Assistance,   Min=Minimal Assistance, Mod=Moderate Assistance, Max=Maximal Assistance, Total=Total Assistance, NT=Not Tested    MOBILITY: I Mod I S SBA CGA Min Mod Max Total  NT x2 Comments:   Supine to sit [] [] [] [] [] [] [] [x] [] [] [x]    Sit to supine [] [] [] [] [] [] [] [x] [] [] [x]    Sit to stand [] [] [] [] [] [] [] [x] [] [] [x]    Bed to chair [] [] [] [] [] [] [] [] [] [x] []    I=Independent, Mod I=Modified Independent, S=Supervision, SBA=Standby Assistance, CGA=Contact Guard Assistance,   Min=Minimal Assistance, Mod=Moderate Assistance, Max=Maximal Assistance, Total=Total Assistance, NT=Not Tested    BALANCE: Good Fair+ Fair Fair- Poor NT Comments   Sitting Static [] [] [x] [x] [] []    Sitting Dynamic [] [] [] [x] [] []              Standing Static [] [] [] [] [x] []    Standing Dynamic [] [] [] [] [x] []      PLAN:   FREQUENCY/DURATION: OT Plan of Care: 3 times/week for duration of hospital stay or until stated goals are met, whichever comes first.    TREATMENT:   TREATMENT:   ( $$ Neuromuscular Re-Education: 38-52 mins   )  Co-Treatment PT/OT necessary due to patient's decreased overall endurance/tolerance levels, as well as need for high level skilled assistance to complete functional transfers/mobility and functional tasks  Neuromuscular Re-education (38 Minutes): Neuromuscular Re-education included Balance Training, Coordination training, Postural training, Sitting balance training and Standing balance training to improve Balance, Coordination and Postural Control.     TREATMENT GRID:  N/A    AFTER TREATMENT POSITION/PRECAUTIONS:  Bed, Needs within reach, RN notified and Visitors at bedside    INTERDISCIPLINARY COLLABORATION:  RN/PCT, PT/PTA and OT/MONTOYA    TOTAL TREATMENT DURATION:  OT Patient Time In/Time Out  Time In: 1438  Time Out: [address], OT

## 2022-02-10 NOTE — PROGRESS NOTES
Hospitalist Progress Note   Admit Date:  2022 12:32 PM   Name:  Eugene Younger   Age:  67 y.o. Sex:  male  :  1945   MRN:  [id number]   Room:  [facility]/    Presenting Complaint: Blood in Urine    Reason(s) for Admission: Gross hematuria [R31.0]     Hospital Course & Interval History: This is a 67 y.o. male with medical history of Cirrhosis, Prostate CA s/p RRP 2003 and radiation 2016, HFpEF, Radiation Cystitis, BENIGNO , L3 compression fracture who presented with worsening gross hematuria and suprapubic pain. Presented from rehab facility. Previously admitted at [facility] from 1/15-22 transferred there from OSH with hematuria and difficulty voiding w/ bladder mass on CT. During hospitalization at [facility], he suffered hypovolemic shock and acute encephalopathy. Patient was seen by Dr. Joe Lynch and underwent cysto/evacuation of clots on 22 and 22 d/t ongoing hematuria/clots. Wolf Lisa was advised to follow up with [facility] urology OP but represents to ED with gross hematuria. Wife at bedside. He c/o of intermittent bladder spasms that radiate to lower back associated with burning sensation that self-resolves. He rec'd 1 L NS in ED. Pt had CBI placed for hematuria; on presentation, hgb was 8.1 with subsequent hgb drop to 6.2; s/p transfusion     Subjective (02/10/22): Pt reports feeling okay. Still has intermittent lower abdominal pains. No fever. Review of Systems:  10 systems reviewed and negative except as noted in HPI.     Assessment & Plan:     Principal Problem:    Gross hematuria (2022)  - attributed to radiation cystitis  - appreciate urology's continued assistance  - CBI dc'd & jasso removed 22; hematuria resumed post jasso removal & h/h dropped but did improve s/p repeat blood transfusion 2/10/22; Hgb stable at 7.3 (22)  - cont voiding on own; per RN, no PVR    Active Problems:    Severe anemia  - h/h dropped s/p CBI & jasso removal; remains stable post transfusion 2/10/222 with hematuria resolved; Hgb of 7.3 this AM  - f/u AM CBC      Hypotension  - suspect multifactorial in setting of anemia and dehydration  - goal to maintain MAP >65  - Monitor BP off of Midodrine. DIMITRI / dehydration  - resolved. 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      Diet:  ADULT DIET Dysphagia - Pureed; 4 carb choices (60 gm/meal)  ADULT ORAL NUTRITION SUPPLEMENT Breakfast, Dinner; Diabetic Supplement  ADULT ORAL NUTRITION SUPPLEMENT Lunch, Dinner; Frozen Supplement  DVT PPx: SCD  Code status: Full Code    Hospital Problems as of 2/10/2022 Date Reviewed: 7/9/2021          Codes Class Noted - Resolved POA    * (Principal) Gross hematuria ICD-10-CM: R31.0  ICD-9-CM: 599.71  1/31/2022 - Present Unknown        Chronic radiation cystitis ICD-10-CM: N30.40  ICD-9-CM: 595.82  1/31/2022 - Present Yes        Functional quadriplegia ([facility] ICD-10-CM: R53.2  ICD-9-CM: 780.72  1/31/2022 - Present Yes        Thrombocytopenia due to sequestration [facility]) ICD-10-CM: D69.6  ICD-9-CM: 287.5  1/31/2022 - Present Yes        Acute blood loss anemia (ABLA) ICD-10-CM: D62  ICD-9-CM: 285.1  1/31/2022 - Present Yes        Hypocortisolism ([facility] ICD-10-CM: E27.49  ICD-9-CM: 255.41  1/31/2022 - Present Yes        Hypoglycemia ICD-10-CM: E16.2  ICD-9-CM: 251.2  1/31/2022 - Present Yes        Hypoalbuminemia due to protein-calorie malnutrition ([facility] ICD-10-CM: E88.09, E46  ICD-9-CM: 273.8, 263.9  1/31/2022 - Present Yes        (HFpEF) heart failure with preserved ejection fraction ([facility] ICD-10-CM: I50.30  ICD-9-CM: 428.9  1/19/2022 - Present Yes        Hepatic cirrhosis ([facility] ICD-10-CM: K74.60  ICD-9-CM: 571.5  10/8/2021 - Present Yes    Overview Signed 1/31/2022  4:07 PM by Laine Galvez DO     Last Assessment & Plan:   Formatting of this note might be different from the original.  Follow-up for edema and ascites  Avoid Tylenol  Check labs on Mondays             Psoriatic arthritis [facility]) ICD-10-CM: L40.50  ICD-9-CM: 696.0  6/23/2017 - Present Yes    Overview Signed 1/31/2022  4:07 PM by Laine Galvez DO     Last Assessment & Plan:   Formatting of this note might be different from the original.  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  Blood sugars have been in good range without treatment  Very poor appetite  Continue supplements  Elevated BUN  Begin IV fluids today  Check BMP daily  Encourage oral fluids             GERD (gastroesophageal reflux disease) ICD-10-CM: K21.9  ICD-9-CM: 530.81  10/2/2015 - Present Yes    Overview Signed 1/31/2022  4:07 PM by Laine Galvez DO     Last Assessment & Plan:   Formatting of this note might be different from the original.  Stable, continue current medications and treatment plan             BENIGNO (obstructive sleep apnea) ICD-10-CM: G47.33  ICD-9-CM: 327.23  10/2/2015 - Present Yes    Overview Signed 1/31/2022  4:07 PM by Olga Pimentel DO     Formatting of this note might be different from the original.  Dr. Yolande Justin:   Formatting of this note might be different from the original.  Continue CPAP while sleeping                   Objective:     Patient Vitals for the past 24 hrs:   Temp Pulse Resp BP SpO2   02/10/22 1544 98.3 °F (36.8 °C) 71 18 103/65 100 %   02/10/22 1113 98.1 °F (36.7 °C) 73 16 121/68 97 %   02/10/22 0758 97.2 °F (36.2 °C) 66 16 (!) 101/59 95 %   02/10/22 0353 98.2 °F (36.8 °C) 70 18 116/68 97 %   02/10/22 0351  73   100 %   02/09/22 2320 97.4 °F (36.3 °C) 70 18 93/62 96 %   02/09/22 1921 97.7 °F (36.5 °C) 71 18 103/65 97 %     Oxygen Therapy  O2 Sat (%): 100 % (02/10/22 1544)  Pulse via Oximetry: 65 beats per minute (02/08/22 2044)  O2 Device: None (Room air) (02/10/22 0806)  O2 Flow Rate (L/min): 3 l/min (02/03/22 1101)  ETCO2 (mmHg):  (not detecting) (02/03/22 1034)    Estimated body mass index is 39.39 kg/m² as calculated from the following:    Height as of this encounter: 5' 5\" (1.651 m). Weight as of this encounter: 107.4 kg (236 lb 11.2 oz). Intake/Output Summary (Last 24 hours) at 2/10/2022 1634  Last data filed at 2/9/2022 2324  Gross per 24 hour   Intake 120 ml   Output    Net 120 ml         Physical Exam:     Blood pressure 103/65, pulse 71, temperature 98.3 °F (36.8 °C), resp. rate 18, height 5' 5\" (1.651 m), weight 107.4 kg (236 lb 11.2 oz), SpO2 100 %. General:    Pleasant, alert, awake, male sitting up in bed  Head:  Normocephalic, atraumatic  Eyes:  Sclerae pale. Pupils equally round. ENT:  Nares appear normal, no drainage. Moist oral mucosa  Neck:  No restricted ROM. Trachea midline   CV:   RRR. No m/r/g. No jugular venous distension. Lungs:   CTAB. No wheezing, rhonchi, or rales. Respirations even, unlabored  Abdomen:  active BS, soft, nontender to deep palpation b/l LQ  Extremities: No cyanosis or clubbing. B/l LE edema with tenderness to palpation  Skin:     No rashes and normal coloration. Warm and dry. Neuro:  CN II-XII grossly intact. Sensation intact. A&Ox3  Psych:  Mood stable / pleasant       I have reviewed ordered lab tests and independently visualized imaging below:    Recent Labs:  Recent Results (from the past 48 hour(s))   CBC WITH AUTOMATED DIFF    Collection Time: 02/09/22  5:26 AM   Result Value Ref Range    WBC 3.9 (L) 4.3 - 11.1 K/uL    RBC 2.51 (L) 4.23 - 5.6 M/uL    HGB 7.3 (L) 13.6 - 17.2 g/dL    HCT 22.6 (L) 41.1 - 50.3 %    MCV 90.0 79.6 - 97.8 FL    MCH 29.1 26.1 - 32.9 PG    MCHC 32.3 31.4 - 35.0 g/dL    RDW 15.9 (H) 11.9 - 14.6 %    PLATELET 903 (L) 900 - 450 K/uL    MPV 9.0 (L) 9.4 - 12.3 FL    ABSOLUTE NRBC 0.00 0.0 - 0.2 K/uL    DF AUTOMATED      NEUTROPHILS 61 43 - 78 %    LYMPHOCYTES 21 13 - 44 %    MONOCYTES 10 4.0 - 12.0 %    EOSINOPHILS 7 0.5 - 7.8 %    BASOPHILS 1 0.0 - 2.0 %    IMMATURE GRANULOCYTES 1 0.0 - 5.0 %    ABS. NEUTROPHILS 2.3 1.7 - 8.2 K/UL    ABS. LYMPHOCYTES 0.8 0.5 - 4.6 K/UL    ABS. MONOCYTES 0.4 0.1 - 1.3 K/UL    ABS. EOSINOPHILS 0.3 0.0 - 0.8 K/UL    ABS. BASOPHILS 0.0 0.0 - 0.2 K/UL    ABS. IMM.  GRANS. 0.0 0.0 - 0.5 K/UL   METABOLIC PANEL, BASIC    Collection Time: 02/09/22  5:26 AM   Result Value Ref Range    Sodium 141 136 - 145 mmol/L    Potassium 3.7 3.5 - 5.1 mmol/L    Chloride 114 (H) 98 - 107 mmol/L    CO2 21 21 - 32 mmol/L    Anion gap 6 (L) 7 - 16 mmol/L    Glucose 96 65 - 100 mg/dL    BUN 40 (H) 8 - 23 MG/DL    Creatinine 1.00 0.8 - 1.5 MG/DL    GFR est AA >60 >60 ml/min/1.73m2    GFR est non-AA >60 >60 ml/min/1.73m2    Calcium 8.3 8.3 - 10.4 MG/DL   CBC WITH AUTOMATED DIFF    Collection Time: 02/10/22  6:56 AM   Result Value Ref Range    WBC 3.1 (L) 4.3 - 11.1 K/uL    RBC 2.48 (L) 4.23 - 5.6 M/uL    HGB 7.3 (L) 13.6 - 17.2 g/dL    HCT 22.7 (L) 41.1 - 50.3 %    MCV 91.5 79.6 - 97.8 FL    MCH 29.4 26.1 - 32.9 PG    MCHC 32.2 31.4 - 35.0 g/dL    RDW 15.8 (H) 11.9 - 14.6 %    PLATELET 164 (L) 969 - 450 K/uL    MPV 8.6 (L) 9.4 - 12.3 FL    ABSOLUTE NRBC 0.00 0.0 - 0.2 K/uL    DF AUTOMATED      NEUTROPHILS 60 43 - 78 %    LYMPHOCYTES 20 13 - 44 %    MONOCYTES 11 4.0 - 12.0 %    EOSINOPHILS 7 0.5 - 7.8 %    BASOPHILS 1 0.0 - 2.0 %    IMMATURE GRANULOCYTES 1 0.0 - 5.0 %    ABS. NEUTROPHILS 1.9 1.7 - 8.2 K/UL    ABS. LYMPHOCYTES 0.6 0.5 - 4.6 K/UL    ABS. MONOCYTES 0.4 0.1 - 1.3 K/UL    ABS. EOSINOPHILS 0.2 0.0 - 0.8 K/UL    ABS. BASOPHILS 0.0 0.0 - 0.2 K/UL    ABS. IMM. GRANS. 0.0 0.0 - 0.5 K/UL   METABOLIC PANEL, BASIC    Collection Time: 02/10/22  6:56 AM   Result Value Ref Range    Sodium 141 136 - 145 mmol/L    Potassium 3.9 3.5 - 5.1 mmol/L    Chloride 115 (H) 98 - 107 mmol/L    CO2 19 (L) 21 - 32 mmol/L    Anion gap 7 7 - 16 mmol/L    Glucose 76 65 - 100 mg/dL    BUN 34 (H) 8 - 23 MG/DL    Creatinine 0.70 (L) 0.8 - 1.5 MG/DL    GFR est AA >60 >60 ml/min/1.73m2    GFR est non-AA >60 >60 ml/min/1.73m2    Calcium 8.0 (L) 8.3 - 10.4 MG/DL       All Micro Results     Procedure Component Value Units Date/Time    CULTURE, URINE [[id number]]  (Abnormal)  (Susceptibility) Collected: 01/31/22 6522    Order Status: Completed Specimen: Cath Urine Updated: 02/04/22 8569     Special Requests: NO SPECIAL REQUESTS        Culture result:       >100,000 COLONIES/mL KLEBSIELLA PNEUMONIAE ** (EXTENDED SPECTRUM BETA LACTAMASE ) ** ** MULTI-DRUG RESISTANT ORGANISM **                  10,000 to 50,000 COLONIES/mL CANDIDA TROPICALIS                  RESULTS VERIFIED, PHONED TO AND READ BACK BY  MAGNO BROWN AT 1158 ON 2/4/22, KYLER            Other Studies:  No results found.     Current Meds:  Current Facility-Administered Medications   Medication Dose Route Frequency    [Held by provider] midodrine (PROAMATINE) tablet 5 mg  5 mg Oral TID WITH MEALS    simethicone (MYLICON) tablet 80 mg  80 mg Oral QID PRN    zinc oxide-cod liver oil (DESITIN) 40 % paste   Topical PRN    traMADoL (ULTRAM) tablet 50 mg  50 mg Oral Q6H PRN    0.9% sodium chloride infusion 250 mL  250 mL IntraVENous PRN    rOPINIRole (REQUIP) tablet 2 mg  2 mg Oral DAILY    melatonin tablet 5 mg  5 mg Oral QHS    traZODone (DESYREL) tablet 50 mg  50 mg Oral QHS    ascorbic acid (vitamin C) (VITAMIN C) tablet 500 mg  500 mg Oral DAILY    fenofibrate (LOFIBRA) tablet 160 mg  160 mg Oral DAILY    atorvastatin (LIPITOR) tablet 40 mg  40 mg Oral QHS    0.9% sodium chloride infusion 250 mL  250 mL IntraVENous PRN    pantoprazole (PROTONIX) tablet 40 mg  40 mg Oral ACB    sodium chloride (NS) flush 5-40 mL  5-40 mL IntraVENous Q8H    sodium chloride (NS) flush 5-40 mL  5-40 mL IntraVENous PRN    acetaminophen (TYLENOL) tablet 650 mg  650 mg Oral Q4H PRN    oxyCODONE IR (ROXICODONE) tablet 5 mg  5 mg Oral Q6H PRN    opium-belladonna (B&O 16-A SUPPRETTE) 16.2-60 mg suppository 1 Suppository  1 Suppository Rectal Q12H PRN    ondansetron (ZOFRAN) injection 4 mg  4 mg IntraVENous Q4H PRN    naloxone (NARCAN) injection 0.4 mg  0.4 mg IntraVENous PRN    dextrose 40% (GLUTOSE) oral gel 1 Tube  15 g Oral PRN    glucagon (GLUCAGEN) injection 1 mg  1 mg IntraMUSCular PRN    dextrose 10% infusion 125-250 mL  125-250 mL IntraVENous PRN       Signed:  Adriana An MD       Part of this note may have been written by using a voice dictation software. The note has been proof read but may still contain some grammatical/other typographical errors.

## 2022-02-10 NOTE — PROGRESS NOTES
Report received from [place], [address] [facility]  Per report Pt can swallow small pills one at a time. EOS: Pt has had 2 wet briefs; pink noted. Mylicone given for \"Gas pain-just need to pass wind\". Effective. Poor appetite at dinner. Wife visited this shift.    Report given to Chris Henriquez

## 2022-02-10 NOTE — PROGRESS NOTES
ACUTE PHYSICAL THERAPY GOALS:  (Developed with and agreed upon by patient and/or caregiver.)  (1.) Janusz Aguillon  will move from supine to sit and sit to supine  with INDEPENDENCE within 7 treatment day(s). (2.) Janusz Aguillon will transfer from bed to chair and chair to bed with MODIFIED INDEPENDENCE using the least restrictive device within 7 treatment day(s). (3.) Janusz Aguillon will ambulate with MINIMAL ASSIST for 50 feet with the least restrictive device within 7 treatment day(s). (4.) Janusz Aguillon will perform seated static and dynamic balance activities x 20 minutes with INDEPENDENCE to improve safety within 7 treatment day(s). (5.) Janusz Aguillon will perform bilateral lower extremity exercises x 20 min for HEP with INDEPENDENCE to improve strength, endurance, and functional mobility within 7 treatment day(s). PHYSICAL THERAPY: Daily Note and PM Treatment Day # 6    Janusz Aguillon is a 67 y.o. male   PRIMARY DIAGNOSIS: Gross hematuria  Gross hematuria [R31.0]         ASSESSMENT:     REHAB RECOMMENDATIONS: CURRENT LEVEL OF FUNCTION:  (Most Recently Demonstrated)   Recommendation to date pending progress:  Setting:   back to [facility]  Equipment:    To Be Determined Bed Mobility:   Maximal Assistance x 2  Sit to Stand:   Moderate Assistance x 2 to Max  Transfers:   Not tested  Gait/Mobility:   Not tested     ASSESSMENT:  Mr. Pancho Kern was supine in bed with wife present on arrival and willing to work with PT. Pt required max A x2 for bed mobility due to increased pain, edema and weakness. He did perform a couple leg exercises prior to getting up to get his legs moving a little. It took him time to get situated on the EOB and sitting up straight (swollen testicles)  He was able to stand from an elevated bed 1 time, attempted a second but without success. He rolled fairly well and was able to partially bridge supine.   Very debilitated, moderate edema and pain all limiting mobility. No real progress since last visit.      SUBJECTIVE:   Mr. Wendie Maza states, \"I can try\"    SOCIAL HISTORY/ LIVING ENVIRONMENT: see eval  Support Systems: Spouse/Significant Other  OBJECTIVE:     PAIN: VITAL SIGNS: LINES/DRAINS:   Pre Treatment: Pain Screen  Pain Scale 1: FLACC  Pain Intensity 1: 5 (with movement)  Post Treatment: 6   Purewick  O2 Device: None (Room air)     MOBILITY: I Mod I S SBA CGA Min Mod Max Total  NT x2 Comments:   Bed Mobility    Rolling [] [] [] [] [] [] [x] [] [] [] [x]    Supine to Sit [] [] [] [] [] [] [] [x] [] [] [x]    Scooting [] [] [] [] [] [] [] [x] [] [] [x]    Sit to Supine [] [] [] [] [] [] [] [x] [x] [] [x]    Transfers    Sit to Stand [] [] [] [] [] [] [x] [] [] [] [x]    Bed to Chair [] [] [] [] [] [] [] [] [] [x] []    Stand to Sit [] [] [] [] [] [] [] [] [] [x] []    I=Independent, Mod I=Modified Independent, S=Supervision, SBA=Standby Assistance, CGA=Contact Guard Assistance,   Min=Minimal Assistance, Mod=Moderate Assistance, Max=Maximal Assistance, Total=Total Assistance, NT=Not Tested    BALANCE: Good Fair+ Fair Fair- Poor NT Comments   Sitting Static [] [] [x] [x] [] []    Sitting Dynamic [] [] [] [x] [] []              Standing Static [] [] [] [x] [x] []    Standing Dynamic [] [] [] [] [] [x]      GAIT: I Mod I S SBA CGA Min Mod Max Total  NT x2 Comments:   Level of Assistance [] [] [] [] [] [] [] [] [] [x] []    Distance     DME N/A    Gait Quality     Weightbearing  Status N/A     I=Independent, Mod I=Modified Independent, S=Supervision, SBA=Standby Assistance, CGA=Contact Guard Assistance,   Min=Minimal Assistance, Mod=Moderate Assistance, Max=Maximal Assistance, Total=Total Assistance, NT=Not Tested    PLAN:   FREQUENCY/DURATION: PT Plan of Care: 3 times/week for duration of hospital stay or until stated goals are met, whichever comes first.  TREATMENT:     TREATMENT:   ($$ Therapeutic Activity: 23-37 mins  $$ Therapeutic Exercises: 8-22 mins )  Co-Treatment PT/OT necessary due to patient's decreased overall endurance/tolerance levels, as well as need for high level skilled assistance to complete functional transfers/mobility and functional tasks  Therapeutic Activity (30 Minutes): Therapeutic activity included Rolling, Supine to Sit, Sit to Supine, Scooting, Lateral Scooting, Transfer Training, Sitting balance  and Standing balance to improve functional Mobility, Strength and Activity tolerance. Therapeutic Exercise (8 Minutes): Therapeutic exercises noted below to improve functional AROM, strength and mobility.      TREATMENT GRID:   Date:  2/8/22 Date:  2/10/22 Date:     Activity/Exercise Parameters Parameters Parameters   Supine heel slides x10 AA 3xB    Supine ABD slides 2x10 AA     Quad sets 2x10     glute sets 2x10     AP 2x10 10x B    Hip ER/IR  10x B                AFTER TREATMENT POSITION/PRECAUTIONS:  Bed, Needs within reach, RN notified and Visitors at bedside    INTERDISCIPLINARY COLLABORATION:  RN/PCT and PT/PTA    TOTAL TREATMENT DURATION:  PT Patient Time In/Time Out  Time In: 1438  Time Out: 1516    Saulo Zuniga PTA

## 2022-02-10 NOTE — PROGRESS NOTES
Chart reviewed by HARIKA PEÑA for discharge planning. CM contacted [facility] [phone] with [facility], regarding potential discharge. Per [facility], next available bed is 2/11. CM was receptive to update. CM notified attending MD Santos via easyfolio. Spouse updated. No additional CM needs expressed or identified. CM following. Update 1:09- CM received call back from [facility] with [facility] , who reports staff can now admit patient this day. CM confirmed with attending physician, patient is not medically stable for discharge. Patient to potentially discharge 2/11, if stable. Staff updated. CM remains available.

## 2022-02-10 NOTE — PROGRESS NOTES
Problem: Falls - Risk of  Goal: *Absence of Falls  Description: Document Robert Luong Fall Risk and appropriate interventions in the flowsheet. Outcome: Progressing Towards Goal  Note: Fall Risk Interventions:  Mobility Interventions: Bed/chair exit alarm,OT consult for ADLs,Patient to call before getting OOB,PT Consult for mobility concerns,PT Consult for assist device competence    Medication Interventions: Bed/chair exit alarm    Elimination Interventions: Bed/chair exit alarm,Call light in reach,Patient to call for help with toileting needs,Toilet paper/wipes in reach,Toileting schedule/hourly rounds    Problem: Pressure Injury - Risk of  Goal: *Prevention of pressure injury  Description: Document Matthew Scale and appropriate interventions in the flowsheet.   Outcome: Progressing Towards Goal  Note: Pressure Injury Interventions:  Sensory Interventions: Assess changes in LOC,Avoid rigorous massage over bony prominences,Check visual cues for pain,Discuss PT/OT consult with provider,Float heels,Keep linens dry and wrinkle-free,Maintain/enhance activity level,Minimize linen layers    Moisture Interventions: Absorbent underpads,Check for incontinence Q2 hours and as needed    Activity Interventions: PT/OT evaluation,Pressure redistribution bed/mattress(bed type),Increase time out of bed    Mobility Interventions: PT/OT evaluation,Pressure redistribution bed/mattress (bed type)    Nutrition Interventions: Document food/fluid/supplement intake    Friction and Shear Interventions: Apply protective barrier, creams and emollients,Foam dressings/transparent film/skin sealants  Problem: Anemia Care Plan (Adult and Pediatric)  Goal: *Labs within defined limits  Outcome: Progressing Towards Goal  Goal: *Tolerates increased activity  Outcome: Progressing Towards Goal

## 2022-02-10 NOTE — PROGRESS NOTES
Pt is alert and oriented x4. Pt in bed, resting. Bed in low position, wheels locked, call light within reach, instructed to call with any needs. Hourly rounds completed this shift. NAD noted. VSS. Pt has c/o pain, treated per MAR. IV is infusing, and dressing is clean, dry, and intact. UOP slightly bloodier today compared to yesterday, urology aware. PVR minimal today. Report given to night shift nurse.

## 2022-02-11 NOTE — PROGRESS NOTES
Patient for discharge today. Patient to discharge to [facility] this day. Patient's room number is 201 and report line is [phone]. Transport scheduled for 1630. Spouse notified of this information. CM to provide information to primary nurse. No additional  CM needs expressed or identified. CM remains available. Care Management Interventions  PCP Verified by CM:  Yes (Dr Santi Cavazos last visit Dec 2021)  MyChart Signup: No  Discharge Durable Medical Equipment: No  Physical Therapy Consult: Yes  Occupational Therapy Consult: Yes  Speech Therapy Consult: No  Support Systems: Spouse/Significant Other  Confirm Follow Up Transport: Family  The Plan for Transition of Care is Related to the Following Treatment Goals : return to inpt rehab at [facility]  The Patient and/or Patient Representative was Provided with a Choice of Provider and Agrees with the Discharge Plan?: Yes  Name of the Patient Representative Who was Provided with a Choice of Provider and Agrees with the Discharge Plan: spouse  Freedom of Choice List was Provided with Basic Dialogue that Supports the Patient's Individualized Plan of Care/Goals, Treatment Preferences and Shares the Quality Data Associated with the Providers?: Yes  Discharge Location  Patient Expects to be Discharged to[de-identified] Rehab hospital/unit acute

## 2022-02-11 NOTE — DISCHARGE SUMMARY
Hospitalist Discharge Summary   Admit Date:  2022 12:32 PM   DC Note date: 2022  Name:  Vern Stallings   Age:  67 y.o.   Sex:  male  :  1945   MRN:  [id number]   Room:  [facility]  PCP:  Leena Severino MD    Presenting Complaint: Blood in Urine    Initial Admission Diagnosis: Gross hematuria [R31.0]     Problem List for this Hospitalization:  Hospital Problems as of 2022 Date Reviewed: 2021          Codes Class Noted - Resolved POA    * (Principal) Gross hematuria ICD-10-CM: R31.0  ICD-9-CM: 599.71  2022 - Present Unknown        Chronic radiation cystitis ICD-10-CM: N30.40  ICD-9-CM: 595.82  2022 - Present Yes        Functional quadriplegia ([facility] ICD-10-CM: R53.2  ICD-9-CM: 780.72  2022 - Present Yes        Thrombocytopenia due to sequestration [facility]) ICD-10-CM: D69.6  ICD-9-CM: 287.5  2022 - Present Yes        Acute blood loss anemia (ABLA) ICD-10-CM: D62  ICD-9-CM: 285.1  2022 - Present Yes        Hypocortisolism ([facility] ICD-10-CM: E27.49  ICD-9-CM: 255.41  2022 - Present Yes        Hypoglycemia ICD-10-CM: E16.2  ICD-9-CM: 251.2  2022 - Present Yes        Hypoalbuminemia due to protein-calorie malnutrition ([facility] ICD-10-CM: E88.09, E46  ICD-9-CM: 273.8, 263.9  2022 - Present Yes        (HFpEF) heart failure with preserved ejection fraction ([facility] ICD-10-CM: I50.30  ICD-9-CM: 428.9  2022 - Present Yes        Hepatic cirrhosis ([facility] ICD-10-CM: K74.60  ICD-9-CM: 571.5  10/8/2021 - Present Yes    Overview Signed 2022  4:07 PM by Shanna Rodriges DO     Last Assessment & Plan:   Formatting of this note might be different from the original.  Follow-up for edema and ascites  Avoid Tylenol  Check labs on              Psoriatic arthritis [facility]) ICD-10-CM: L40.50  ICD-9-CM: 696.0  2017 - Present Yes    Overview Signed 2022  4:07 PM by Shanna Rodriges DO     Last Assessment & Plan:   Formatting of this note might be different from the original.  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  Blood sugars have been in good range without treatment  Very poor appetite  Continue supplements  Elevated BUN  Begin IV fluids today  Check BMP daily  Encourage oral fluids             GERD (gastroesophageal reflux disease) ICD-10-CM: K21.9  ICD-9-CM: 530.81  10/2/2015 - Present Yes    Overview Signed 1/31/2022  4:07 PM by Darian Manners, DO     Last Assessment & Plan:   Formatting of this note might be different from the original.  Stable, continue current medications and treatment plan             BENIGNO (obstructive sleep apnea) ICD-10-CM: G47.33  ICD-9-CM: 327.23  10/2/2015 - Present Yes    Overview Signed 1/31/2022  4:07 PM by Darian Manners, DO     Formatting of this note might be different from the original.  Dr. Judi Yepez:   Obdulia Reyes of this note might be different from the original.  Continue CPAP while sleeping                 Did Patient have Sepsis (YES OR NO): NO    Severe symptomatic anemia from hematuria likely from radiation cystitis  Gross hematuria from radiation cystitis present on admission  Hypotension resolved  Acute kidney injury prerenal resolved  History of prostate cancer status post radiation therapy with radiation cystitis  Bilateral lower extremity DVT status post IVC filter  ESBL Klebsiella urinary tract infection/Candida UTI status post treatment  Hypokalemia  Functional quadriplegia- ruled out  Liver cirrhosis/chronic thrombocytopenia  Chronic diastolic congestive heart failure with preserved ejection fraction  Previous history of diabetes currently with A1c of 4.7  GERD  Obstructive sleep apnea      Hospital Course: This is a 67 y. o. male with medical history of Cirrhosis, Prostate CA s/p RRP 5/2003 and radiation 2016, HFpEF, Radiation Cystitis, BENIGNO , L3 compression fracture who presented with worsening gross hematuria and suprapubic pain. Presented from rehab facility. Previously admitted at [facility] from 1/15-1/27/22 transferred there from OSH with hematuria and difficulty voiding w/ bladder mass on CT. During hospitalization at [facility], he suffered hypovolemic shock and acute encephalopathy.  Patient was seen by Dr. Wandy Mcintosh and underwent cysto/evacuation of clots on 1/21/22 and 1/25/22 d/t ongoing hematuria/clots.  He was advised to follow up with [facility] urology OP but represents to ED with gross hematuria. Wife at bedside. He c/o of intermittent bladder spasms that radiate to lower back associated with burning sensation that self-resolves. He rec'd 1 L NS in ED.      Pt had CBI placed for hematuria; on presentation, hgb was 8.1 with subsequent hgb drop to 6.2; s/p transfusion. For his gross hematuria, likely secondary to radiation cystitis. Urology was consulted. Continuous bladder irrigation was discontinued and Guajardo catheter was removed on 2/7/2022. Post Guajardo removal, he had hematuria and hemoglobin dropped for which she received 1 unit of packed red blood cells. Hemoglobin stable at 7.2 on discharge. He is able to void on his own without any problem. Urology recommends outpatient follow-up. He also has acute kidney injury likely prerenal which resolved with IV fluids. He has bilateral lower extremity DVT and IVC filter was placed on 2/3 in IR. Unfortunately because of hematuria, he was not started on any anticoagulation. His hematuria improved but hemoglobin is still low at 7.2 on discharge. He needs to follow-up with primary care physician or physician at rehab and needs repeat CBC in 3 to 5 days.   At some point once he is hemoglobin is stable patient needs to be started on oral anticoagulation for bilateral lower extremity DVT. For his hypertension, patient was on midodrine which was subsequently discontinued. Blood pressure is borderline 320 systolic and therefore off of antihypertensive medications. He has ESBL Klebsiella UTI/Candida UTI for which he completed course of meropenem and Diflucan. Has liver cirrhosis with chronic thrombocytopenia. He has chronic diastolic congestive heart failure with preserved ejection fraction. He has previous history of diabetes with A1c is only 4.7 and therefore Metformin was discontinued on discharge. Other chronic medical meds which are stable include GERD. He has obstructive sleep apnea for which he uses CPAP at bedtime. Patient is being discharged to short-term rehab today in a stable condition to follow-up with physician at rehab in 48 hours and urology in 1 week. Disposition: Skilled Nursing Facility  Diet: ADULT DIET Dysphagia - Pureed; 4 carb choices (60 gm/meal)  ADULT ORAL NUTRITION SUPPLEMENT Breakfast, Dinner; Diabetic Supplement  ADULT ORAL NUTRITION SUPPLEMENT Lunch, Dinner; Frozen Supplement  Code Status: Full Code    Follow Up Orders: Follow-up Appointments   Procedures    FOLLOW UP VISIT Appointment in: 3 - 5 Days F/U WITH PCP IN 3-5 DAYS F/U WITH UROLOGY IN  WEEK     F/U WITH PCP IN 3-5 DAYS  F/U WITH UROLOGY IN  WEEK     Standing Status:   Standing     Number of Occurrences:   1     Order Specific Question:   Appointment in     Answer:   3 - 5 Days       Follow-up Information     Follow up With Specialties Details Why Contact Info    Scar De Jesus MD [address]  [phone]            Follow up labs/diagnostics (ultimately defer to outpatient provider):  CBC, BMP in 3 to 5 days. Time spent in patient discharge and coordination 39 minutes. Plan was discussed with the pt and his wife at bedside.   All questions answered. Patient was stable at time of discharge. Instructions given to call a physician or return if any concerns. Discharge Info:   Current Discharge Medication List      START taking these medications    Details   opium-belladonna (B&O 16-A SUPPRETTE) 16.2-60 mg suppository Insert 1 Suppository into rectum every twelve (12) hours as needed for Pain for up to 3 days. Max Daily Amount: 2 Suppositories. Qty: 6 Suppository, Refills: 0  Start date: 2/11/2022, End date: 2/14/2022    Associated Diagnoses: Prostate cancer ([facility]      traZODone (DESYREL) 50 mg tablet Take 1 Tablet by mouth nightly as needed for Sleep for up to 3 days. Qty: 3 Tablet, Refills: 0  Start date: 2/11/2022, End date: 2/14/2022         CONTINUE these medications which have CHANGED    Details   traMADoL (ULTRAM) 50 mg tablet Take 1 Tablet by mouth every six (6) hours as needed for Pain for up to 3 days. Max Daily Amount: 200 mg. Qty: 9 Tablet, Refills: 0  Start date: 2/11/2022, End date: 2/14/2022    Associated Diagnoses: Prostate cancer ([facility]      rOPINIRole (REQUIP) 1 mg tablet Take One tab by mouth in AM, two tabs by mouth in PM  Qty: 10 Tablet, Refills: 0  Start date: 2/11/2022         CONTINUE these medications which have NOT CHANGED    Details   brief disposable (ADULT) misc by Does Not Apply route. Qty: 1 Package, Refills: 11    Associated Diagnoses: Stress incontinence      miscellaneous medical supply ([employer]) misc Use as needed for incontinence  Qty: 1 Each, Refills: 0    Associated Diagnoses: Stress incontinence      nystatin (MYCOSTATIN) topical cream Apply  to affected area two (2) times a day. Qty: 15 g, Refills: 0      doxepin HCl (DOXEPIN PO) Take 0.3 mL by mouth nightly. triamcinolone acetonide (KENALOG) 0.1 % topical cream Apply topically 2 (two) times a day. folic acid 646 mcg tablet Take 400 mcg by mouth daily.  Everyday except Thursday      cinnamon bark 500 mg cap Take 500 mg by mouth daily. b complex vitamins tablet Take 1 Tab by mouth daily. fenofibrate (TRICOR) 160 mg tablet Take 160 mg by mouth daily. fluticasone (FLONASE) 50 mcg/actuation nasal spray 2 Sprays by Both Nostrils route as needed. pantoprazole (PROTONIX) 40 mg tablet Take 40 mg by mouth daily. simvastatin (ZOCOR) 80 mg tablet Take 80 mg by mouth daily. ascorbic acid, vitamin C, (VITAMIN C) 500 mg tablet Take 500 mg by mouth daily. methotrexate 25 mg/mL chemo injection 20 mg by SubCUTAneous route Every Thursday. STOP taking these medications       clindamycin (CLEOCIN) 300 mg capsule Comments:   Reason for Stopping:         losartan-hydrochlorothiazide (HYZAAR) 50-12.5 mg per tablet Comments:   Reason for Stopping:         metFORMIN ER (GLUCOPHAGE XR) 500 mg tablet Comments:   Reason for Stopping:         HYDROcodone-acetaminophen (NORCO) 5-325 mg per tablet Comments:   Reason for Stopping:               Procedures done this admission:  * No surgery found *    Consults this admission:  IP CONSULT TO INTERVENTIONAL RADIOLOGY  IP CONSULT TO INFECTIOUS DISEASES    Echocardiogram/EKG results:  Results from Hospital Encounter encounter on 01/31/22    ECHO ADULT COMPLETE    Interpretation Summary    Left Ventricle: Left ventricle size is normal. Normal wall thickness. Normal wall motion. Normal left ventricular systolic function with a visually estimated EF of 55 - 60%. Normal diastolic function.   Aortic Valve: Mild sclerosis of the aortic valve cusps.   Left Atrium: Left atrium is mildly dilated.   Technical qualifiers: Echo study was technically difficult with poor endocardial visualization.   Contrast used: Definity. EKG Results     None          Diagnostic Imaging/Tests:   XR HUMERUS RT    Result Date: 1/31/2022  Right humerus series: 01/31/2022 HISTORY: severe right shoulder pain, restricted ROM s/p fall 4 views were obtained. Comparison: None Findings:  There is no evidence of acute fracture or dislocation. The soft tissues are unremarkable. There is no bony destruction or other abnormality. Unremarkable exam.      DUPLEX LOWER EXT VENOUS BILAT    Result Date: 2/1/2022  Bilateral lower extremity venous ultrasound INDICATION:  Pain and swelling, both legs subacute, moderate, taking anticoagulant COMPARISON: none Technique: Grayscale, color Doppler, and spectral analysis were performed on the deep veins of each lower extremity. FINDINGS: There is no evidence of Espinoza's cyst. Right: There is normal flow in the common femoral, deep femoral, greater saphenous, femoral and popliteal veins. Normal compression and augmentation demonstrated. One of the dual Peroneal veins demonstrates occlusive thrombus. The proximal calf veins are also otherwise patent. Left: There is normal flow in the common femoral, deep femoral, greater saphenous, and popliteal veins. Normal compression and augmentation demonstrated. A nonocclusive thrombus is seen in the femoral vein distally. The proximal calf veins are also patent as visualized. 1. Left femoral vein DVT. 2. Right lower extremity peroneal vein clot below the knee. ECHO ADULT COMPLETE    Result Date: 2/1/2022    Left Ventricle: Left ventricle size is normal. Normal wall thickness. Normal wall motion. Normal left ventricular systolic function with a visually estimated EF of 55 - 60%. Normal diastolic function.   Aortic Valve: Mild sclerosis of the aortic valve cusps.   Left Atrium: Left atrium is mildly dilated.   Technical qualifiers: Echo study was technically difficult with poor endocardial visualization.   Contrast used: Definity. CT ABD/PEL HEMATURIA    Result Date: 2/1/2022  CT of the Abdomen and Pelvis INDICATION:  Hematuria. Dysuria. Chronic indwelling Guajardo catheter. Multiple axial images were obtained through the abdomen and pelvis before and after intravenous injection of 100mL of Isovue 370.   Radiation dose reduction techniques were used for this study: All CT scans performed at this facility use one or all of the following: Automated exposure control, adjustment of the mA and/or kVp according to patient's size, iterative reconstruction. COMPARISON: None FINDINGS: LOWER CHEST: Atelectasis left lung base. Trace left pleural effusion. Right lung base is clear. HEPATOBILIARY: Cirrhotic liver morphology without focal mass. Recannulized para umbilical vein is present. No focal mass. No calcified gallstones. PANCREAS: Normal. SPLEEN: Normal. ADRENAL GLANDS: Normal. KIDNEYS/BLADDER: No renal calculi or hydronephrosis. No enhancing renal mass. Possible subcentimeter cyst left renal cortex too small to characterize by CT but of doubtful clinical significance. Delayed imaging shows an normal appearing collecting systems and ureters. Small amount contrast noted in the bladder with indwelling Guajardo catheter. Bladder is partially decompressed. No significant bladder wall thickening or nodularity. BOWEL: No bowel obstruction or diverticulitis. Appendix is normal. LYMPH NODES: No enlarged abdominal or pelvic lymph nodes. VASCULATURE: Scattered calcified plaque abdominal aorta without aneurysm. PELVIC ORGANS: Prior prostatectomy changes. Rectum is unremarkable. Mild stranding in the presacral region of the pelvis may reflect postradiation changes. MUSCULOSKELETAL: L3 vertebral body compression fracture. No destructive bone lesions are appreciated. 1. No urinary tract calculi or hydronephrosis. Bladder is decompressed with a Guajardo catheter. Mild bladder wall thickening could be related to incomplete distention and/or changes of chronic bladder outlet obstruction from an enlarged prostate gland. Patient is status post recent prostatectomy. UTI could appear similar. 2. Cirrhotic liver morphology. Recanalized paraumbilical vein is noted. Spleen is normal in size. 3. L3 vertebral body compression fracture.        All Micro Results Procedure Component Value Units Date/Time    CULTURE, URINE [[id number]]  (Abnormal)  (Susceptibility) Collected: 01/31/22 1615    Order Status: Completed Specimen: Cath Urine Updated: 02/04/22 1159     Special Requests: NO SPECIAL REQUESTS        Culture result:       >100,000 COLONIES/mL KLEBSIELLA PNEUMONIAE ** (EXTENDED SPECTRUM BETA LACTAMASE ) ** ** MULTI-DRUG RESISTANT ORGANISM **                  10,000 to 50,000 COLONIES/mL CANDIDA TROPICALIS                  RESULTS VERIFIED, PHONED TO AND READ BACK BY  MAGNO BROWN AT 1158 ON 2/4/22, KYLER            Labs: Results:       BMP, Mg, Phos Recent Labs     02/11/22  1026 02/10/22  0656 02/09/22  0526    141 141   K 3.4* 3.9 3.7   * 115* 114*   CO2 25 19* 21   AGAP 3* 7 6*   BUN 27* 34* 40*   CREA 0.60* 0.70* 1.00   CA 8.5 8.0* 8.3   GLU 72 76 96      CBC Recent Labs     02/11/22  1026 02/10/22  0656 02/09/22  0526   WBC 3.4* 3.1* 3.9*   RBC 2.42* 2.48* 2.51*   HGB 7.2* 7.3* 7.3*   HCT 23.8* 22.7* 22.6*   * 128* 137*   GRANS 56 60 61   LYMPH 24 20 21   EOS 7 7 7   MONOS 10 11 10   BASOS 1 1 1   IG 2 1 1   ANEU 1.9 1.9 2.3   ABL 0.8 0.6 0.8   ALIN 0.2 0.2 0.3   ABM 0.4 0.4 0.4   ABB 0.0 0.0 0.0   AIG 0.1 0.0 0.0      LFT No results for input(s): ALT, TBIL, AP, TP, ALB, GLOB, AGRAT in the last 72 hours.     No lab exists for component: SGOT, GPT   Cardiac Testing Lab Results   Component Value Date/Time    CK 77 11/29/2018 09:27 AM      Coagulation Tests Lab Results   Component Value Date/Time    Prothrombin time 18.2 (H) 02/02/2022 10:17 AM    Prothrombin time 18.0 (H) 02/01/2022 04:48 AM    Prothrombin time 16.9 (H) 01/31/2022 02:15 PM    INR 1.5 02/02/2022 10:17 AM    INR 1.5 02/01/2022 04:48 AM    INR 1.4 01/31/2022 02:15 PM      A1c Lab Results   Component Value Date/Time    Hemoglobin A1c 4.7 02/05/2022 05:58 AM      Lipid Panel No results found for: CHOL, CHOLPOCT, CHOLX, CHLST, CHOLV, 794915, HDL, HDLP, LDL, LDLC, DLDLP, 925426, VLDLC, VLDL, TGLX, TRIGL, TRIGP, TGLPOCT, CHHD, CHHDX   Thyroid Panel No results found for: TSH, T4, FT4, TT3, T3U, TSHEXT     Most Recent UA Lab Results   Component Value Date/Time    Color RED 01/31/2022 02:32 PM    Appearance TURBID 01/31/2022 02:32 PM    Specific gravity 1.022 11/29/2018 09:26 AM    pH (UA) 7.0 01/31/2022 02:32 PM    Protein 100 (A) 01/31/2022 02:32 PM    Glucose Negative 01/31/2022 02:32 PM    Ketone Negative 01/31/2022 02:32 PM    Bilirubin Negative 01/31/2022 02:32 PM    Blood LARGE (A) 01/31/2022 02:32 PM    Urobilinogen 0.2 01/31/2022 02:32 PM    Nitrites Negative 01/31/2022 02:32 PM    Leukocyte Esterase SMALL (A) 01/31/2022 02:32 PM    WBC 10-20 01/31/2022 02:32 PM    RBC >100 01/31/2022 02:32 PM    Epithelial cells 0-3 01/31/2022 02:32 PM    Bacteria TRACE 01/31/2022 02:32 PM    Casts 0 01/31/2022 02:32 PM    Crystals, urine 0 01/31/2022 02:32 PM    Mucus 0 01/31/2022 02:32 PM    Other observations RESULTS VERIFIED MANUALLY 01/31/2022 02:32 PM          All Labs from Last 24 Hrs:  Recent Results (from the past 24 hour(s))   CBC WITH AUTOMATED DIFF    Collection Time: 02/11/22 10:26 AM   Result Value Ref Range    WBC 3.4 (L) 4.3 - 11.1 K/uL    RBC 2.42 (L) 4.23 - 5.6 M/uL    HGB 7.2 (L) 13.6 - 17.2 g/dL    HCT 23.8 (L) 41.1 - 50.3 %    MCV 98.3 (H) 79.6 - 97.8 FL    MCH 29.8 26.1 - 32.9 PG    MCHC 30.3 (L) 31.4 - 35.0 g/dL    RDW 15.5 (H) 11.9 - 14.6 %    PLATELET 999 (L) 195 - 450 K/uL    MPV 8.2 (L) 9.4 - 12.3 FL    ABSOLUTE NRBC 0.00 0.0 - 0.2 K/uL    DF AUTOMATED      NEUTROPHILS 56 43 - 78 %    LYMPHOCYTES 24 13 - 44 %    MONOCYTES 10 4.0 - 12.0 %    EOSINOPHILS 7 0.5 - 7.8 %    BASOPHILS 1 0.0 - 2.0 %    IMMATURE GRANULOCYTES 2 0.0 - 5.0 %    ABS. NEUTROPHILS 1.9 1.7 - 8.2 K/UL    ABS. LYMPHOCYTES 0.8 0.5 - 4.6 K/UL    ABS. MONOCYTES 0.4 0.1 - 1.3 K/UL    ABS. EOSINOPHILS 0.2 0.0 - 0.8 K/UL    ABS. BASOPHILS 0.0 0.0 - 0.2 K/UL    ABS. IMM.  GRANS. 0.1 0.0 - 0.5 K/UL   METABOLIC PANEL, BASIC Collection Time: 02/11/22 10:26 AM   Result Value Ref Range    Sodium 139 138 - 145 mmol/L    Potassium 3.4 (L) 3.5 - 5.1 mmol/L    Chloride 111 (H) 98 - 107 mmol/L    CO2 25 21 - 32 mmol/L    Anion gap 3 (L) 7 - 16 mmol/L    Glucose 72 65 - 100 mg/dL    BUN 27 (H) 8 - 23 MG/DL    Creatinine 0.60 (L) 0.8 - 1.5 MG/DL    GFR est AA >60 >60 ml/min/1.73m2    GFR est non-AA >60 >60 ml/min/1.73m2    Calcium 8.5 8.3 - 10.4 MG/DL       Current Med List in Hospital:   Current Facility-Administered Medications   Medication Dose Route Frequency    rOPINIRole (REQUIP) tablet 2 mg  2 mg Oral BID    [Held by provider] midodrine (PROAMATINE) tablet 5 mg  5 mg Oral TID WITH MEALS    simethicone (MYLICON) tablet 80 mg  80 mg Oral QID PRN    zinc oxide-cod liver oil (DESITIN) 40 % paste   Topical PRN    traMADoL (ULTRAM) tablet 50 mg  50 mg Oral Q6H PRN    0.9% sodium chloride infusion 250 mL  250 mL IntraVENous PRN    melatonin tablet 5 mg  5 mg Oral QHS    traZODone (DESYREL) tablet 50 mg  50 mg Oral QHS    ascorbic acid (vitamin C) (VITAMIN C) tablet 500 mg  500 mg Oral DAILY    fenofibrate (LOFIBRA) tablet 160 mg  160 mg Oral DAILY    atorvastatin (LIPITOR) tablet 40 mg  40 mg Oral QHS    0.9% sodium chloride infusion 250 mL  250 mL IntraVENous PRN    pantoprazole (PROTONIX) tablet 40 mg  40 mg Oral ACB    sodium chloride (NS) flush 5-40 mL  5-40 mL IntraVENous Q8H    sodium chloride (NS) flush 5-40 mL  5-40 mL IntraVENous PRN    acetaminophen (TYLENOL) tablet 650 mg  650 mg Oral Q4H PRN    oxyCODONE IR (ROXICODONE) tablet 5 mg  5 mg Oral Q6H PRN    opium-belladonna (B&O 16-A SUPPRETTE) 16.2-60 mg suppository 1 Suppository  1 Suppository Rectal Q12H PRN    ondansetron (ZOFRAN) injection 4 mg  4 mg IntraVENous Q4H PRN    naloxone (NARCAN) injection 0.4 mg  0.4 mg IntraVENous PRN    dextrose 40% (GLUTOSE) oral gel 1 Tube  15 g Oral PRN    glucagon (GLUCAGEN) injection 1 mg  1 mg IntraMUSCular PRN    dextrose 10% infusion 125-250 mL  125-250 mL IntraVENous PRN       Allergies   Allergen Reactions    Amoxicillin Rash    Lisinopril Cough     cough    Sulfa (Sulfonamide Antibiotics) Rash     Immunization History   Administered Date(s) Administered    COVID-19, Pfizer Purple top, DILUTE for use, 12+ yrs, 30mcg/0.3mL dose 06/11/2021, 07/03/2021    TB Skin Test (PPD) Intradermal 02/01/2022       Recent Vital Data:  Patient Vitals for the past 24 hrs:   Temp Pulse Resp BP SpO2   02/11/22 1132 98.2 °F (36.8 °C) 64 16 99/60 99 %   02/11/22 0731 97.4 °F (36.3 °C) 66 16 (!) 101/58 97 %   02/11/22 0335 97.6 °F (36.4 °C) 68 18 (!) 99/55 97 %   02/10/22 2351 98.1 °F (36.7 °C) 72 18 106/62 97 %   02/10/22 2047 98.2 °F (36.8 °C) 71 18 109/67 97 %   02/10/22 1544 98.3 °F (36.8 °C) 71 18 103/65 100 %     Oxygen Therapy  O2 Sat (%): 99 % (02/11/22 1132)  Pulse via Oximetry: 65 beats per minute (02/08/22 2044)  O2 Device: None (Room air) (02/11/22 0335)  O2 Flow Rate (L/min): 3 l/min (02/03/22 1101)  ETCO2 (mmHg):  (not detecting) (02/03/22 1034)    Estimated body mass index is 39.39 kg/m² as calculated from the following:    Height as of this encounter: 5' 5\" (1.651 m). Weight as of this encounter: 107.4 kg (236 lb 11.2 oz). Intake/Output Summary (Last 24 hours) at 2/11/2022 1311  Last data filed at 2/11/2022 0930  Gross per 24 hour   Intake 480 ml   Output    Net 480 ml         Physical Exam:    General:    Well nourished. No overt distress  Head:  Normocephalic, atraumatic  Eyes:  Sclerae appear normal.  Pupils equally round. HENT:  Nares appear normal, no drainage. Moist mucous membranes  Neck:  No restricted ROM. Trachea midline  CV:   RRR. No m/r/g. No JVD  Lungs:   CTAB. No wheezing, rhonchi, or rales. Even, unlabored  Abdomen:   Soft, nontender, nondistended. Extremities: Warm and dry. No cyanosis or clubbing. Bilateral lower extremity edema. Skin:     No rashes.   Normal coloration  Neuro:  CN II-XII grossly intact. Psych:  Normal mood and affect. Signed:  Shahbaz Berger MD    Part of this note may have been written by using a voice dictation software. The note has been proof read but may still contain some grammatical/other typographical errors.

## 2022-02-15 NOTE — PROGRESS NOTES
Physician Progress Note      Vinicio Whyte  CRIS #:                  [id number]  :                       1945  ADMIT DATE:       2022 12:32 PM  [address] [place] DATE:        2022 5:10 PM  RESPONDING  PROVIDER #:        Leena Nicole MD          QUERY TEXT:    Patient admitted with radiation cystitis with hematuria. Noted documentation of functional quadriplegia in the medical record. In order to support the diagnosis of functional quadriplegia, please include additional clinical indicators in your documentation. Or please document if the diagnosis of functional quadriplegia has been ruled out after further study. The medical record reflects the following:  Risk Factors: 67 yr, Hx  L3 compression fracture  Clinical Indicators: up with assistance, per nursing flow sheet turns self,  Treatment: PT, OT, discharged to short-term rehab  Options provided:  -- functional quadriplegia present as evidenced by, Please document evidence. -- functional quadriplegia was ruled out  -- Other - I will add my own diagnosis  -- Disagree - Not applicable / Not valid  -- Disagree - Clinically unable to determine / Unknown  -- Refer to Clinical Documentation Reviewer    PROVIDER RESPONSE TEXT:    functional quadriplegia was ruled out after study.     Query created by: Suzanne Garcia on 2/15/2022 1:58 PM      Electronically signed by:  Leena Nicole MD 2/15/2022 3:20 PM

## 2022-04-03 PROBLEM — E46 HYPOALBUMINEMIA DUE TO PROTEIN-CALORIE MALNUTRITION (HCC): Chronic | Status: ACTIVE | Noted: 2022-01-01

## 2022-04-03 PROBLEM — R31.0 GROSS HEMATURIA: Status: RESOLVED | Noted: 2022-01-01 | Resolved: 2022-01-01

## 2022-04-03 PROBLEM — I50.30 (HFPEF) HEART FAILURE WITH PRESERVED EJECTION FRACTION (HCC): Chronic | Status: ACTIVE | Noted: 2022-01-01

## 2022-04-03 PROBLEM — E66.01 SEVERE OBESITY (HCC): Status: RESOLVED | Noted: 2019-06-27 | Resolved: 2022-01-01

## 2022-04-03 PROBLEM — K74.60 HEPATIC CIRRHOSIS (HCC): Chronic | Status: ACTIVE | Noted: 2021-01-01

## 2022-04-03 PROBLEM — N39.0 UTI (URINARY TRACT INFECTION): Status: ACTIVE | Noted: 2022-01-01

## 2022-04-03 PROBLEM — U07.1 COVID-19: Status: RESOLVED | Noted: 2021-01-11 | Resolved: 2022-01-01

## 2022-04-03 PROBLEM — E87.20 LACTIC ACIDOSIS: Status: ACTIVE | Noted: 2022-01-01

## 2022-04-03 PROBLEM — E88.09 HYPOALBUMINEMIA DUE TO PROTEIN-CALORIE MALNUTRITION (HCC): Chronic | Status: ACTIVE | Noted: 2022-01-01

## 2022-04-03 PROBLEM — D62 ACUTE BLOOD LOSS ANEMIA (ABLA): Status: RESOLVED | Noted: 2022-01-01 | Resolved: 2022-01-01

## 2022-04-03 PROBLEM — E16.2 HYPOGLYCEMIA: Status: RESOLVED | Noted: 2022-01-01 | Resolved: 2022-01-01

## 2022-04-03 PROBLEM — I95.9 HYPOTENSION: Status: ACTIVE | Noted: 2022-01-01

## 2022-04-03 NOTE — ED PROVIDER NOTES
Pt with HTN, DM, and prostate cancer treated with radiation. On midodrine for hypotension. Wife checks his BP every morning. Usually around 601 systolic. Today in the 70's. Went to urgent care who sent pt here. Pt has chronic back pain getting worse. The history is provided by the patient and the spouse. No  was used. Hypotension   This is a new problem. The current episode started 3 to 5 hours ago. The problem has not changed since onset. Associated symptoms include weakness. Pertinent negatives include no confusion and no numbness. His past medical history is significant for diabetes and hypertension.         Past Medical History:   Diagnosis Date    Adverse effect of anesthesia     reports \"slow to get over effects\" of anesthesia- drowsy    Allergic rhinitis     Anemia 2018    Back pain     Colon polyps     Diabetes ([address]     oral agents; rarely checks BS, reports 138 today; occasional hypo symptoms (shaky) unaware of hypo level    Elevated liver function tests     GERD (gastroesophageal reflux disease)     controlled with meds    H/O alcohol abuse     Hiatal hernia     Hypercholesterolemia     Hypertension     well controlled with meds    Hypotestosteronism     Migraine     Personal history of prostate cancer     Restless leg     Sleep apnea     wears CPAP       Past Surgical History:   Procedure Laterality Date    HX COLONOSCOPY  ~2017    HX ORTHOPAEDIC Left 2017    post tibial tendon repair    HX OTHER SURGICAL Right 10/16/2018    ingrown toenail removal    HX PROSTATECTOMY  2003    IR IVC FILTER  2/3/2022         Family History:   Problem Relation Age of Onset    Heart Disease Father     Emphysema Mother     Heart Disease Brother     Psychiatric Disorder Brother        Social History     Socioeconomic History    Marital status:      Spouse name: Not on file    Number of children: Not on file    Years of education: Not on file    Highest education level: Not on file   Occupational History    Not on file   Tobacco Use    Smoking status: Former Smoker     Types: Cigars     Quit date: 2003     Years since quittin.9    Smokeless tobacco: Never Used   Substance and Sexual Activity    Alcohol use: No    Drug use: No    Sexual activity: Not on file   Other Topics Concern    Not on file   Social History Narrative    Not on file     Social Determinants of Health     Financial Resource Strain:     Difficulty of Paying Living Expenses: Not on file   Food Insecurity:     Worried About Running Out of Food in the Last Year: Not on file    Ruby of Food in the Last Year: Not on file   Transportation Needs:     Lack of Transportation (Medical): Not on file    Lack of Transportation (Non-Medical): Not on file   Physical Activity:     Days of Exercise per Week: Not on file    Minutes of Exercise per Session: Not on file   Stress:     Feeling of Stress : Not on file   Social Connections:     Frequency of Communication with Friends and Family: Not on file    Frequency of Social Gatherings with Friends and Family: Not on file    Attends [employer] Services: Not on file    Active Member of [address] [place] [address] or Organizations: Not on file    Attends Club or Organization Meetings: Not on file    Marital Status: Not on file   Intimate Partner Violence:     Fear of Current or Ex-Partner: Not on file    Emotionally Abused: Not on file    Physically Abused: Not on file    Sexually Abused: Not on file   Housing Stability:     Unable to Pay for Housing in the Last Year: Not on file    Number of [place] in the Last Year: Not on file    Unstable Housing in the Last Year: Not on file         ALLERGIES: Amoxicillin, Lisinopril, and Sulfa (sulfonamide antibiotics)    Review of Systems   Constitutional: Negative for chills and fever. HENT: Negative for rhinorrhea and sore throat. Eyes: Negative for pain and redness.    Respiratory: Negative for chest tightness, shortness of breath and wheezing. Cardiovascular: Negative for chest pain and leg swelling. Gastrointestinal: Negative for abdominal pain, diarrhea, nausea and vomiting. Genitourinary: Negative for dysuria and hematuria. Musculoskeletal: Negative for back pain, gait problem, neck pain and neck stiffness. Skin: Negative for color change and rash. Neurological: Positive for weakness. Negative for numbness and headaches. Psychiatric/Behavioral: Negative for confusion. Vitals:    04/03/22 1418   BP: 124/81   Pulse: 83   Resp: 18   Temp: 97.4 °F (36.3 °C)   SpO2: 96%   Weight: 81.6 kg (180 lb)   Height: 5' 11\" (1.803 m)            Physical Exam  Constitutional:       Appearance: Normal appearance. He is well-developed. HENT:      Head: Normocephalic and atraumatic. Eyes:      Extraocular Movements: Extraocular movements intact. Pupils: Pupils are equal, round, and reactive to light. Cardiovascular:      Rate and Rhythm: Normal rate and regular rhythm. Pulmonary:      Effort: Pulmonary effort is normal.      Breath sounds: Normal breath sounds. Abdominal:      General: Bowel sounds are normal.      Palpations: Abdomen is soft. Tenderness: There is no abdominal tenderness. Musculoskeletal:         General: Tenderness (lower back) present. Normal range of motion. Cervical back: Normal range of motion and neck supple. Right lower leg: Edema present. Left lower leg: Edema present. Skin:     General: Skin is warm and dry. Neurological:      General: No focal deficit present. Mental Status: He is alert and oriented to person, place, and time. Cranial Nerves: No cranial nerve deficit. MDM  Number of Diagnoses or Management Options  Diagnosis management comments: Patient with UTI and sepsis. Fluids and antibiotics started. Having some low blood pressure but takes midodrine for low blood pressure. Currently improved with some fluids. Has low back pain with a new L1 compression fracture and progression of an L3 compression fracture. Has had previous prostate cancer with radiation. Will admit for antibiotics and further treatment. Amount and/or Complexity of Data Reviewed  Clinical lab tests: ordered and reviewed  Tests in the radiology section of CPT®: ordered and reviewed  Tests in the medicine section of CPT®: ordered and reviewed    Patient Progress  Patient progress: stable         Procedures          EKG: normal sinus rhythm, nonspecific ST and T waves changes. Rate 75. XR CHEST PORT (Final result)  Result time 04/03/22 15:53:24  Final result by Anabelle Ortiz MD (04/03/22 15:53:24)                Narrative:    History: Cough     Exam: portable chest     Comparison: None     Findings: There is blunting of the left costophrenic angle which could be due to   a small left pleural effusion. The lungs are otherwise clear. No change in the   appearance of the mediastinal contour or osseous structures. IMPRESSIONs: Blunting of the left costophrenic angle. No additional abnormality.                        XR SPINE LUMB 2 OR 3 V (Final result)  Result time 04/03/22 15:51:29  Final result by Anabelle Ortiz MD (04/03/22 15:51:29)                Impression:    1. Interval development of L1 compression fracture. 2. Interval progression of L3 compression deformity, now with vertebral plana. 3. Osteopenia. 4. Multilevel lumbar spondylosis. 5. Interval placement of IVC filter. Narrative:    History: Low back pain     EXAM: Lumbar spine series     COMPARISON: 12/17/2021     FINDINGS: There has been interval progression of the L3 compression fracture   seen previously, now with vertebral plana. There is a new compression fracture   at T12. There is multilevel facet arthropathy. There is anterior osteophyte   formation multiple levels. The pedicles are intact. The SI joints are patent. There is osteopenia.  There has been interval placement of an IVC filter.                   Results Include:    Recent Results (from the past 24 hour(s))   EKG, 12 LEAD, INITIAL    Collection Time: 04/03/22  2:46 PM   Result Value Ref Range    Ventricular Rate 75 BPM    Atrial Rate 75 BPM    P-R Interval 160 ms    QRS Duration 102 ms    Q-T Interval 362 ms    QTC Calculation (Bezet) 404 ms    Calculated P Axis 70 degrees    Calculated R Axis 9 degrees    Calculated T Axis 66 degrees    Diagnosis       !! AGE AND GENDER SPECIFIC ECG ANALYSIS !! Normal sinus rhythm  Low voltage QRS  Cannot rule out Anterior infarct , age undetermined  Abnormal ECG  When compared with ECG of 14-AUG-2017 11:06,  No significant change was found     CBC WITH AUTOMATED DIFF    Collection Time: 04/03/22  2:59 PM   Result Value Ref Range    WBC 23.7 (H) 4.3 - 11.1 K/uL    RBC 3.22 (L) 4.23 - 5.6 M/uL    HGB 8.8 (L) 13.6 - 17.2 g/dL    HCT 27.1 (L) 41.1 - 50.3 %    MCV 84.2 79.6 - 97.8 FL    MCH 27.3 26.1 - 32.9 PG    MCHC 32.5 31.4 - 35.0 g/dL    RDW 15.9 (H) 11.9 - 14.6 %    PLATELET 97 (L) 718 - 450 K/uL    MPV 9.5 9.4 - 12.3 FL    ABSOLUTE NRBC 0.00 0.0 - 0.2 K/uL    NEUTROPHILS 90 (H) 43 - 78 %    LYMPHOCYTES 2 (L) 13 - 44 %    MONOCYTES 5 4.0 - 12.0 %    EOSINOPHILS 0 (L) 0.5 - 7.8 %    BASOPHILS 0 0.0 - 2.0 %    IMMATURE GRANULOCYTES 3 0.0 - 5.0 %    ABS. NEUTROPHILS 21.3 (H) 1.7 - 8.2 K/UL    ABS. LYMPHOCYTES 0.5 0.5 - 4.6 K/UL    ABS. MONOCYTES 1.2 0.1 - 1.3 K/UL    ABS. EOSINOPHILS 0.0 0.0 - 0.8 K/UL    ABS. BASOPHILS 0.0 0.0 - 0.2 K/UL    ABS. IMM.  GRANS. 0.7 (H) 0.0 - 0.5 K/UL    RBC COMMENTS OCCASIONAL  JOSELUIS CELLS        RBC COMMENTS OCCASIONAL  OVALOCYTES        RBC COMMENTS OCCASIONAL  HYPOCHROMIA        WBC COMMENTS Result Confirmed By Smear      PLATELET COMMENTS DECREASED      DF AUTOMATED     METABOLIC PANEL, COMPREHENSIVE    Collection Time: 04/03/22  2:59 PM   Result Value Ref Range    Sodium 137 136 - 145 mmol/L    Potassium 3.6 3.5 - 5.1 mmol/L Chloride 103 98 - 107 mmol/L    CO2 23 21 - 32 mmol/L    Anion gap 11 7 - 16 mmol/L    Glucose 71 65 - 100 mg/dL    BUN 27 (H) 8 - 23 MG/DL    Creatinine 1.37 0.8 - 1.5 MG/DL    GFR est AA >60 >60 ml/min/1.73m2    GFR est non-AA 54 (L) >60 ml/min/1.73m2    Calcium 8.9 8.3 - 10.4 MG/DL    Bilirubin, total 1.4 (H) 0.2 - 1.1 MG/DL    ALT (SGPT) 33 12 - 65 U/L    AST (SGOT) 60 (H) 15 - 37 U/L    Alk.  phosphatase 99 50 - 136 U/L    Protein, total 4.7 (L) 6.3 - 8.2 g/dL    Albumin 2.2 (L) 3.2 - 4.6 g/dL    Globulin 2.5 2.3 - 3.5 g/dL    A-G Ratio 0.9 (L) 1.2 - 3.5     NT-PRO BNP    Collection Time: 04/03/22  2:59 PM   Result Value Ref Range    NT pro-BNP 2,968 (H) <450 PG/ML   LACTIC ACID    Collection Time: 04/03/22  2:59 PM   Result Value Ref Range    Lactic acid 3.4 (H) 0.4 - 2.0 MMOL/L   TROPONIN-HIGH SENSITIVITY    Collection Time: 04/03/22  2:59 PM   Result Value Ref Range    Troponin-High Sensitivity 50.0 (H) 0 - 14 pg/mL   LIPASE    Collection Time: 04/03/22  2:59 PM   Result Value Ref Range    Lipase 56 (L) 73 - 393 U/L   LACTIC ACID    Collection Time: 04/03/22  4:06 PM   Result Value Ref Range    Lactic acid 3.0 (H) 0.4 - 2.0 MMOL/L   URINE MICROSCOPIC    Collection Time: 04/03/22  5:44 PM   Result Value Ref Range    WBC >100 0 /hpf    RBC 3-5 0 /hpf    Epithelial cells 0-3 0 /hpf    Bacteria TRACE 0 /hpf    Casts 3-5 0 /lpf    Crystals, urine 0 0 /LPF    Mucus 0 0 /lpf

## 2022-04-03 NOTE — ED NOTES
TRANSFER - OUT REPORT:    Verbal report given to Corinne on Tedra Ahumada  being transferred to [facility] [id number] [phone] [id number] for routine progression of care       Report consisted of patients Situation, Background, Assessment and   Recommendations(SBAR). Information from the following report(s) ED Summary, MAR, Recent Results and Cardiac Rhythm NSR with NSR with occasion PVC's was reviewed with the receiving nurse. Lines:   Peripheral IV 04/03/22 Right Antecubital (Active)   Site Assessment Clean, dry, & intact 04/03/22 1504   Phlebitis Assessment 0 04/03/22 1504   Infiltration Assessment 0 04/03/22 1504   Dressing Status Clean, dry, & intact 04/03/22 1504   Hub Color/Line Status Pink 04/03/22 1504       Peripheral IV 04/03/22 Left Antecubital (Active)   Site Assessment Clean, dry, & intact 04/03/22 1611   Phlebitis Assessment 0 04/03/22 1611   Infiltration Assessment 0 04/03/22 1611   Dressing Status Clean, dry, & intact 04/03/22 1611   Hub Color/Line Status Pink 04/03/22 1611        Opportunity for questions and clarification was provided.       Patient transported with:  Rhiannon

## 2022-04-03 NOTE — H&P
Hospitalist History and Physical   Admit Date:  4/3/2022  2:25 PM   Name:  Breanne Younger   Age:  67 y.o. Sex:  male  :  1945   MRN:  [id number]   Room:  HA/A    Presenting Complaint: Hypotension    Reason(s) for Admission: UTI (urinary tract infection) [N39.0]  Hypotension [I95.9]     History of Present Illness: This patient was discussed with the ER provider prior to seeing the patient. Pertinent history, physical, diagnostics,   initial treatments, and further plans reviewed. Radu Keenan is a 67 y.o. male with medical history of orthostatic hypotension, heart failure with preserved ejection fraction, and cirrhosis of liver, who presented with hypotension. He has known orthostatic hypotension and takes midodrine for this. His wife checks his blood pressure every morning and he usually is around a systolic of 361. She states today it was in the 76s with a few rechecks. She then took him to urgent care. Urgent care referred him to the ER. Initial evaluation in the ER showed his systolic blood pressure being 60s, but this quickly came up to the 80s and moved maintained generally between 80 and 100. Further work-up however, shows him to have a UTI and lactic acidosis. He does not meet other criteria for sepsis, and I believe the lactic acidosis is due to the hypotension. Also complicating matters is that he has chronic back pain with compression fractures, and appears to have another acute compression fracture. He denies fever/chills, NVD, abdominal pain, chest pain, shortness of breath. He complains only of back pain. He does have a history of prostate cancer, status post resection and radiation. 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 Review of Systems:  10 systems reviewed and negative except as noted in HPI.     Assessment & Plan:     Principal Problem:    UTI (urinary tract infection) (4/3/2022)  IV Rocephin and gentle hydration    Active Problems:    Hypotension (4/3/2022)  Continue to monitor, I am going to put him in the ICU in case he needs pressors. His blood pressure is still running borderline low at the time of admission. As noted above, will give gentle hydration as well. Addendum: He did ultimately require pressor support and Levophed has been started to maintain a MAP of 65      Lactic acidosis (4/3/2022)  I believe this is due to his hypotension and not necessarily reflective of sepsis. At any rate, will monitor. (HFpEF) heart failure with preserved ejection fraction ([address] (1/19/2022)  He is not on any specific medications due to his chronically low blood pressure      Hepatic cirrhosis ([address] (10/8/2021)  Noted, monitor      Hypoalbuminemia due to protein-calorie malnutrition ([facility] [address] (1/31/2022)  Albumin today is 2.2, this is more likely due to chronic liver disease rather than protein calorie malnutrition as noted in the medical record    Addendum: Troponins were initially done in the ER and were slightly elevated and did rise a little bit, we ordered a final troponin for this morning, which has now resulted as 592.7; significantly higher than the previous which was 66. This is most likely heart strain due to his hypotension and infection.   We will place a cardiology consult for today      Dispo/Discharge Planning:   Inpatient, anticipate 2 days    Diet: Regular  VTE ppx:  Lovenox  Code status: DNR, HC POA is his wife    Hospital Problems as of 4/3/2022 Date Reviewed: 3/10/2022          Codes Class Noted - Resolved POA    * (Principal) UTI (urinary tract infection) ICD-10-CM: N39.0  ICD-9-CM: 599.0  4/3/2022 - Present Yes        Hypotension ICD-10-CM: I95.9  ICD-9-CM: 458.9  4/3/2022 - Present Yes        Lactic acidosis ICD-10-CM: E87.2  ICD-9-CM: 276.2  4/3/2022 - Present Yes        Hypoalbuminemia due to protein-calorie malnutrition ([facility] [address] (Chronic) ICD-10-CM: E88.09, E46  ICD-9-CM: 273.8, 263.9  1/31/2022 - Present Yes        (HFpEF) heart failure with preserved ejection fraction (HCC) (Chronic) ICD-10-CM: I50.30  ICD-9-CM: 428.9  1/19/2022 - Present Yes        Hepatic cirrhosis (HCC) (Chronic) ICD-10-CM: K74.60  ICD-9-CM: 571.5  10/8/2021 - Present Yes    Overview Signed 1/31/2022  4:07 PM by Guru Campbell DO     Last Assessment & Plan:   Formatting of this note might be different from the original.  Follow-up for edema and ascites  Avoid Tylenol  Check labs on Mondays                   Past History:  Past Medical History:   Diagnosis Date    Adverse effect of anesthesia     reports \"slow to get over effects\" of anesthesia- drowsy    Allergic rhinitis     Androgen deficiency 10/2/2015    Anemia 2018    Back pain     Colon polyps     COVID-19 1/11/2021    Last Assessment & Plan:  Formatting of this note might be different from the original. Now Reji recovered    Diabetes ([address]     oral agents; rarely checks BS, reports 138 today; occasional hypo symptoms (shaky) unaware of hypo level    Elevated liver function tests     GERD (gastroesophageal reflux disease)     controlled with meds    H/O alcohol abuse     Hiatal hernia     Hypercholesteremia 10/2/2015    Last Assessment & Plan:  Formatting of this note might be different from the original. chronic stable condition, refilled Simvastatin 80 mg 90/2    Hypercholesterolemia     Hypertension     well controlled with meds    Hypotestosteronism     Migraine     Personal history of prostate cancer     Restless leg     Sleep apnea     wears CPAP     Past Surgical History:   Procedure Laterality Date    HX COLONOSCOPY  ~2017    HX ORTHOPAEDIC Left 2017    post tibial tendon repair    HX OTHER SURGICAL Right 10/16/2018    ingrown toenail removal    HX PROSTATECTOMY  2003    IR IVC FILTER  2/3/2022      Allergies   Allergen Reactions    Amoxicillin Rash    Lisinopril Cough     cough    Sulfa (Sulfonamide Antibiotics) Rash      Social History     Tobacco Use    Smoking status: Former Smoker     Types: Cigars     Quit date: 2003     Years since quittin.9    Smokeless tobacco: Never Used   Substance Use Topics    Alcohol use: No        Family Hx:  Family History   Problem Relation Age of Onset    Heart Disease Father     Emphysema Mother     Heart Disease Brother     Psychiatric Disorder Brother        Family history reviewed and negative except as otherwise noted. Immunization History   Administered Date(s) Administered    COVID-19, Pfizer Purple top, DILUTE for use, 12+ yrs, 30mcg/0.3mL dose 2021, 2021    TB Skin Test (PPD) Intradermal 2022     Prior to Admit Medications:  Current Outpatient Medications   Medication Instructions    atorvastatin (LIPITOR) 40 mg, Oral, DAILY    b complex vitamins tablet 1 Tablet, Oral, DAILY    brief disposable (ADULT) misc Does Not Apply    bumetanide (BUMEX) 1 mg, Oral, 2 TIMES DAILY    cinnamon bark 500 mg, Oral, DAILY    doxepin HCl (DOXEPIN PO) 0.3 mL, Oral, EVERY BEDTIME    fenofibrate (LOFIBRA) 160 mg, Oral, DAILY    fluticasone (FLONASE) 50 mcg/actuation nasal spray 2 Sprays, Both Nostrils, AS NEEDED    folic acid (FOLVITE) 253 mcg, Oral, DAILY, Everyday except Thursday     Integra 125-40-3 mg cap 1 Capsule, Oral, DAILY    methotrexate 20 mg, EVERY THURSDAY    miscellaneous medical supply ([facility]) misc Use as needed for incontinence    montelukast (SINGULAIR) 10 mg, Oral, DAILY    nystatin (MYCOSTATIN) topical cream Topical, 2 TIMES DAILY    pantoprazole (PROTONIX) 40 mg, Oral, DAILY    rOPINIRole (REQUIP) 1 mg tablet Take One tab by mouth in AM, two tabs by mouth in PM    simvastatin (ZOCOR) 80 mg, Oral, DAILY    traZODone (DESYREL) 50 mg    triamcinolone acetonide (KENALOG) 0.1 % topical cream Apply topically 2 (two) times a day.     Vitamin C 500 mg, Oral, DAILY       Objective:     Patient Vitals for the past 24 hrs:   Temp Pulse Resp BP SpO2   22 1755  71  (!) 88/51    04/03/22 1705  71  (!) 85/56 98 %   04/03/22 1550  70  (!) 88/53 94 %   04/03/22 1545  71  (!) 84/52 95 %   04/03/22 1518  88  (!) 68/48 99 %   04/03/22 1516  76  (!) 64/44 97 %   04/03/22 1514  78  (!) 76/52 98 %   04/03/22 1418 97.4 °F (36.3 °C) 83 18 124/81 96 %     Oxygen Therapy  O2 Sat (%): 98 % (04/03/22 1705)  Pulse via Oximetry: 73 beats per minute (04/03/22 1705)  O2 Device: None (Room air) (04/03/22 1418)    Estimated body mass index is 25.1 kg/m² as calculated from the following:    Height as of this encounter: 5' 11\" (1.803 m). Weight as of this encounter: 81.6 kg (180 lb). No intake or output data in the 24 hours ending 04/03/22 1933      Physical Exam:    Blood pressure (!) 88/51, pulse 71, temperature 97.4 °F (36.3 °C), resp. rate 18, height 5' 11\" (1.803 m), weight 81.6 kg (180 lb), SpO2 98 %. General:    Well nourished. No apparent distress  HENT:  Normocephalic, atraumatic. Nares appear normal, no drainage. Oropharynx is clear with tacky mucous membranes; hard of hearing and without hearing aids  Eyes:  Sclerae appear normal.  PERRL; EOMI  Neck:  No restricted ROM. Trachea midline   CV:   RRR. No jugular venous distension. No LE edema  Lungs:   CTAB. No wheezing, rhonchi, or rales. Respirations even, unlabored  Abdomen: Bowel sounds present. Soft, nontender, nondistended. Musc/Sk: No cyanosis or clubbing. Skin:     normal coloration. Warm and dry. Neuro:  CN II-XII grossly intact.   Eye exam as noted above; Moves extremities equally and appropriately  Psych:   Alert and oriented x 4; Judgement and insight are normal;     I have reviewed ordered lab tests and reports of imaging below:    Last 24hr Labs:  Recent Results (from the past 24 hour(s))   EKG, 12 LEAD, INITIAL    Collection Time: 04/03/22  2:46 PM   Result Value Ref Range    Ventricular Rate 75 BPM    Atrial Rate 75 BPM    P-R Interval 160 ms    QRS Duration 102 ms    Q-T Interval 362 ms QTC Calculation (Bezet) 404 ms    Calculated P Axis 70 degrees    Calculated R Axis 9 degrees    Calculated T Axis 66 degrees    Diagnosis       !! AGE AND GENDER SPECIFIC ECG ANALYSIS !! Normal sinus rhythm  Low voltage QRS  Cannot rule out Anterior infarct , age undetermined  Abnormal ECG  When compared with ECG of 14-AUG-2017 11:06,  No significant change was found     CBC WITH AUTOMATED DIFF    Collection Time: 04/03/22  2:59 PM   Result Value Ref Range    WBC 23.7 (H) 4.3 - 11.1 K/uL    RBC 3.22 (L) 4.23 - 5.6 M/uL    HGB 8.8 (L) 13.6 - 17.2 g/dL    HCT 27.1 (L) 41.1 - 50.3 %    MCV 84.2 79.6 - 97.8 FL    MCH 27.3 26.1 - 32.9 PG    MCHC 32.5 31.4 - 35.0 g/dL    RDW 15.9 (H) 11.9 - 14.6 %    PLATELET 97 (L) 766 - 450 K/uL    MPV 9.5 9.4 - 12.3 FL    ABSOLUTE NRBC 0.00 0.0 - 0.2 K/uL    NEUTROPHILS 90 (H) 43 - 78 %    LYMPHOCYTES 2 (L) 13 - 44 %    MONOCYTES 5 4.0 - 12.0 %    EOSINOPHILS 0 (L) 0.5 - 7.8 %    BASOPHILS 0 0.0 - 2.0 %    IMMATURE GRANULOCYTES 3 0.0 - 5.0 %    ABS. NEUTROPHILS 21.3 (H) 1.7 - 8.2 K/UL    ABS. LYMPHOCYTES 0.5 0.5 - 4.6 K/UL    ABS. MONOCYTES 1.2 0.1 - 1.3 K/UL    ABS. EOSINOPHILS 0.0 0.0 - 0.8 K/UL    ABS. BASOPHILS 0.0 0.0 - 0.2 K/UL    ABS. IMM.  GRANS. 0.7 (H) 0.0 - 0.5 K/UL    RBC COMMENTS OCCASIONAL  JOSELUIS CELLS        RBC COMMENTS OCCASIONAL  OVALOCYTES        RBC COMMENTS OCCASIONAL  HYPOCHROMIA        WBC COMMENTS Result Confirmed By Smear      PLATELET COMMENTS DECREASED      DF AUTOMATED     METABOLIC PANEL, COMPREHENSIVE    Collection Time: 04/03/22  2:59 PM   Result Value Ref Range    Sodium 137 136 - 145 mmol/L    Potassium 3.6 3.5 - 5.1 mmol/L    Chloride 103 98 - 107 mmol/L    CO2 23 21 - 32 mmol/L    Anion gap 11 7 - 16 mmol/L    Glucose 71 65 - 100 mg/dL    BUN 27 (H) 8 - 23 MG/DL    Creatinine 1.37 0.8 - 1.5 MG/DL    GFR est AA >60 >60 ml/min/1.73m2    GFR est non-AA 54 (L) >60 ml/min/1.73m2    Calcium 8.9 8.3 - 10.4 MG/DL    Bilirubin, total 1.4 (H) 0.2 - 1.1 MG/DL ALT (SGPT) 33 12 - 65 U/L    AST (SGOT) 60 (H) 15 - 37 U/L    Alk. phosphatase 99 50 - 136 U/L    Protein, total 4.7 (L) 6.3 - 8.2 g/dL    Albumin 2.2 (L) 3.2 - 4.6 g/dL    Globulin 2.5 2.3 - 3.5 g/dL    A-G Ratio 0.9 (L) 1.2 - 3.5     NT-PRO BNP    Collection Time: 04/03/22  2:59 PM   Result Value Ref Range    NT pro-BNP 2,968 (H) <450 PG/ML   LACTIC ACID    Collection Time: 04/03/22  2:59 PM   Result Value Ref Range    Lactic acid 3.4 (H) 0.4 - 2.0 MMOL/L   TROPONIN-HIGH SENSITIVITY    Collection Time: 04/03/22  2:59 PM   Result Value Ref Range    Troponin-High Sensitivity 50.0 (H) 0 - 14 pg/mL   LIPASE    Collection Time: 04/03/22  2:59 PM   Result Value Ref Range    Lipase 56 (L) 73 - 393 U/L   LACTIC ACID    Collection Time: 04/03/22  4:06 PM   Result Value Ref Range    Lactic acid 3.0 (H) 0.4 - 2.0 MMOL/L   URINE MICROSCOPIC    Collection Time: 04/03/22  5:44 PM   Result Value Ref Range    WBC >100 0 /hpf    RBC 3-5 0 /hpf    Epithelial cells 0-3 0 /hpf    Bacteria TRACE 0 /hpf    Casts 3-5 0 /lpf    Crystals, urine 0 0 /LPF    Mucus 0 0 /lpf   TROPONIN-HIGH SENSITIVITY    Collection Time: 04/03/22  5:45 PM   Result Value Ref Range    Troponin-High Sensitivity 46.4 (H) 0 - 14 pg/mL       All Micro Results     Procedure Component Value Units Date/Time    CULTURE, BLOOD [[id number]] Collected: 04/03/22 1608    Order Status: Completed Specimen: Blood Updated: 04/03/22 1619    CULTURE, BLOOD [[id number]] Collected: 04/03/22 1608    Order Status: Completed Specimen: Blood Updated: 04/03/22 1618          Other Studies:  XR SPINE LUMB 2 OR 3 V    Result Date: 4/3/2022  History: Low back pain EXAM: Lumbar spine series COMPARISON: 12/17/2021 FINDINGS: There has been interval progression of the L3 compression fracture seen previously, now with vertebral plana. There is a new compression fracture at T12. There is multilevel facet arthropathy. There is anterior osteophyte formation multiple levels. The pedicles are intact. The SI joints are patent. There is osteopenia. There has been interval placement of an IVC filter. 1. Interval development of L1 compression fracture. 2. Interval progression of L3 compression deformity, now with vertebral plana. 3. Osteopenia. 4. Multilevel lumbar spondylosis. 5. Interval placement of IVC filter. XR CHEST PORT    Result Date: 4/3/2022  History: Cough Exam: portable chest Comparison: None Findings: There is blunting of the left costophrenic angle which could be due to a small left pleural effusion. The lungs are otherwise clear. No change in the appearance of the mediastinal contour or osseous structures. IMPRESSIONs: Blunting of the left costophrenic angle. No additional abnormality. Medications Administered     0.9% sodium chloride infusion 1,000 mL     Admin Date  04/03/2022 Action  New Bag Dose  1,000 mL Rate  2,000 mL/hr Route  IntraVENous Administered By  José Brower RN          cefTRIAXone (ROCEPHIN) 1 g in 0.9% sodium chloride (MBP/ADV) 50 mL MBP     Admin Date  04/03/2022 Action  New Bag Dose  1 g Rate  100 mL/hr Route  IntraVENous Administered By  Mirna Baez RN          ketorolac (TORADOL) injection 15 mg     Admin Date  04/03/2022 Action  Given Dose  15 mg Route  IntraVENous Administered By  José Brower RN          midodrine (PROAMATINE) tablet 5 mg     Admin Date  04/03/2022 Action  Given Dose  5 mg Route  Oral Administered By  Mirna Baez RN          sodium chloride 0.9 % bolus infusion 1,000 mL     Admin Date  04/03/2022 Action  New Bag Dose  1,000 mL Route  IntraVENous Administered By  Mirna Baez RN           Admin Date  04/03/2022 Action  New Bag Dose  1,000 mL Route  IntraVENous Administered By  Mirna Baez RN                Signed:  Miguel Larose MD    Part of this note may have been written by using a voice dictation software. The note has been proof read but may still contain some grammatical/other typographical errors.

## 2022-04-03 NOTE — ED TRIAGE NOTES
Per wife, pt had BP of 70/50 at home and she took him to doctors care. [facility] doctors care sent him herer for BP of 70/30. Pt with BP of 124/81 in triage, second reading of 64/42. Pt only complaint of weakness in knees.

## 2022-04-04 PROBLEM — R65.21 SEPTIC SHOCK (HCC): Status: ACTIVE | Noted: 2022-01-01

## 2022-04-04 PROBLEM — A41.9 SEPTIC SHOCK (HCC): Status: ACTIVE | Noted: 2022-01-01

## 2022-04-04 NOTE — CONSULTS
Infectious Disease Consult    Today's Date: 4/4/2022   Admit Date: 4/3/2022    Impression:   · MRSA bacteremia, four of four bottles (4/3), TTE with MV calcification and aortic sclerosis. · Chronic radiation cystitis (recent exacerbation February 2022)    Plan:   · Discontinue ceftriaxone, and continue vancomycin. Follow repeat blood cultures. Anti-infectives:   · Vancomycin (4/3 -  · Ceftriaxone (4/3 - 4/4)    Subjective:   Date of Consultation:  April 4, 2022  Referring Physician: Mario Talavera, Hospitalist    Patient is a 67 y.o. male with an underlying medical history that includes HFpEF, hypotension on midodrine, prostate cancer, s/p resection and radiation, hospitalized in February 2022 with gross hematuria, chronic radiation cystitis, DM II. He had an IVC filter placed 2/3/22. During his hospitalization in February he was seen by ID with ESBL klebsiella and C tropicalis bacteruria, after cystoureteroscopy and fulguration, treated with three days of meropenem and fluconazole. He went to Urgent Care 4/3/22 with SBP in the 70s and was sent to the ED. On admission his platelets were 81A, and hemoglobin 8.8. He was transfused. He had leukocytosis 23.7k, and Tmax 100.4 since admission. UA showed WBCs >100 and trace bacteria, lactic acid was 3.4. Blood cultures were drawn and have GPCs in both sets, all bottles. He has chronic back pain with compression fractures and XR showed known L3 fracture and new fracture at T12. ID has been asked for recommendations regarding the positive blood cultures. He has been started on antibiotics as above. Patient reports that he is tired, denies nausea, vomiting, or diarrhea, reports that he has had blood clots in his urine. He has not felt like eating anything this morning.      Patient Active Problem List   Diagnosis Code    Prostate cancer ([facility] [address] C61    (HFpEF) heart failure with preserved ejection fraction (HCC) I50.30    Bilateral lower extremity edema R60.0    Bladder mass N32.89    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, without long-term current use of insulin (HCC) E08.59    Disorder of bone and cartilage M89.9, M94.9    Debility R53.81    GERD (gastroesophageal reflux disease) K21.9    Hepatic cirrhosis (HCC) K74.60    BENIGNO (obstructive sleep apnea) G47.33    Psoriatic arthritis (HCC) L40.50    Restless leg syndrome G25.81    Chronic radiation cystitis N30.40    Functional quadriplegia (HCC) R53.2    Thrombocytopenia due to sequestration (HCC) D69.6    Hypocortisolism (HCC) E27.49    Hypoalbuminemia due to protein-calorie malnutrition (HCC) E88.09, E46    UTI (urinary tract infection) N39.0    Hypotension I95.9    Lactic acidosis E87.2    Septic shock (HCC) A41.9, R65.21     Past Medical History:   Diagnosis Date    Adverse effect of anesthesia     reports \"slow to get over effects\" of anesthesia- drowsy    Allergic rhinitis     Androgen deficiency 10/2/2015    Anemia 2018    Back pain     Colon polyps     COVID-19 1/11/2021    Last Assessment & Plan:  Formatting of this note might be different from the original. Now COVID recovered    Diabetes ([address]     oral agents; rarely checks BS, reports 138 today; occasional hypo symptoms (shaky) unaware of hypo level    Elevated liver function tests     GERD (gastroesophageal reflux disease)     controlled with meds    H/O alcohol abuse     Hiatal hernia     Hypercholesteremia 10/2/2015    Last Assessment & Plan:  Formatting of this note might be different from the original. chronic stable condition, refilled Simvastatin 80 mg 90/2    Hypercholesterolemia     Hypertension     well controlled with meds    Hypotestosteronism     Migraine     Personal history of prostate cancer     Restless leg     Sleep apnea     wears CPAP      Family History   Problem Relation Age of Onset    Heart Disease Father     Emphysema Mother     Heart Disease Brother     Psychiatric Disorder Brother       Social History     Tobacco Use    Smoking status: Former Smoker     Types: Cigars     Quit date: 2003     Years since quittin.9    Smokeless tobacco: Never Used   Substance Use Topics    Alcohol use: No     Past Surgical History:   Procedure Laterality Date    HX COLONOSCOPY  ~2017    HX ORTHOPAEDIC Left 2017    post tibial tendon repair    HX OTHER SURGICAL Right 10/16/2018    ingrown toenail removal    HX PROSTATECTOMY      IR IVC FILTER  2/3/2022      Prior to Admission medications    Medication Sig Start Date End Date Taking? Authorizing Provider   pramipexole (MIRAPEX) 1 mg tablet Take 0.5 mg by mouth two (2) times a day. 0.5-1 tablet BID PRN   Yes Provider, Historical   bumetanide (BUMEX) 1 mg tablet Take 1 mg by mouth two (2) times a day. 21  Yes Other, MD Frederic   Integra 125-40-3 mg cap Take 1 Capsule by mouth daily. 21  Yes Other, MD Frederic   montelukast (SINGULAIR) 10 mg tablet Take 10 mg by mouth daily. 21  Yes Other, MD Frederic   atorvastatin (LIPITOR) 40 mg tablet Take 40 mg by mouth daily. 3/4/22 3/4/23 Yes Other, MD Frederic   traZODone (DESYREL) 50 mg tablet 50 mg. 21  Yes Other, MD Frederic   traMADoL (ULTRAM) 50 mg tablet Take 50 mg by mouth every six (6) hours as needed for Pain. 1-2   Indications: pain   Yes Provider, Historical   rOPINIRole (REQUIP) 1 mg tablet Take One tab by mouth in AM, two tabs by mouth in PM 22  Yes Jb Tavarez MD   brief disposable (ADULT) misc by Does Not Apply route. 20  Yes Krystle Valdes MD   b complex vitamins tablet Take 1 Tab by mouth daily. Yes Provider, Historical   ascorbic acid, vitamin C, (VITAMIN C) 500 mg tablet Take 500 mg by mouth daily. Yes Provider, Historical   miscellaneous medical supply ([employer] [place] [employer]) misc Use as needed for incontinence 19   Krystle Valdes MD   nystatin (MYCOSTATIN) topical cream Apply  to affected area two (2) times a day.  18 Jose Lucas, DO   methotrexate 25 mg/mL chemo injection 20 mg by SubCUTAneous route Every Thursday. Patient not taking: Reported on 2022   Provider, Historical   triamcinolone acetonide (KENALOG) 0.1 % topical cream Apply topically 2 (two) times a day. 18   Provider, Historical   folic acid 550 mcg tablet Take 400 mcg by mouth daily. Everyday except Thursday  Patient not taking: Reported on 4/3/2022    Provider, Historical   cinnamon bark 500 mg cap Take 500 mg by mouth daily. Patient not taking: Reported on 4/3/2022    Provider, Historical   fenofibrate (TRICOR) 160 mg tablet Take 160 mg by mouth daily. Patient not taking: Reported on 4/3/2022 1/19/15   Provider, Historical   fluticasone (FLONASE) 50 mcg/actuation nasal spray 2 Sprays by Both Nostrils route as needed. Patient not taking: Reported on 4/3/2022 2/9/15   Provider, Historical   pantoprazole (PROTONIX) 40 mg tablet Take 40 mg by mouth daily. Patient not taking: Reported on 4/3/2022 2/9/15   Provider, Historical   simvastatin (ZOCOR) 80 mg tablet Take 80 mg by mouth daily. Patient not taking: Reported on 4/3/2022 3/13/15   Provider, Historical       Allergies   Allergen Reactions    Amoxicillin Rash    Lisinopril Cough     cough    Sulfa (Sulfonamide Antibiotics) Rash        Review of Systems:  Pertinent items are noted in the History of Present Illness.     Objective:     Visit Vitals  /63   Pulse 81   Temp 98.8 °F (37.1 °C)   Resp 18   Ht 5' 11\" (1.803 m)   Wt 88.2 kg (194 lb 6.4 oz)   SpO2 96%   BMI 27.11 kg/m²     Temp (24hrs), Av.9 °F (37.2 °C), Min:97.4 °F (36.3 °C), Max:100.4 °F (38 °C)       Lines:  Peripheral IV:       Physical Exam:    General:  Alert, cooperative, chronically debilitated, appears stated age   Eyes:  Sclera anicteric, EOMI   Mouth/Throat: Mucous membranes moist   Neck: Supple   Lungs:   Clear but diminished, no increased work of breathing on room air   CV:  Regular rate and rhythm, no audible murmur   Abdomen:   Soft, non-tender, catheter with clear yellow urine   Extremities: No cyanosis; 2+ pitting edema bilateral LE   Skin: No acute rash   Musculoskeletal: No deformity   Lines/Devices:  Intact, no erythema, drainage or tenderness   Psych: Alert and oriented, appropriate        Data Review:     CBC:  Recent Labs     04/04/22  0328 04/03/22  1459   WBC 16.6* 23.7*   GRANS 92* 90*   MONOS 4 5   EOS 0* 0*   ANEU 15.3* 21.3*   ABL 0.3* 0.5   HGB 8.2* 8.8*   HCT 25.0* 27.1*   * 97*       BMP:  Recent Labs     04/04/22  0328 04/03/22  1459   CREA 1.11 1.37   BUN 27* 27*    137   K 3.4* 3.6    103   CO2 25 23   AGAP 8 11   GLU 57* 71       LFTS:  Recent Labs     04/03/22  1459   TBILI 1.4*   ALT 33   AP 99   TP 4.7*   ALB 2.2*       Microbiology:     All Micro Results     Procedure Component Value Units Date/Time    CULTURE, BLOOD [[id number]] Collected: 04/03/22 1608    Order Status: Completed Specimen: Blood Updated: 04/04/22 0429     Special Requests: --        RIGHT  Antecubital       GRAM STAIN GRAM POSITIVE COCCI               AEROBIC AND ANAEROBIC BOTTLES                  RESULTS VERIFIED, PHONED TO AND READ BACK BY Shona Shipman (R) RN BY NB  [address] [place] [id number]           Culture result:       CULTURE IN [address] [facility] UPDATES TO FOLLOW          CULTURE, BLOOD [[id number]] Collected: 04/03/22 1608    Order Status: Completed Specimen: Blood Updated: 04/04/22 0428     Special Requests: --        LEFT  Antecubital       GRAM STAIN GRAM POSITIVE COCCI               AEROBIC AND ANAEROBIC BOTTLES                  RESULTS VERIFIED, PHONED TO AND READ BACK BY hSona Shipman (R) RN BY NB  [address] [place] [id number]           Culture result:       CULTURE IN [address] UPDATES TO FOLLOW                Imaging:   XR Spine Lumbar 4/3/22  IMPRESSION  1. Interval development of L1 compression fracture. 2. Interval progression of L3 compression deformity, now with vertebral plana. 3. Osteopenia.   4. Multilevel lumbar spondylosis. 5. Interval placement of IVC filter.     Signed By: Medina Talavera NP     April 4, 2022

## 2022-04-04 NOTE — ROUTINE PROCESS
CMP blood glucose 57. POC recheck 52. Patient does not have upper dentures with him and would be unable to chew glucose tabs. Dextrose 10% administered in place of tabs. Recheck POC glucose 90.

## 2022-04-04 NOTE — PROGRESS NOTES
Hospitalist Progress Note   Admit Date:  4/3/2022  2:25 PM   Name:  Nahid Younger   Age:  67 y.o. Sex:  male  :  1945   MRN:  [id number]   Room:  374/01    Presenting Complaint: Hypotension    Reason(s) for Admission: UTI (urinary tract infection) [N39.0]  Hypotension [I95.9]     Hospital Course & Interval History: This is a 54-year-old male with past medical history significant for congestive heart failure with preserved ejection fraction, liver cirrhosis, orthostatic hypotension, prostate cancer status post surgery as well as radiation therapy, was sent to the ER from urgent care because of hypotension. Patient usually has orthostatic hypotension and is on midodrine. In the ER, his blood pressure was found to be in the 60s. ER work-up shows patient has leukocytosis, lactic acidosis, UTI. He also has chronic back pain with compression fractures. Patient was started on Levophed overnight due to hypotension. Blood cultures on admission positive for MRSA. Patient was started on vancomycin. ID consulted. Echocardiogram ordered. Subjective/24hr Events (22): Patient is alert awake oriented x3. He was started on Levophed overnight for low blood pressure. Blood cultures on admission positive for gram-positive cocci. Preliminary report positive for MRSA. No fever no chills. No chest pain. No shortness of breath. ROS:  10 systems reviewed and negative except as noted above. Assessment & Plan:     Principal Problem: This is a 66y Male with:      Septic shock ([address] (2022) secondary to MRSA bacteremia  MRSA bacteremia  Patient meets criteria for sepsis with leukocytosis, fever, tachycardia, tachypnea, lactic acidosis, positive blood cultures. Patient is still needing Levophed to maintain blood pressure with MAP greater than 65. Currently on midodrine. Dose increased to 10 mg p.o. 3 times daily. Plan to wean Levophed as MAP improves more than 65.   Blood cultures on admission positive for MRSA. Currently on IV vancomycin. ID consulted. Appreciate recommendations. TTE showed no visible vegetations. Repeat blood cultures in a.m. Elevated troponin  Troponin elevated at 592 this morning. Patient does not have any chest pain. Likely elevated troponin is secondary to demand supply mismatch. Cardiology consulted. Appreciate recommendations. Echocardiogram shows preserved left and ejection fraction of greater than 65% with hyperdynamic left ventricular systolic function, normal diastolic function. No evidence of vegetations on the transthoracic echocardiogram.    Hypokalemia  Hypomagnesemia  Repleted    Lactic acidosis present on admission  Resolved    Reported history of diastolic congestive heart failure  Echo showed no evidence of congestive heart failure this admission. Liver cirrhosis    Hypoalbuminemia/moderate protein calorie malnutrition  Monitor for now. Nutrition consulted. Discharge Planning:    Patient is critically ill meeting ICU level of care. Patient is critically ill. Without intervention, there is a high probability of acute organ impairment or life-threatening deterioration in the patient's condition from: Septic shock  Critical care interventions: Levophed  Total critical care time spent: 32 minutes. Time is indicative of direct patient attendance at bedside and on the patient's floor nearby. Includes time spent at bedside performing history and exam, performing chart review, discussing findings and treatment plan with patient and/or family, discussing patient with consultants and colleagues, ordering and reviewing pertinent laboratory and radiographic evaluations, and discussing patient with nursing staff. Time excludes procedures.      Diet:  ADULT DIET Easy to Chew  ADULT ORAL NUTRITION SUPPLEMENT Breakfast, Lunch, Dinner; Standard High Calorie/High Protein  DVT PPx: Lovenox  Code status: DNR    Hospital Problems as of 4/4/2022 Date Reviewed: 3/10/2022          Codes Class Noted - Resolved POA    * (Principal) Septic shock ([facility] [address] ICD-10-CM: A41.9, R65.21  ICD-9-CM: 038.9, 785.52, 995.92  4/4/2022 - Present Unknown        UTI (urinary tract infection) ICD-10-CM: N39.0  ICD-9-CM: 599.0  4/3/2022 - Present Yes        Hypotension ICD-10-CM: I95.9  ICD-9-CM: 458.9  4/3/2022 - Present Yes        Lactic acidosis ICD-10-CM: E87.2  ICD-9-CM: 276.2  4/3/2022 - Present Yes        Hypoalbuminemia due to protein-calorie malnutrition (HCC) (Chronic) ICD-10-CM: E88.09, E46  ICD-9-CM: 273.8, 263.9  1/31/2022 - Present Yes        (HFpEF) heart failure with preserved ejection fraction (HCC) (Chronic) ICD-10-CM: I50.30  ICD-9-CM: 428.9  1/19/2022 - Present Yes        Hepatic cirrhosis (HCC) (Chronic) ICD-10-CM: K74.60  ICD-9-CM: 571.5  10/8/2021 - Present Yes    Overview Signed 1/31/2022  4:07 PM by Zana Le DO     Last Assessment & Plan:   Formatting of this note might be different from the original.  Follow-up for edema and ascites  Avoid Tylenol  Check labs on Mondays                   Objective:     Patient Vitals for the past 24 hrs:   Temp Pulse Resp BP SpO2   04/04/22 1202  98 16  95 %   04/04/22 1201  98 21  97 %   04/04/22 1200  98 17  96 %   04/04/22 1159  97 20  95 %   04/04/22 1158 100.2 °F (37.9 °C) (!) 101 17 99/61 95 %   04/04/22 1157  (!) 101 17  96 %   04/04/22 1156  95 21  96 %   04/04/22 1143  86 18 (!) 95/51 95 %   04/04/22 1130  81 16 (!) 89/57 95 %   04/04/22 1113  85 16 (!) 97/57 96 %   04/04/22 1100  95 19 (!) 91/58 95 %   04/04/22 1043  88 17 (!) 90/58 94 %   04/04/22 1030  85 18 (!) 107/57 93 %   04/04/22 1013  86 21 (!) 100/59 94 %   04/04/22 1000  84 21 108/62 94 %   04/04/22 0930  89 20 (!) 102/58 95 %   04/04/22 0900  88 19 106/61 95 %   04/04/22 0830  85 18 (!) 95/59 94 %   04/04/22 0800  81 18 111/63 96 %   04/04/22 0730  80 18 (!) 100/59 95 %   04/04/22 0715 98.8 °F (37.1 °C) 78 17 (!) 98/56 96 %   04/04/22 0700  78 13 (!) 98/56 95 %   04/04/22 0645  79 16 103/61 95 %   04/04/22 0630  76 14 (!) 101/57 96 %   04/04/22 0615  77 17 (!) 93/57 95 %   04/04/22 0600  73 17 (!) 101/55 96 %   04/04/22 0545  76 17 (!) 94/57 97 %   04/04/22 0530  75 16 (!) 90/55 94 %   04/04/22 0515  76 15 (!) 87/53 93 %   04/04/22 0500  78 16 (!) 89/56 93 %   04/04/22 0445  79 16 (!) 88/53 93 %   04/04/22 0430  80 17 (!) 112/59 92 %   04/04/22 0415  80 18 (!) 87/55 95 %   04/04/22 0400  82 17 (!) 94/56 95 %   04/04/22 0345  82 18 (!) 84/54 95 %   04/04/22 0330 100.4 °F (38 °C) 82 17 (!) 88/57 94 %   04/04/22 0315  81 17 (!) 91/52 96 %   04/04/22 0300  82 16 (!) 86/53 96 %   04/04/22 0245  79 18 (!) 87/55 95 %   04/04/22 0234  94 23 (!) 80/48 93 %   04/04/22 0231  88 21 (!) 80/52    04/04/22 0215  89 20 (!) 84/50 92 %   04/04/22 0210  84 17 (!) 85/56 90 %   04/04/22 0205  81 19 (!) 86/53 90 %   04/04/22 0200  83 17 (!) 90/54 91 %   04/04/22 0155  83 16 (!) 97/50 91 %   04/04/22 0150  84 19 (!) 88/58 91 %   04/04/22 0145  83 19 (!) 84/56 90 %   04/04/22 0140  83 19 (!) 90/57 91 %   04/04/22 0135  82 19  91 %   04/04/22 0130  84 18 (!) 93/53 92 %   04/04/22 0125  85 21 (!) 86/53 91 %   04/04/22 0120  85 20 (!) 79/52 92 %   04/04/22 0115  89 15 (!) 89/52 92 %   04/04/22 0110  86 21 (!) 79/52 95 %   04/04/22 0105  92 19 (!) 68/44 92 %   04/04/22 0100  87 19 (!) 72/43 92 %   04/04/22 0045  95 26 96/68 92 %   04/04/22 0030  94 13 (!) 90/56 92 %   04/04/22 0015  96 22 (!) 83/53 90 %   04/04/22 0003   16 (!) 88/55 94 %   04/04/22 0001  95 20 (!) 82/56 90 %   04/04/22 0000 99.6 °F (37.6 °C) 99 21 (!) 77/56 92 %   04/03/22 2345  96 21 (!) 87/54 91 %   04/03/22 2330  88 22 (!) 91/59 92 %   04/03/22 2315  89 23 (!) 98/58 92 %   04/03/22 2300  91 17 90/60 92 %   04/03/22 2245  89 24 (!) 93/59 93 %   04/03/22 2230  90 26 (!) 101/59 91 %   04/03/22 2215  89 24 95/79 91 %   04/03/22 2200  86 23 112/62 92 %   04/03/22 2145  86 18 95/64 92 %   04/03/22 2130  88 21 94/61 95 %   04/03/22 2115  81 23 (!) 91/57 95 %   04/03/22 2100  83 22 99/60 95 %   04/03/22 2045  79 23 94/62 96 %   04/03/22 2030  76 21 96/60 95 %   04/03/22 2026  76 11 (!) 100/56 96 %   04/03/22 2023 98.3 °F (36.8 °C) 79 17 (!) 98/57 96 %   04/03/22 1948  71  (!) 97/57 97 %   04/03/22 1918  72  (!) 97/59 98 %   04/03/22 1848  83  (!) 100/56 97 %   04/03/22 1818  68  (!) 83/59 97 %   04/03/22 1755  71  (!) 88/51    04/03/22 1705  71  (!) 85/56 98 %   04/03/22 1550  70  (!) 88/53 94 %   04/03/22 1545  71  (!) 84/52 95 %   04/03/22 1518  88  (!) 68/48 99 %   04/03/22 1516  76  (!) 64/44 97 %   04/03/22 1514  78  (!) 76/52 98 %   04/03/22 1418 97.4 °F (36.3 °C) 83 18 124/81 96 %     Oxygen Therapy  O2 Sat (%): 95 % (04/04/22 1202)  Pulse via Oximetry: 97 beats per minute (04/04/22 1202)  O2 Device: None (Room air) (04/04/22 0800)  Skin Assessment: Clean, dry, & intact (04/04/22 0330)  Skin Protection for O2 Device: N/A (04/04/22 0800)  O2 Flow Rate (L/min): 2 l/min (04/04/22 0330)    Estimated body mass index is 27.11 kg/m² as calculated from the following:    Height as of this encounter: 5' 11\" (1.803 m). Weight as of this encounter: 88.2 kg (194 lb 6.4 oz). Intake/Output Summary (Last 24 hours) at 4/4/2022 1213  Last data filed at 4/4/2022 0600  Gross per 24 hour   Intake 4512.16 ml   Output 200 ml   Net 4312.16 ml         Physical Exam:     Blood pressure 99/61, pulse 98, temperature 100.2 °F (37.9 °C), resp. rate 16, height 5' 11\" (1.803 m), weight 88.2 kg (194 lb 6.4 oz), SpO2 95 %. General:    Elderly male, acutely ill and chronically ill-appearing,  Head:  Normocephalic, atraumatic  Eyes:  Sclerae appear normal.  Pupils equally round. ENT:  Nares appear normal, no drainage. Moist oral mucosa  Neck:  No restricted ROM. Trachea midline   CV:   RRR. No m/r/g. No jugular venous distension.   Lungs: CTAB.  No wheezing, rhonchi, or rales. Respirations even, unlabored  Abdomen: Bowel sounds present. Soft, nontender, nondistended. Extremities: No cyanosis or clubbing. 1- 2+ leg edema bilaterally  Skin:     No rashes and normal coloration. Warm and dry. Neuro:  CN II-XII grossly intact. Sensation intact. A&Ox3  Psych:  Normal mood and affect. I have reviewed ordered lab tests and independently visualized imaging below:    Recent Labs:  Recent Results (from the past 48 hour(s))   EKG, 12 LEAD, INITIAL    Collection Time: 04/03/22  2:46 PM   Result Value Ref Range    Ventricular Rate 75 BPM    Atrial Rate 75 BPM    P-R Interval 160 ms    QRS Duration 102 ms    Q-T Interval 362 ms    QTC Calculation (Bezet) 404 ms    Calculated P Axis 70 degrees    Calculated R Axis 9 degrees    Calculated T Axis 66 degrees    Diagnosis         Normal sinus rhythm  Low voltage QRS  Abnormal ECG  When compared with ECG of 14-AUG-2017 11:06,  No significant change was found  Confirmed by Tharon Ax ([id number]) on 4/3/2022 8:08:54 PM     CBC WITH AUTOMATED DIFF    Collection Time: 04/03/22  2:59 PM   Result Value Ref Range    WBC 23.7 (H) 4.3 - 11.1 K/uL    RBC 3.22 (L) 4.23 - 5.6 M/uL    HGB 8.8 (L) 13.6 - 17.2 g/dL    HCT 27.1 (L) 41.1 - 50.3 %    MCV 84.2 79.6 - 97.8 FL    MCH 27.3 26.1 - 32.9 PG    MCHC 32.5 31.4 - 35.0 g/dL    RDW 15.9 (H) 11.9 - 14.6 %    PLATELET 97 (L) 916 - 450 K/uL    MPV 9.5 9.4 - 12.3 FL    ABSOLUTE NRBC 0.00 0.0 - 0.2 K/uL    NEUTROPHILS 90 (H) 43 - 78 %    LYMPHOCYTES 2 (L) 13 - 44 %    MONOCYTES 5 4.0 - 12.0 %    EOSINOPHILS 0 (L) 0.5 - 7.8 %    BASOPHILS 0 0.0 - 2.0 %    IMMATURE GRANULOCYTES 3 0.0 - 5.0 %    ABS. NEUTROPHILS 21.3 (H) 1.7 - 8.2 K/UL    ABS. LYMPHOCYTES 0.5 0.5 - 4.6 K/UL    ABS. MONOCYTES 1.2 0.1 - 1.3 K/UL    ABS. EOSINOPHILS 0.0 0.0 - 0.8 K/UL    ABS. BASOPHILS 0.0 0.0 - 0.2 K/UL    ABS. IMM.  GRANS. 0.7 (H) 0.0 - 0.5 K/UL    RBC COMMENTS OCCASIONAL  JOSELUIS CELLS RBC COMMENTS OCCASIONAL  OVALOCYTES        RBC COMMENTS OCCASIONAL  HYPOCHROMIA        WBC COMMENTS Result Confirmed By Smear      PLATELET COMMENTS DECREASED      DF AUTOMATED     METABOLIC PANEL, COMPREHENSIVE    Collection Time: 04/03/22  2:59 PM   Result Value Ref Range    Sodium 137 136 - 145 mmol/L    Potassium 3.6 3.5 - 5.1 mmol/L    Chloride 103 98 - 107 mmol/L    CO2 23 21 - 32 mmol/L    Anion gap 11 7 - 16 mmol/L    Glucose 71 65 - 100 mg/dL    BUN 27 (H) 8 - 23 MG/DL    Creatinine 1.37 0.8 - 1.5 MG/DL    GFR est AA >60 >60 ml/min/1.73m2    GFR est non-AA 54 (L) >60 ml/min/1.73m2    Calcium 8.9 8.3 - 10.4 MG/DL    Bilirubin, total 1.4 (H) 0.2 - 1.1 MG/DL    ALT (SGPT) 33 12 - 65 U/L    AST (SGOT) 60 (H) 15 - 37 U/L    Alk.  phosphatase 99 50 - 136 U/L    Protein, total 4.7 (L) 6.3 - 8.2 g/dL    Albumin 2.2 (L) 3.2 - 4.6 g/dL    Globulin 2.5 2.3 - 3.5 g/dL    A-G Ratio 0.9 (L) 1.2 - 3.5     NT-PRO BNP    Collection Time: 04/03/22  2:59 PM   Result Value Ref Range    NT pro-BNP 2,968 (H) <450 PG/ML   LACTIC ACID    Collection Time: 04/03/22  2:59 PM   Result Value Ref Range    Lactic acid 3.4 (H) 0.4 - 2.0 MMOL/L   TROPONIN-HIGH SENSITIVITY    Collection Time: 04/03/22  2:59 PM   Result Value Ref Range    Troponin-High Sensitivity 50.0 (H) 0 - 14 pg/mL   LIPASE    Collection Time: 04/03/22  2:59 PM   Result Value Ref Range    Lipase 56 (L) 73 - 393 U/L   LACTIC ACID    Collection Time: 04/03/22  4:06 PM   Result Value Ref Range    Lactic acid 3.0 (H) 0.4 - 2.0 MMOL/L   CULTURE, BLOOD    Collection Time: 04/03/22  4:08 PM    Specimen: Blood   Result Value Ref Range    Special Requests: LEFT  Antecubital        GRAM STAIN GRAM POSITIVE COCCI      GRAM STAIN AEROBIC AND ANAEROBIC BOTTLES      GRAM STAIN        RESULTS VERIFIED, PHONED TO AND READ BACK BY Shona Shipman (R) RN BY NB AT 0250 ON [id number]    GRAM STAIN        RESULTS VERIFIED, PHONED TO AND READ BACK BY Jose Eduardo Rivas AT 0096 PN 4/4/22, [address] [facility]    Culture result: CULTURE IN PROGRESS,FURTHER UPDATES TO FOLLOW      Culture result:        Refer to Blood Culture ID Panel Accession  [id number]     CULTURE, BLOOD    Collection Time: 04/03/22  4:08 PM    Specimen: Blood   Result Value Ref Range    Special Requests: RIGHT  Antecubital        GRAM STAIN GRAM POSITIVE COCCI      GRAM STAIN AEROBIC AND ANAEROBIC BOTTLES      GRAM STAIN        RESULTS VERIFIED, PHONED TO AND READ BACK BY Shona Shipman (R) RN BY NB  [address] [place] [id number]    Culture result: CULTURE IN [address] UPDATES TO FOLLOW     BLOOD CULTURE ID PANEL    Collection Time: 04/03/22  4:08 PM    Specimen: Blood   Result Value Ref Range    Acc. no. from Micro Order [id number]     Staphylococcus Detected (A) NOTDET      Staphylococcus aureus Detected (A) NOTDET      mecA (Methicillin-Resistance Genes) Detected (A) NOTDET      INTERPRETATION        Gram positive cocci in clusters, identified by realtime PCR as SUSPECTED MRSA.    URINE MICROSCOPIC    Collection Time: 04/03/22  5:44 PM   Result Value Ref Range    WBC >100 0 /hpf    RBC 3-5 0 /hpf    Epithelial cells 0-3 0 /hpf    Bacteria TRACE 0 /hpf    Casts 3-5 0 /lpf    Crystals, urine 0 0 /LPF    Mucus 0 0 /lpf   TROPONIN-HIGH SENSITIVITY    Collection Time: 04/03/22  5:45 PM   Result Value Ref Range    Troponin-High Sensitivity 46.4 (H) 0 - 14 pg/mL   LACTIC ACID    Collection Time: 04/03/22  9:55 PM   Result Value Ref Range    Lactic acid 2.0 0.4 - 2.0 MMOL/L   TROPONIN-HIGH SENSITIVITY    Collection Time: 04/03/22  9:55 PM   Result Value Ref Range    Troponin-High Sensitivity 66.6 (H) 0 - 14 pg/mL   METABOLIC PANEL, BASIC    Collection Time: 04/04/22  3:28 AM   Result Value Ref Range    Sodium 139 136 - 145 mmol/L    Potassium 3.4 (L) 3.5 - 5.1 mmol/L    Chloride 106 98 - 107 mmol/L    CO2 25 21 - 32 mmol/L    Anion gap 8 7 - 16 mmol/L    Glucose 57 (L) 65 - 100 mg/dL    BUN 27 (H) 8 - 23 MG/DL    Creatinine 1.11 0.8 - 1.5 MG/DL    GFR est AA >60 >60 ml/min/1.73m2 GFR est non-AA >60 >60 ml/min/1.73m2    Calcium 8.0 (L) 8.3 - 10.4 MG/DL   MAGNESIUM    Collection Time: 04/04/22  3:28 AM   Result Value Ref Range    Magnesium 1.3 (L) 1.8 - 2.4 mg/dL   CBC WITH AUTOMATED DIFF    Collection Time: 04/04/22  3:28 AM   Result Value Ref Range    WBC 16.6 (H) 4.3 - 11.1 K/uL    RBC 2.95 (L) 4.23 - 5.6 M/uL    HGB 8.2 (L) 13.6 - 17.2 g/dL    HCT 25.0 (L) 41.1 - 50.3 %    MCV 84.7 79.6 - 97.8 FL    MCH 27.8 26.1 - 32.9 PG    MCHC 32.8 31.4 - 35.0 g/dL    RDW 16.1 (H) 11.9 - 14.6 %    PLATELET 161 (L) 787 - 450 K/uL    MPV 9.3 (L) 9.4 - 12.3 FL    ABSOLUTE NRBC 0.00 0.0 - 0.2 K/uL    NEUTROPHILS 92 (H) 43 - 78 %    LYMPHOCYTES 2 (L) 13 - 44 %    MONOCYTES 4 4.0 - 12.0 %    EOSINOPHILS 0 (L) 0.5 - 7.8 %    BASOPHILS 0 0.0 - 2.0 %    IMMATURE GRANULOCYTES 2 0.0 - 5.0 %    ABS. NEUTROPHILS 15.3 (H) 1.7 - 8.2 K/UL    ABS. LYMPHOCYTES 0.3 (L) 0.5 - 4.6 K/UL    ABS. MONOCYTES 0.7 0.1 - 1.3 K/UL    ABS. EOSINOPHILS 0.0 0.0 - 0.8 K/UL    ABS. BASOPHILS 0.0 0.0 - 0.2 K/UL    ABS. IMM.  GRANS. 0.3 0.0 - 0.5 K/UL    RBC COMMENTS SLIGHT  ANISOCYTOSIS + POIKILOCYTOSIS        RBC COMMENTS SLIGHT  OVALOCYTES        RBC COMMENTS SLIGHT  SCHISTOCYTES        WBC COMMENTS Result Confirmed By Smear      PLATELET COMMENTS DECREASED      DF AUTOMATED     TROPONIN-HIGH SENSITIVITY    Collection Time: 04/04/22  3:30 AM   Result Value Ref Range    Troponin-High Sensitivity 592.7 (HH) 0 - 14 pg/mL   GLUCOSE, POC    Collection Time: 04/04/22  4:13 AM   Result Value Ref Range    Glucose (POC) 52 (L) 65 - 100 mg/dL    Performed by July Delgadillo    GLUCOSE, POC    Collection Time: 04/04/22  4:51 AM   Result Value Ref Range    Glucose (POC) 90 65 - 100 mg/dL    Performed by Elen    MAGNESIUM    Collection Time: 04/04/22  7:14 AM   Result Value Ref Range    Magnesium 1.8 1.8 - 2.4 mg/dL   ECHO ADULT COMPLETE    Collection Time: 04/04/22  9:22 AM   Result Value Ref Range    LV EDV A2C 143 mL    LV EDV A4C 166 mL    LV ESV A2C 48 mL    LV ESV A4C 54 mL    IVSd 1.0 0.6 - 1.0 cm    LVIDd 5.4 4.2 - 5.9 cm    LVIDs 3.3 cm    LVOT Diameter 2.1 cm    LVOT Mean Gradient 5 mmHg    LVOT VTI 30.3 cm    LVOT Peak Velocity 1.5 m/s    LVOT Peak Gradient 9 mmHg    LVPWd 1.1 (A) 0.6 - 1.0 cm    LV E' Lateral Velocity 11 cm/s    LV E' Septal Velocity 11 cm/s    LV Ejection Fraction A2C 67 %    LV Ejection Fraction A4C 67 %    EF BP 67 55 - 100 %    LVOT Area 3.5 cm2    LVOT .9 ml    LA Minor Axis 6.3 cm    LA Major Axis 6.6 cm    LA Area 2C 22.1 cm2    LA Area 4C 26.2 cm2    LA Volume BP 73 (A) 18 - 58 mL    LA Diameter 3.8 cm    AV Mean Gradient 10 mmHg    AV VTI 40.5 cm    AV Mean Velocity 1.5 m/s    AV Peak Velocity 2.2 m/s    AV Peak Gradient 18 mmHg    AV Area by VTI 2.6 cm2    AV Area by Peak Velocity 2.4 cm2    Aortic Root 4.1 cm    Ascending Aorta 3.7 cm    IVC Expiration 2.2 cm    MV E Wave Deceleration Time 183.0 ms    MV A Velocity 0.92 m/s    MV E Velocity 1.29 m/s    RV Basal Dimension 4.9 cm    TAPSE 2.5 (A) 1.5 - 2.0 cm    TR Max Velocity 2.21 m/s    TR Peak Gradient 20 mmHg    Fractional Shortening 2D 39 28 - 44 %    LV ESV Index A4C 26 mL/m2    LV EDV Index A4C 80 mL/m2    LV ESV Index A2C 23 mL/m2    LV EDV Index A2C 69 mL/m2    LVIDd Index 2.60 cm/m2    LVIDs Index 1.59 cm/m2    LV RWT Ratio 0.41     LV Mass 2D 220.6 88 - 224 g    LV Mass 2D Index 106.1 49 - 115 g/m2    MV E/A 1.40     E/E' Ratio (Averaged) 11.73     E/E' Lateral 11.73     E/E' Septal 11.73     LA Volume Index BP 35 (A) 16 - 34 ml/m2    LVOT Stroke Volume Index 50.4 mL/m2    LA Size Index 1.83 cm/m2    LA/AO Root Ratio 0.93     Ao Root Index 1.97 cm/m2    Ascending Aorta Index 1.78 cm/m2    AV Velocity Ratio 0.68     LVOT:AV VTI Index 0.75     OSMEL/BSA VTI 1.3 cm2/m2    OSMEL/BSA Peak Velocity 1.2 cm2/m2    Est. RA Pressure 8 mmHg    RVSP 28 mmHg   GLUCOSE, POC    Collection Time: 04/04/22 11:52 AM   Result Value Ref Range    Glucose (POC) 58 (L) 65 - 100 mg/dL    Performed by [facility]        All Micro Results     Procedure Component Value Units Date/Time    CULTURE, BLOOD [[id number]] Collected: 04/03/22 1608    Order Status: Completed Specimen: Blood Updated: 04/04/22 1133     Special Requests: --        LEFT  Antecubital       GRAM STAIN GRAM POSITIVE COCCI               AEROBIC AND ANAEROBIC BOTTLES                  RESULTS VERIFIED, PHONED TO AND READ BACK BY Shona Shipman (R) RN BY NB  [address] [place] [id number]                  RESULTS VERIFIED, PHONED TO AND READ BACK BY Anita Singh AT 1128 PN 4/4/22, [address]           Culture result:       CULTURE IN [address] UPDATES TO FOLLOW                  Refer to Blood Culture ID Panel Accession  [id number]      BLOOD CULTURE ID PANEL [[id number]]  (Abnormal) Collected: 04/03/22 1608    Order Status: Completed Specimen: Blood Updated: 04/04/22 1132     Acc. no. from Micro Order [id number]     Staphylococcus Detected        Staphylococcus aureus Detected        Comment: RESULTS VERIFIED, PHONED TO AND READ BACK BY  LEANDER ESCOBEDO AT 1653 ON 4/4/22, KYLER          mecA (Methicillin-Resistance Genes) Detected        Comment: Presence of mecA is highly indicative of methicillin resistance. The test does not replace traditional culture and susceptibilities        INTERPRETATION       Gram positive cocci in clusters, identified by realtime PCR as SUSPECTED MRSA. Comment: Recommend IV vancomycin use, unlikely to be a contaminant. Infectious Diseases Consult recommended in adult patients. THIS TEST DOES NOT REPLACE SENSITIVITY TESTING.        CULTURE, BLOOD [[id number]] Collected: 04/03/22 1608    Order Status: Completed Specimen: Blood Updated: 04/04/22 0429     Special Requests: --        RIGHT  Antecubital       GRAM STAIN GRAM POSITIVE COCCI               AEROBIC AND ANAEROBIC BOTTLES                  RESULTS VERIFIED, PHONED TO AND READ BACK BY Shona Shipman (R) RN BY NB  [address] ON [id number] Culture result:       CULTURE IN PROGRESS,FURTHER UPDATES TO FOLLOW                Other Studies:  XR SPINE LUMB 2 OR 3 V    Result Date: 4/3/2022  History: Low back pain EXAM: Lumbar spine series COMPARISON: 12/17/2021 FINDINGS: There has been interval progression of the L3 compression fracture seen previously, now with vertebral plana. There is a new compression fracture at T12. There is multilevel facet arthropathy. There is anterior osteophyte formation multiple levels. The pedicles are intact. The SI joints are patent. There is osteopenia. There has been interval placement of an IVC filter. 1. Interval development of L1 compression fracture. 2. Interval progression of L3 compression deformity, now with vertebral plana. 3. Osteopenia. 4. Multilevel lumbar spondylosis. 5. Interval placement of IVC filter. XR CHEST PORT    Result Date: 4/3/2022  History: Cough Exam: portable chest Comparison: None Findings: There is blunting of the left costophrenic angle which could be due to a small left pleural effusion. The lungs are otherwise clear. No change in the appearance of the mediastinal contour or osseous structures. IMPRESSIONs: Blunting of the left costophrenic angle. No additional abnormality. ECHO ADULT COMPLETE    Result Date: 4/4/2022    Left Ventricle: Left ventricle size is normal. Normal wall thickness. Normal wall motion. Hyperdynamic left ventricular systolic function with a visually estimated EF of greater than 65%. Normal diastolic function.   Left Atrium: Left atrium is mildly dilated.   Aortic Valve: Moderate sclerosis of the aortic valve cusp.   Mitral Valve: Mild annular calcification of the mitral valve.        Current Meds:  Current Facility-Administered Medications   Medication Dose Route Frequency    NOREPINephrine (LEVOPHED) 4 mg/250 mL (16 mcg/mL) in NS infusion  0.5-16 mcg/min IntraVENous TITRATE    glucose chewable tablet 16 g  16 g Oral PRN    glucagon (GLUCAGEN) injection 1 mg  1 mg IntraMUSCular PRN    dextrose 10% infusion 125-250 mL  125-250 mL IntraVENous PRN    potassium bicarb-citric acid (EFFER-K) tablet 40 mEq  40 mEq Oral BID    [START ON 4/5/2022] vancomycin (VANCOCIN) 1500 mg in  ml infusion  1,500 mg IntraVENous Q24H    midodrine (PROAMATINE) tablet 10 mg  10 mg Oral TID WITH MEALS    dextrose 5% and 0.9% NaCl infusion  100 mL/hr IntraVENous CONTINUOUS    cefTRIAXone (ROCEPHIN) 2 g in 0.9% sodium chloride (MBP/ADV) 50 mL MBP  2 g IntraVENous Q24H    [Held by provider] bumetanide (BUMEX) tablet 1 mg  1 mg Oral BID    fenofibrate (LOFIBRA) tablet 160 mg  160 mg Oral DAILY    folic acid (FOLVITE) tablet 1 mg  1 mg Oral DAILY    montelukast (SINGULAIR) tablet 10 mg  10 mg Oral DAILY    pantoprazole (PROTONIX) tablet 40 mg  40 mg Oral 6am    rOPINIRole (REQUIP) tablet 1 mg  1 mg Oral QAM    rOPINIRole (REQUIP) tablet 2 mg  2 mg Oral QHS    atorvastatin (LIPITOR) tablet 40 mg  40 mg Oral QHS    traZODone (DESYREL) tablet 50 mg  50 mg Oral QHS PRN    sodium chloride (NS) flush 5-40 mL  5-40 mL IntraVENous Q8H    sodium chloride (NS) flush 5-40 mL  5-40 mL IntraVENous PRN    acetaminophen (TYLENOL) tablet 650 mg  650 mg Oral Q6H PRN    Or    acetaminophen (TYLENOL) suppository 650 mg  650 mg Rectal Q6H PRN    polyethylene glycol (MIRALAX) packet 17 g  17 g Oral DAILY    promethazine (PHENERGAN) tablet 12.5 mg  12.5 mg Oral Q6H PRN    Or    ondansetron (ZOFRAN) injection 4 mg  4 mg IntraVENous Q6H PRN    enoxaparin (LOVENOX) injection 40 mg  40 mg SubCUTAneous DAILY    oxyCODONE IR (ROXICODONE) tablet 5 mg  5 mg Oral Q4H PRN    morphine injection 4 mg  4 mg IntraVENous Q4H PRN       Signed:  González Long MD    Part of this note may have been written by using a voice dictation software. The note has been proof read but may still contain some grammatical/other typographical errors.

## 2022-04-04 NOTE — PROGRESS NOTES
Physician Progress Note      PATIENTSylvan Meth  [facility] #:                  [id number]  :                       1945  ADMIT DATE:       4/3/2022 2:25 PM  DISCH DATE:  RESPONDING  PROVIDER #:        Sawyer Traylor MD          QUERY TEXT:    Pt admitted with hypotension. Pt noted to have UTI, Lactic Acidosis. If possible, please document in the progress notes and discharge summary if you are evaluating and /or treating any of the following: The medical record reflects the following:  Risk Factors: UTI, Hypotension, Lactic Acidosis  Clinical Indicators: WBC 23.7, LA 3.0, Troponin 592.7; Bld Cults- Gram + Cocci. HR 95, B/P 88/53  Treatment: IV N/S Bolus infusion, IV N/S infusion, Midodrine, Rocephin IV, Vancomycin IV, Levophed IV, ID Consult  Options provided:  -- Sepsis, present on admission  -- UTI without Sepsis  -- Sepsis was ruled out  -- Other - I will add my own diagnosis  -- Disagree - Not applicable / Not valid  -- Disagree - Clinically unable to determine / Unknown  -- Refer to Clinical Documentation Reviewer    PROVIDER RESPONSE TEXT:    This patient has sepsis which was present on admission.     Query created by: teresita fernandez on 2022 11:33 AM      Electronically signed by:  Sawyer Traylor MD 2022 2:19 PM

## 2022-04-04 NOTE — PROGRESS NOTES
VANCO DAILY FOLLOW UP NOTE  [facility] Pharmacokinetic Monitoring Service - Vancomycin    Consulting Provider: Dr. Juan Davidson   Indication: bloodstream infection  Target Concentration: Goal AUC/BUZZ 400-600 mg*hr/L  Day of Therapy: 1  Additional Antimicrobials: ceftriaxone    Pertinent Laboratory Values: Wt Readings from Last 1 Encounters:   04/03/22 88.2 kg (194 lb 6.4 oz)     Temp Readings from Last 1 Encounters:   04/04/22 100.2 °F (37.9 °C)     No components found for: PROCAL  Recent Labs     04/04/22  0328 04/03/22  2155 04/03/22  1606 04/03/22  1459   BUN 27*  --   --  27*   CREA 1.11  --   --  1.37   WBC 16.6*  --   --  23.7*   LAC  --  2.0 3.0* 3.4*     Estimated Creatinine Clearance: 59.4 mL/min (based on SCr of 1.11 mg/dL). No results found for: Mason Faye    MRSA Nasal Swab: N/A. Non-respiratory infection. .      Assessment:  Date/Time Dose Concentration AUC   4/4/22 1226 1500 mg q24h 15.0 512   Note: Serum concentrations collected for AUC dosing may appear elevated if collected in close proximity to the dose administered, this is not necessarily an indication of toxicity    Plan:  Current dosing regimen is therapeutic  Continue current dose  Repeat vancomycin concentration ordered for 4/5/22 @ 0400    Pharmacy will continue to monitor patient and adjust therapy as indicated    Thank you for the consult,  BETO Wallace

## 2022-04-04 NOTE — PROGRESS NOTES
[facility] Pharmacokinetic Monitoring Service - Vancomycin     Radu Keenan is a 67 y.o. male starting on vancomycin therapy for bloodstream infection. Pharmacy consulted by Dr Nichole Valencia for monitoring and adjustment. Target Concentration: Goal AUC/BUZZ 400-600 mg*hr/L    Additional Antimicrobials: ceftriaxone    Pertinent Laboratory Values: Wt Readings from Last 1 Encounters:   04/03/22 88.2 kg (194 lb 6.4 oz)     Temp Readings from Last 1 Encounters:   04/04/22 100.4 °F (38 °C)     No components found for: PROCAL  Recent Labs     04/04/22  0328 04/03/22  2155 04/03/22  1606 04/03/22  1459   BUN 27*  --   --  27*   CREA 1.11  --   --  1.37   WBC 16.6*  --   --  23.7*   LAC  --  2.0 3.0* 3.4*     Estimated Creatinine Clearance: 59.4 mL/min (based on SCr of 1.11 mg/dL). No results found for: Hermes Aguilar    MRSA Nasal Swab: N/A. Non-respiratory infection. .    Plan:  1. Dosing recommendations based on Bayesian software  2. Start vancomycin 1500mg q24h.  a. Anticipated AUC of 502 and trough concentration of 14.7 at steady state  3. Renal labs as indicated   4. Vancomycin concentrations will be ordered as clinically appropriate   5.  Pharmacy will continue to monitor patient and adjust therapy as indicated    Thank you for the consult,  BETO Parsons

## 2022-04-04 NOTE — PROGRESS NOTES
Care Management Interventions  PCP Verified by CM: Yes Howie Milner MD)  Mode of Transport at Discharge: Other (see comment)  Transition of Care Consult (CM Consult): [address]: No  Reason Outside [place]: Patient already serviced by other home care/hospice agency (Already with [facility])  Physical Therapy Consult: Yes  Occupational Therapy Consult: Yes  Support Systems: Spouse/Significant Other  Confirm Follow Up Transport: Family  The Patient and/or Patient Representative was Provided with a Choice of Provider and Agrees with the Discharge Plan?: Yes  Name of the Patient Representative Who was Provided with a Choice of Provider and Agrees with the Discharge Plan: Patient and spouse  Freedom of Choice List was Provided with Basic Dialogue that Supports the Patient's Individualized Plan of Care/Goals, Treatment Preferences and Shares the Quality Data Associated with the Providers?: Yes  Discharge Location  Patient Expects to be Discharged to[de-identified] Home with home health    Discussed patient in interdisciplinarily rounds with the following disciplines: Physician, Case Management, Nursing, Dietary and Therapy. The plan of care and goals for discharge date/location were reviewed. CM continuing to follow for discharge needs. In to see patient. Spouse in room. Patient awake but drowsy and oriented. Lives with spouse, Deysi Potts, [phone] in a one-story home. They have ramps to enter. Their son lives in a tiny home in their back yard. Patient has been in [facility] in the past. Has a BSC, walker, shower chair, wheelchair, and rolling walker at home. Plan is to take patient back home after discharge with [facility]  (they already have services with [facility] at the present time. CM following for all discharge needs.

## 2022-04-04 NOTE — PROGRESS NOTES
PTA med list completed at this time after being verified with spouse while at bedside during admission.

## 2022-04-04 NOTE — CONSULTS
Comprehensive Nutrition Assessment    Type and Reason for Visit: Initial,Consult,Positive nutrition screen,Wound  Best Practice Alert for Malnutrition Screening Tool: Recently Lost Weight Without Trying: Yes, If Yes, How Much Weight Loss: >33 lbs, Eating Poorly Due to Decreased Appetite: Yes  Poor intake/appetite 5 or more days (Hospitalist)  Best Practice Alert for Pressure Injury stage 2 or greater    Nutrition Recommendations/Plan:   Meals and Snacks:  Diet: Downgrade SB6. Add ice cream tid. Nutrition Supplement Therapy:   Medical food supplement therapy:  Continue Ensure Enlive three times per day (this provides 350 kcal and 20 grams protein per bottle)    Add daily MVI with minerals   Consider appetite stimulant. Malnutrition Assessment:  Malnutrition Status: Severe malnutrition  Context: Chronic illness  Findings of clinical characteristics of malnutrition:   Energy Intake:  7 - 75% or less est energy requirements for 1 month or longer (<50% of estimatedd needs for ~ 3 months)  Weight Loss:  7.0 - Greater than 7.5% over 3 months (239# (January 2022) to 194#=18.6%)     Body Fat Loss:  1 - Mild body fat loss, Triceps   Muscle Mass Loss:  1 - Mild muscle mass loss, Temples (temporalis)    Nutrition Assessment:   Nutrition History: 4/4: Hx primarily from wife. Pt was eating very well at the end of last year. He weighed 239# when he was admitted to [facility] at he first of January 2022. His dentures were lost there and thus he did not eat well. He then went to IR/[facility] and continued with poor po intake because he needed but did not like the minced and moist or pureed consistency unless the food item was puree texture at baseline. He was admitted to [facility] in February and continued with decreased po intake-mainly soups and ONS. Since he has been home as of 2/25, he did get a new upper plate but it makes his mouth sore to wear. He puts the plate in periodically but says he can't eat with it.  His weight at home was ~190# on 2/25. He weighs almost daily and his weight has been somewhat variable but close to 190# with last weight at home PTA of about 188#. Meal intake at home has been, B-eggo waffle, L/D-1/3 to half of a container of [employer]era soup (broccoli and cheddar or black bean). He also sometimes drinks V-8 juice and orange juice. He drinks Bolthouse protein drink (280 kcal, 22 gram protein per bottle) about once a week. Nutrition Background: H/O: cirrhosis, hypotension on midodrine, DVT, prostate cancer status post radiation and resection, recurrent hematuria, and radiation cystitis . Pt sent from urgent care d/t hypotension. Findings of bacteremia. Nutrition Interval:  Pt seen in company of wife. Wife reports she arrived at ~1100 and brought a McDonalds's milkshake and Bolthouse protein drink as well as some other snacks like M and M's. She has been encouraging po intake since arrival and he has consumed ~90% of the shake but only 10% of his lunch tray. Food on his tray was found to be soft enough except for the meat. Wife says he has not been eating meat but pt and wife agreeable to downgrade to [facility]. She says she knows he will not eat MM5 or PU4. He had magic cup during his February admission but did not like it very much even though he loves ice cream. He says he would prefer regular ice cream. Wife reports he consumed a bowl of broth last night after admission. Pt reports he does not recall eating any breakfast this morning. He struggles with staying awake and had to be awakened by wife allie mcgregor RD visit on several occassions. BMP glucose 57, POC glucose 120 at 1418 during RD visit. Assess [place] for wound healing not indicated until po intake improves to >75% of estimated needs.   Nutrition Related Findings:     Wound Type: Stage II,Multiple    Current Nutrition Therapies:  ADULT DIET Easy to Chew  ADULT ORAL NUTRITION SUPPLEMENT Breakfast, Lunch, Dinner; Standard High Calorie/High Protein    Current Intake:   Average Meal Intake: 1-25% Average Supplement Intake: 1-25%      Anthropometric Measures:  Height: 5' 11\" (180.3 cm)  Current Body Wt: 88.2 kg (194 lb 7.1 oz) (4/3), Weight source: Bed scale  BMI: 27.1, Overweight (BMI 25.0-29. 9)  Admission Body Weight: 180 lb (stated)  Ideal Body Weight (lbs) (Calculated): 172 lbs (78 kg),    Usual Body Wt: 108.4 kg (239 lb) (Stated Jan 2022), Percent weight change: -18.6        Edema: LLE: 4+ (4/4/2022  8:00 AM)  RLE: 4+ (4/4/2022  8:00 AM)    Estimated Daily Nutrient Needs:  Energy (kcal/day): 0943-0064 (Kcal/kg (20-25), Weight Used: Current (88.2 kg bed scale 4/3))  Protein (g/day):  ( (20% of kcal))  Fluid (ml/day):   (1 ml/kcal)    Nutrition Diagnosis:   · Severe malnutrition,In context of chronic illness related to inadequate protein-energy intake as evidenced by  (as above)    · Inadequate oral intake related to early satiety,biting/chewing (masticatory) difficulty as evidenced by  (pt reported barriers as above. current intake < 25% of estimated needs)    Nutrition Interventions:   Food and/or Nutrient Delivery: Modify current diet,Continue oral nutrition supplement  Nutrition Education/Counseling: Education initiated, Encouraged pt to consume high kcal, high protein foods and beverages first.  Coordination of Nutrition Care: Continue to monitor while inpatient      Goals:       Active Goal: Meet >50% of estimated needs by follow up    Nutrition Monitoring and Evaluation:      Food/Nutrient Intake Outcomes: Food and nutrient intake,Supplement intake  Physical Signs/Symptoms Outcomes: Weight,Chewing or swallowing    Discharge Planning:    Continue oral nutrition supplement    Ruby Haley, 65 [address], [facility] [address]

## 2022-04-04 NOTE — CONSULTS
CARDIOLOGY Consult                 Reason For Consult: Elevated troponin    Subjective:     Patient is a 67 y.o. male with a PMH of cirrhosis, hypotension on midodrine, DVT, prostate cancer status post radiation and resection, recurrent hematuria, and radiation cystitis for whom we were consulted for an elevated troponin. The patient reports presenting with hematuria. The patient went to urgent care on April 3 with a systolic blood pressure in the 70s. He was directed to go to the ED. The patient received a transfusion of blood products. He was noted to have a leukocytosis. Blood cultures were drawn and GPC's have been noted. Currently, the patient has a WBC of 16.6 and a hemoglobin of 8.2. His platelet count is 211,487. The patient's troponin is gone from 46 to 593. The patient denies angina. The patient had an echocardiogram today that was noted to demonstrate a normal EF with no WMA's noted.     Past Medical History:   Diagnosis Date    Adverse effect of anesthesia     reports \"slow to get over effects\" of anesthesia- drowsy    Allergic rhinitis     Androgen deficiency 10/2/2015    Anemia 2018    Back pain     Colon polyps     COVID-19 1/11/2021    Last Assessment & Plan:  Formatting of this note might be different from the original. Now COVID recovered    Diabetes ([address]     oral agents; rarely checks BS, reports 138 today; occasional hypo symptoms (shaky) unaware of hypo level    Elevated liver function tests     GERD (gastroesophageal reflux disease)     controlled with meds    H/O alcohol abuse     Hiatal hernia     Hypercholesteremia 10/2/2015    Last Assessment & Plan:  Formatting of this note might be different from the original. chronic stable condition, refilled Simvastatin 80 mg 90/2    Hypercholesterolemia     Hypertension     well controlled with meds    Hypotestosteronism     Migraine     Personal history of prostate cancer     Restless leg     Sleep apnea     wears CPAP Past Surgical History:   Procedure Laterality Date    HX COLONOSCOPY  ~2017    HX ORTHOPAEDIC Left 2017    post tibial tendon repair    HX OTHER SURGICAL Right 10/16/2018    ingrown toenail removal    HX PROSTATECTOMY      IR IVC FILTER  2/3/2022      Family History   Problem Relation Age of Onset    Heart Disease Father     Emphysema Mother     Heart Disease Brother     Psychiatric Disorder Brother       Social History     Tobacco Use    Smoking status: Former Smoker     Types: Cigars     Quit date: 2003     Years since quittin.9    Smokeless tobacco: Never Used   Substance Use Topics    Alcohol use: No      Allergies   Allergen Reactions    Amoxicillin Rash    Lisinopril Cough     cough    Sulfa (Sulfonamide Antibiotics) Rash         Constitutional:   Negative unexplained weight loss. Eyes:   Negative for unexplained blindness. ENT:   Negative for unexplained hearing loss. Respiratory:   Negative for unexplained hemoptysis. Cardiovascular:   Negative except as noted in HPI. Gastrointestinal:   Negative for unexplained vomiting. Genitourinary:   Negative for unexplained hematuria. Integumentary:   Negative for unexplained rash. Hematologic/Lymphatic:   Negative for unexplained excessive bleeding. Musculoskeletal:  Negative for unexplained joint pain. Neurological:   Negative for stroke. Behavioral/Psych:   Negative for suicidal ideations. Endocrine:   Negative for uncontrolled diabetic symptoms including polyuria, polydipsia. Objective:       Visit Vitals  BP (!) 107/57   Pulse 88   Temp 98.8 °F (37.1 °C)   Resp 17   Ht 5' 11\" (1.803 m)   Wt 194 lb 6.4 oz (88.2 kg)   SpO2 94%   BMI 27.11 kg/m²       No intake/output data recorded.    1901 -  0700  In: 4512.2 [I.V.:4512.2]  Out: 200 [Urine:200]    General/Constitutional:   Alert and oriented x 3, no acute distress  HEENT:   normocephalic, atraumatic, moist mucous membranes  Neck:   No JVD or carotid bruits bilaterally  Cardiovascular:   regular rate and rhythm, no rub/gallop appreciated  Pulmonary:   clear to auscultation bilaterally, no respiratory distress  Abdomen:   soft, non-tender, non-distended  Ext:   No sig LE edema bilaterally  Skin:  warm and dry, no obvious rashes seen  Neuro:   no obvious sensory or motor deficits  Psychiatric:   normal mood and affect    ECG:   Yesterday  Normal sinus rhythm  Poor R wave progression    Data Review:   Recent Labs     04/04/22  0714 04/04/22  0328 04/03/22  1459   NA  --  139 137   K  --  3.4* 3.6   MG 1.8 1.3*  --    BUN  --  27* 27*   CREA  --  1.11 1.37   GLU  --  57* 71   WBC  --  16.6* 23.7*   HGB  --  8.2* 8.8*   HCT  --  25.0* 27.1*   PLT  --  101* 97*       Assessment/Plan:     1. Elevated troponin  2. Sepsis due to UTI  3. Recurrent hematuria  4. Thrombocytopenia  5. Anemia  6. Cirrhosis    The patient is admitted with sepsis with GPC bacteremia after presenting with hypotension and hematuria. The patient's UA is noteworthy for pyuria, and he presented with a lactic acidosis. His troponin is elevated; however, the patient denies angina and his echocardiogram today is without regional wall motion abnormalities documented. There is documentation that the patient received blood products during his admission. His ECG yesterday is without ischemic changes. The patient is currently in sinus rhythm. His troponin elevation is in the setting of recent shock (hypotension) and sepsis. Follow-up ID recommendations.     Zoe Capone MD

## 2022-04-04 NOTE — PROGRESS NOTES
Bedside and Verbal shift change report given to Evelin Coles RN (oncoming nurse) by Marlene Mason RN (offgoing nurse). Report included the following information SBAR, Kardex, Intake/Output, MAR, Med Rec Status, Cardiac Rhythm NSR and Alarm Parameters .       Infusions: Levophed @ 6.5mcg/min    NaCl 0.9% @ 75mL/hr

## 2022-04-04 NOTE — CONSENT
I have discussed with the patient and spouse the rationale for blood component transfusion; its benefits in treating or preventing fatigue, organ damage, or death; and its risk which includes mild transfusion reactions, rare risk of blood borne infection, or more serious but rare reactions. I have discussed the alternatives to transfusion, including the risk and consequences of not receiving transfusion. The patient and spouse had an opportunity to ask questions and had agreed to proceed with transfusion of blood components.     Tri Tom MD

## 2022-04-04 NOTE — PROGRESS NOTES
Problem: Risk for Spread of Infection  Goal: Prevent transmission of infectious organism to others  Description: Prevent the transmission of infectious organisms to other patients, staff members, and visitors. Outcome: Progressing Towards Goal     Problem: Patient Education:  Go to Education Activity  Goal: Patient/Family Education  Outcome: Progressing Towards Goal     Problem: Falls - Risk of  Goal: *Absence of Falls  Description: Document Carrie Tesfaye Fall Risk and appropriate interventions in the flowsheet. Outcome: Progressing Towards Goal  Note: Fall Risk Interventions:  Mobility Interventions: Assess mobility with egress test,Bed/chair exit alarm,Patient to call before getting OOB,Strengthening exercises (ROM-active/passive)    Mentation Interventions: Bed/chair exit alarm,Adequate sleep, hydration, pain control,Increase mobility,More frequent rounding,Reorient patient,Room close to nurse's station,Toileting rounds    Medication Interventions: Assess postural VS orthostatic hypotension,Bed/chair exit alarm    Elimination Interventions: Bed/chair exit alarm,Call light in reach    History of Falls Interventions: Bed/chair exit alarm,Door open when patient unattended,Room close to nurse's station         Problem: Patient Education: Go to Patient Education Activity  Goal: Patient/Family Education  Outcome: Progressing Towards Goal     Problem: Pressure Injury - Risk of  Goal: *Prevention of pressure injury  Description: Document Matthew Scale and appropriate interventions in the flowsheet. Outcome: Progressing Towards Goal  Note: Pressure Injury Interventions:  Sensory Interventions: Assess changes in LOC,Assess need for specialty bed    Moisture Interventions: Absorbent underpads,Assess need for specialty bed,Maintain skin hydration (lotion/cream)    Activity Interventions: Assess need for specialty bed    Mobility Interventions: Assess need for specialty bed,Turn and reposition approx.  every two hours(pillow and wedges)    Nutrition Interventions: Document food/fluid/supplement intake    Friction and Shear Interventions: Apply protective barrier, creams and emollients,Feet elevated on foot rest                Problem: Patient Education: Go to Patient Education Activity  Goal: Patient/Family Education  Outcome: Progressing Towards Goal

## 2022-04-04 NOTE — PROGRESS NOTES
Critical Lab values: Gram + Cocci in both blood cultures along with a troponin of 592.7. MD Dr. Zonia Campos notified. Orders received to start vancomycin, first dose infusing. Repeat 12-lead EKG ordered and preformed per MD no changes. Cardiology consult ordered.

## 2022-04-04 NOTE — PROGRESS NOTES
Primary Nurse Darshan Burroughs RN and Niecy Contreras RN performed a dual skin assessment on this patient Impairment noted- see wound doc flow sheet  Matthew score is 15. Two dime sized areas of breakdown on sacrum and right buttocks. Dressed with allyven to protect against further breakdown. Groin and penis extremely red, irritated, swollen and painful. Guajardo placed to facilitate urination and for monitoring UOP in a critically ill patient.

## 2022-04-05 PROBLEM — E43 SEVERE PROTEIN-CALORIE MALNUTRITION (HCC): Status: ACTIVE | Noted: 2022-01-01

## 2022-04-05 NOTE — PROGRESS NOTES
[facility] CARDIOLOGY PROGRESS NOTE           4/5/2022 3:45 PM    Admit Date: 4/3/2022      Subjective:   Patient somnolent unable to give much of a subjective history. Denies chest pain, shortness of breath. Reports leg swelling. ROS:  Cardiovascular:  As noted above    Objective:      Vitals:    04/05/22 1215 04/05/22 1300 04/05/22 1400 04/05/22 1500   BP: 113/73 102/64 116/70 107/65   Pulse: (!) 105 (!) 103 97 97   Resp: (!) 36 20 20 20   Temp:       SpO2:    95%   Weight:       Height:           Physical Exam:  General-No Acute Distress, somnolent  Neck- supple, no JVD  CV- regular rate and rhythm no MRG  Lung- clear bilaterally without wheezes/nonlabored   Abd- soft, nontender, nondistended  Ext-  Leg and hand edema bilaterally. Skin- warm and dry    Data Review:   Recent Labs     04/05/22  1155 04/05/22  0324 04/04/22  0714 04/04/22  0328 04/04/22  0328   NA  --  138  --   --  139   K  --  3.7  --   --  3.4*   MG  --   --  1.8  --  1.3*   BUN  --  31*  --   --  27*   CREA  --  0.79*  --   --  1.11   GLU  --  102*  --   --  57*   WBC  --  6.5  --   --  16.6*   HGB 9.9* 7.3*  --    < > 8.2*   HCT 30.0* 22.9*  --    < > 25.0*   PLT  --  88*  --   --  101*    < > = values in this interval not displayed. Assessment/Plan:     Principal Problem:    Septic shock ([address] (4/4/2022)    Hypotension (4/3/2022)    Lactic acidosis (4/3/2022)    -Hemodynamics are improved at this time. MRSA bacteremia. Antibiotics started, patient on vancomycin per ID. Active Problems:    (HFpEF) heart failure with preserved ejection fraction ([address] (1/19/2022)  Diuretics held due to sepsis, closely monitor hemodynamics. Look to resume in the future as possible. Elevated serum troponin  Likely elevated in the setting of septic shock/anemia  No indication for coronary angiography at this time, does not represent ACS. Echo as above with normal LVEF, no focal wall motion abnormalities. Bacteremia    - patient with likely cirrhosis      - TTE without evidence of vegetations, patient reviewed poor candidate for cardiothoracic surgery. At this time will follow blood cultures. No plans for DAPHNEY at this time.       Hepatic cirrhosis ([facility] (10/8/2021)    Hypoalbuminemia due to protein-calorie malnutrition ([facility] (1/31/2022)  Likely contributes to anasarca      Severe protein-calorie malnutrition ([facility] (4/5/2022)    - may contribute to edema, albumin low       Lumbar fracture   -  Seen by ortho     Jaquan Claire DO  4/5/2022 3:45 PM

## 2022-04-05 NOTE — PROGRESS NOTES
VANCO DAILY FOLLOW UP NOTE  [facility] Pharmacokinetic Monitoring Service - Vancomycin    Consulting Provider: Dr. Mei Mac   Indication: bloodstream infection  Target Concentration: Goal AUC/BUZZ 400-600 mg*hr/L  Day of Therapy: 2  Additional Antimicrobials: ceftriaxone    Pertinent Laboratory Values: Wt Readings from Last 1 Encounters:   04/05/22 90.3 kg (199 lb)     Temp Readings from Last 1 Encounters:   04/05/22 99.9 °F (37.7 °C)     No components found for: PROCAL  Recent Labs     04/05/22  0324 04/04/22  0328 04/03/22  2155 04/03/22  1606 04/03/22  1459   BUN 31* 27*  --   --  27*   CREA 0.79* 1.11  --   --  1.37   WBC 6.5 16.6*  --   --  23.7*   PCT  --  1.58*  --   --   --    LAC  --   --  2.0 3.0* 3.4*     Estimated Creatinine Clearance: 83.4 mL/min (A) (based on SCr of 0.79 mg/dL (L)). Lab Results   Component Value Date/Time    Vancomycin, random 8.7 04/05/2022 03:24 AM       MRSA Nasal Swab: N/A. Non-respiratory infection. .      Assessment:  Date/Time Dose Concentration AUC   4/5/22 0324 2000 mg x 1  8.7 393   Note: Serum concentrations collected for AUC dosing may appear elevated if collected in close proximity to the dose administered, this is not necessarily an indication of toxicity    Plan:  Current dosing regimen is sub-therapeutic  Increase dose to 1000 mg q12h for predicted AUC/Tr of 508/16.7  Repeat vancomycin concentration ordered for 4/6 @ 0400    Pharmacy will continue to monitor patient and adjust therapy as indicated    Thank you for the consult,  BETO Dooley

## 2022-04-05 NOTE — CONSULTS
Name: Diana Younger  YOB: 1945  Gender: male  MRN: [id number]  Age: 67 y.o. Chief Complaint: Back pain    History of Present Illness: This is a very pleasant 67 y.o. male who presents with a history of L3 compression fracture in November which was treated in a TLSO. Patient had increased back pain in last week, but without new trauma. The patient describes the quality of the pain as a deep ache. He denies any change in bowel or bladder function since the onset of the symptoms. The symptoms are exacerbated by excessive bending and twisting, and also with prolonged sitting, standing or walking. Symptoms are somewhat better with supine rest. This patient has not had lumbar surgery in the past. Work up has revealed new T12 fracture and worsening of old L3 fracture. Patient in ICU for sepsis.          Medications:       Current Facility-Administered Medications:     midodrine (PROAMATINE) tablet 5 mg, 5 mg, Oral, TID WITH MEALS, Aleyda Santos MD, 5 mg at 04/05/22 1213    mirtazapine (REMERON) tablet 7.5 mg, 7.5 mg, Oral, QHS, Aleyda Santos MD    vancomycin (VANCOCIN) 1,000 mg in 0.9% sodium chloride 250 mL (Xtfo1Tak), 1,000 mg, IntraVENous, Q12H, Josselyn Santos MD    glucose chewable tablet 16 g, 16 g, Oral, PRN, Kamaljit Duarte MD    glucagon (GLUCAGEN) injection 1 mg, 1 mg, IntraMUSCular, PRN, Kamaljit Duarte MD    dextrose 10% infusion 125-250 mL, 125-250 mL, IntraVENous, PRNGerald Flo Boyers, MD, Stopped at 04/04/22 0433    multivitamin, tx-iron-ca-min (THERA-M w/ IRON) tablet 1 Tablet, 1 Tablet, Oral, DAILY, Aleyda Santos MD, 1 Tablet at 04/05/22 0800    [Held by provider] bumetanide (BUMEX) tablet 1 mg, 1 mg, Oral, BID, Yoselyn Lantigua MD    fenofibrate (LOFIBRA) tablet 160 mg, 160 mg, Oral, DAILY, Kamaljit Duarte MD, 160 mg at 78/60/03 3396    folic acid (FOLVITE) tablet 1 mg, 1 mg, Oral, DAILY, Kamaljit Duarte MD, 1 mg at 04/05/22 0800   montelukast (SINGULAIR) tablet 10 mg, 10 mg, Oral, DAILY, Merlin Child, MD, 10 mg at 04/05/22 0800    pantoprazole (PROTONIX) tablet 40 mg, 40 mg, Oral, 6am, Luisana Duarte MD, 40 mg at 04/05/22 0502    rOPINIRole (REQUIP) tablet 1 mg, 1 mg, Oral, QAM, Luisana Duarte MD, 1 mg at 04/05/22 0758    rOPINIRole (REQUIP) tablet 2 mg, 2 mg, Oral, QHS, Luisana Garnica MD, 2 mg at 04/04/22 2056    atorvastatin (LIPITOR) tablet 40 mg, 40 mg, Oral, QHS, Luisana Garnica MD, 40 mg at 04/04/22 2134    traZODone (DESYREL) tablet 50 mg, 50 mg, Oral, QHS PRN, Merlin Child, MD    sodium chloride (NS) flush 5-40 mL, 5-40 mL, IntraVENous, Q8H, Luisana Garnica MD, 10 mL at 04/05/22 0500    sodium chloride (NS) flush 5-40 mL, 5-40 mL, IntraVENous, PRN, Merlin Child, MD    acetaminophen (TYLENOL) tablet 650 mg, 650 mg, Oral, Q6H PRN, 650 mg at 04/04/22 1205 **OR** acetaminophen (TYLENOL) suppository 650 mg, 650 mg, Rectal, Q6H PRN, Merlin Child, MD    polyethylene glycol (MIRALAX) packet 17 g, 17 g, Oral, DAILY, Luisana Garnica MD, 17 g at 04/05/22 0800    promethazine (PHENERGAN) tablet 12.5 mg, 12.5 mg, Oral, Q6H PRN **OR** ondansetron (ZOFRAN) injection 4 mg, 4 mg, IntraVENous, Q6H PRN, Merlin Child, MD    [Held by provider] enoxaparin (LOVENOX) injection 40 mg, 40 mg, SubCUTAneous, DAILY, Merlin Child, MD, 40 mg at 04/04/22 0800    oxyCODONE IR (ROXICODONE) tablet 5 mg, 5 mg, Oral, Q4H PRN, Merlin Child, MD, 5 mg at 04/05/22 0758    morphine injection 4 mg, 4 mg, IntraVENous, Q4H PRN, Merlin Child, MD, 4 mg at 04/05/22 1213    Allergies: Allergies   Allergen Reactions    Amoxicillin Rash    Lisinopril Cough     cough    Sulfa (Sulfonamide Antibiotics) Rash         Physical Exam:     No focal neurologic deficit. Sat on bedside with PT.      Radiographic Studies:     Llumbar spine images were reviewed and interpreted: T12 compression fracture about  50% LEODAN and mild wedging. L3 has progressed from a compression fracture to vertebra plana. Diagnosis:      ICD-10-CM ICD-9-CM    1. Acute cystitis without hematuria  N30.00 595.0    2. Sepsis, due to unspecified organism, unspecified whether acute organ dysfunction present ([facility] [address]  A41.9 038.9      995.91    3. Closed compression fracture of body of L1 vertebra (HCC)  S32.010A 805.4    4. Elevated troponin  R77.8 790.6    5. Sepsis due to urinary tract infection (HCC)  A41.9 038.9     N39.0 995.91      599.0    6. Recurrent hematuria  N02.9 599.70    7. Thrombocytopenia ([facility])  D69.6 287.5    8. Anemia, unspecified type  D64.9 285.9    9. Hepatic cirrhosis, unspecified hepatic cirrhosis type, unspecified whether ascites present ([facility])  K74.60 571.5        Assessment/Plan: This patient's clinical history and physical exam is consistent with a compression fracture of T12 and L3. At this point the L3 has collapsed beyond what kyphoplasty can benefit. T12 would have kyphoplasty. However patient is septic and in no shape to undergo such a procedure. Would recommend having spouse bring the TLSO from the prior treatment of the L3 fracture back in and use it as needed to mobilize patient. From my perspective, patient can be WBAT with walker. Brace should not be worn in bed. Kyphoplasty of T12 could be reconsidered as an outpatient after discharge if his pain remains unabated.            Electronically Signed By Omar Ardon MD   04/05/22  3:05 PM

## 2022-04-05 NOTE — PROGRESS NOTES
Hospitalist Progress Note   Admit Date:  4/3/2022  2:25 PM   Name:  Walter Younger   Age:  67 y.o. Sex:  male  :  1945   MRN:  [id number]   Room:  [facility]/01    Presenting Complaint: Hypotension    Reason(s) for Admission: UTI (urinary tract infection) [N39.0]  Hypotension [I95.9]     Hospital Course & Interval History: This is a 44-year-old male with past medical history significant for congestive heart failure with preserved ejection fraction, liver cirrhosis, orthostatic hypotension, prostate cancer status post surgery as well as radiation therapy, was sent to the ER from urgent care because of hypotension. Patient usually has orthostatic hypotension and is on midodrine. In the ER, his blood pressure was found to be in the 60s. ER work-up shows patient has leukocytosis, lactic acidosis, UTI. He also has chronic back pain with compression fractures. Patient was started on Levophed overnight due to hypotension. Blood cultures on admission positive for MRSA. Patient was started on vancomycin. ID consulted. Echocardiogram ordered. Subjective/24hr Events (22): Patient complains of back pain this morning. Blood pressure significantly improved. Off of Levophed since this morning. No fever no chills. No chest pain or shortness of breath. No nausea no vomiting. No abdominal pain. No bleeding per rectum. No dark stools. ROS:  10 systems reviewed and negative except as noted above. Assessment & Plan:     Principal Problem: This is a 66y Male with:     Septic shock ([address] (2022) secondary to MRSA bacteremia  MRSA bacteremia-unknown etiology  Patient meets criteria for sepsis with leukocytosis, fever, tachycardia, tachypnea, lactic acidosis, positive blood cultures. Able to wean off of Levophed. Midodrine dose decreased to 5 mg p.o. 3 times daily. Blood pressure 120/80 this morning. Blood cultures on admission positive for MRSA.   Currently on IV vancomycin-day 2 of antibiotics. ID on board. Appreciate recommendations. TTE showed no visible vegetations. Repeat blood cultures done this a.m. Patient may need DAPHNEY. Elevated troponin-no signs of acute coronary syndrome  Likely secondary to demand supply mismatch. Cardiology consulted. Appreciate recommendations. Echocardiogram shows preserved left ventricular ejection fraction of greater than 65% with hyperdynamic left ventricular systolic function, normal diastolic function. No evidence of vegetations on the transthoracic echocardiogram.    Acute on chronic normocytic anemia  Multifactorial.  Patient's baseline hemoglobin seems to be approximately 8-9. Hemoglobin this morning is 7.3. Patient denies any visible external bleeding. Will check Hemoccult. Part of this drop could be hemodilutional as patient received IV fluids since admission. Repeat H&H. Hold Lovenox. Hypokalemia  Hypomagnesemia  Resolved    Lactic acidosis present on admission  Resolved    L1 compression fracture  Multilevel lumbar spondylosis  Interval progression of L3 compression deformity  Consult Orthopedics. Reported history of chronic diastolic congestive heart failure  Echo showed no evidence of congestive heart failure this admission. Liver cirrhosis    Hypoalbuminemia/moderate protein calorie malnutrition  Monitor for now. Nutrition consulted. Patient started on mirtazapine as he complains of poor appetite. Discharge Planning:    Plan to transfer patient to medical floor today if able to maintain blood pressure off of pressors. Anticipate inpatient hospital stay for at least 72 hours. PT/OT eval.  May need rehab. Diet:  ADULT ORAL NUTRITION SUPPLEMENT Breakfast, Lunch, Dinner; Standard High Calorie/High Protein  ADULT DIET Dysphagia - Soft & Bite Sized; Likes soup  DVT PPx: Lovenox held.  SCDs  Code status: DNR    Hospital Problems as of 4/5/2022 Date Reviewed: 3/10/2022          Codes Class Noted - Resolved POA Severe protein-calorie malnutrition ([facility] ICD-10-CM: E43  ICD-9-CM: 732  4/5/2022 - Present Yes        * (Principal) Septic shock ([facility] ICD-10-CM: A41.9, R65.21  ICD-9-CM: 038.9, 785.52, 995.92  4/4/2022 - Present Unknown        UTI (urinary tract infection) ICD-10-CM: N39.0  ICD-9-CM: 599.0  4/3/2022 - Present Yes        Hypotension ICD-10-CM: I95.9  ICD-9-CM: 458.9  4/3/2022 - Present Yes        Lactic acidosis ICD-10-CM: E87.2  ICD-9-CM: 276.2  4/3/2022 - Present Yes        Hypoalbuminemia due to protein-calorie malnutrition (HCC) (Chronic) ICD-10-CM: E88.09, E46  ICD-9-CM: 273.8, 263.9  1/31/2022 - Present Yes        (HFpEF) heart failure with preserved ejection fraction (HCC) (Chronic) ICD-10-CM: I50.30  ICD-9-CM: 428.9  1/19/2022 - Present Yes        Hepatic cirrhosis (HCC) (Chronic) ICD-10-CM: K74.60  ICD-9-CM: 571.5  10/8/2021 - Present Yes    Overview Signed 1/31/2022  4:07 PM by Shahana Mas DO     Last Assessment & Plan:   Formatting of this note might be different from the original.  Follow-up for edema and ascites  Avoid Tylenol  Check labs on Mondays                   Objective:     Patient Vitals for the past 24 hrs:   Temp Pulse Resp BP SpO2   04/05/22 1140    128/80    04/05/22 1125 99.3 °F (37.4 °C) (!) 111 26 127/77 92 %   04/05/22 1045  97 15 121/75    04/05/22 1030  98 21 115/71    04/05/22 1015  98 26 (!) 135/96 92 %   04/05/22 1000  90 21 116/68    04/05/22 0945  93 22 110/69    04/05/22 0930  95 20 102/68    04/05/22 0915  (!) 108 21 110/73    04/05/22 0900  96 24 116/69 95 %   04/05/22 0845  (!) 103 20 103/70    04/05/22 0830  98 20 114/68    04/05/22 0818     96 %   04/05/22 0815  93 23 124/68 96 %   04/05/22 0800  92 22 122/78 96 %   04/05/22 0759  91  121/69    04/05/22 0745  97 21 121/69 96 %   04/05/22 0730  99 (!) 32 121/75 94 %   04/05/22 0715  94 25 116/75    04/05/22 0702 99.9 °F (37.7 °C) 95 25 106/63 94 %   04/05/22 0647  90 24 109/62 91 % 04/05/22 0632  92 25 107/79 94 %   04/05/22 0617  75 15 (!) 113/55 96 %   04/05/22 0602  84 13 (!) 108/58 95 %   04/05/22 0547  80 18 101/64 95 %   04/05/22 0532  74 15 102/60 95 %   04/05/22 0517  84 16 (!) 104/56 95 %   04/05/22 0502  75 15 (!) 96/56 95 %   04/05/22 0447  78 15 (!) 105/52 95 %   04/05/22 0432  75 14 (!) 101/57 96 %   04/05/22 0417  89 21 111/68 95 %   04/05/22 0402  72 14 (!) 100/51 96 %   04/05/22 0347  76 13 (!) 89/54 96 %   04/05/22 0317 97.8 °F (36.6 °C) 85 23 (!) 106/59 95 %   04/05/22 0247  70 12 (!) 99/58 93 %   04/05/22 0232  79 14 (!) 96/58 95 %   04/05/22 0217  79 16 (!) 96/55 94 %   04/05/22 0202  81 19 (!) 101/57 94 %   04/05/22 0132  75 17 (!) 104/58 96 %   04/05/22 0117  81 18 107/64 95 %   04/05/22 0102  76 18 (!) 105/55 94 %   04/05/22 0032  77 15 101/63 94 %   04/05/22 0002  81 19 (!) 105/59 94 %   04/04/22 2332  87 20 (!) 103/58 94 %   04/04/22 2317 99 °F (37.2 °C) 85 18 105/67 93 %   04/04/22 2302  87 19 (!) 99/58 94 %   04/04/22 2247  88 20 106/63 96 %   04/04/22 2232  94 21 (!) 110/57 95 %   04/04/22 2217  92 25 116/66 95 %   04/04/22 2202  94 27 114/63 96 %   04/04/22 2147  (!) 102 (!) 38 115/74 95 %   04/04/22 2132  99 22 111/70 95 %   04/04/22 2117  85 24 (!) 112/55 97 %   04/04/22 2102  88 21 (!) 101/59 95 %   04/04/22 2047  97 22 (!) 114/42 94 %   04/04/22 2032  80 21 (!) 104/51 97 %   04/04/22 2017  93 27 (!) 106/55 94 %   04/04/22 2002  86 21 (!) 109/57 94 %   04/04/22 1947  81 21 (!) 104/54 96 %   04/04/22 1932  90 21 (!) 106/56 96 %   04/04/22 1917 98.9 °F (37.2 °C) 94 23 (!) 100/57 97 %   04/04/22 1902  81 20 (!) 97/58 97 %   04/04/22 1847  85 22 (!) 93/58 97 %   04/04/22 1832  88 24 106/78 96 %   04/04/22 1517 99.2 °F (37.3 °C) 86 23 (!) 92/55 97 %   04/04/22 1502  84 20 (!) 94/54 96 %   04/04/22 1447  80 18 (!) 91/53 96 %   04/04/22 1432  89 24 (!) 98/56 96 %   04/04/22 1417  87 19 (!) 94/50 97 %   04/04/22 1402  94 21 (!) 84/47 96 %   04/04/22 1347  81 16 (!) 91/50 97 %   04/04/22 1332  85 16 (!) 84/50 97 %   04/04/22 1317  91 18 (!) 79/45 95 %   04/04/22 1302  92 15 (!) 79/51 95 %   04/04/22 1247  99 23 (!) 87/45 94 %   04/04/22 1232  95 21 102/63 95 %   04/04/22 1217  99 17 (!) 102/59 97 %   04/04/22 1202  98 16  95 %   04/04/22 1201  98 21  97 %   04/04/22 1200  98 17  96 %   04/04/22 1159  97 20  95 %   04/04/22 1158 100.2 °F (37.9 °C) (!) 101 17 99/61 95 %   04/04/22 1157  (!) 101 17  96 %   04/04/22 1156  95 21  96 %     Oxygen Therapy  O2 Sat (%): 92 % (04/05/22 1125)  Pulse via Oximetry: 85 beats per minute (04/05/22 1015)  O2 Device: Nasal cannula (04/05/22 0818)  Skin Assessment: Clean, dry, & intact (04/05/22 0818)  Skin Protection for O2 Device: N/A (04/05/22 0818)  O2 Flow Rate (L/min): 2 l/min (04/05/22 0818)    Estimated body mass index is 27.75 kg/m² as calculated from the following:    Height as of this encounter: 5' 11\" (1.803 m). Weight as of this encounter: 90.3 kg (199 lb). Intake/Output Summary (Last 24 hours) at 4/5/2022 1148  Last data filed at 4/5/2022 0511  Gross per 24 hour   Intake 2393.33 ml   Output 450 ml   Net 1943.33 ml         Physical Exam:     Blood pressure 128/80, pulse (!) 111, temperature 99.3 °F (37.4 °C), resp. rate 26, height 5' 11\" (1.803 m), weight 90.3 kg (199 lb), SpO2 92 %. General:    Elderly male, acutely ill and chronically ill-appearing,  Head:  Normocephalic, atraumatic  Eyes:  Sclerae appear normal.  Pupils equally round. ENT:  Nares appear normal, no drainage. Moist oral mucosa  Neck:  No restricted ROM. Trachea midline   CV:   RRR. No m/r/g. No jugular venous distension. Lungs:   CTAB. No wheezing, rhonchi, or rales. Respirations even, unlabored  Abdomen: Bowel sounds present. Soft, nontender, nondistended. Extremities: No cyanosis or clubbing. 1- 2+ leg edema bilaterally  Skin:     No rashes and normal coloration. Warm and dry. Neuro:  CN II-XII grossly intact. Sensation intact. A&Ox3  Psych:  Normal mood and affect. I have reviewed ordered lab tests and independently visualized imaging below:    Recent Labs:  Recent Results (from the past 48 hour(s))   EKG, 12 LEAD, INITIAL    Collection Time: 04/03/22  2:46 PM   Result Value Ref Range    Ventricular Rate 75 BPM    Atrial Rate 75 BPM    P-R Interval 160 ms    QRS Duration 102 ms    Q-T Interval 362 ms    QTC Calculation (Bezet) 404 ms    Calculated P Axis 70 degrees    Calculated R Axis 9 degrees    Calculated T Axis 66 degrees    Diagnosis         Normal sinus rhythm  Low voltage QRS  Abnormal ECG  When compared with ECG of 14-AUG-2017 11:06,  No significant change was found  Confirmed by Faye Langston ([id number]) on 4/3/2022 8:08:54 PM     CBC WITH AUTOMATED DIFF    Collection Time: 04/03/22  2:59 PM   Result Value Ref Range    WBC 23.7 (H) 4.3 - 11.1 K/uL    RBC 3.22 (L) 4.23 - 5.6 M/uL    HGB 8.8 (L) 13.6 - 17.2 g/dL    HCT 27.1 (L) 41.1 - 50.3 %    MCV 84.2 79.6 - 97.8 FL    MCH 27.3 26.1 - 32.9 PG    MCHC 32.5 31.4 - 35.0 g/dL    RDW 15.9 (H) 11.9 - 14.6 %    PLATELET 97 (L) 702 - 450 K/uL    MPV 9.5 9.4 - 12.3 FL    ABSOLUTE NRBC 0.00 0.0 - 0.2 K/uL    NEUTROPHILS 90 (H) 43 - 78 %    LYMPHOCYTES 2 (L) 13 - 44 %    MONOCYTES 5 4.0 - 12.0 %    EOSINOPHILS 0 (L) 0.5 - 7.8 %    BASOPHILS 0 0.0 - 2.0 %    IMMATURE GRANULOCYTES 3 0.0 - 5.0 %    ABS. NEUTROPHILS 21.3 (H) 1.7 - 8.2 K/UL    ABS. LYMPHOCYTES 0.5 0.5 - 4.6 K/UL    ABS. MONOCYTES 1.2 0.1 - 1.3 K/UL    ABS. EOSINOPHILS 0.0 0.0 - 0.8 K/UL    ABS. BASOPHILS 0.0 0.0 - 0.2 K/UL    ABS. IMM.  GRANS. 0.7 (H) 0.0 - 0.5 K/UL    RBC COMMENTS OCCASIONAL  JOSELUIS CELLS        RBC COMMENTS OCCASIONAL  OVALOCYTES        RBC COMMENTS OCCASIONAL  HYPOCHROMIA        WBC COMMENTS Result Confirmed By Smear      PLATELET COMMENTS DECREASED      DF AUTOMATED     METABOLIC PANEL, COMPREHENSIVE    Collection Time: 04/03/22  2:59 PM   Result Value Ref Range    Sodium 137 136 - 145 mmol/L    Potassium 3.6 3.5 - 5.1 mmol/L    Chloride 103 98 - 107 mmol/L    CO2 23 21 - 32 mmol/L    Anion gap 11 7 - 16 mmol/L    Glucose 71 65 - 100 mg/dL    BUN 27 (H) 8 - 23 MG/DL    Creatinine 1.37 0.8 - 1.5 MG/DL    GFR est AA >60 >60 ml/min/1.73m2    GFR est non-AA 54 (L) >60 ml/min/1.73m2    Calcium 8.9 8.3 - 10.4 MG/DL    Bilirubin, total 1.4 (H) 0.2 - 1.1 MG/DL    ALT (SGPT) 33 12 - 65 U/L    AST (SGOT) 60 (H) 15 - 37 U/L    Alk.  phosphatase 99 50 - 136 U/L    Protein, total 4.7 (L) 6.3 - 8.2 g/dL    Albumin 2.2 (L) 3.2 - 4.6 g/dL    Globulin 2.5 2.3 - 3.5 g/dL    A-G Ratio 0.9 (L) 1.2 - 3.5     NT-PRO BNP    Collection Time: 04/03/22  2:59 PM   Result Value Ref Range    NT pro-BNP 2,968 (H) <450 PG/ML   LACTIC ACID    Collection Time: 04/03/22  2:59 PM   Result Value Ref Range    Lactic acid 3.4 (H) 0.4 - 2.0 MMOL/L   TROPONIN-HIGH SENSITIVITY    Collection Time: 04/03/22  2:59 PM   Result Value Ref Range    Troponin-High Sensitivity 50.0 (H) 0 - 14 pg/mL   LIPASE    Collection Time: 04/03/22  2:59 PM   Result Value Ref Range    Lipase 56 (L) 73 - 393 U/L   LACTIC ACID    Collection Time: 04/03/22  4:06 PM   Result Value Ref Range    Lactic acid 3.0 (H) 0.4 - 2.0 MMOL/L   CULTURE, BLOOD    Collection Time: 04/03/22  4:08 PM    Specimen: Blood   Result Value Ref Range    Special Requests: LEFT  Antecubital        GRAM STAIN GRAM POSITIVE COCCI      GRAM STAIN AEROBIC AND ANAEROBIC BOTTLES      GRAM STAIN        RESULTS VERIFIED, PHONED TO AND READ BACK BY Shona Shipman (R) RN BY NB AT [address] [place] [address] Av [place] [id number]    GRAM STAIN        RESULTS VERIFIED, PHONED TO AND READ BACK BY Marisol Adkins AT [address] 4/4/22, [id number]    Culture result: STAPHYLOCOCCUS AUREUS SENSITIVITY TO FOLLOW (A)      Culture result:        Refer to Blood Culture ID Panel Accession  [id number]     CULTURE, BLOOD    Collection Time: 04/03/22  4:08 PM    Specimen: Blood   Result Value Ref Range    Special Requests: RIGHT  Antecubital        GRAM STAIN GRAM POSITIVE COCCI      GRAM STAIN AEROBIC AND ANAEROBIC BOTTLES      GRAM STAIN        RESULTS VERIFIED, PHONED TO AND READ BACK BY Arminda Crawford RN BY NB AT [address] [place] [address] ON [id number]    Culture result: STAPHYLOCOCCUS AUREUS (A)      Culture result: CULTURE IN PROGRESS,FURTHER UPDATES TO FOLLOW     BLOOD CULTURE ID PANEL    Collection Time: 04/03/22  4:08 PM    Specimen: Blood   Result Value Ref Range    Acc. no. from Micro Order [id number]     Staphylococcus Detected (A) NOTDET      Staphylococcus aureus Detected (A) NOTDET      mecA (Methicillin-Resistance Genes) Detected (A) NOTDET      INTERPRETATION        Gram positive cocci in clusters, identified by realtime PCR as SUSPECTED MRSA.    URINE MICROSCOPIC    Collection Time: 04/03/22  5:44 PM   Result Value Ref Range    WBC >100 0 /hpf    RBC 3-5 0 /hpf    Epithelial cells 0-3 0 /hpf    Bacteria TRACE 0 /hpf    Casts 3-5 0 /lpf    Crystals, urine 0 0 /LPF    Mucus 0 0 /lpf   TROPONIN-HIGH SENSITIVITY    Collection Time: 04/03/22  5:45 PM   Result Value Ref Range    Troponin-High Sensitivity 46.4 (H) 0 - 14 pg/mL   LACTIC ACID    Collection Time: 04/03/22  9:55 PM   Result Value Ref Range    Lactic acid 2.0 0.4 - 2.0 MMOL/L   TROPONIN-HIGH SENSITIVITY    Collection Time: 04/03/22  9:55 PM   Result Value Ref Range    Troponin-High Sensitivity 66.6 (H) 0 - 14 pg/mL   METABOLIC PANEL, BASIC    Collection Time: 04/04/22  3:28 AM   Result Value Ref Range    Sodium 139 136 - 145 mmol/L    Potassium 3.4 (L) 3.5 - 5.1 mmol/L    Chloride 106 98 - 107 mmol/L    CO2 25 21 - 32 mmol/L    Anion gap 8 7 - 16 mmol/L    Glucose 57 (L) 65 - 100 mg/dL    BUN 27 (H) 8 - 23 MG/DL    Creatinine 1.11 0.8 - 1.5 MG/DL    GFR est AA >60 >60 ml/min/1.73m2    GFR est non-AA >60 >60 ml/min/1.73m2    Calcium 8.0 (L) 8.3 - 10.4 MG/DL   MAGNESIUM    Collection Time: 04/04/22  3:28 AM   Result Value Ref Range    Magnesium 1.3 (L) 1.8 - 2.4 mg/dL   CBC WITH AUTOMATED DIFF    Collection Time: 04/04/22  3:28 AM   Result Value Ref Range    WBC 16.6 (H) 4.3 - 11.1 K/uL    RBC 2.95 (L) 4.23 - 5.6 M/uL    HGB 8.2 (L) 13.6 - 17.2 g/dL    HCT 25.0 (L) 41.1 - 50.3 %    MCV 84.7 79.6 - 97.8 FL    MCH 27.8 26.1 - 32.9 PG    MCHC 32.8 31.4 - 35.0 g/dL    RDW 16.1 (H) 11.9 - 14.6 %    PLATELET 504 (L) 206 - 450 K/uL    MPV 9.3 (L) 9.4 - 12.3 FL    ABSOLUTE NRBC 0.00 0.0 - 0.2 K/uL    NEUTROPHILS 92 (H) 43 - 78 %    LYMPHOCYTES 2 (L) 13 - 44 %    MONOCYTES 4 4.0 - 12.0 %    EOSINOPHILS 0 (L) 0.5 - 7.8 %    BASOPHILS 0 0.0 - 2.0 %    IMMATURE GRANULOCYTES 2 0.0 - 5.0 %    ABS. NEUTROPHILS 15.3 (H) 1.7 - 8.2 K/UL    ABS. LYMPHOCYTES 0.3 (L) 0.5 - 4.6 K/UL    ABS. MONOCYTES 0.7 0.1 - 1.3 K/UL    ABS. EOSINOPHILS 0.0 0.0 - 0.8 K/UL    ABS. BASOPHILS 0.0 0.0 - 0.2 K/UL    ABS. IMM.  GRANS. 0.3 0.0 - 0.5 K/UL    RBC COMMENTS SLIGHT  ANISOCYTOSIS + POIKILOCYTOSIS        RBC COMMENTS SLIGHT  OVALOCYTES        RBC COMMENTS SLIGHT  SCHISTOCYTES        WBC COMMENTS Result Confirmed By Smear      PLATELET COMMENTS DECREASED      DF AUTOMATED     PROCALCITONIN    Collection Time: 04/04/22  3:28 AM   Result Value Ref Range    Procalcitonin 1.58 (H) 0.00 - 0.49 ng/mL   TROPONIN-HIGH SENSITIVITY    Collection Time: 04/04/22  3:30 AM   Result Value Ref Range    Troponin-High Sensitivity 592.7 (HH) 0 - 14 pg/mL   GLUCOSE, POC    Collection Time: 04/04/22  4:13 AM   Result Value Ref Range    Glucose (POC) 52 (L) 65 - 100 mg/dL    Performed by Nadir Hernandez    GLUCOSE, POC    Collection Time: 04/04/22  4:51 AM   Result Value Ref Range    Glucose (POC) 90 65 - 100 mg/dL    Performed by Elen    MAGNESIUM    Collection Time: 04/04/22  7:14 AM   Result Value Ref Range    Magnesium 1.8 1.8 - 2.4 mg/dL   ECHO ADULT COMPLETE    Collection Time: 04/04/22  9:22 AM   Result Value Ref Range    LV EDV A2C 143 mL    LV EDV A4C 166 mL    LV ESV A2C 48 mL    LV ESV A4C 54 mL    IVSd 1.0 0.6 - 1.0 cm    LVIDd 5.4 4.2 - 5.9 cm    LVIDs 3.3 cm    LVOT Diameter 2.1 cm    LVOT Mean Gradient 5 mmHg    LVOT VTI 30.3 cm    LVOT Peak Velocity 1.5 m/s    LVOT Peak Gradient 9 mmHg    LVPWd 1.1 (A) 0.6 - 1.0 cm    LV E' Lateral Velocity 11 cm/s    LV E' Septal Velocity 11 cm/s    LV Ejection Fraction A2C 67 %    LV Ejection Fraction A4C 67 %    EF BP 67 55 - 100 %    LVOT Area 3.5 cm2    LVOT .9 ml    LA Minor Axis 6.3 cm    LA Major Axis 6.6 cm    LA Area 2C 22.1 cm2    LA Area 4C 26.2 cm2    LA Volume BP 73 (A) 18 - 58 mL    LA Diameter 3.8 cm    AV Mean Gradient 10 mmHg    AV VTI 40.5 cm    AV Mean Velocity 1.5 m/s    AV Peak Velocity 2.2 m/s    AV Peak Gradient 18 mmHg    AV Area by VTI 2.6 cm2    AV Area by Peak Velocity 2.4 cm2    Aortic Root 4.1 cm    Ascending Aorta 3.7 cm    IVC Expiration 2.2 cm    MV E Wave Deceleration Time 183.0 ms    MV A Velocity 0.92 m/s    MV E Velocity 1.29 m/s    RV Basal Dimension 4.9 cm    TAPSE 2.5 (A) 1.5 - 2.0 cm    TR Max Velocity 2.21 m/s    TR Peak Gradient 20 mmHg    Fractional Shortening 2D 39 28 - 44 %    LV ESV Index A4C 26 mL/m2    LV EDV Index A4C 80 mL/m2    LV ESV Index A2C 23 mL/m2    LV EDV Index A2C 69 mL/m2    LVIDd Index 2.60 cm/m2    LVIDs Index 1.59 cm/m2    LV RWT Ratio 0.41     LV Mass 2D 220.6 88 - 224 g    LV Mass 2D Index 106.1 49 - 115 g/m2    MV E/A 1.40     E/E' Ratio (Averaged) 11.73     E/E' Lateral 11.73     E/E' Septal 11.73     LA Volume Index BP 35 (A) 16 - 34 ml/m2    LVOT Stroke Volume Index 50.4 mL/m2    LA Size Index 1.83 cm/m2    LA/AO Root Ratio 0.93     Ao Root Index 1.97 cm/m2    Ascending Aorta Index 1.78 cm/m2    AV Velocity Ratio 0.68     LVOT:AV VTI Index 0.75     OSMEL/BSA VTI 1.3 cm2/m2    OSMEL/BSA Peak Velocity 1.2 cm2/m2    Est. RA Pressure 8 mmHg    RVSP 28 mmHg   GLUCOSE, POC    Collection Time: 04/04/22 11:52 AM   Result Value Ref Range    Glucose (POC) 58 (L) 65 - 100 mg/dL    Performed by Jerald    GLUCOSE, POC    Collection Time: 04/04/22 12:32 PM   Result Value Ref Range    Glucose (POC) 85 65 - 100 mg/dL    Performed by Steven Vargas    GLUCOSE, POC    Collection Time: 04/04/22  2:18 PM   Result Value Ref Range    Glucose (POC) 120 (H) 65 - 100 mg/dL    Performed by Steven Vargas    GLUCOSE, POC    Collection Time: 04/04/22  5:04 PM   Result Value Ref Range    Glucose (POC) 131 (H) 65 - 100 mg/dL    Performed by Steven Vargas    GLUCOSE, POC    Collection Time: 04/05/22  1:59 AM   Result Value Ref Range    Glucose (POC) 174 (H) 65 - 100 mg/dL    Performed by Janes [address], BASIC    Collection Time: 04/05/22  3:24 AM   Result Value Ref Range    Sodium 138 136 - 145 mmol/L    Potassium 3.7 3.5 - 5.1 mmol/L    Chloride 109 (H) 98 - 107 mmol/L    CO2 24 21 - 32 mmol/L    Anion gap 5 (L) 7 - 16 mmol/L    Glucose 102 (H) 65 - 100 mg/dL    BUN 31 (H) 8 - 23 MG/DL    Creatinine 0.79 (L) 0.8 - 1.5 MG/DL    GFR est AA >60 >60 ml/min/1.73m2    GFR est non-AA >60 >60 ml/min/1.73m2    Calcium 7.9 (L) 8.3 - 10.4 MG/DL   CBC WITH AUTOMATED DIFF    Collection Time: 04/05/22  3:24 AM   Result Value Ref Range    WBC 6.5 4.3 - 11.1 K/uL    RBC 2.70 (L) 4.23 - 5.6 M/uL    HGB 7.3 (L) 13.6 - 17.2 g/dL    HCT 22.9 (L) 41.1 - 50.3 %    MCV 84.8 79.6 - 97.8 FL    MCH 27.0 26.1 - 32.9 PG    MCHC 31.9 31.4 - 35.0 g/dL    RDW 16.4 (H) 11.9 - 14.6 %    PLATELET 88 (L) 925 - 450 K/uL    MPV 8.5 (L) 9.4 - 12.3 FL    ABSOLUTE NRBC 0.00 0.0 - 0.2 K/uL    DF AUTOMATED      NEUTROPHILS 85 (H) 43 - 78 %    LYMPHOCYTES 5 (L) 13 - 44 %    MONOCYTES 9 4.0 - 12.0 %    EOSINOPHILS 0 (L) 0.5 - 7.8 %    BASOPHILS 0 0.0 - 2.0 %    IMMATURE GRANULOCYTES 1 0.0 - 5.0 %    ABS. NEUTROPHILS 5.5 1.7 - 8.2 K/UL    ABS. LYMPHOCYTES 0.4 (L) 0.5 - 4.6 K/UL    ABS. MONOCYTES 0.6 0.1 - 1.3 K/UL    ABS. EOSINOPHILS 0.0 0.0 - 0.8 K/UL    ABS. BASOPHILS 0.0 0.0 - 0.2 K/UL    ABS. IMM.  GRANS. 0.0 0.0 - 0.5 K/UL   VANCOMYCIN, RANDOM    Collection Time: 04/05/22  3:24 AM   Result Value Ref Range    Vancomycin, random 8.7 UG/ML   GLUCOSE, POC    Collection Time: 04/05/22  7:46 AM   Result Value Ref Range    Glucose (POC) 82 65 - 100 mg/dL    Performed by Umanzor (Davis)    GLUCOSE, POC    Collection Time: 04/05/22 11:22 AM   Result Value Ref Range    Glucose (POC) 107 (H) 65 - 100 mg/dL    Performed by Umanzor (Davis)        All Micro Results     Procedure Component Value Units Date/Time    CULTURE, BLOOD [[id number]]  (Abnormal) Collected: 04/03/22 1608    Order Status: Completed Specimen: Blood Updated: 04/05/22 0648     Special Requests: --        RIGHT  Antecubital       GRAM STAIN GRAM POSITIVE COCCI               AEROBIC AND ANAEROBIC BOTTLES                  RESULTS VERIFIED, PHONED TO AND READ BACK BY Shona Shipman (R) RN BY [address]  [address] [place] [address]           Culture result: STAPHYLOCOCCUS AUREUS               CULTURE IN [address] UPDATES TO FOLLOW          CULTURE, BLOOD [[id number]]  (Abnormal) Collected: 04/03/22 1608    Order Status: Completed Specimen: Blood Updated: 04/05/22 0647     Special Requests: --        LEFT  Antecubital       GRAM STAIN GRAM POSITIVE COCCI               AEROBIC AND ANAEROBIC BOTTLES                  RESULTS VERIFIED, PHONED TO AND READ BACK BY Shona Shipman (R) RN BY NB  [address] [place] [id number]                  RESULTS VERIFIED, PHONED TO AND READ BACK BY Wyatt Thorpe AT [address] 4/4/22, [address]           Culture result:       STAPHYLOCOCCUS AUREUS SENSITIVITY TO FOLLOW                  Refer to Blood Culture ID Panel Accession  [id number]      CULTURE, BLOOD [[id number]] Collected: 04/05/22 0324    Order Status: Completed Specimen: Blood Updated: 04/05/22 0346    CULTURE, BLOOD [[id number]] Collected: 04/05/22 0324    Order Status: Completed Specimen: Blood Updated: 04/05/22 0346    BLOOD CULTURE ID PANEL [[id number]]  (Abnormal) Collected: 04/03/22 1608    Order Status: Completed Specimen: Blood Updated: 04/04/22 1132     Acc. no. from Micro Order [id number]     Staphylococcus Detected        Staphylococcus aureus Detected        Comment: RESULTS VERIFIED, PHONED TO AND READ BACK BY  LEANDER ESCOBEDO AT 6905 ON 4/4/22, KYLER          mecA (Methicillin-Resistance Genes) Detected        Comment: Presence of mecA is highly indicative of methicillin resistance. The test does not replace traditional culture and susceptibilities        INTERPRETATION       Gram positive cocci in clusters, identified by realtime PCR as SUSPECTED MRSA. Comment: Recommend IV vancomycin use, unlikely to be a contaminant. Infectious Diseases Consult recommended in adult patients. THIS TEST DOES NOT REPLACE SENSITIVITY TESTING. Other Studies:  No results found.     Current Meds:  Current Facility-Administered Medications   Medication Dose Route Frequency    midodrine (PROAMATINE) tablet 5 mg  5 mg Oral TID WITH MEALS    mirtazapine (REMERON) tablet 7.5 mg  7.5 mg Oral QHS    vancomycin (VANCOCIN) 1,000 mg in 0.9% sodium chloride 250 mL ([id number])  1,000 mg IntraVENous Q12H    glucose chewable tablet 16 g  16 g Oral PRN    glucagon (GLUCAGEN) injection 1 mg  1 mg IntraMUSCular PRN    dextrose 10% infusion 125-250 mL  125-250 mL IntraVENous PRN    multivitamin, tx-iron-ca-min (THERA-M w/ IRON) tablet 1 Tablet  1 Tablet Oral DAILY    [Held by provider] bumetanide (BUMEX) tablet 1 mg  1 mg Oral BID    fenofibrate (LOFIBRA) tablet 160 mg  160 mg Oral DAILY    folic acid (FOLVITE) tablet 1 mg  1 mg Oral DAILY    montelukast (SINGULAIR) tablet 10 mg  10 mg Oral DAILY    pantoprazole (PROTONIX) tablet 40 mg  40 mg Oral 6am    rOPINIRole (REQUIP) tablet 1 mg  1 mg Oral QAM    rOPINIRole (REQUIP) tablet 2 mg  2 mg Oral QHS    atorvastatin (LIPITOR) tablet 40 mg  40 mg Oral QHS    traZODone (DESYREL) tablet 50 mg  50 mg Oral QHS PRN    sodium chloride (NS) flush 5-40 mL  5-40 mL IntraVENous Q8H    sodium chloride (NS) flush 5-40 mL  5-40 mL IntraVENous PRN  acetaminophen (TYLENOL) tablet 650 mg  650 mg Oral Q6H PRN    Or    acetaminophen (TYLENOL) suppository 650 mg  650 mg Rectal Q6H PRN    polyethylene glycol (MIRALAX) packet 17 g  17 g Oral DAILY    promethazine (PHENERGAN) tablet 12.5 mg  12.5 mg Oral Q6H PRN    Or    ondansetron (ZOFRAN) injection 4 mg  4 mg IntraVENous Q6H PRN    [Held by provider] enoxaparin (LOVENOX) injection 40 mg  40 mg SubCUTAneous DAILY    oxyCODONE IR (ROXICODONE) tablet 5 mg  5 mg Oral Q4H PRN    morphine injection 4 mg  4 mg IntraVENous Q4H PRN       Signed:  Jong Hernandez MD    Part of this note may have been written by using a voice dictation software. The note has been proof read but may still contain some grammatical/other typographical errors.

## 2022-04-05 NOTE — PROGRESS NOTES
Discussed patient in interdisciplinarily rounds with the following disciplines: Physician, Case Management, Nursing, Dietary and Therapy. The plan of care and goals for discharge date/location were reviewed. CM continuing to follow for discharge needs. Referral made for [facility] for possible home IV antbx. Per [place], Medicare and his supplemental insurance, [employer] do not cover home IV antbx. Therefore, cost for total cost would be $506.73/week and this would be due up front. CM following.

## 2022-04-05 NOTE — PROGRESS NOTES
Problem: Mobility Impaired (Adult and Pediatric)  Goal: *Acute Goals and Plan of Care (Insert Text)  Note: STG:  (1.)Mr. Younger will move from supine to sit and sit to supine  with MODERATE ASSIST within 7 treatment day(s). (2.)Mr. Younger will transfer from bed to chair and chair to bed with MODERATE ASSIST using the least restrictive device within 7 treatment day(s). (3.)Mr. Younger will tolerate sitting on EOB for 5 minutes without UE support within 7 treatment day(s). (4.)Pt. will increase B LE strength 1/2 grade within 7 days     Will assess transfers and gait as he progresses      PHYSICAL THERAPY: Initial Assessment and PM 4/5/2022  INPATIENT: PT Visit Days : 1  [place] / [place] / Product Type: Medicare /       NAME/AGE/GENDER: Baron Green is a 67 y.o. male   PRIMARY DIAGNOSIS: UTI (urinary tract infection) [N39.0]  Hypotension [I95.9] Septic shock ([address] Septic shock ([facility]        ICD-10: Treatment Diagnosis:    Generalized Muscle Weakness (M62.81)  Other lack of cordination (R27.8)  Other abnormalities of gait and mobility (R26.89)   Precaution/Allergies:  Amoxicillin, Lisinopril, and Sulfa (sulfonamide antibiotics)      ASSESSMENT:     Mr. Leonid Echavarria presents with septic shock and is experiencing a significant decline in his premorbid level of function. He also has a L1 compression fracture which is limiting his functional mobility. He would benefit from further PT while here to address these deficits: decreased general strength and endurance for activity, decreased sitting tolerance, dependence for all functional mobility and increased pain in low back and B LEs and increased edema in B LEs. He has been to [facility] in the past. I definitely feel that he will need some type of rehab stay(STR vs IRC) prior to his return home. Wife is present. She is supportive. She leaves the room, as I get him up on side of bed. He is dependent for his bed mobility.  He does offer assistance, but he is max assist. He sat on EOB for 3 minutes. Worked on some  posture and LE exs in sitting. He then returned to supine. This section established at most recent assessment   PROBLEM LIST (Impairments causing functional limitations):  Decreased Strength  Decreased ADL/Functional Activities  Decreased Balance  Increased Pain  Decreased Activity Tolerance  Increased Fatigue  Increased Shortness of Breath  Decreased Flexibility/Joint Mobility  Decreased Knowledge of Precautions  Decreased [place] with Home Exercise Program   INTERVENTIONS PLANNED: (Benefits and precautions of physical therapy have been discussed with the patient.)  Bed Mobility  Range of Motion (ROM)  Therapeutic Activites  Therapeutic Exercise/Strengthening  Transfer Training     TREATMENT PLAN: Frequency/Duration: daily for duration of hospital stay  Rehabilitation Potential For Stated Goals: [facility] (at time of discharge pending progress):    Placement: It is my opinion, based on this patient's performance to date, that Mr. Yang Zamora may benefit from intensive therapy at a [address] [place] [address] after discharge due to the functional deficits listed above that are likely to improve with skilled rehabilitation and concerns that he/she may be unsafe to be unsupervised at home due to the need for significant assistance. Equipment:   None at this time              HISTORY:   History of Present Injury/Illness (Reason for Referral):   Admitted with septic shock  Past Medical History/Comorbidities:   Mr. Yang Zamora  has a past medical history of Adverse effect of anesthesia, Allergic rhinitis, Androgen deficiency (10/2/2015), Anemia (2018), Back pain, Colon polyps, COVID-19 (1/11/2021), [facility] Elevated liver function tests, GERD (gastroesophageal reflux disease), H/O alcohol abuse, Hiatal hernia, Hypercholesteremia (10/2/2015), Hypercholesterolemia, Hypertension, Hypotestosteronism, Migraine, Personal history of prostate cancer, Restless leg, and Sleep apnea. Mr. Yesi Donnelly  has a past surgical history that includes hx colonoscopy (~2017); hx prostatectomy (2003); hx orthopaedic (Left, 2017); hx other surgical (Right, 10/16/2018); and ir ivc filter (2/3/2022). Social History/Living Environment:   Home Environment: Private residence  # Steps to Enter: 0  Wheelchair Ramp: Yes  One/Two Story Residence: One story  Living Alone: No  Support Systems: Spouse/Significant Other  Patient Expects to be Discharged to[de-identified]  (may need STR)  Current DME Used/Available at Home: Burnetta Obdulio, rolling,Walker,Cane, straight,Shower chair,Wheelchair,Commode, bedside  Tub or Shower Type: Shower  Prior Level of Function/Work/Activity:  States he was functionally I with mobility, but I question this. Dominant Side:         RIGHT   Number of Personal Factors/Comorbidities that affect the Plan of Care: 3+: HIGH COMPLEXITY   EXAMINATION:   Most Recent Physical Functioning:   Gross Assessment:  AROM: Generally decreased, functional (B LEs)  Strength: Grossly decreased, non-functional (B hips: 2-; knees: 2+; ankles 2+)  Coordination: Grossly decreased, non-functional (B LEs due to weakness)  Sensation: Intact (B LEs)               Posture:  Posture Assessment: Forward head,Rounded shoulders  Balance:  Sitting: High guard  Standing:  (NT) Bed Mobility:  Rolling: Moderate assistance  Supine to Sit: Maximum assistance  Sit to Supine: Maximum assistance  Scooting: Maximum assistance  Interventions: Safety awareness training; Tactile cues; Verbal cues; Visual cues  Duration: 10 Minutes  Wheelchair Mobility:     Transfers:     Gait:        Home Environment: Private residence  Home Situation  Home Environment: Private residence  # Steps to Enter: 0  Wheelchair Ramp: Yes  One/Two Story Residence: One story  Living Alone: No  Support Systems: Spouse/Significant Other  Patient Expects to be Discharged to[de-identified]  (may need STR)  Current DME Used/Available at Home: Walker, rolling,Walker,Cane, straight,Shower chair,Wheelchair,Commode, bedside  Tub or Shower Type: Shower      Body Structures Involved:  Nerves  Lungs  Bones  Joints  Muscles Body Functions Affected:  Sensory/Pain  Respiratory  Neuromusculoskeletal  Movement Related  Skin Related Activities and Participation Affected:  General Tasks and Demands  Mobility  Self Care   Number of elements that affect the Plan of Care: 4+: HIGH COMPLEXITY   CLINICAL PRESENTATION:   Presentation: Evolving clinical presentation with changing clinical characteristics: MODERATE COMPLEXITY   CLINICAL DECISION MAKIN [facility] Mobility Inpatient Short Form  How much difficulty does the patient currently have. .. Unable A Lot A Little None   1. Turning over in bed (including adjusting bedclothes, sheets and blankets)? [] 1   [x] 2   [] 3   [] 4   2. Sitting down on and standing up from a chair with arms ( e.g., wheelchair, bedside commode, etc.)   [x] 1   [] 2   [] 3   [] 4   3. Moving from lying on back to sitting on the side of the bed? [] 1   [x] 2   [] 3   [] 4   How much help from another person does the patient currently need. .. Total A Lot A Little None   4. Moving to and from a bed to a chair (including a wheelchair)? [x] 1   [] 2   [] 3   [] 4   5. Need to walk in hospital room? [x] 1   [] 2   [] 3   [] 4   6. Climbing 3-5 steps with a railing? [x] 1   [] 2   [] 3   [] 4   © , Trustees of [facility] [employer], under license to [employer]. All rights reserved      Score:  Initial: 8 Most Recent: X (Date: -- )    Interpretation of Tool:  Represents activities that are increasingly more difficult (i.e. Bed mobility, Transfers, Gait). Medical Necessity:     Patient demonstrates   fair   rehab potential due to higher previous functional level.   Reason for Services/Other Comments:  Patient   continues to require present interventions due to patient's inability to perform functional mobility at a safe min assist level   . Use of outcome tool(s) and clinical judgement create a POC that gives a: Questionable prediction of patient's progress: MODERATE COMPLEXITY            TREATMENT:   (In addition to Assessment/Re-Assessment sessions the following treatments were rendered)   Pre-treatment Symptoms/Complaints:  back and leg pain  Pain: Initial:   Pain Intensity 1: 5  Pain Location 1: Back,Leg  Pain Orientation 1: Right,Left,Lower  Pain Intervention(s) 1: Emotional support,Repositioned  Post Session:  3, LEs elevated     Therapeutic Activity: (10 Minutes   ):  Therapeutic activities including Bed transfers and sitting balance activities to improve mobility, strength, balance, and coordination. Required moderate   to  insure safe technique . Assessment provided today    Braces/Orthotics/Lines/Etc:   jasso catheter  Telemetry lines  O2 Device: Nasal cannula  Treatment/Session Assessment:    Response to Treatment:  in pain but worked through it  Interdisciplinary Collaboration:   Physical Therapist  Registered Nurse  After treatment position/precautions:   Supine in bed  Bed/Chair-wheels locked  Bed in low position  Call light within reach  RN notified  Family at bedside  Side rails x 3   Compliance with Program/Exercises: Will assess as treatment progresses  Recommendations/Intent for next treatment session: \"Next visit will focus on advancements to more challenging activities and reduction in assistance provided\".   Total Treatment Duration:  PT Patient Time In/Time Out  Time In: 1300  Time Out: [address] [place], [place]

## 2022-04-05 NOTE — PROGRESS NOTES
Infectious Disease Consult    Today's Date: 2022   Admit Date: 4/3/2022    Impression:   · MRSA bacteremia, four of four bottles (4/3), TTE with MV calcification and aortic sclerosis. Compression fracture in spine  · Chronic radiation cystitis (recent exacerbation 2022)  · Cirrhosis    Plan:   · Continue vancomycin, goal trough 15-20. · Follow repeat blood cultures     Anti-infectives:   · Vancomycin (4/3 -  · Ceftriaxone (4/3 - )    Subjective:   LE swelling improved this morning. Off pressors. No other complaints today. Review of Systems:  Pertinent items are noted in the History of Present Illness. Objective:     Visit Vitals  /73   Pulse (!) 105   Temp 99.3 °F (37.4 °C)   Resp (!) 36   Ht 5' 11\" (1.803 m)   Wt 90.3 kg (199 lb)   SpO2 90%   BMI 27.75 kg/m²     Temp (24hrs), Av °F (37.2 °C), Min:97.8 °F (36.6 °C), Max:99.9 °F (37.7 °C)       Lines:  Peripheral IV:     Exam performed  and unchanged except as noted below  Physical Exam:    General:  Appears chronically ill but not distressed   Eyes:  Sclera anicteric, EOMI   Mouth/Throat: Mucous membranes moist   Neck: Supple   Lungs:   Clear but diminished, no increased work of breathing on room air   CV:  tachycardic   Abdomen:   Soft, non-tender, catheter with dark urine   Extremities: No cyanosis; 2+ pitting edema bilateral LE   Skin: No acute rash   Musculoskeletal: No deformity   Lines/Devices:  Intact, no erythema, drainage or tenderness   Psych: Alert and oriented, appropriate        Data Review:     CBC:  Recent Labs     22  1155 22  0324 22  0328 22  1459 22  1459   WBC  --  6.5 16.6*  --  23.7*   GRANS  --  85* 92*   < > 90*   MONOS  --  9 4   < > 5   EOS  --  0* 0*   < > 0*   ANEU  --  5.5 15.3*   < > 21.3*   ABL  --  0.4* 0.3*   < > 0.5   HGB 9.9* 7.3* 8.2*   < > 8.8*   HCT 30.0* 22.9* 25.0*   < > 27.1*   PLT  --  88* 101*  --  97*    < > = values in this interval not displayed. BMP:  Recent Labs     04/05/22  0324 04/04/22 0328 04/03/22  1459   CREA 0.79* 1.11 1.37   BUN 31* 27* 27*    139 137   K 3.7 3.4* 3.6   * 106 103   CO2 24 25 23   AGAP 5* 8 11   * 57* 71       LFTS:  Recent Labs     04/03/22  1459   TBILI 1.4*   ALT 33   AP 99   TP 4.7*   ALB 2.2*       Microbiology:     All Micro Results     Procedure Component Value Units Date/Time    CULTURE, BLOOD [[id number]]  (Abnormal) Collected: 04/03/22 1608    Order Status: Completed Specimen: Blood Updated: 04/05/22 0648     Special Requests: --        RIGHT  Antecubital       GRAM STAIN GRAM POSITIVE COCCI               AEROBIC AND ANAEROBIC BOTTLES                  RESULTS VERIFIED, PHONED TO AND READ BACK BY Shona Shipman (R) RN BY NB  [address] [place] [address]333           Culture result: STAPHYLOCOCCUS AUREUS               CULTURE IN [address] UPDATES TO FOLLOW          CULTURE, BLOOD [[id number]]  (Abnormal) Collected: 04/03/22 1608    Order Status: Completed Specimen: Blood Updated: 04/05/22 0647     Special Requests: --        LEFT  Antecubital       GRAM STAIN GRAM POSITIVE COCCI               AEROBIC AND ANAEROBIC BOTTLES                  RESULTS VERIFIED, PHONED TO AND READ BACK BY Shona Shipman (R) RN BY NB  [address] [place] [address]897                  RESULTS VERIFIED, PHONED TO AND READ BACK BY Jcarlos Allan AT [address] 4/4/22, [address]           Culture result:       STAPHYLOCOCCUS AUREUS SENSITIVITY TO FOLLOW                  Refer to Blood Culture ID Panel Accession  [id number]      CULTURE, BLOOD [[id number]] Collected: 04/05/22 0324    Order Status: Completed Specimen: Blood Updated: 04/05/22 0346    CULTURE, BLOOD [[id number]] Collected: 04/05/22 0324    Order Status: Completed Specimen: Blood Updated: 04/05/22 0346    BLOOD CULTURE ID PANEL [[id number]]  (Abnormal) Collected: 04/03/22 1608    Order Status: Completed Specimen: Blood Updated: 04/04/22 1132     Acc. no. from Micro Order [id number]     Staphylococcus Detected Staphylococcus aureus Detected        Comment: RESULTS VERIFIED, PHONED TO AND READ BACK BY  LEANDER ESCOBEDO AT 0654 ON 4/4/22, KYLER          mecA (Methicillin-Resistance Genes) Detected        Comment: Presence of mecA is highly indicative of methicillin resistance. The test does not replace traditional culture and susceptibilities        INTERPRETATION       Gram positive cocci in clusters, identified by realtime PCR as SUSPECTED MRSA. Comment: Recommend IV vancomycin use, unlikely to be a contaminant. Infectious Diseases Consult recommended in adult patients. THIS TEST DOES NOT REPLACE SENSITIVITY TESTING. Imaging:   XR Spine Lumbar 4/3/22  IMPRESSION  1. Interval development of L1 compression fracture. 2. Interval progression of L3 compression deformity, now with vertebral plana. 3. Osteopenia. 4. Multilevel lumbar spondylosis. 5. Interval placement of IVC filter.     Signed By: Mariaa Calderon MD     April 5, 2022

## 2022-04-05 NOTE — PROGRESS NOTES
Problem: Risk for Spread of Infection  Goal: Prevent transmission of infectious organism to others  Description: Prevent the transmission of infectious organisms to other patients, staff members, and visitors. Outcome: Progressing Towards Goal     Problem: Patient Education:  Go to Education Activity  Goal: Patient/Family Education  Outcome: Progressing Towards Goal     Problem: Falls - Risk of  Goal: *Absence of Falls  Description: Document Parker Lynch Fall Risk and appropriate interventions in the flowsheet.   Outcome: Progressing Towards Goal  Note: Fall Risk Interventions:  Mobility Interventions: Assess mobility with egress test,Bed/chair exit alarm,Communicate number of staff needed for ambulation/transfer,Patient to call before getting OOB,PT Consult for mobility concerns    Mentation Interventions: Adequate sleep, hydration, pain control,Bed/chair exit alarm,Door open when patient unattended,Increase mobility,More frequent rounding,Reorient patient,Room close to nurse's station,Toileting rounds,Update white board    Medication Interventions: Bed/chair exit alarm,Assess postural VS orthostatic hypotension,Patient to call before getting OOB,Evaluate medications/consider consulting pharmacy,Teach patient to arise slowly,Utilize gait belt for transfers/ambulation    Elimination Interventions: Bed/chair exit alarm,Call light in reach,Patient to call for help with toileting needs,Toileting schedule/hourly rounds    History of Falls Interventions: Bed/chair exit alarm,Consult care management for discharge planning,Door open when patient unattended,Evaluate medications/consider consulting pharmacy,Investigate reason for fall,Room close to nurse's station,Utilize gait belt for transfer/ambulation,Assess for delayed presentation/identification of injury for 48 hrs (comment for end date),Vital signs minimum Q4HRs X 24 hrs (comment for end date)         Problem: Patient Education: Go to Patient Education Activity  Goal: Patient/Family Education  Outcome: Progressing Towards Goal     Problem: Pressure Injury - Risk of  Goal: *Prevention of pressure injury  Description: Document Matthew Scale and appropriate interventions in the flowsheet. Outcome: Progressing Towards Goal  Note: Pressure Injury Interventions:  Sensory Interventions: Assess changes in LOC,Assess need for specialty bed,Avoid rigorous massage over bony prominences,Check visual cues for pain,Discuss PT/OT consult with provider,Float heels,Minimize linen layers,Keep linens dry and wrinkle-free,Turn and reposition approx. every two hours (pillows and wedges if needed)    Moisture Interventions: Absorbent underpads,Apply protective barrier, creams and emollients,Assess need for specialty bed,Check for incontinence Q2 hours and as needed,Internal/External urinary devices,Maintain skin hydration (lotion/cream),Minimize layers,Moisture barrier,Offer toileting Q_hr    Activity Interventions: Assess need for specialty bed,Pressure redistribution bed/mattress(bed type)    Mobility Interventions: Assess need for specialty bed,Float heels,HOB 30 degrees or less,Pressure redistribution bed/mattress (bed type),PT/OT evaluation,Turn and reposition approx.  every two hours(pillow and wedges)    Nutrition Interventions: Document food/fluid/supplement intake,Offer support with meals,snacks and hydration    Friction and Shear Interventions: Apply protective barrier, creams and emollients,Foam dressings/transparent film/skin sealants,HOB 30 degrees or less,Lift sheet,Minimize layers                Problem: Patient Education: Go to Patient Education Activity  Goal: Patient/Family Education  Outcome: Progressing Towards Goal

## 2022-04-06 NOTE — PROGRESS NOTES
Problem: Risk for Spread of Infection  Goal: Prevent transmission of infectious organism to others  Description: Prevent the transmission of infectious organisms to other patients, staff members, and visitors. Outcome: Progressing Towards Goal     Problem: Patient Education:  Go to Education Activity  Goal: Patient/Family Education  Outcome: Progressing Towards Goal     Problem: Falls - Risk of  Goal: *Absence of Falls  Description: Document Chayito Whyte Fall Risk and appropriate interventions in the flowsheet.   Outcome: Progressing Towards Goal  Note: Fall Risk Interventions:  Mobility Interventions: Assess mobility with egress test,Bed/chair exit alarm,Communicate number of staff needed for ambulation/transfer,Patient to call before getting OOB    Mentation Interventions: Adequate sleep, hydration, pain control,Bed/chair exit alarm,Door open when patient unattended,Eyeglasses and hearing aids,Familiar objects from home,More frequent rounding,Reorient patient,Room close to nurse's station,Self-releasing belt,Update white board,Toileting rounds    Medication Interventions: Assess postural VS orthostatic hypotension,Bed/chair exit alarm,Evaluate medications/consider consulting pharmacy,Patient to call before getting OOB,Teach patient to arise slowly,Utilize gait belt for transfers/ambulation    Elimination Interventions: Bed/chair exit alarm,Call light in reach,Patient to call for help with toileting needs,Toileting schedule/hourly rounds    History of Falls Interventions: Bed/chair exit alarm,Consult care management for discharge planning,Door open when patient unattended,Evaluate medications/consider consulting pharmacy,Investigate reason for fall,Room close to nurse's station,Utilize gait belt for transfer/ambulation,Assess for delayed presentation/identification of injury for 48 hrs (comment for end date),Vital signs minimum Q4HRs X 24 hrs (comment for end date)         Problem: Patient Education: Go to Patient Education Activity  Goal: Patient/Family Education  Outcome: Progressing Towards Goal     Problem: Pressure Injury - Risk of  Goal: *Prevention of pressure injury  Description: Document Matthew Scale and appropriate interventions in the flowsheet. Outcome: Progressing Towards Goal  Note: Pressure Injury Interventions:  Sensory Interventions: Assess changes in LOC,Assess need for specialty bed,Avoid rigorous massage over bony prominences,Check visual cues for pain,Float heels,Keep linens dry and wrinkle-free,Minimize linen layers,Monitor skin under medical devices,Turn and reposition approx. every two hours (pillows and wedges if needed)    Moisture Interventions: Absorbent underpads,Check for incontinence Q2 hours and as needed,Internal/External urinary devices,Maintain skin hydration (lotion/cream),Minimize layers,Moisture barrier    Activity Interventions: Pressure redistribution bed/mattress(bed type),Assess need for specialty bed    Mobility Interventions: Assess need for specialty bed,Float heels,HOB 30 degrees or less,Pressure redistribution bed/mattress (bed type),PT/OT evaluation,Turn and reposition approx.  every two hours(pillow and wedges)    Nutrition Interventions: Document food/fluid/supplement intake,Offer support with meals,snacks and hydration    Friction and Shear Interventions: Apply protective barrier, creams and emollients,Foam dressings/transparent film/skin sealants,HOB 30 degrees or less,Lift sheet,Transferring/repositioning devices,Minimize layers                Problem: Patient Education: Go to Patient Education Activity  Goal: Patient/Family Education  Outcome: Progressing Towards Goal     Problem: Patient Education: Go to Patient Education Activity  Goal: Patient/Family Education  Outcome: Progressing Towards Goal

## 2022-04-06 NOTE — PROGRESS NOTES
Problem: Self Care Deficits Care Plan (Adult)  Goal: *Acute Goals and Plan of Care (Insert Text)  Outcome: Progressing Towards Goal  Note: 1. Patient will perform grooming with min assist.   2. Patient will perform feeding with setup. 3. Patient will participate in 25 + minutes of ADL/ therapeutic exercise/therapeutic activity with min rest breaks to increase activity tolerance for self care. 4. Patient will sit edge of bed for 15 minutes with supervision in preparation for ADL activities. Goals to be achieved in 7 days. OCCUPATIONAL THERAPY: Initial Assessment, Daily Note, and AM 4/6/2022  INPATIENT:    [place] / [place] / Product Type: Medicare /      NAME/AGE/GENDER: Kirstie Longo is a 67 y.o. male   PRIMARY DIAGNOSIS:  UTI (urinary tract infection) [N39.0]  Hypotension [I95.9] Septic shock ([facility] [address] Septic shock ([facility] [address]        ICD-10: Treatment Diagnosis:    Generalized Muscle Weakness (M62.81)  Other lack of cordination (R27.8)   Precautions/Allergies:     Amoxicillin, Lisinopril, and Sulfa (sulfonamide antibiotics)      ASSESSMENT:     Mr. Ekta Watts presents with septic shock and is experiencing a significant decline in his premorbid level of function. He also has a L1 compression fracture which is limiting his functional mobility. He would benefit from further OT while here to address these deficits: decreased general strength and endurance for ADLs, decreased sitting tolerance, dependence for all functional mobility and self care and increased pain in low back. He has been to [facility] rehab in the past. I definitely feel that he will need some type of rehab stay(STR vs IRC) prior to his return home. This am, pt has pain with movement. Ortho note recommends pt wear his TLSO when up ambulating. He is not to that point yet. Pt required max assist to hold cup to drink out of. Pt needed verbal cues to sip through straw.  Pt required max assist for rolling and supine to sit and back. He was able to sit on the edge of the bed. He needed encouragement to sit edge of bed longer. Pt is in pain and weak. This section established at most recent assessment   PROBLEM LIST (Impairments causing functional limitations):  Decreased Strength  Decreased ADL/Functional Activities  Decreased Transfer Abilities  Decreased Ambulation Ability/Technique  Decreased Balance  Decreased Activity Tolerance   INTERVENTIONS PLANNED: (Benefits and precautions of occupational therapy have been discussed with the patient.)  Activities of daily living training  Adaptive equipment training  Balance training  Clothing management  Therapeutic activity  Therapeutic exercise     TREATMENT PLAN: Frequency/Duration: Follow patient 3x/week to address above goals. Rehabilitation Potential For Stated Goals: Good     REHAB RECOMMENDATIONS (at time of discharge pending progress):    Placement: It is my opinion, based on this patient's performance to date, that Mr. Jessica Bush may benefit from participating in 1-2 additional therapy sessions in order to continue to assess for rehab potential and then make recommendation for disposition at discharge. Equipment:   None at this time              OCCUPATIONAL PROFILE AND HISTORY:   History of Present Injury/Illness (Reason for Referral):  Septic shock; UTI  Past Medical History/Comorbidities:   Mr. Jessica Bush  has a past medical history of Adverse effect of anesthesia, Allergic rhinitis, Androgen deficiency (10/2/2015), Anemia (2018), Back pain, Colon polyps, COVID-19 (1/11/2021), Diabetes ([facility] [address] Elevated liver function tests, GERD (gastroesophageal reflux disease), H/O alcohol abuse, Hiatal hernia, Hypercholesteremia (10/2/2015), Hypercholesterolemia, Hypertension, Hypotestosteronism, Migraine, Personal history of prostate cancer, Restless leg, and Sleep apnea.   Mr. Jessica Bush  has a past surgical history that includes hx colonoscopy (~2017); hx prostatectomy (2003); hx orthopaedic (Left, 2017); hx other surgical (Right, 10/16/2018); and ir ivc filter (2/3/2022). Social History/Living Environment:   Home Environment: Private residence  # Steps to Enter: 0  Wheelchair Ramp: Yes  One/Two Story Residence: One story  Living Alone: No  Support Systems: Spouse/Significant Other  Patient Expects to be Discharged to[de-identified]  (may need STR)  Current DME Used/Available at Home: Walker, rolling,Walker,Cane, straight,Shower chair,Wheelchair,Commode, bedside  Tub or Shower Type: Shower  Prior Level of Function/Work/Activity:  Independent per pt     Number of Personal Factors/Comorbidities that affect the Plan of Care: Brief history (0):  LOW COMPLEXITY   ASSESSMENT OF OCCUPATIONAL PERFORMANCE[de-identified]   Activities of Daily Living:   Basic ADLs (From Assessment) Complex ADLs (From Assessment)   Feeding: Maximum assistance  Oral Facial Hygiene/Grooming: Total assistance  Bathing: Total assistance  Upper Body Dressing: Total assistance  Lower Body Dressing: Total assistance  Toileting: Total assistance     Grooming/Bathing/Dressing Activities of Daily Living     Cognitive Retraining  Safety/Judgement: Fall prevention                       Bed/Mat Mobility  Supine to Sit: Maximum assistance  Sit to Supine: Maximum assistance  Scooting: Maximum assistance     Most Recent Physical Functioning:   Gross Assessment:  AROM: Generally decreased, functional  Strength: Generally decreased, functional  Coordination: Generally decreased, functional  Tone: Normal  Sensation: Intact               Posture:  Posture Assessment: Forward head,Rounded shoulders  Balance:  Sitting: High guard  Standing:  (NT) Bed Mobility:  Supine to Sit: Maximum assistance  Sit to Supine: Maximum assistance  Scooting: Maximum assistance  Interventions: Demonstration;Manual cues; Safety awareness training;Verbal cues; Visual cues  Duration: 25 Minutes  Wheelchair Mobility:     Transfers:               Patient Vitals for the past 6 hrs:   BP BP Patient Position SpO2 O2 Flow Rate (L/min) Pulse   22 0415 108/68 -- 95 % -- 91   22 0500 (!) 94/59 -- 97 % -- 80   22 06 (!) 99/48 -- 95 % -- 90   2230 -- -- 94 % -- 78   22 0700 (!) 89/54 -- 96 % -- 84   22 0715 -- -- 95 % -- 82   22 0719 (!) 89/54 At rest 95 % 2 l/min 89   22 0730 -- -- 95 % -- 83   22 0745 -- -- 95 % -- 90   22 0746 -- -- -- 2 l/min --   22 0800 (!) 104/56 -- 95 % -- 85   22 0815 -- -- 95 % -- 94   22 0830 -- -- 94 % -- 89   22 0845 -- -- 96 % -- (!) 122   22 0900 (!) 110/58 -- 94 % -- 88   22 0909 -- -- 96 % 2 l/min --   22 0915 -- -- 96 % -- 88   22 0930 -- -- 100 % -- 88       Mental Status  Neurologic State: Alert  Orientation Level: Oriented to person  Cognition: Follows commands  Perception: Cues to maintain midline in sitting  Perseveration: No perseveration noted  Safety/Judgement: Fall prevention                          Physical Skills Involved:  Balance  Strength  Activity Tolerance Cognitive Skills Affected (resulting in the inability to perform in a timely and safe manner):  none Psychosocial Skills Affected:  none   Number of elements that affect the Plan of Care: 1-3:  LOW COMPLEXITY   CLINICAL DECISION MAKIN [facility] [address] [address] AM-PAC 6 Clicks   Daily Activity Inpatient Short Form  How much help from another person does the patient currently need. .. Total A Lot A Little None   1. Putting on and taking off regular lower body clothing? [x] 1   [] 2   [] 3   [] 4   2. Bathing (including washing, rinsing, drying)? [x] 1   [] 2   [] 3   [] 4   3. Toileting, which includes using toilet, bedpan or urinal?   [x] 1   [] 2   [] 3   [] 4   4. Putting on and taking off regular upper body clothing? [x] 1   [] 2   [] 3   [] 4   5. Taking care of personal grooming such as brushing teeth? [x] 1   [] 2   [] 3   [] 4   6. Eating meals?    [] 1   [x] 2   [] 3   [] 4   © 2007, Trustees of [address] [place] [address] [address] [address], under license to [employer]. All rights reserved      Score:  Initial: 7 Most Recent: X (Date: -- )    Interpretation of Tool:  Represents activities that are increasingly more difficult (i.e. Bed mobility, Transfers, Gait). Medical Necessity:     Patient is expected to demonstrate progress in   strength, balance, coordination, and functional technique   to   increase independence with self care and functional mobility   . Reason for Services/Other Comments:  Patient continues to require skilled intervention due to   Decrease self care and functional mobility  . Use of outcome tool(s) and clinical judgement create a POC that gives a: LOW COMPLEXITY         TREATMENT:   (In addition to Assessment/Re-Assessment sessions the following treatments were rendered)     Pre-treatment Symptoms/Complaints:  tolerated sitting edge of bed briefly  Pain: Initial:   Pain Intensity 1: 8  Pain Location 1: Back  Pain Orientation 1: Mid  Pain Intervention(s) 1: Repositioned  Post Session:  8     Therapeutic Activity: (15): Therapeutic activities including sitting edge of bed to improve mobility, strength, balance, and coordination. Required moderate   to promote static balance in sitting. Assessment/Reassessment     Braces/Orthotics/Lines/Etc:   ICU monitors  O2 Device: Nasal cannula  Treatment/Session Assessment:    Response to Treatment: tolerated fair  Interdisciplinary Collaboration:   Physical Therapist  Occupational Therapist  Registered Nurse  After treatment position/precautions:   Supine in bed  Bed/Chair-wheels locked  Bed in low position  Call light within reach  RN notified  Side rails x 3   Compliance with Program/Exercises: Compliant all of the time. Recommendations/Intent for next treatment session: \"Next visit will focus on advancements to more challenging activities and reduction in assistance provided\".   Total Treatment Duration:  OT Patient Time In/Time Out  Time In: 6240  Time Out: Miguelina Gordon

## 2022-04-06 NOTE — PROGRESS NOTES
Problem: Mobility Impaired (Adult and Pediatric)  Goal: *Acute Goals and Plan of Care (Insert Text)  Note: STG:  (1.)Mr. Younger will move from supine to sit and sit to supine  with MODERATE ASSIST within 7 treatment day(s). (2.)Mr. Younger will transfer from bed to chair and chair to bed with MODERATE ASSIST using the least restrictive device within 7 treatment day(s). (3.)Mr. Younger will tolerate sitting on EOB for 5 minutes without UE support within 7 treatment day(s). (4.)Pt. will increase B LE strength 1/2 grade within 7 days     Will assess transfers and gait as he progresses      PHYSICAL THERAPY: Daily Note and AM 4/6/2022  INPATIENT: PT Visit Days : 2  [place] / Product Type: Medicare /       NAME/AGE/GENDER: Tedra Ahumada is a 67 y.o. male   PRIMARY DIAGNOSIS: UTI (urinary tract infection) [N39.0]  Hypotension [I95.9] Septic shock ([address] Septic shock ([facility] [address]       ICD-10: Treatment Diagnosis:    · Generalized Muscle Weakness (M62.81)  · Other lack of cordination (R27.8)  · Other abnormalities of gait and mobility (R26.89)   Precaution/Allergies:  Amoxicillin, Lisinopril, and Sulfa (sulfonamide antibiotics)      ASSESSMENT:     Mr. Frederick Hardwick presents with septic shock and is experiencing a significant decline in his premorbid level of function. He also has a L1 compression fracture which is limiting his functional mobility. He would benefit from further PT while here to address these deficits: decreased general strength and endurance for activity, decreased sitting tolerance, dependence for all functional mobility and increased pain in low back and B LEs and increased edema in B LEs. He has been to [facility] rehab in the past. I definitely feel that he will need some type of rehab stay(STR vs IRC) prior to his return home. This am, he is still very sensitive to touch on any extremity. He performs AA ROM with B LEs in supine. L foot appears a bit more swollen. [FreeTextEntry1] : CPE \par \par - diet / exercise discussed\par - check lipid and BG \par - UTD PAP \par - UTD Tdap \par \par \par R shoulder pain \par - x-ray \par - PT \par - rheum eval if not better bc pt not willing to try NSAID due to  gastric sx  Ortho note recommends pt wear his TLSO when up ambulating. He is not to that point yet. He required max assist for rolling and supine to sit and back. He was able to sit on the edge of the bed. He needed encouragement to stay up a little longer. I know that he is in pain and weak. This section established at most recent assessment   PROBLEM LIST (Impairments causing functional limitations):  1. Decreased Strength  2. Decreased ADL/Functional Activities  3. Decreased Balance  4. Increased Pain  5. Decreased Activity Tolerance  6. Increased Fatigue  7. Increased Shortness of Breath  8. Decreased Flexibility/Joint Mobility  9. Decreased Knowledge of Precautions  10. Decreased [place] with Home Exercise Program   INTERVENTIONS PLANNED: (Benefits and precautions of physical therapy have been discussed with the patient.)  1. Bed Mobility  2. Range of Motion (ROM)  3. Therapeutic Activites  4. Therapeutic Exercise/Strengthening  5. Transfer Training     TREATMENT PLAN: Frequency/Duration: daily for duration of hospital stay  Rehabilitation Potential For Stated Goals: [facility] (at time of discharge pending progress):    Placement: It is my opinion, based on this patient's performance to date, that Mr. Ryann Ashby may benefit from intensive therapy at [address] after discharge due to the functional deficits listed above that are likely to improve with skilled rehabilitation and concerns that he/she may be unsafe to be unsupervised at home due to the need for significant assistance. [facility]  Equipment:    None at this time              HISTORY:   History of Present Injury/Illness (Reason for Referral):   Admitted with septic shock  Past Medical History/Comorbidities:   Mr. Ryann Ashby  has a past medical history of Adverse effect of anesthesia, Allergic rhinitis, Androgen deficiency (10/2/2015), Anemia (2018), Back pain, Colon polyps, COVID-19 (1/11/2021), Diabetes ([facility] [address] Elevated liver function tests, GERD (gastroesophageal reflux disease), H/O alcohol abuse, Hiatal hernia, Hypercholesteremia (10/2/2015), Hypercholesterolemia, Hypertension, Hypotestosteronism, Migraine, Personal history of prostate cancer, Restless leg, and Sleep apnea. Mr. Elder Woods  has a past surgical history that includes hx colonoscopy (~2017); hx prostatectomy (2003); hx orthopaedic (Left, 2017); hx other surgical (Right, 10/16/2018); and ir ivc filter (2/3/2022). Social History/Living Environment:   Home Environment: Private residence  # Steps to Enter: 0  Wheelchair Ramp: Yes  One/Two Story Residence: One story  Living Alone: No  Support Systems: Spouse/Significant Other  Patient Expects to be Discharged to[de-identified]  (may need STR)  Current DME Used/Available at Home: Clarnce Masker, rolling,Walker,Cane, straight,Shower chair,Wheelchair,Commode, bedside  Tub or Shower Type: Shower  Prior Level of Function/Work/Activity:  States he was functionally I with mobility, but I question this. Dominant Side:         RIGHT   Number of Personal Factors/Comorbidities that affect the Plan of Care: 3+: HIGH COMPLEXITY   EXAMINATION:   Most Recent Physical Functioning:   Gross Assessment:  AROM: Generally decreased, functional (B LEs)  Strength: Grossly decreased, non-functional (B hips: 2-; knees: 2+; ankles 2+)  Coordination: Grossly decreased, non-functional (B LEs due to weakness)  Sensation: Intact (B LEs)               Posture:  Posture Assessment: Forward head,Rounded shoulders  Balance:  Sitting: High guard  Standing:  (NT) Bed Mobility:  Supine to Sit: Maximum assistance  Sit to Supine: Maximum assistance  Scooting: Maximum assistance  Interventions: Demonstration;Manual cues; Safety awareness training;Verbal cues; Visual cues  Duration: 25 Minutes  Wheelchair Mobility:     Transfers:     Gait:        Home Environment: Private residence  Home Situation  Home Environment: Private residence  # Steps to Enter: 0  Wheelchair Ramp: Yes  One/Two Story Residence: One story  Living Alone: No  Support Systems: Spouse/Significant Other  Patient Expects to be Discharged to[de-identified]  (may need STR)  Current DME Used/Available at Home: Walker, rolling,Walker,Cane, straight,Shower chair,Wheelchair,Commode, bedside  Tub or Shower Type: Shower      Body Structures Involved:  1. Nerves  2. Lungs  3. Bones  4. Joints  5. Muscles Body Functions Affected:  1. Sensory/Pain  2. Respiratory  3. Neuromusculoskeletal  4. Movement Related  5. Skin Related Activities and Participation Affected:  1. General Tasks and Demands  2. Mobility  3. Self Care   Number of elements that affect the Plan of Care: 4+: HIGH COMPLEXITY   CLINICAL PRESENTATION:   Presentation: Evolving clinical presentation with changing clinical characteristics: MODERATE COMPLEXITY   CLINICAL DECISION MAKIN [facility] Inpatient Short Form  How much difficulty does the patient currently have. .. Unable A Lot A Little None   1. Turning over in bed (including adjusting bedclothes, sheets and blankets)? [] 1   [x] 2   [] 3   [] 4   2. Sitting down on and standing up from a chair with arms ( e.g., wheelchair, bedside commode, etc.)   [x] 1   [] 2   [] 3   [] 4   3. Moving from lying on back to sitting on the side of the bed? [] 1   [x] 2   [] 3   [] 4   How much help from another person does the patient currently need. .. Total A Lot A Little None   4. Moving to and from a bed to a chair (including a wheelchair)? [x] 1   [] 2   [] 3   [] 4   5. Need to walk in hospital room? [x] 1   [] 2   [] 3   [] 4   6. Climbing 3-5 steps with a railing? [x] 1   [] 2   [] 3   [] 4   © , Trustees of [address] [place] [address] [address] [address], under license to [employer]. All rights reserved      Score:  Initial: 8 Most Recent: X (Date: -- )    Interpretation of Tool:  Represents activities that are increasingly more difficult (i.e. Bed mobility, Transfers, Gait).     Medical Necessity:     · Patient demonstrates   · fair  ·  rehab potential due to higher previous functional level. Reason for Services/Other Comments:  · Patient   · continues to require present interventions due to patient's inability to perform functional mobility at a safe min assist level   · . Use of outcome tool(s) and clinical judgement create a POC that gives a: Questionable prediction of patient's progress: MODERATE COMPLEXITY            TREATMENT:   (In addition to Assessment/Re-Assessment sessions the following treatments were rendered)   Pre-treatment Symptoms/Complaints:  C/o back and pain in extremities  Pain: Initial:   Pain Intensity 1: 5  Pain Location 1: Back,Generalized,Leg  Pain Orientation 1: Right,Left,Lower  Pain Intervention(s) 1: Emotional support,Exercise,Repositioned  Post Session:  3, LEs elevated     Therapeutic Activity: (25 Minutes   ):  Therapeutic activities including Bed mobility, LE exs in supine and sitting balance activities to improve mobility, strength, balance, and coordination. Required moderate cues   to  insure safe technique . Braces/Orthotics/Lines/Etc:   · jasso catheter  · Telemetry lines  · O2 Device: Nasal cannula  Treatment/Session Assessment:    · Response to Treatment:  needed encouragement to work through pain  · Interdisciplinary Collaboration:   o Physical Therapist  o Occupational Therapist  o Registered Nurse  o Physician  o   · After treatment position/precautions:   o Supine in bed  o Bed/Chair-wheels locked  o Bed in low position  o Call light within reach  o RN notified  o Side rails x 3   · Compliance with Program/Exercises: Will assess as treatment progresses  · Recommendations/Intent for next treatment session: \"Next visit will focus on advancements to more challenging activities and reduction in assistance provided\".   Total Treatment Duration:  PT Patient Time In/Time Out  Time In: 0730  Time Out: [address] [place],

## 2022-04-06 NOTE — PROGRESS NOTES
Problem: Risk for Spread of Infection  Goal: Prevent transmission of infectious organism to others  Description: Prevent the transmission of infectious organisms to other patients, staff members, and visitors. Outcome: Progressing Towards Goal     Problem: Patient Education:  Go to Education Activity  Goal: Patient/Family Education  Outcome: Progressing Towards Goal     Problem: Falls - Risk of  Goal: *Absence of Falls  Description: Document Rand Cohen Fall Risk and appropriate interventions in the flowsheet. Outcome: Progressing Towards Goal  Note: Fall Risk Interventions:  Mobility Interventions: Assess mobility with egress test,Bed/chair exit alarm    Mentation Interventions: Adequate sleep, hydration, pain control,Bed/chair exit alarm,Increase mobility,More frequent rounding    Medication Interventions: Assess postural VS orthostatic hypotension,Bed/chair exit alarm,Patient to call before getting OOB    Elimination Interventions: Bed/chair exit alarm,Call light in reach    History of Falls Interventions: Bed/chair exit alarm         Problem: Pressure Injury - Risk of  Goal: *Prevention of pressure injury  Description: Document Matthew Scale and appropriate interventions in the flowsheet.   Outcome: Progressing Towards Goal  Note: Pressure Injury Interventions:  Sensory Interventions: Assess changes in LOC,Assess need for specialty bed    Moisture Interventions: Absorbent underpads,Minimize layers    Activity Interventions: Pressure redistribution bed/mattress(bed type)    Mobility Interventions: Assess need for specialty bed,Pressure redistribution bed/mattress (bed type)    Nutrition Interventions: Document food/fluid/supplement intake    Friction and Shear Interventions: Apply protective barrier, creams and emollients

## 2022-04-06 NOTE — PROGRESS NOTES
Infectious Disease Consult    Today's Date: 4/6/2022   Admit Date: 4/3/2022    Impression:   · MRSA bacteremia (4/3, 4/5), TTE with MV calcification and aortic sclerosis. Compression fracture in spine  · Chronic radiation cystitis (recent exacerbation February 2022)  · Cirrhosis  · History of prostate cancer  · History of IVC filter placement    I still don't have a great source of infection, but with spinal fractures and valve calcifications he has two locations that are high risk for seed. His bcx on 4/5 were still positive but this was after only 48 hours of appropriate therapy so difficult to tell if we just hadn't allowed enough time for clearance or he does have metastatic foci. Given the fractures in his back and pain I will order MRI today. This is also easier to assess for seeding than heart valves which would require DAPHNEY and a sedating procedure which comes with additional risks given history of cirrhosis. Will continue vanc for now and repeat cultures in AM. If MRI shows spinal involvement this would necessitate 8 weeks of abx so he would be covered for IE. If MRI is negative then there may be concern for valve involvement but given age and comorbidities he is a poor surgical candidate and high risk for DAPHNEY so we would plan to treat empirically as IE x 6 weeks. If repeat cultures are still positive we may also consider change to dapto/ceftaroline but continuing with vanc for now. Plan:   · Continue vancomycin, goal trough 15-20. · Repeat bcx in AM x 2  · MRI L spine to assess fracture sites for seeding    Anti-infectives:   · Vancomycin (4/3 -  · Ceftriaxone (4/3 - 4/4)    Subjective:   Remains off pressors. Repeat bcx positive. Sleepier today, wife notes that he has pain diffusely and hurts when he wakes up. Review of Systems:  Pertinent items are noted in the History of Present Illness.     Objective:     Visit Vitals  /61   Pulse 89   Temp 99 °F (37.2 °C)   Resp 16   Ht 5' 11\" (1.803 m) Wt 90.7 kg (200 lb)   SpO2 96%   BMI 27.89 kg/m²     Temp (24hrs), Av °F (37.2 °C), Min:97.9 °F (36.6 °C), Max:99.9 °F (37.7 °C)       Lines:  Peripheral IV:     Exam performed  and unchanged except as noted below  Physical Exam:    General:  Appears ill, sleepy today   Eyes:  Sclera anicteric, EOMI   Mouth/Throat: Mucous membranes moist   Neck: Supple   Lungs:   Clear but diminished, no increased work of breathing on room air   CV:  RR, 2/6 systolic murmur   Abdomen:   Soft, non-tender, catheter with dark urine   Extremities: No cyanosis; 2+ pitting edema bilateral LE   Skin: No acute rash   Musculoskeletal: No deformity   Lines/Devices:  Intact, no erythema, drainage or tenderness   Psych: Alert and oriented, appropriate        Data Review:     CBC:  Recent Labs     22  0756 22  1155 22  0324 22   WBC 6.8  --  6.5  --  16.6*   GRANS 73  --  85*   < > 92*   MONOS 14*  --  9   < > 4   EOS 3  --  0*   < > 0*   ANEU 4.9  --  5.5   < > 15.3*   ABL 0.7  --  0.4*   < > 0.3*   HGB 8.0* 9.9* 7.3*   < > 8.2*   HCT 24.6* 30.0* 22.9*   < > 25.0*   PLT 66*  --  88*  --  101*    < > = values in this interval not displayed. BMP:  Recent Labs     22  0756 22  03222   CREA 0.71* 0.79* 1.11   BUN 32* 31* 27*    138 139   K 4.0 3.7 3.4*   * 109* 106   CO2 26 24 25   AGAP 4* 5* 8   GLU 65 102* 57*       LFTS:  Recent Labs     22  1459   TBILI 1.4*   ALT 33   AP 99   TP 4.7*   ALB 2.2*       Microbiology:     All Micro Results     Procedure Component Value Units Date/Time    CULTURE, BLOOD [[id number]] Collected: 22 0324    Order Status: Completed Specimen: Blood Updated: 22 1125     Special Requests: --        RIGHT  HAND       GRAM STAIN       GRAM POS COCCI IN CLUSTERS                  AEROBIC AND ANAEROBIC BOTTLES                  RESULTS VERIFIED, PHONED TO AND READ BACK BY            HOANG BONDS @8295 BY TS 22. Culture result:       CULTURE IN PROGRESS,FURTHER UPDATES TO FOLLOW          CULTURE, BLOOD [[id number]]  (Abnormal) Collected: 04/03/22 1608    Order Status: Completed Specimen: Blood Updated: 04/06/22 0654     Special Requests: --        RIGHT  Antecubital       GRAM STAIN GRAM POSITIVE COCCI               AEROBIC AND ANAEROBIC BOTTLES                  RESULTS VERIFIED, PHONED TO AND READ BACK BY Shona Shipman (R) RN BY NB  [address] [place] [address]802           Culture result: STAPHYLOCOCCUS AUREUS               For Susceptibility Refer to Culture  [id number]      CULTURE, BLOOD [[id number]]  (Abnormal)  (Susceptibility) Collected: 04/03/22 1608    Order Status: Completed Specimen: Blood Updated: 04/06/22 0651     Special Requests: --        LEFT  Antecubital       GRAM STAIN GRAM POSITIVE COCCI               AEROBIC AND ANAEROBIC BOTTLES                  RESULTS VERIFIED, PHONED TO AND READ BACK BY Shona Shipman (R) RN BY NB AT 4982 ON [id number]                  RESULTS VERIFIED, PHONED TO AND READ BACK BY LEANDER ESCOBEDO AT 1128 PN 4/4/22, Gianfranco Brown           Culture result:       * Methicillin Resistant Staphylococcus aureus *                  Refer to Blood Culture ID Panel Accession  [id number]              MRSA CALLED TO AND CORRECTLY REPEATED BY:  [facility] WALL AT 0649 ON 4/6/22, [facility]      BLOOD CULTURE ID PANEL [[id number]]  (Abnormal) Collected: 04/06/22 0345    Order Status: Completed Specimen: Blood Updated: 04/06/22 0508     Acc. no. from Micro Order [id number]     Staphylococcus Detected        Staphylococcus aureus Detected        Comment: RESULTS VERIFIED, PHONED TO AND READ BACK BY  Jake Pimentel, RN @0507 4.6.22 SC          mecA (Methicillin-Resistance Genes) Detected        Comment: Presence of mecA is highly indicative of methicillin resistance. The test does not replace traditional culture and susceptibilities        INTERPRETATION       Gram positive cocci in clusters, identified by realtime PCR as SUSPECTED MRSA. Comment: Recommend IV vancomycin use, unlikely to be a contaminant. Infectious Diseases Consult recommended in adult patients. THIS TEST DOES NOT REPLACE SENSITIVITY TESTING. CULTURE, BLOOD [[id number]] Collected: 04/05/22 0324    Order Status: Completed Specimen: Blood Updated: 04/06/22 0352     Special Requests: --        RIGHT  ARM       GRAM STAIN GRAM POSITIVE COCCI         AEROBIC BOTTLE POSITIVE         RESULTS VERIFIED,PHONED TO AND READ BACK BY TS TO [facility] RN AT 4/5/22 AT 2130     Culture result:       CULTURE IN [facility] UPDATES TO FOLLOW                  Refer to Blood Culture ID Panel Accession  [id number]      BLOOD CULTURE ID PANEL [[id number]]  (Abnormal) Collected: 04/03/22 1608    Order Status: Completed Specimen: Blood Updated: 04/04/22 1132     Acc. no. from Micro Order [id number]     Staphylococcus Detected        Staphylococcus aureus Detected        Comment: RESULTS VERIFIED, PHONED TO AND READ BACK BY  LEANDER ESCOBEDO AT 3423 ON 4/4/22, KYLER          mecA (Methicillin-Resistance Genes) Detected        Comment: Presence of mecA is highly indicative of methicillin resistance. The test does not replace traditional culture and susceptibilities        INTERPRETATION       Gram positive cocci in clusters, identified by realtime PCR as SUSPECTED MRSA. Comment: Recommend IV vancomycin use, unlikely to be a contaminant. Infectious Diseases Consult recommended in adult patients. THIS TEST DOES NOT REPLACE SENSITIVITY TESTING. Imaging:   XR Spine Lumbar 4/3/22  IMPRESSION  1. Interval development of L1 compression fracture. 2. Interval progression of L3 compression deformity, now with vertebral plana. 3. Osteopenia. 4. Multilevel lumbar spondylosis. 5. Interval placement of IVC filter.     Signed By: Robert Segura MD     April 6, 2022

## 2022-04-06 NOTE — PROGRESS NOTES
VANCO DAILY FOLLOW UP NOTE  [facility] Pharmacokinetic Monitoring Service - Vancomycin    Consulting Provider: Dr. Mando Morrison   Indication: bloodstream infection  Target Concentration: Goal AUC/BUZZ 400-600 mg*hr/L  Day of Therapy: 3  Additional Antimicrobials: none    Pertinent Laboratory Values: Wt Readings from Last 1 Encounters:   04/06/22 90.7 kg (200 lb)     Temp Readings from Last 1 Encounters:   04/06/22 99.3 °F (37.4 °C)     No components found for: PROCAL  Recent Labs     04/06/22  0756 04/05/22  0324 04/04/22  0328 04/03/22  2155 04/03/22  1606 04/03/22  1459 04/03/22  1459   BUN 32* 31* 27*  --   --    < > 27*   CREA 0.71* 0.79* 1.11  --   --    < > 1.37   WBC 6.8 6.5 16.6*  --   --    < > 23.7*   PCT  --   --  1.58*  --   --   --   --    LAC  --   --   --  2.0 3.0*  --  3.4*    < > = values in this interval not displayed. Estimated Creatinine Clearance: 100.4 mL/min (A) (based on SCr of 0.71 mg/dL (L)). Lab Results   Component Value Date/Time    Vancomycin, random 14.3 04/06/2022 07:56 AM       MRSA Nasal Swab: N/A. Non-respiratory infection. .      Assessment:  Date/Time Dose Concentration AUC   4/5/22 0324 2000 mg x 1  8.7 393   4/6/22 0756 1000 mg q12h 14.3 457   Note: Serum concentrations collected for AUC dosing may appear elevated if collected in close proximity to the dose administered, this is not necessarily an indication of toxicity    Plan:  Current dosing regimen is therapeutic  Continue current dose  Repeat vancomycin concentrations will be ordered as clinically appropriate   Pharmacy will continue to monitor patient and adjust therapy as indicated    Thank you for the consult,  BETO Alvarado

## 2022-04-07 PROBLEM — K70.30 ALCOHOLIC CIRRHOSIS OF LIVER WITHOUT ASCITES (HCC): Status: ACTIVE | Noted: 2021-01-01

## 2022-04-07 PROBLEM — B95.62 MRSA BACTEREMIA: Status: ACTIVE | Noted: 2022-01-01

## 2022-04-07 PROBLEM — R78.81 MRSA BACTEREMIA: Status: ACTIVE | Noted: 2022-01-01

## 2022-04-07 PROBLEM — D50.0 IRON DEFICIENCY ANEMIA DUE TO CHRONIC BLOOD LOSS: Status: ACTIVE | Noted: 2022-01-01

## 2022-04-07 NOTE — PROGRESS NOTES
VANCO DAILY FOLLOW UP NOTE  [address] [facility] Pharmacokinetic Monitoring Service - Vancomycin    Consulting Provider: Dr. Teressa Pineda   Indication: bloodstream infection  Target Concentration: Goal AUC/BUZZ 400-600 mg*hr/L  Day of Therapy: 4  Additional Antimicrobials: none    Pertinent Laboratory Values: Wt Readings from Last 1 Encounters:   04/07/22 94 kg (207 lb 4.8 oz)     Temp Readings from Last 1 Encounters:   04/07/22 99.5 °F (37.5 °C)     No components found for: PROCAL  Recent Labs     04/07/22  0321 04/06/22  0756 04/05/22  0324   BUN 34* 32* 31*   CREA 0.62* 0.71* 0.79*   WBC 8.8 6.8 6.5     Estimated Creatinine Clearance: 103.5 mL/min (A) (by C-G formula based on SCr of 0.62 mg/dL (L)). Lab Results   Component Value Date/Time    Vancomycin, random 14.3 04/06/2022 07:56 AM       MRSA Nasal Swab: N/A. Non-respiratory infection. .      Assessment:  Date/Time Dose Concentration AUC   4/5/22 0324 2000 mg x 1  8.7 393   4/6/22 0756 1000 mg q12h 14.3 457   Note: Serum concentrations collected for AUC dosing may appear elevated if collected in close proximity to the dose administered, this is not necessarily an indication of toxicity    Plan:  Current dosing regimen of 1000 mg q12h is sub-therapeutic with predicted AUC/Tr of 411/12.6  Increase dose to 1250 mg q12h for predicted AUC/Tr of 511/15.8  Repeat vancomycin concentration ordered for 4/8 @ 0400    Pharmacy will continue to monitor patient and adjust therapy as indicated    Thank you for the consult,  BETO Anand

## 2022-04-07 NOTE — CONSULTS
Comprehensive Nutrition Assessment    Type and Reason for Visit: Reassess    Nutrition Recommendations/Plan:   Meals and Snacks:  Diet: Continue current order. Continue ice cream tid. Nutrition Supplement Therapy:   Medical food supplement therapy:  Decrease Ensure Enlive twice per day (this provides 350 kcal and 20 grams protein per bottle). Add Ensure clear bid. Wife continues to bring protein shakes from home.  Continue daily MVI with minerals and appetite stimulant. Malnutrition Assessment:  Malnutrition Status: Severe malnutrition  Context: Chronic illness  Findings of clinical characteristics of malnutrition:   Energy Intake:  7 - 75% or less est energy requirements for 1 month or longer (<50% of estimatedd needs for ~ 3 months)  Weight Loss:  7.0 - Greater than 7.5% over 3 months (239# (January 2022) to 194#=18.6%)     Body Fat Loss:  1 - Mild body fat loss, Triceps   Muscle Mass Loss:  1 - Mild muscle mass loss, Temples (temporalis)    Nutrition Assessment:   Nutrition History: 4/4: Hx primarily from wife. Pt was eating very well at the end of last year. He weighed 239# when he was admitted to [facility] at he first of January 2022. His dentures were lost there and thus he did not eat well. He then went to IR/[facility] and continued with poor po intake because he needed, but did not like the minced and moist or pureed consistency unless the food item was puree texture at baseline. He was admitted to [facility] in February and continued with decreased po intake-mainly soups and ONS. Since he has been home as of 2/25, he did get a new upper plate but it makes his mouth sore to wear. He puts the plate in periodically but says he can't eat with it. His weight at home was ~190# on 2/25. He weighs almost daily and his weight has been somewhat variable but close to 190# with last weight at home PTA of about 188#.  Meal intake at home has been, B-eggo waffle, L/D-1/3 to half of a container of [employer] soup (broccoli and cheddar or black bean). He also sometimes drinks V-8 juice and orange juice. He drinks Bolthouse protein drink (280 kcal, 22 gram protein per bottle) about once a week. Nutrition Background: H/O: Prostate CA s/p RRP 5/2003 and radiation 2016, HFpEF, Radiation Cystitis, BENIGNO , L3 compression fracture cirrhosis, hypotension on midodrine, DVT, recurrent hematuria. Pt sent from urgent care d/t hypotension. Findings of bacteremia. Nutrition Interval:  Source of infection per ID is unclear but plan is to treat empirically as IE x 6 weeks  Pt seen in company of wife. Pt in bed resting with eyes closed and did not participate in visit. Wife reports that he continues to eat just bites and is not eating any of the ice cream even though he normally loves it. He has an open bottle of Ensure Enlive at bedside with ~30% consumed. Wife says he usually just sips on it ,but did drink an entire Ensure clear with encouragement from RN. Wife also brought a homemade protein shake today and another day. She makes it with almond milk, fruit and protein powder. He consumed all of one on a prior day but has yet to consume it today as his wife recently arrived. Lab Results   Component Value Date/Time    Glucose 85 04/07/2022 03:21 AM    Glucose (POC) 106 (H) 04/07/2022 11:02 AM    Glucose (POC) 81 04/07/2022 07:43 AM    Glucose (POC) 85 04/07/2022 03:09 AM    Glucose (POC) 78 04/06/2022 11:05 PM    Glucose (POC) 99 04/06/2022 07:17 PM    Glucose (POC) 105 (H) 04/06/2022 04:34 PM     Assess Gerson for wound healing not indicated until po intake improves to >75% of estimated needs. Nutrition Related Findings:     Wound Type: Stage II,Multiple    Current Nutrition Therapies:  ADULT ORAL NUTRITION SUPPLEMENT Breakfast, Lunch, Dinner; Standard High Calorie/High Protein  ADULT DIET Dysphagia - Soft & Bite Sized;  Likes soup    Current Intake:   Average Meal Intake: 1-25% (For 4 recorded meals in past 3 days) Average Supplement Intake: 1-25%      Anthropometric Measures:  Height: 5' 11\" (180.3 cm)  Current Body Wt: 94 kg (207 lb 3.7 oz) (4/7), Weight source: Bed scale   Last 3 Recorded Weights in this Encounter    04/05/22 0502 04/06/22 0300 04/07/22 0500   Weight: 90.3 kg (199 lb) 90.7 kg (200 lb) 94 kg (207 lb 4.8 oz)   Weight variances likely d/t fluid and scale variances. BMI: 28.9, Overweight (BMI 25.0-29. 9)  Admission Body Weight: 180 lb (stated)  Ideal Body Weight (lbs) (Calculated): 172 lbs (78 kg),    Usual Body Wt: 108.4 kg (239 lb) (Stated Jan 2022), Percent weight change: -18.6        Edema: LLE: 3+; Pitting (4/7/2022  8:00 AM)  RLE: 3+; Pitting (4/7/2022  8:00 AM)    Estimated Daily Nutrient Needs:  Energy (kcal/day): 4485-2323 (Kcal/kg (20-25), Weight Used: Current (88.2 kg bed scale 4/3))  Protein (g/day):  ( (20% of kcal))  Fluid (ml/day):   (1 ml/kcal)    Nutrition Diagnosis:   · Severe malnutrition,In context of chronic illness related to inadequate protein-energy intake as evidenced by  (as above)    · Inadequate oral intake related to early satiety,biting/chewing (masticatory) difficulty as evidenced by  (current intake meeting 25-50% of estimated needs)    Nutrition Interventions:   Food and/or Nutrient Delivery: Continue current diet,Modify oral nutrition supplement    Coordination of Nutrition Care: Continue to monitor while inpatient  Discussed with Dr Dorina Pulido. If po intake remains inadequate and TF is c/w GOC, please consult for same.     Goals:   Previous Goal Met: Progressing toward goal(s)  Active Goal: Meet >50% of estimated needs by follow up    Nutrition Monitoring and Evaluation:      Food/Nutrient Intake Outcomes: Food and nutrient intake,Supplement intake  Physical Signs/Symptoms Outcomes: Weight,Chewing or swallowing    Discharge Planning:    Continue oral nutrition supplement    Walker Bahena, [address] [place] [address], [address] [place] [address]

## 2022-04-07 NOTE — PROGRESS NOTES
Bedside and Verbal shift change report given to Meena Ojeda RN (oncoming nurse) by Ariana Vásquez RN (offgoing nurse). Report included the following information SBAR, Kardex, Intake/Output, MAR, Recent Results, Cardiac Rhythm NSR and Alarm Parameters .       Infusions:  D5-0.9%NaCl @ 75mL/hr

## 2022-04-07 NOTE — PROGRESS NOTES
Bedside and Verbal shift change report given to Daja Gan RN (oncoming nurse) by Nik Ireland RN(offgoing nurse). Report included the following information SBAR, Kardex, Intake/Output, MAR and Cardiac Rhythm NSR.       Infusions: [email or web address]

## 2022-04-07 NOTE — PROGRESS NOTES
RN CM spoke with the patient's wife Shayy Csatellanos over the telephone and discussed discharge planning, including LTAC. She reported someone was telling her about [facility] and she is in agreement for the RN CM to send them a referral. ROCHELLE PEÑA spoke with [facility] and was given the fax number [phone]. The referral was faxed and is pending review.

## 2022-04-07 NOTE — PROGRESS NOTES
Infectious Disease Consult    Today's Date: 4/7/2022   Admit Date: 4/3/2022    Impression:   · MRSA bacteremia (4/3, 4/5), TTE with MV calcification and aortic sclerosis. Compression fracture in spine. MRI spine with small fluid collection in psoas c/f possible abscess  · Chronic radiation cystitis (recent exacerbation February 2022)  · Cirrhosis  · History of prostate cancer  · History of IVC filter placement    I still don't have a great source of infection, but with spinal fractures and valve calcifications he has two locations that are high risk for seed. His bcx on 4/5 were still positive but this was after only 48 hours of appropriate therapy so difficult to tell if we just hadn't allowed enough time for clearance or he does have metastatic foci. Given the fractures in his back and pain MRI was ordered that showed progression of the fractures without obvious bacterial involvement but small fluid collection in the psoas c/f possible early abscess. Given this, we will plan to treat x 8 weeks as spinal OM. Discussed again with his wife at bedside that since we're treating long term and he is high risk we will not proceed with DAPHNEY. He is not ready for PICC yet while we're awaiting culture clearance, repeat cultures from today are pending. If these remain positive will change therapy to dapto/ceftaroline. He does have IVC filter in place, this may have been seeded but will follow cultures for now. Plan:   · Continue vancomycin, goal trough 15-20. · Follow repeat blood cultures  · Planning 8 weeks of therapy but start date TBD    Anti-infectives:   · Vancomycin (4/3 -  · Ceftriaxone (4/3 - 4/4)    Subjective:   Remains off pressors. Sleepy, only wakes up when coughing per wife at bedside. Still with significant pain and decreased appetite. MRI with fluid in psoas at L1-L2 but no obvious involvement of fracture sites.      Review of Systems:  Pertinent items are noted in the History of Present Illness. Objective:     Visit Vitals  /69   Pulse 81   Temp 98 °F (36.7 °C)   Resp 15   Ht 5' 11\" (1.803 m)   Wt 94 kg (207 lb 4.8 oz)   SpO2 97%   BMI 28.91 kg/m²     Temp (24hrs), Av °F (37.2 °C), Min:98 °F (36.7 °C), Max:99.5 °F (37.5 °C)       Lines:  Peripheral IV:     Exam performed  and unchanged except as noted below  Physical Exam:    General:  Appears ill, sleepy today   Eyes:  Sclera anicteric, EOMI   Mouth/Throat: Mucous membranes moist   Neck: Supple   Lungs:   Normal WOB on supplemental O2, upper airway noise   CV:  RR, 2/6 systolic murmur   Abdomen:   Soft, non-tender, catheter with dark urine   Extremities: No cyanosis; 2+ pitting edema bilateral LE   Skin: No acute rash   Musculoskeletal: No deformity   Lines/Devices:  Intact, no erythema, drainage or tenderness   Psych: sleepy       Data Review:     CBC:  Recent Labs     22  0321 22  0756 22  1155 224 22   WBC 8.8 6.8  --   --  6.5   GRANS 74 73  --    < > 85*   MONOS 12 14*  --    < > 9   EOS 2 3  --    < > 0*   ANEU 6.5 4.9  --    < > 5.5   ABL 1.0 0.7  --    < > 0.4*   HGB 7.9* 8.0* 9.9*   < > 7.3*   HCT 24.2* 24.6* 30.0*   < > 22.9*   PLT 74* 66*  --   --  88*    < > = values in this interval not displayed. BMP:  Recent Labs     22  0321 22  0756 22   CREA 0.62* 0.71* 0.79*   BUN 34* 32* 31*    140 138   K 4.0 4.0 3.7   * 110* 109*   CO2 25 26 24   AGAP 6* 4* 5*   GLU 85 65 102*       LFTS:  No results for input(s): TBILI, ALT, AP, TP, ALB in the last 72 hours.     No lab exists for component: SGOT    Microbiology:     All Micro Results     Procedure Component Value Units Date/Time    CULTURE, BLOOD [[id number]]  (Abnormal) Collected: 22 0324    Order Status: Completed Specimen: Blood Updated: 22 0659     Special Requests: --        RIGHT  HAND       GRAM STAIN       GRAM POS COCCI IN CLUSTERS                  AEROBIC AND ANAEROBIC BOTTLES                  RESULTS VERIFIED, PHONED TO AND READ BACK BY            HOANG BONDS @2130 BY TS 4/5/22.      Culture result: STAPHYLOCOCCUS AUREUS               CULTURE IN PROGRESS,FURTHER UPDATES TO FOLLOW          CULTURE, BLOOD [[id number]]  (Abnormal) Collected: 04/05/22 0324    Order Status: Completed Specimen: Blood Updated: 04/07/22 0657     Special Requests: --        RIGHT  ARM       GRAM STAIN GRAM POSITIVE COCCI               AEROBIC AND ANAEROBIC BOTTLES            RESULTS VERIFIED,PHONED TO AND READ BACK BY TS TO [facility] VAMSHI RN AT 4/5/22 AT 2130     Culture result:       STAPHYLOCOCCUS AUREUS SENSITIVITY TO FOLLOW                  Refer to Blood Culture ID Panel Accession  [id number]      CULTURE, BLOOD [[id number]] Collected: 04/07/22 0313    Order Status: Completed Specimen: Blood Updated: 04/07/22 0355    CULTURE, BLOOD [[id number]] Collected: 04/07/22 0321    Order Status: Completed Specimen: Blood Updated: 04/07/22 0355    CULTURE, BLOOD [[id number]]  (Abnormal) Collected: 04/03/22 1608    Order Status: Completed Specimen: Blood Updated: 04/06/22 0654     Special Requests: --        RIGHT  Antecubital       GRAM STAIN GRAM POSITIVE COCCI               AEROBIC AND ANAEROBIC BOTTLES                  RESULTS VERIFIED, PHONED TO AND READ BACK BY Shona Shipman (R) RN BY NB  [address] [place] [id number]           Culture result: STAPHYLOCOCCUS AUREUS               For Susceptibility Refer to Culture  [id number]      CULTURE, BLOOD [[id number]]  (Abnormal)  (Susceptibility) Collected: 04/03/22 1608    Order Status: Completed Specimen: Blood Updated: 04/06/22 0651     Special Requests: --        LEFT  Antecubital       GRAM STAIN GRAM POSITIVE COCCI               AEROBIC AND ANAEROBIC BOTTLES                  RESULTS VERIFIED, PHONED TO AND READ BACK BY Shona Shipman (R) RN BY NB  [address] [place] [id number]                  RESULTS VERIFIED, PHONED TO AND READ BACK BY LEANDER ESCOBEDO AT 1788 PN 4/4/22, KYLER           Culture result: * Methicillin Resistant Staphylococcus aureus *                  Refer to Blood Culture ID Panel Accession  [id number]              [facility] CALLED TO AND CORRECTLY REPEATED BY:  [facility] WALL AT 0649 ON 4/6/22, KYLER      BLOOD CULTURE ID PANEL [[id number]]  (Abnormal) Collected: 04/06/22 0345    Order Status: Completed Specimen: Blood Updated: 04/06/22 0508     Acc. no. from Micro Order [id number]     Staphylococcus Detected        Staphylococcus aureus Detected        Comment: RESULTS VERIFIED, PHONED TO AND READ BACK BY  Jake Pimentel RN @0453 4.6.22 SC          mecA (Methicillin-Resistance Genes) Detected        Comment: Presence of mecA is highly indicative of methicillin resistance. The test does not replace traditional culture and susceptibilities        INTERPRETATION       Gram positive cocci in clusters, identified by realtime PCR as SUSPECTED MRSA. Comment: Recommend IV vancomycin use, unlikely to be a contaminant. Infectious Diseases Consult recommended in adult patients. THIS TEST DOES NOT REPLACE SENSITIVITY TESTING.       BLOOD CULTURE ID PANEL [[id number]]  (Abnormal) Collected: 04/03/22 1608    Order Status: Completed Specimen: Blood Updated: 04/04/22 1132     Acc. no. from Micro Order [id number]     Staphylococcus Detected        Staphylococcus aureus Detected        Comment: RESULTS VERIFIED, PHONED TO AND READ BACK BY  LEANDER ESOCBEDO AT 9494 ON 4/4/22, KYLER          mecA (Methicillin-Resistance Genes) Detected        Comment: Presence of mecA is highly indicative of methicillin resistance. The test does not replace traditional culture and susceptibilities        INTERPRETATION       Gram positive cocci in clusters, identified by realtime PCR as SUSPECTED MRSA. Comment: Recommend IV vancomycin use, unlikely to be a contaminant. Infectious Diseases Consult recommended in adult patients. THIS TEST DOES NOT REPLACE SENSITIVITY TESTING.              Imaging:   XR Spine Lumbar 4/3/22  IMPRESSION  1. Interval development of L1 compression fracture. 2. Interval progression of L3 compression deformity, now with vertebral plana. 3. Osteopenia. 4. Multilevel lumbar spondylosis. 5. Interval placement of IVC filter.     Signed By: Zoey Eaton MD     April 7, 2022

## 2022-04-07 NOTE — PROGRESS NOTES
Problem: Self Care Deficits Care Plan (Adult)  Goal: *Acute Goals and Plan of Care (Insert Text)  Outcome: Progressing Towards Goal  Note: 1. Patient will perform grooming with min assist.   2. Patient will perform feeding with setup. 3. Patient will participate in 25 + minutes of ADL/ therapeutic exercise/therapeutic activity with min rest breaks to increase activity tolerance for self care. 4. Patient will sit edge of bed for 15 minutes with supervision in preparation for ADL activities. Goals to be achieved in 7 days. OCCUPATIONAL THERAPY: Daily Note and AM 4/7/2022  INPATIENT: OT Visit Days: 1  [place] / [place] / Product Type: Medicare /      NAME/AGE/GENDER: Tedra Ahumada is a 67 y.o. male   PRIMARY DIAGNOSIS:  UTI (urinary tract infection) [N39.0]  Hypotension [I95.9] MRSA bacteremia MRSA bacteremia       ICD-10: Treatment Diagnosis:    · Generalized Muscle Weakness (M62.81)  · Other lack of cordination (R27.8)   Precautions/Allergies:     Amoxicillin, Lisinopril, and Sulfa (sulfonamide antibiotics)      ASSESSMENT:     Mr. Frederick Hardwick presents with septic shock and is experiencing a significant decline in his premorbid level of function. He also has a L1 compression fracture which is limiting his functional mobility. He would benefit from further OT while here to address these deficits: decreased general strength and endurance for ADLs, decreased sitting tolerance, dependence for all functional mobility and self care and increased pain in low back. He has been to [facility] in the past. I definitely feel that he will need some type of rehab stay(STR vs IRC) prior to his return home. This am, pt has pain with movement. Ortho note recommends pt wear his TLSO when up ambulating. He is not to that point yet. Pt required max assist to hold cup to drink out of. Pt needed verbal cues to sip through straw.  Pt required max assist for rolling and supine to sit and back. He was able to sit on the edge of the bed. He needed encouragement to sit edge of bed longer. Pt is in pain and weak.  4-7-22 This am, he is more hypersensitive to touch and movement. Moaning more. He performs rolling with max assist for brief change. Worked on bed mobility this am: rolling L/R with maximum assist, dependent to scoot to head of bed. Pt not feeling well today. Slow progress with OT. This section established at most recent assessment   PROBLEM LIST (Impairments causing functional limitations):  1. Decreased Strength  2. Decreased ADL/Functional Activities  3. Decreased Transfer Abilities  4. Decreased Ambulation Ability/Technique  5. Decreased Balance  6. Decreased Activity Tolerance   INTERVENTIONS PLANNED: (Benefits and precautions of occupational therapy have been discussed with the patient.)  1. Activities of daily living training  2. Adaptive equipment training  3. Balance training  4. Clothing management  5. Therapeutic activity  6. Therapeutic exercise     TREATMENT PLAN: Frequency/Duration: Follow patient 3x/week to address above goals. Rehabilitation Potential For Stated Goals: Good     REHAB RECOMMENDATIONS (at time of discharge pending progress):    Placement: It is my opinion, based on this patient's performance to date, that Mr. Frederick Hardwick may benefit from participating in 1-2 additional therapy sessions in order to continue to assess for rehab potential and then make recommendation for disposition at discharge.   Equipment:    None at this time              OCCUPATIONAL PROFILE AND HISTORY:   History of Present Injury/Illness (Reason for Referral):  Septic shock; UTI  Past Medical History/Comorbidities:   Mr. Frederick Hardwick  has a past medical history of Adverse effect of anesthesia, Allergic rhinitis, Androgen deficiency (10/2/2015), Anemia (2018), Back pain, Colon polyps, COVID-19 (1/11/2021), [facility] Elevated liver function tests, GERD (gastroesophageal reflux disease), H/O alcohol abuse, Hiatal hernia, Hypercholesteremia (10/2/2015), Hypercholesterolemia, Hypertension, Hypotestosteronism, Migraine, Personal history of prostate cancer, Restless leg, and Sleep apnea. Mr. Elli Alberto  has a past surgical history that includes hx colonoscopy (~2017); hx prostatectomy (2003); hx orthopaedic (Left, 2017); hx other surgical (Right, 10/16/2018); and ir ivc filter (2/3/2022). Social History/Living Environment:   Home Environment: Private residence  # Steps to Enter: 0  Wheelchair Ramp: Yes  One/Two Story Residence: One story  Living Alone: No  Support Systems: Spouse/Significant Other  Patient Expects to be Discharged to[de-identified]  (may need STR)  Current DME Used/Available at Home: Walker, rolling,Walker,Cane, straight,Shower chair,Wheelchair,Commode, bedside  Tub or Shower Type: Shower  Prior Level of Function/Work/Activity:  Independent per pt     Number of Personal Factors/Comorbidities that affect the Plan of Care: Brief history (0):  LOW COMPLEXITY   ASSESSMENT OF OCCUPATIONAL PERFORMANCE[de-identified]   Activities of Daily Living:   Basic ADLs (From Assessment) Complex ADLs (From Assessment)   Feeding: Maximum assistance  Oral Facial Hygiene/Grooming: Total assistance  Bathing: Total assistance  Upper Body Dressing: Total assistance  Lower Body Dressing: Total assistance  Toileting: Total assistance     Grooming/Bathing/Dressing Activities of Daily Living     Cognitive Retraining  Safety/Judgement: Awareness of environment           [address]: Total assistance(dependent)  Bladder Hygiene: Total assistance (dependent)  Bowel Hygiene: Set-up           Bed/Mat Mobility  Rolling: Maximum assistance  Supine to Sit: Maximum assistance; Total assistance  Scooting:  Total assistance     Most Recent Physical Functioning:   Gross Assessment:  AROM: Generally decreased, functional  Strength: Generally decreased, functional  Coordination: Generally decreased, functional  Tone: Normal  Sensation: Intact               Posture:  Posture Assessment: Forward head,Rounded shoulders  Balance:    Bed Mobility:  Rolling: Maximum assistance  Supine to Sit: Maximum assistance; Total assistance  Scooting: Total assistance  Interventions: Manual cues; Verbal cues; Visual cues  Duration: 25 Minutes  Wheelchair Mobility:     Transfers:               Patient Vitals for the past 6 hrs:   BP BP Patient Position SpO2 O2 Flow Rate (L/min) Pulse   22 0600 104/61  96 %  78   22 0612   94 % 2 l/min 76   22 0700 95/60  96 %  74   22 0800 (!) 101/55 At rest;Lying 97 % 2 l/min 78       Mental Status  Neurologic State: Alert  Orientation Level: Oriented to person  Cognition: Follows commands  Perception: Appears intact  Perseveration: No perseveration noted  Safety/Judgement: Awareness of environment                          Physical Skills Involved:  1. Balance  2. Strength  3. Activity Tolerance Cognitive Skills Affected (resulting in the inability to perform in a timely and safe manner): 1. none Psychosocial Skills Affected:  1. none   Number of elements that affect the Plan of Care: 1-3:  LOW COMPLEXITY   CLINICAL DECISION MAKIN[address] [place] [address] AM-PAC 6 Clicks   Daily Activity Inpatient Short Form  How much help from another person does the patient currently need. .. Total A Lot A Little None   1. Putting on and taking off regular lower body clothing? [x] 1   [] 2   [] 3   [] 4   2. Bathing (including washing, rinsing, drying)? [x] 1   [] 2   [] 3   [] 4   3. Toileting, which includes using toilet, bedpan or urinal?   [x] 1   [] 2   [] 3   [] 4   4. Putting on and taking off regular upper body clothing? [x] 1   [] 2   [] 3   [] 4   5. Taking care of personal grooming such as brushing teeth? [x] 1   [] 2   [] 3   [] 4   6. Eating meals? [] 1   [x] 2   [] 3   [] 4   © , Trustees of [address] [place] [id number], under license to [employer].  All rights reserved      Score:  Initial: 7 Most Recent: X (Date: -- )    Interpretation of Tool:  Represents activities that are increasingly more difficult (i.e. Bed mobility, Transfers, Gait). Medical Necessity:     · Patient is expected to demonstrate progress in   · strength, balance, coordination, and functional technique  ·  to   · increase independence with self care and functional mobility   · . Reason for Services/Other Comments:  · Patient continues to require skilled intervention due to   · Decrease self care and functional mobility  · . Use of outcome tool(s) and clinical judgement create a POC that gives a: LOW COMPLEXITY         TREATMENT:   (In addition to Assessment/Re-Assessment sessions the following treatments were rendered)     Pre-treatment Symptoms/Complaints:  tolerated sitting edge of bed briefly  Pain: Initial:   Pain Intensity 1: 8  Pain Location 1:  (all over)  Pain Orientation 1: Mid  Pain Intervention(s) 1: Repositioned  Post Session:  8     Self Care: (25): Procedure(s) (per grid) utilized to improve and/or restore self-care/home management as related to toileting and bed mobility for ADLs. Required minimal verbal and   cueing to facilitate activities of daily living skills and compensatory activities. Assessment/Reassessment     Braces/Orthotics/Lines/Etc:   · ICU monitors  · O2 Device: Nasal cannula  Treatment/Session Assessment:    · Response to Treatment: tolerated fair  · Interdisciplinary Collaboration:   o Physical Therapist  o Occupational Therapist  o Registered Nurse  · After treatment position/precautions:   o Supine in bed  o Bed/Chair-wheels locked  o Bed in low position  o Call light within reach  o RN notified  o Side rails x 3   · Compliance with Program/Exercises: Compliant all of the time. · Recommendations/Intent for next treatment session: \"Next visit will focus on advancements to more challenging activities and reduction in assistance provided\".   Total Treatment Duration:  OT Patient Time In/Time Out  Time In: 0900  Time Out: [address], OT

## 2022-04-07 NOTE — PROGRESS NOTES
Problem: Mobility Impaired (Adult and Pediatric)  Goal: *Acute Goals and Plan of Care (Insert Text)  Note: STG:  (1.)Mr. Younger will move from supine to sit and sit to supine  with MODERATE ASSIST within 7 treatment day(s). (2.)Mr. Younger will transfer from bed to chair and chair to bed with MODERATE ASSIST using the least restrictive device within 7 treatment day(s). (3.)Mr. Younger will tolerate sitting on EOB for 5 minutes without UE support within 7 treatment day(s). (4.)Pt. will increase B LE strength 1/2 grade within 7 days     Will assess transfers and gait as he progresses      PHYSICAL THERAPY: Daily Note and AM 4/7/2022  INPATIENT: PT Visit Days : 3  [place] / [place] B / Product Type: Medicare /       NAME/AGE/GENDER: Flor Frye is a 67 y.o. male   PRIMARY DIAGNOSIS: UTI (urinary tract infection) [N39.0]  Hypotension [I95.9] MRSA bacteremia MRSA bacteremia       ICD-10: Treatment Diagnosis:    · Generalized Muscle Weakness (M62.81)  · Other lack of cordination (R27.8)  · Other abnormalities of gait and mobility (R26.89)   Precaution/Allergies:  Amoxicillin, Lisinopril, and Sulfa (sulfonamide antibiotics)      ASSESSMENT:     Mr. Elida Landers presents with septic shock and is experiencing a significant decline in his premorbid level of function. He also has a L1 compression fracture which is limiting his functional mobility. He would benefit from further PT while here to address these deficits: decreased general strength and endurance for activity, decreased sitting tolerance, dependence for all functional mobility and increased pain in low back and B LEs and increased edema in B LEs. He has been to [facility] in the past. I definitely feel that he will need some type of rehab stay(STR vs IRC) prior to his return home. This am, he is more hypersensitive to touch and movement. Moaning more. He performs AA ROM exs with B LEs in supine.  Worked on bed mobility this am: rolling L/R with maximum assist, dependent to scoot to head of bed. Just not feeling well today. This section established at most recent assessment   PROBLEM LIST (Impairments causing functional limitations):  1. Decreased Strength  2. Decreased ADL/Functional Activities  3. Decreased Balance  4. Increased Pain  5. Decreased Activity Tolerance  6. Increased Fatigue  7. Increased Shortness of Breath  8. Decreased Flexibility/Joint Mobility  9. Decreased Knowledge of Precautions  10. Decreased [place] with Home Exercise Program   INTERVENTIONS PLANNED: (Benefits and precautions of physical therapy have been discussed with the patient.)  1. Bed Mobility  2. Range of Motion (ROM)  3. Therapeutic Activites  4. Therapeutic Exercise/Strengthening  5. Transfer Training     TREATMENT PLAN: Frequency/Duration: daily for duration of hospital stay  Rehabilitation Potential For Stated Goals: [facility] (at time of discharge pending progress):    Placement: It is my opinion, based on this patient's performance to date, that Mr. Elida Landers may benefit from intensive therapy at a [address] [place] [address] after discharge due to the functional deficits listed above that are likely to improve with skilled rehabilitation and concerns that he/she may be unsafe to be unsupervised at home due to the need for significant assistance. Equipment:    None at this time              HISTORY:   History of Present Injury/Illness (Reason for Referral):   Admitted with septic shock  Past Medical History/Comorbidities:   Mr. 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 Mr. Ellyn Monroy  has a past surgical history that includes hx colonoscopy (~2017); hx prostatectomy (2003); hx orthopaedic (Left, 2017); hx other surgical (Right, 10/16/2018); and ir ivc filter (2/3/2022). Social History/Living Environment:   Home Environment: Private residence  # Steps to Enter: 0  Wheelchair Ramp: Yes  One/Two Story Residence: One story  Living Alone: No  Support Systems: Spouse/Significant Other  Patient Expects to be Discharged to[de-identified]  (may need STR)  Current DME Used/Available at Home: Sofy , rolling,Walker,Cane, straight,Shower chair,Wheelchair,Commode, bedside  Tub or Shower Type: Shower  Prior Level of Function/Work/Activity:  States he was functionally I with mobility, but I question this. Dominant Side:         RIGHT   Number of Personal Factors/Comorbidities that affect the Plan of Care: 3+: HIGH COMPLEXITY   EXAMINATION:   Most Recent Physical Functioning:   Gross Assessment:  AROM: Generally decreased, functional (B LEs)  Strength: Grossly decreased, non-functional (B hips: 2-; knees: 2+; ankles 2+)  Coordination: Grossly decreased, non-functional (B LEs due to weakness)  Sensation: Intact (B LEs)               Posture:  Posture Assessment: Forward head,Rounded shoulders  Balance:    Bed Mobility:  Rolling: Maximum assistance  Interventions: Manual cues; Verbal cues; Visual cues  Duration: 25 Minutes  Wheelchair Mobility:     Transfers:     Gait:        Home Environment: Private residence  Home Situation  Home Environment: Private residence  # Steps to Enter: 0  Wheelchair Ramp: Yes  One/Two Story Residence: One story  Living Alone: No  Support Systems: Spouse/Significant Other  Patient Expects to be Discharged to[de-identified]  (may need STR)  Current DME Used/Available at Home: Sofy , rolling,Walker,Cane, straight,Shower chair,Wheelchair,Commode, bedside  Tub or Shower Type: Shower      Body Structures Involved:  1. Nerves  2. Lungs  3. Bones  4. Joints  5. Muscles Body Functions Affected:  1. Sensory/Pain  2. Respiratory  3. Neuromusculoskeletal  4. Movement Related  5. Skin Related Activities and Participation Affected:  1. General Tasks and Demands  2. Mobility  3. Self Care   Number of elements that affect the Plan of Care: 4+: HIGH COMPLEXITY   CLINICAL PRESENTATION:   Presentation: Evolving clinical presentation with changing clinical characteristics: MODERATE COMPLEXITY   CLINICAL DECISION MAKIN [facility] Inpatient Short Form  How much difficulty does the patient currently have. .. Unable A Lot A Little None   1. Turning over in bed (including adjusting bedclothes, sheets and blankets)? [] 1   [x] 2   [] 3   [] 4   2. Sitting down on and standing up from a chair with arms ( e.g., wheelchair, bedside commode, etc.)   [x] 1   [] 2   [] 3   [] 4   3. Moving from lying on back to sitting on the side of the bed? [] 1   [x] 2   [] 3   [] 4   How much help from another person does the patient currently need. .. Total A Lot A Little None   4. Moving to and from a bed to a chair (including a wheelchair)? [x] 1   [] 2   [] 3   [] 4   5. Need to walk in hospital room? [x] 1   [] 2   [] 3   [] 4   6. Climbing 3-5 steps with a railing? [x] 1   [] 2   [] 3   [] 4   © , Trustees of [facility] [employer], under license to [employer]. All rights reserved      Score:  Initial: 8 Most Recent: X (Date: -- )    Interpretation of Tool:  Represents activities that are increasingly more difficult (i.e. Bed mobility, Transfers, Gait). Medical Necessity:     · Patient demonstrates   · fair  ·  rehab potential due to higher previous functional level. Reason for Services/Other Comments:  · Patient   · continues to require present interventions due to patient's inability to perform functional mobility at a safe min assist level   · .    Use of outcome tool(s) and clinical judgement create a POC that gives a: Questionable prediction of patient's progress: MODERATE COMPLEXITY            TREATMENT:   (In addition to Assessment/Re-Assessment sessions the following treatments were rendered)   Pre-treatment Symptoms/Complaints:  Hypersensitive to pain in LEs and back  Pain: Initial:   Pain Intensity 1: 6  Pain Location 1: Back,Generalized,Leg  Pain Orientation 1: Left,Right  Pain Intervention(s) 1: Distraction,Elevation,Emotional support,Exercise,Repositioned  Post Session:  3, all 4 extremities elevated     Therapeutic Activity: (25 Minutes   ):  Therapeutic activities including Bed mobility: rolling L/R, scooting to head of bed, LE exs in supine   to improve mobility, strength, balance, and coordination. Required moderate cues   to  insure safe technique . Braces/Orthotics/Lines/Etc:   · jasso catheter  · Telemetry lines  · O2 Device: Nasal cannula  Treatment/Session Assessment:    · Response to Treatment:  In a lot of pain  · Interdisciplinary Collaboration:   o Physical Therapist  o Occupational Therapist  o Registered Nurse  o Physician  o   · After treatment position/precautions:   o Supine in bed  o Bed/Chair-wheels locked  o Bed in low position  o Call light within reach  o RN notified  o Side rails x 3   · Compliance with Program/Exercises: Will assess as treatment progresses  · Recommendations/Intent for next treatment session: \"Next visit will focus on advancements to more challenging activities and reduction in assistance provided\".   Total Treatment Duration:  PT Patient Time In/Time Out  Time In: 0730  Time Out: [address], PT

## 2022-04-07 NOTE — PROGRESS NOTES
Problem: Risk for Spread of Infection  Goal: Prevent transmission of infectious organism to others  Description: Prevent the transmission of infectious organisms to other patients, staff members, and visitors. Outcome: Progressing Towards Goal     Problem: Falls - Risk of  Goal: *Absence of Falls  Description: Document Demetria Hardwick Fall Risk and appropriate interventions in the flowsheet.   Outcome: Progressing Towards Goal  Note: Fall Risk Interventions:  Mobility Interventions: Assess mobility with egress test,Bed/chair exit alarm,PT Consult for mobility concerns    Mentation Interventions: Adequate sleep, hydration, pain control,Reorient patient,More frequent rounding    Medication Interventions: Assess postural VS orthostatic hypotension,Bed/chair exit alarm    Elimination Interventions: Bed/chair exit alarm,Call light in reach    History of Falls Interventions: Bed/chair exit alarm         Problem: Patient Education:  Go to Education Activity  Goal: Patient/Family Education  Outcome: Progressing Towards Goal     Problem: Patient Education: Go to Patient Education Activity  Goal: Patient/Family Education  Outcome: Progressing Towards Goal     Problem: Patient Education: Go to Patient Education Activity  Goal: Patient/Family Education  Outcome: Progressing Towards Goal     Problem: Patient Education: Go to Patient Education Activity  Goal: Patient/Family Education  Outcome: Progressing Towards Goal     Problem: Patient Education: Go to Patient Education Activity  Goal: Patient/Family Education  Outcome: Progressing Towards Goal     Problem: Patient Education: Go to Patient Education Activity  Goal: Patient/Family Education  Outcome: Progressing Towards Goal

## 2022-04-07 NOTE — PROGRESS NOTES
Hospitalist Progress Note   Admit Date:  4/3/2022  2:25 PM   Name:  Chinmay Younger   Age:  67 y.o. Sex:  male  :  1945   MRN:  [id number]   Room:  [facility]/01    Presenting Complaint: Hypotension    Reason(s) for Admission: UTI (urinary tract infection) [N39.0]  Hypotension [I95.9]     Hospital Course & Interval History: This is a 70-year-old male with past medical history significant for congestive heart failure with preserved ejection fraction, liver cirrhosis, orthostatic hypotension, prostate cancer status post surgery as well as radiation therapy, was sent to the ER from urgent care because of hypotension. Patient usually has orthostatic hypotension and is on midodrine. In the ER, his blood pressure was found to be in the 60s. ER work-up shows patient has leukocytosis, lactic acidosis, UTI. He also has chronic back pain with compression fractures. Patient was started on Levophed overnight due to hypotension. Blood cultures on admission positive for MRSA. Patient was started on vancomycin. ID consulted. Echocardiogram was done on admission which showed hyperdynamic left ventricular systolic function with EF of greater than 32%, normal diastolic function. No visible vegetations. Repeat blood cultures were done on  and still positive. Given patient's history of liver cirrhosis pt is too risky for DAPHNEY, as he may have varices and also risk of anesthesia and may not be a candidate for any surgical intervention. ID recommends continuing vancomycin and obtaining MRI of the spine as well as repeating blood cultures on . Subjective/24hr Events (22): Patient's p.o. intake is very poor. Discussed with dietician about possible NGT. Patient complains of pain all over. Denies specific pain areas. Denies CP/SOB. Denies N/V. Denies F/C.    ROS:  10 systems reviewed and negative except as noted above. Assessment & Plan:     Principal Problem:     This is a 66y Male with: Septic shock ([facility] (4/4/2022) secondary to MRSA bacteremia  Septic shock resolved  MRSA bacteremia-unknown etiology  Patient meets criteria for sepsis with leukocytosis, fever, tachycardia, tachypnea, lactic acidosis, positive blood cultures. 4/5 Able to wean off of Levophed. Midodrine dose decreased to 5 mg p.o. 3 times daily. Blood cultures on admission positive for MRSA. ID on board. Appreciate recommendations. TTE showed no visible vegetations. 4/6 blood cultures repeated yesterday still positive. ID recommends obtaining MRI of the spine. Repeat blood cultures in a.m. Patient may not be a candidate for DAPHNEY given his liver cirrhosis, may have underlying esophageal varices, as well as poor surgical candidate and risks of anesthesia. Continue IV vancomycin day 3. He may need to be treated empirically with IV antibiotics. 4/7 Repeat blood cultures taken this AM. Continue Vanc. ID following. Elevated troponin-no signs of acute coronary syndrome  Likely secondary to demand supply mismatch. Cardiology consulted. Appreciate recommendations. Echocardiogram shows preserved left ventricular ejection fraction of greater than 65% with hyperdynamic left ventricular systolic function, normal diastolic function. No evidence of vegetations on the transthoracic echocardiogram.  No plans for DAPHNEY at this time, given his liver cirrhosis now underlying esophageal varices as well as poor surgical candidate and risks of anesthesia. Acute on chronic normocytic anemia  Multifactorial.  Patient's baseline hemoglobin seems to be approximately 8-9. Hemoglobin this morning is 8. Patient denies any visible external bleeding. Hold Lovenox. Hypokalemia  Hypomagnesemia  Resolved  Repeat magnesium in a.m. Lactic acidosis present on admission  Resolved    L1 compression fracture  Multilevel lumbar spondylosis  Interval progression of L3 compression deformity  Orthopedics consulted.   Recommends brace and outpatient follow-up. Given sepsis may not be a candidate for any intervention at this time. Reported history of chronic diastolic congestive heart failure  Echo showed no evidence of congestive heart failure this admission. Liver cirrhosis    Hypoalbuminemia/moderate protein calorie malnutrition/hypoglycemia from poor p.o. intake. Ordered dextrose containing fluids. Nutrition consulted. Patient started on mirtazapine as he complains of poor appetite. Discharge Planning:      Anticipate inpatient hospital stay for at least 72 -96 hours. PT/OT eval. Pt may be a candidate for LTAC. Case management on board. Diet:  ADULT DIET Dysphagia - Soft & Bite Sized; Likes soup  ADULT ORAL NUTRITION SUPPLEMENT Lunch, Dinner; Standard High Calorie/High Protein  ADULT ORAL NUTRITION SUPPLEMENT Breakfast, Lunch; Clear Liquid  DVT PPx: Lovenox held.  SCDs  Code status: DNR    Hospital Problems as of 4/7/2022 Date Reviewed: 3/10/2022          Codes Class Noted - Resolved POA    * (Principal) MRSA bacteremia ICD-10-CM: R78.81, B95.62  ICD-9-CM: 790.7, 041.12  4/7/2022 - Present Yes        Septic shock (HCC) ICD-10-CM: A41.9, R65.21  ICD-9-CM: 038.9, 785.52, 995.92  4/4/2022 - Present Yes        UTI (urinary tract infection) ICD-10-CM: N39.0  ICD-9-CM: 599.0  4/3/2022 - Present Yes        Iron deficiency anemia due to chronic blood loss ICD-10-CM: D50.0  ICD-9-CM: 280.0  4/7/2022 - Present Yes        Lactic acidosis ICD-10-CM: E87.2  ICD-9-CM: 276.2  4/3/2022 - Present Yes        Hypotension ICD-10-CM: I95.9  ICD-9-CM: 458.9  4/3/2022 - Present Yes        (HFpEF) heart failure with preserved ejection fraction (HCC) (Chronic) ICD-10-CM: I50.30  ICD-9-CM: 428.9  1/19/2022 - Present Yes        Alcoh[facility] liver without [facility] ([facility] ICD-10-CM: K70.30  ICD-9-CM: 571.2  10/8/2021 - Present Yes    Overview Signed 1/31/2022  4:07 PM by Elsa Walters DO     Last Assessment & Plan:   Formatting of this note might be different from the original.  Follow-up for edema and ascites  Avoid Tylenol  Check labs on Mondays             Thrombocytopenia due to sequestration [facility]) ICD-10-CM: D69.6  ICD-9-CM: 287.5  1/31/2022 - Present Yes        Prostate cancer ([facility] ICD-10-CM: C61  ICD-9-CM: 185  5/13/2016 - Present Yes        GERD (gastroesophageal reflux disease) ICD-10-CM: K21.9  ICD-9-CM: 530.81  10/2/2015 - Present Yes    Overview Signed 1/31/2022  4:07 PM by Alejandra Singer, DO     Last Assessment & Plan:   Formatting of this note might be different from the original.  Stable, continue current medications and treatment plan             Severe protein-calorie malnutrition ([facility] ICD-10-CM: E43  ICD-9-CM: 262  4/5/2022 - Present Yes        Hypoalbuminemia due to protein-calorie malnutrition ([facility] (Chronic) ICD-10-CM: E88.09, E46  ICD-9-CM: 273.8, 263.9  1/31/2022 - Present Yes        BENIGNO (obstructive sleep apnea) ICD-10-CM: G47.33  ICD-9-CM: 327.23  10/2/2015 - Present Yes    Overview Signed 1/31/2022  4:07 PM by Alejandra Singer, DO     Formatting of this note might be different from the original.  Dr. Kristen Russell:   Ivanna Blair of this note might be different from the original.  Continue CPAP while sleeping             Restless leg syndrome ICD-10-CM: G25.81  ICD-9-CM: 333.94  10/2/2015 - Present Yes    Overview Signed 1/31/2022  4:07 PM by Alejandra Singer, DO     Last Assessment & Plan:   Formatting of this note might be different from the original.  Symptoms well controlled. Continue pramipexole.                    Objective:     Patient Vitals for the past 24 hrs:   Temp Pulse Resp BP SpO2   04/07/22 1300  81 15 105/69 97 %   04/07/22 1230  83 16  96 %   04/07/22 1200  93 19 111/71 96 %   04/07/22 1130  91 25  97 %   04/07/22 1100  91 24 103/67 99 %   04/07/22 1030  87 23  98 %   04/07/22 1000  93 22 110/65 97 %   04/07/22 0930  83 17  99 %   04/07/22 0900  91 28 106/67 96 %   04/07/22 0830  88 23  95 %   04/07/22 0800 98 °F (36.7 °C) 78 15 (!) 101/55 97 %   04/07/22 0700  74 15 95/60 96 %   04/07/22 0612  76 15  94 %   04/07/22 0600  78 15 104/61 96 %   04/07/22 0500  77 13 95/61 96 %   04/07/22 0400  78 14 96/65 95 %   04/07/22 0300 99.5 °F (37.5 °C) 78 15 (!) 95/55 95 %   04/07/22 0200  78 19 100/62 95 %   04/07/22 0100  79 14 104/65 96 %   04/07/22 0000  80 14 (!) 89/51 95 %   04/06/22 2300 99 °F (37.2 °C) 78 15 93/62 96 %   04/06/22 2200  76 13 (!) 96/55 95 %   04/06/22 2135  85 20 113/65 95 %   04/06/22 2100  90 17 (!) 103/56 95 %   04/06/22 2000 99.5 °F (37.5 °C) 84 21 (!) 94/57 94 %   04/06/22 1900  84 22 103/67 97 %   04/06/22 1800  88 18 (!) 106/58 97 %   04/06/22 1700  91 23 114/62 95 %     Oxygen Therapy  O2 Sat (%): 97 % (04/07/22 1300)  Pulse via Oximetry: 80 beats per minute (04/07/22 1300)  O2 Device: Nasal cannula (04/07/22 0800)  Skin Assessment: Clean, dry, & intact (04/07/22 0800)  Skin Protection for O2 Device: N/A (04/07/22 0800)  O2 Flow Rate (L/min): 2 l/min (04/07/22 0800)  ETCO2 (mmHg): 96 mmHg (04/05/22 1400)    Estimated body mass index is 28.91 kg/m² as calculated from the following:    Height as of this encounter: 5' 11\" (1.803 m). Weight as of this encounter: 94 kg (207 lb 4.8 oz). Intake/Output Summary (Last 24 hours) at 4/7/2022 1658  Last data filed at 4/7/2022 1113  Gross per 24 hour   Intake 1449.46 ml   Output 470 ml   Net 979.46 ml         Physical Exam:     Blood pressure 105/69, pulse 81, temperature 98 °F (36.7 °C), resp. rate 15, height 5' 11\" (1.803 m), weight 94 kg (207 lb 4.8 oz), SpO2 97 %. General:    Elderly male, acutely ill and chronically ill-appearing, mildly confused  Head:  Normocephalic, atraumatic  Eyes:  Sclerae appear normal.  Pupils equally round. ENT:  Nares appear normal, no drainage. Moist oral mucosa  Neck:  No restricted ROM. Trachea midline   CV:   RRR.   Grade 3/6 systolic murmur heard in aortic area, no jugular venous distension. Lungs:   CTAB. No wheezing, rhonchi, or rales. Respirations even, unlabored  Abdomen: Bowel sounds present. Soft, nontender, nondistended. Extremities: No cyanosis or clubbing. 1- 2+ leg edema bilaterally  Skin:     No rashes and normal coloration. Warm and dry. Neuro:  CN II-XII grossly intact. Sensation intact. A&Ox3  Psych:  Normal mood and affect. I have reviewed ordered lab tests and independently visualized imaging below:    Recent Labs:  Recent Results (from the past 48 hour(s))   GLUCOSE, POC    Collection Time: 04/05/22  8:35 PM   Result Value Ref Range    Glucose (POC) 139 (H) 65 - 100 mg/dL    Performed by [address] [place] [address], POC    Collection Time: 04/05/22 11:16 PM   Result Value Ref Range    Glucose (POC) 80 65 - 100 mg/dL    Performed by [facility]    BLOOD CULTURE ID PANEL    Collection Time: 04/06/22  3:45 AM    Specimen: Blood   Result Value Ref Range    Acc. no. from Micro Order [id number]     Staphylococcus Detected (A) NOTDET      Staphylococcus aureus Detected (A) NOTDET      mecA (Methicillin-Resistance Genes) Detected (A) NOTDET      INTERPRETATION        Gram positive cocci in clusters, identified by realtime PCR as SUSPECTED MRSA.    GLUCOSE, POC    Collection Time: 04/06/22  4:04 AM   Result Value Ref Range    Glucose (POC) 71 65 - 100 mg/dL    Performed by [address] [place] [address], POC    Collection Time: 04/06/22  7:35 AM   Result Value Ref Range    Glucose (POC) 62 (L) 65 - 100 mg/dL    Performed by Tom Glaser, RANDOM    Collection Time: 04/06/22  7:56 AM   Result Value Ref Range    Vancomycin, random 14.3 UG/ML   CBC WITH AUTOMATED DIFF    Collection Time: 04/06/22  7:56 AM   Result Value Ref Range    WBC 6.8 4.3 - 11.1 K/uL    RBC 2.93 (L) 4.23 - 5.6 M/uL    HGB 8.0 (L) 13.6 - 17.2 g/dL    HCT 24.6 (L) 41.1 - 50.3 %    MCV 84.0 79.6 - 97.8 FL    MCH 27.3 26.1 - 32.9 PG    MCHC 32.5 31.4 - 35.0 g/dL    RDW 16.3 (H) 11.9 - 14.6 %    PLATELET 66 (L) 003 - 450 K/uL    MPV 8.5 (L) 9.4 - 12.3 FL    ABSOLUTE NRBC 0.00 0.0 - 0.2 K/uL    NEUTROPHILS 73 43 - 78 %    LYMPHOCYTES 10 (L) 13 - 44 %    MONOCYTES 14 (H) 4.0 - 12.0 %    EOSINOPHILS 3 0.5 - 7.8 %    BASOPHILS 0 0.0 - 2.0 %    IMMATURE GRANULOCYTES 0 0.0 - 5.0 %    ABS. NEUTROPHILS 4.9 1.7 - 8.2 K/UL    ABS. LYMPHOCYTES 0.7 0.5 - 4.6 K/UL    ABS. MONOCYTES 1.0 0.1 - 1.3 K/UL    ABS. EOSINOPHILS 0.2 0.0 - 0.8 K/UL    ABS. BASOPHILS 0.0 0.0 - 0.2 K/UL    ABS. IMM.  GRANS. 0.0 0.0 - 0.5 K/UL    RBC COMMENTS MODERATE  HYPOCHROMIA        WBC COMMENTS Result Confirmed By Smear      PLATELET COMMENTS DECREASED      DF MANUAL     METABOLIC PANEL, BASIC    Collection Time: 04/06/22  7:56 AM   Result Value Ref Range    Sodium 140 136 - 145 mmol/L    Potassium 4.0 3.5 - 5.1 mmol/L    Chloride 110 (H) 98 - 107 mmol/L    CO2 26 21 - 32 mmol/L    Anion gap 4 (L) 7 - 16 mmol/L    Glucose 65 65 - 100 mg/dL    BUN 32 (H) 8 - 23 MG/DL    Creatinine 0.71 (L) 0.8 - 1.5 MG/DL    GFR est AA >60 >60 ml/min/1.73m2    GFR est non-AA >60 >60 ml/min/1.73m2    Calcium 8.2 (L) 8.3 - 10.4 MG/DL   GLUCOSE, POC    Collection Time: 04/06/22  8:55 AM   Result Value Ref Range    Glucose (POC) 76 65 - 100 mg/dL    Performed by Hansel He    GLUCOSE, POC    Collection Time: 04/06/22 11:45 AM   Result Value Ref Range    Glucose (POC) 99 65 - 100 mg/dL    Performed by Hansel He    GLUCOSE, POC    Collection Time: 04/06/22  4:34 PM   Result Value Ref Range    Glucose (POC) 105 (H) 65 - 100 mg/dL    Performed by Aj    GLUCOSE, POC    Collection Time: 04/06/22  7:17 PM   Result Value Ref Range    Glucose (POC) 99 65 - 100 mg/dL    [facility]    GLUCOSE, POC    Collection Time: 04/06/22 11:05 PM   Result Value Ref Range    Glucose (POC) 78 65 - 100 mg/dL    [facility] by [facility]    OCCULT BLOOD, STOOL    Collection Time: 04/06/22 11:15 PM   Result Value Ref Range Occult blood, stool Negative NEG     GLUCOSE, POC    Collection Time: 04/07/22  3:09 AM   Result Value Ref Range    Glucose (POC) 85 65 - 100 mg/dL    Performed by Juana    CBC WITH AUTOMATED DIFF    Collection Time: 04/07/22  3:21 AM   Result Value Ref Range    WBC 8.8 4.3 - 11.1 K/uL    RBC 2.93 (L) 4.23 - 5.6 M/uL    HGB 7.9 (L) 13.6 - 17.2 g/dL    HCT 24.2 (L) 41.1 - 50.3 %    MCV 82.6 79.6 - 97.8 FL    MCH 27.0 26.1 - 32.9 PG    MCHC 32.6 31.4 - 35.0 g/dL    RDW 16.2 (H) 11.9 - 14.6 %    PLATELET 74 (L) 105 - 450 K/uL    MPV 8.9 (L) 9.4 - 12.3 FL    ABSOLUTE NRBC 0.00 0.0 - 0.2 K/uL    DF AUTOMATED      NEUTROPHILS 74 43 - 78 %    LYMPHOCYTES 11 (L) 13 - 44 %    MONOCYTES 12 4.0 - 12.0 %    EOSINOPHILS 2 0.5 - 7.8 %    BASOPHILS 0 0.0 - 2.0 %    IMMATURE GRANULOCYTES 1 0.0 - 5.0 %    ABS. NEUTROPHILS 6.5 1.7 - 8.2 K/UL    ABS. LYMPHOCYTES 1.0 0.5 - 4.6 K/UL    ABS. MONOCYTES 1.1 0.1 - 1.3 K/UL    ABS. EOSINOPHILS 0.2 0.0 - 0.8 K/UL    ABS. BASOPHILS 0.0 0.0 - 0.2 K/UL    ABS. IMM.  GRANS. 0.1 0.0 - 0.5 K/UL   METABOLIC PANEL, BASIC    Collection Time: 04/07/22  3:21 AM   Result Value Ref Range    Sodium 142 136 - 145 mmol/L    Potassium 4.0 3.5 - 5.1 mmol/L    Chloride 111 (H) 98 - 107 mmol/L    CO2 25 21 - 32 mmol/L    Anion gap 6 (L) 7 - 16 mmol/L    Glucose 85 65 - 100 mg/dL    BUN 34 (H) 8 - 23 MG/DL    Creatinine 0.62 (L) 0.8 - 1.5 MG/DL    GFR est AA >60 >60 ml/min/1.73m2    GFR est non-AA >60 >60 ml/min/1.73m2    Calcium 8.2 (L) 8.3 - 10.4 MG/DL   MAGNESIUM    Collection Time: 04/07/22  3:21 AM   Result Value Ref Range    Magnesium 1.8 1.8 - 2.4 mg/dL   GLUCOSE, POC    Collection Time: 04/07/22  7:43 AM   Result Value Ref Range    Glucose (POC) 81 65 - 100 mg/dL    Performed by One[facility](ART)    GLUCOSE, POC    Collection Time: 04/07/22 11:02 AM   Result Value Ref Range    Glucose (POC) 106 (H) 65 - 100 mg/dL    Performed by One[facility](ART)    PLEASE READ & DOCUMENT PPD TEST IN 24 HRS Collection Time: 04/07/22  2:27 PM   Result Value Ref Range    PPD Negative Negative    mm Induration 0 0 - 5 mm   GLUCOSE, POC    Collection Time: 04/07/22  4:24 PM   Result Value Ref Range    Glucose (POC) 101 (H) 65 - 100 mg/dL    Performed by [facility](BS)        All Micro Results     Procedure Component Value Units Date/Time    CULTURE, BLOOD [[id number]]  (Abnormal) Collected: 04/05/22 0324    Order Status: Completed Specimen: Blood Updated: 04/07/22 0659     Special Requests: --        RIGHT  HAND       GRAM STAIN       GRAM POS COCCI IN CLUSTERS                  AEROBIC AND ANAEROBIC BOTTLES                  RESULTS VERIFIED, PHONED TO AND READ BACK BY            HOANG BONDS @2130 BY TS 4/5/22.      Culture result: STAPHYLOCOCCUS AUREUS               CULTURE IN PROGRESS,FURTHER UPDATES TO FOLLOW          CULTURE, BLOOD [[id number]]  (Abnormal) Collected: 04/05/22 0324    Order Status: Completed Specimen: Blood Updated: 04/07/22 0657     Special Requests: --        RIGHT  ARM       GRAM STAIN GRAM POSITIVE COCCI               AEROBIC AND ANAEROBIC BOTTLES            RESULTS VERIFIED,PHONED TO AND READ BACK BY TS TO [facility] VAMSHI RN AT 4/5/22 AT 2130     Culture result:       STAPHYLOCOCCUS AUREUS SENSITIVITY TO FOLLOW                  Refer to Blood Culture ID Panel Accession  [id number]      CULTURE, BLOOD [[id number]] Collected: 04/07/22 0313    Order Status: Completed Specimen: Blood Updated: 04/07/22 0355    CULTURE, BLOOD [[id number]] Collected: 04/07/22 0321    Order Status: Completed Specimen: Blood Updated: 04/07/22 0355    CULTURE, BLOOD [[id number]]  (Abnormal) Collected: 04/03/22 1608    Order Status: Completed Specimen: Blood Updated: 04/06/22 0654     Special Requests: --        RIGHT  Antecubital       GRAM STAIN GRAM POSITIVE COCCI               AEROBIC AND ANAEROBIC BOTTLES                  RESULTS VERIFIED, PHONED TO AND READ BACK BY Shona Shipman (R) RN BY NB AT [address] [place] [address] ON [id number]           Culture result: STAPHYLOCOCCUS AUREUS               For Susceptibility Refer to Culture  [id number]      CULTURE, BLOOD [[id number]]  (Abnormal)  (Susceptibility) Collected: 04/03/22 1608    Order Status: Completed Specimen: Blood Updated: 04/06/22 0651     Special Requests: --        LEFT  Antecubital       GRAM STAIN GRAM POSITIVE COCCI               AEROBIC AND ANAEROBIC BOTTLES                  RESULTS VERIFIED, PHONED TO AND READ BACK BY Shona Shipman (R) RN BY NB AT 0420 ON [id number]                  RESULTS VERIFIED, PHONED TO AND READ BACK BY LEANDER ESCOBEDO AT 1128 PN 4/4/22, [address]           Culture result:       * Methicillin Resistant Staphylococcus aureus *                  Refer to Blood Culture ID Panel Accession  [id number]              MRSA CALLED TO AND CORRECTLY REPEATED BY:  [facility] WALL AT 0649 ON 4/6/22, KT      BLOOD CULTURE ID PANEL [[id number]]  (Abnormal) Collected: 04/06/22 0345    Order Status: Completed Specimen: Blood Updated: 04/06/22 0508     Acc. no. from Micro Order [id number]     Staphylococcus Detected        Staphylococcus aureus Detected        Comment: RESULTS VERIFIED, PHONED TO AND READ BACK BY  Jake Pimentel, RN @0507 4.6.22 SC          mecA (Methicillin-Resistance Genes) Detected        Comment: Presence of mecA is highly indicative of methicillin resistance. The test does not replace traditional culture and susceptibilities        INTERPRETATION       Gram positive cocci in clusters, identified by realtime PCR as SUSPECTED MRSA. Comment: Recommend IV vancomycin use, unlikely to be a contaminant. Infectious Diseases Consult recommended in adult patients.   THIS TEST DOES NOT REPLACE SENSITIVITY TESTING.       BLOOD CULTURE ID PANEL [[id number]]  (Abnormal) Collected: 04/03/22 1608    Order Status: Completed Specimen: Blood Updated: 04/04/22 1132     Acc. no. from Micro Order [id number]     Staphylococcus Detected        Staphylococcus aureus Detected        Comment: RESULTS VERIFIED, PHONED TO AND READ BACK BY  Bear ESCOBEDO AT 1996 ON 4/4/22, KYLER          mecA (Methicillin-Resistance Genes) Detected        Comment: Presence of mecA is highly indicative of methicillin resistance. The test does not replace traditional culture and susceptibilities        INTERPRETATION       Gram positive cocci in clusters, identified by realtime PCR as SUSPECTED MRSA. Comment: Recommend IV vancomycin use, unlikely to be a contaminant. Infectious Diseases Consult recommended in adult patients. THIS TEST DOES NOT REPLACE SENSITIVITY TESTING. Other Studies:  No results found.     Current Meds:  Current Facility-Administered Medications   Medication Dose Route Frequency    pantoprazole (PROTONIX) tablet 40 mg  40 mg Oral ACB    vancomycin (VANCOCIN) 1250 mg in  ml infusion  1,250 mg IntraVENous Q12H    dextrose 5% and 0.9% NaCl infusion  75 mL/hr IntraVENous CONTINUOUS    midodrine (PROAMATINE) tablet 5 mg  5 mg Oral TID WITH MEALS    mirtazapine (REMERON) tablet 7.5 mg  7.5 mg Oral QHS    glucose chewable tablet 16 g  16 g Oral PRN    glucagon (GLUCAGEN) injection 1 mg  1 mg IntraMUSCular PRN    dextrose 10% infusion 125-250 mL  125-250 mL IntraVENous PRN    multivitamin, tx-iron-ca-min (THERA-M w/ IRON) tablet 1 Tablet  1 Tablet Oral DAILY    [Held by provider] bumetanide (BUMEX) tablet 1 mg  1 mg Oral BID    fenofibrate (LOFIBRA) tablet 160 mg  160 mg Oral DAILY    folic acid (FOLVITE) tablet 1 mg  1 mg Oral DAILY    montelukast (SINGULAIR) tablet 10 mg  10 mg Oral DAILY    rOPINIRole (REQUIP) tablet 1 mg  1 mg Oral QAM    rOPINIRole (REQUIP) tablet 2 mg  2 mg Oral QHS    atorvastatin (LIPITOR) tablet 40 mg  40 mg Oral QHS    traZODone (DESYREL) tablet 50 mg  50 mg Oral QHS PRN    sodium chloride (NS) flush 5-40 mL  5-40 mL IntraVENous Q8H    sodium chloride (NS) flush 5-40 mL  5-40 mL IntraVENous PRN    acetaminophen (TYLENOL) tablet 650 mg  650 mg Oral Q6H PRN    Or    acetaminophen (TYLENOL) suppository 650 mg  650 mg Rectal Q6H PRN    polyethylene glycol (MIRALAX) packet 17 g  17 g Oral DAILY    promethazine (PHENERGAN) tablet 12.5 mg  12.5 mg Oral Q6H PRN    Or    ondansetron (ZOFRAN) injection 4 mg  4 mg IntraVENous Q6H PRN    [Held by provider] enoxaparin (LOVENOX) injection 40 mg  40 mg SubCUTAneous DAILY    oxyCODONE IR (ROXICODONE) tablet 5 mg  5 mg Oral Q4H PRN    morphine injection 4 mg  4 mg IntraVENous Q4H PRN       Signed:  Calista Arnett DO    Part of this note may have been written by using a voice dictation software. The note has been proof read but may still contain some grammatical/other typographical errors.

## 2022-04-07 NOTE — PALLIATIVE CARE
Late entry for 4/6/2021 -- discussed in rounds by PT/OT, nursing, case management, physician, resp therapy. Addressing discharge needs. CM following.

## 2022-04-07 NOTE — PROGRESS NOTES
Bedside and Verbal shift change report given to ROCHELLE Zapata (oncoming nurse) by Iwona Maier RN (offgoing nurse). Report included the following information SBAR, Kardex, Accordion, Recent Results, Cardiac Rhythm SR and Dual Neuro Assessment.

## 2022-04-07 NOTE — PROGRESS NOTES
RN CM attempted to meet with the patient. He was sleeping and no family was present. RN CM called his spouse Sharon and left a voicemail.

## 2022-04-08 NOTE — PROGRESS NOTES
1636 Lab unable to draw blood. Notified Dr Eric Lloyd. Ok to move lab to morning draw and attempt then. Hold off on any more labs this day. IF unable to draw labs and cultures in the am, ok to order central line at that time. Called VAT to attempt endurance cath. Pt unable to tolerate SCDs. Blood noted in urine. No VTE prophylaxis at this time per Dr Eric Lloyd. Discussed with spouse at bedside. Spouse in agreement.

## 2022-04-08 NOTE — PROGRESS NOTES
TRANSFER - OUT REPORT:    Verbal report given to Belinda Mendoza RN on Francisco Elise  being transferred to IR room 6 for routine post - op       Report consisted of patients Situation, Background, Assessment and   Recommendations(SBAR). Information from the following report(s) SBAR, Procedure Summary and MAR was reviewed with the receiving nurse. Opportunity for questions and clarification was provided. Conscious Sedation:   25 Mcg of Fentanyl administered  1 Mg of Versed administered    Pt tolerated procedure well.      Visit Vitals  BP (!) 90/59   Pulse 83   Temp 97.5 °F (36.4 °C)   Resp 20   Ht 5' 11\" (1.803 m)   Wt 96.2 kg (212 lb)   SpO2 99%   BMI 29.57 kg/m²     Past Medical History:   Diagnosis Date    Adverse effect of anesthesia     reports \"slow to get over effects\" of anesthesia- drowsy    Allergic rhinitis     Androgen deficiency 10/2/2015    Anemia 2018    Back pain     Colon polyps     COVID-19 1/11/2021    Last Assessment & Plan:  Formatting of this note might be different from the original. Now COVID recovered    Diabetes ([facility] [address]     oral agents; rarely checks BS, reports 138 today; occasional hypo symptoms (shaky) unaware of hypo level    Elevated liver function tests     GERD (gastroesophageal reflux disease)     controlled with meds    H/O alcohol abuse     Hiatal hernia     Hypercholesteremia 10/2/2015    Last Assessment & Plan:  Formatting of this note might be different from the original. chronic stable condition, refilled Simvastatin 80 mg 90/2    Hypercholesterolemia     Hypertension     well controlled with meds    Hypotestosteronism     Migraine     Personal history of prostate cancer     Restless leg     Sleep apnea     wears CPAP     Peripheral IV 04/05/22 Right Wrist (Active)   Site Assessment Clean, dry, & intact 04/08/22 1000   Phlebitis Assessment 0 04/08/22 1000   Infiltration Assessment 0 04/08/22 1000   Dressing Status Clean, dry, & intact 04/08/22 1000 Dressing Type Tape;Transparent 04/08/22 1000   Hub Color/Line Status Pink; Infusing;Flushed;Patent 04/08/22 1000   Action Taken Blood drawn;Open ports on tubing capped 04/08/22 0300   Alcohol Cap Used Yes 04/08/22 1000

## 2022-04-08 NOTE — PROGRESS NOTES
TRANSFER - IN REPORT:    Verbal report received from [place], RN(name) on Kalyani Gan  being received from IR prior [facility] ICU (unit) for routine progression of care      Report consisted of patients Situation, Background, Assessment and   Recommendations(SBAR). Information from the following report(s) SBAR, Procedure Summary, Intake/Output, MAR, Recent Results and Cardiac Rhythm NSR was reviewed with the receiving nurse. Opportunity for questions and clarification was provided. Assessment completed upon patients arrival to unit and care assumed.

## 2022-04-08 NOTE — PROGRESS NOTES
I was unable to see Mr. Sesar Peguero today as he was in IR and transferring between hospitals. He has MRSA bacteremia (4/3, 4/5, 4/7) from unclear source. MRI spine done as he has compression fractures without involvement of those but possible small psoas abscess. IVC filter removed by IR today. TTE done with valve calcifications and he is not a candidate for DAPHNEY. I have changed him from vanc to dapto/cefatroline given persistently positive cultures. Repeat cultures are ordered for tomorrow morning and would repeat these every 24-48 hours if they remain positive until they are negative. Anticipating treating x 8 weeks given spine involvement and inability to obtain DAPHNEY. To note, he has had prior DVTs and significant hematuria necessitating IVC filter which is now removed in setting of infection. This will greatly alter care going forward. He has also been quite sleepy the last few days and having significant pain. Palliative care has been consulted to assist with these issues.      ID plan:  daptomycin 8mg/kg and ceftaroline 600mg IV q8h - will need pharmacy assistance with ordering ceftaroline as I am not able  Repeat bcx in AM x 2   Planning 8 weeks of therapy but start date TBD    ID will follow up again on Monday unless called

## 2022-04-08 NOTE — PROGRESS NOTES
Possible signs and symptoms of aspiration noted. Hanna swallow screen completed, patient failed. Speech therapy consult ordered.

## 2022-04-08 NOTE — PROGRESS NOTES
Pt to be admitted to room 611 s/p recovery period. Report called to Quita Carpenter RN. All questions answered. IVC filter removed. Wife at bedside.

## 2022-04-08 NOTE — PROCEDURES
Department of Interventional Radiology  [phone]        Interventional Radiology Brief Procedure Note    Patient: Nan Nguyen MRN: [id number]  SSN: [id number]    YOB: 1945  Age: 67 y.o. Sex: male      Date of Procedure: 4/8/2022    Pre-Procedure Diagnosis: MRSA bacteremia. Severe back pain secondary to VCFs. Recent history of hemorrhagic cystitis. Post-Procedure Diagnosis: SAME    Procedure(s): Venogram and IVCFilter retrieval.     Brief Description of Procedure: RIJV access. Performed By: Nuris Garcia MD     Assistants: None    Anesthesia:Moderate Sedation    Estimated Blood Loss: Less than 10ml    Specimens: IVCF to Microbiology    Implants:  None    Findings: No clot in the IVC. No vegetation on the IVCF. Complications: None    Recommendations: 1 hour bedrest.  OK to start AntiCoagulation. Follow Up: PRN.       Signed By: Nuris Garcia MD     April 8, 2022

## 2022-04-08 NOTE — PROGRESS NOTES
TRANSFER - OUT REPORT:    Verbal report given to [facility], RN on Bennie Aj  being transferred to [phone] for routine progression of care       Report consisted of patients Situation, Background, Assessment and Recommendations(SBAR). Information from the following report(s) SBAR, Kardex, Intake/Output and Cardiac Rhythm NSR was reviewed with the receiving nurse. Opportunity for questions and clarification was provided.

## 2022-04-08 NOTE — H&P
Please see my discharge summary for full HPI and physical exam and progress note for full assessment and plan.     Tyson Sheridan, DO

## 2022-04-08 NOTE — PROGRESS NOTES
TRANSFER - IN REPORT:    Verbal report received from [facility] on Carmela Colmenares  being received from IR for routine post - op      Report consisted of patients Situation, Background, Assessment and   Recommendations(SBAR). Information from the following report(s) SBAR, Kardex, ED Summary, Procedure Summary, Intake/Output, MAR and Recent Results was reviewed with the receiving nurse. Opportunity for questions and clarification was provided. Assessment will be completed upon patients arrival to unit and care assumed.

## 2022-04-08 NOTE — PROGRESS NOTES
TRANSFER - OUT REPORT:    Verbal report given to Ravinder Rausch RN on Hannah Seat  being transferred to [facility] for routine progression of care       Report consisted of patients Situation, Background, Assessment and Recommendations(SBAR). Information from the following report(s) SBAR, Kardex, Intake/Output, Recent Results and Cardiac Rhythm NSR was reviewed with the receiving nurse. Opportunity for questions and clarification was provided.

## 2022-04-08 NOTE — PROGRESS NOTES
SPEECH PATHOLOGY NOTE:    Speech therapy consult received and appreciated. Per chart review, patient transferring DT for a procedure. Will follow up at later time/date.       Armando Low MS, CCC-SLP

## 2022-04-08 NOTE — PROGRESS NOTES
Pt to IR Suite 1 via stretcher with RN.       IR Nurse Pre-Procedure Checklist Part 2          Consent signed: Yes    H&P complete:  Yes    Antibiotics: Not applicable    Airway/Mallampati Done: Yes    Shaved: Yes    Pregnancy Form:Not applicable    Patient Position: Yes    MD Side: Yes     Biopsy Worksheet: Yes    Specimen Medium: Yes

## 2022-04-08 NOTE — DISCHARGE SUMMARY
Hospitalist Discharge Summary     Patient ID:  Miguel Mulligan  [id number]  3 y.o.  1945  Admit date: 4/3/2022  Discharge date: 4/8/2022  Attending: Moe Pacheco DO  PCP:  Criselda Brandon MD  Treatment Team: Attending Provider: Moe Pacheco DO; Consulting Provider: Alicia Potts MD; Care Manager: Coral Castro, RN; Utilization Review: Diane Oreilly RN; Hospitalist: Moe Pacheco DO; Care Manager: Majo Blanco RN; Physical Therapist: Amos Hernandez PT    Principal Diagnosis MRSA bacteremia    Hospital Problems as of 4/8/2022 Date Reviewed: 3/10/2022          Codes Class Noted - Resolved POA    * (Principal) MRSA bacteremia ICD-10-CM: R78.81, B95.62  ICD-9-CM: 790.7, 041.12  4/7/2022 - Present Yes        Septic shock ([facility] ICD-10-CM: A41.9, R65.21  ICD-9-CM: 038.9, 785.52, 995.92  4/4/2022 - Present Yes        UTI (urinary tract infection) ICD-10-CM: N39.0  ICD-9-CM: 599.0  4/3/2022 - Present Yes        Iron deficiency anemia due to chronic blood loss ICD-10-CM: D50.0  ICD-9-CM: 280.0  4/7/2022 - Present Yes        Lactic acidosis ICD-10-CM: E87.2  ICD-9-CM: 276.2  4/3/2022 - Present Yes        Hypotension ICD-10-CM: I95.9  ICD-9-CM: 458.9  4/3/2022 - Present Yes        (HFpEF) heart failure with preserved ejection fraction (HCC) (Chronic) ICD-10-CM: I50.30  ICD-9-CM: 428.9  1/19/2022 - Present Yes        Alcoholic cirrhosis of liver without ascites ([facility] ICD-10-CM: K70.30  ICD-9-CM: 571.2  10/8/2021 - Present Yes    Overview Signed 1/31/2022  4:07 PM by Lubna Walls DO     Last Assessment & Plan:   Formatting of this note might be different from the original.  Follow-up for edema and ascites  Avoid Tylenol  Check labs on Mondays             Thrombocytopenia due to sequestration [facility]) ICD-10-CM: D69.6  ICD-9-CM: 287.5  1/31/2022 - Present Yes        Prostate cancer ([facility] ICD-10-CM: Maxi Parrot  ICD-9-CM: 185  5/13/2016 - Present Yes        GERD (gastroesophageal reflux disease) ICD-10-CM: K21.9  ICD-9-CM: 530.81  10/2/2015 - Present Yes    Overview Signed 1/31/2022  4:07 PM by Nicole Valdivia,      Last Assessment & Plan:   Formatting of this note might be different from the original.  Stable, continue current medications and treatment plan             Severe protein-calorie malnutrition ([facility] [address] ICD-10-CM: E43  ICD-9-CM: 262  4/5/2022 - Present Yes        Hypoalbuminemia due to protein-calorie malnutrition ([facility] [address] (Chronic) ICD-10-CM: E88.09, E46  ICD-9-CM: 273.8, 263.9  1/31/2022 - Present Yes        BENIGNO (obstructive sleep apnea) ICD-10-CM: G47.33  ICD-9-CM: 327.23  10/2/2015 - Present Yes    Overview Signed 1/31/2022  4:07 PM by Nicole Valdivia DO     Formatting of this note might be different from the original.  Dr. Chaparro Silveira:   Noa Adler of this note might be different from the original.  Continue CPAP while sleeping             Restless leg syndrome ICD-10-CM: G25.81  ICD-9-CM: 333.94  10/2/2015 - Present Yes    Overview Signed 1/31/2022  4:07 PM by Nicole Valdivia,      Last Assessment & Plan:   Formatting of this note might be different from the original.  Symptoms well controlled. Continue pramipexole. Hospital Course:  Please refer to the admission H&P for details of presentation. In summary, the patient is a 71-year-old male with past medical history significant for congestive heart failure with preserved ejection fraction, liver cirrhosis, orthostatic hypotension, prostate cancer status post surgery as well as radiation therapy, was sent to the ER from urgent care because of hypotension. Patient usually has orthostatic hypotension and is on midodrine. In the ER, his blood pressure was found to be in the 60s. ER work-up shows patient has leukocytosis, lactic acidosis, UTI. He also has chronic back pain with compression fractures. Patient was started on Levophed overnight due to hypotension.   Blood cultures on admission positive for MRSA. Patient was started on vancomycin. ID consulted. Echocardiogram was done on admission which showed hyperdynamic left ventricular systolic function with EF of greater than 94%, normal diastolic function. No visible vegetations. Repeat blood cultures were done on 4/5 and still positive. Given patient's history of liver cirrhosis pt is too risky for DAPHNEY, as he may have varices and also risk of anesthesia and may not be a candidate for any surgical intervention. ID recommended continuing vancomycin and obtaining MRI of the spine as well as repeating blood cultures on 4/7. MRI revealed multilevel fractures and DJD, could not rule out OM and a small right psoas abscess. Discussed with IR who said abscess was too small to drain. His blood cultures from 4/7 remained positive so he was transferred to IR for IVC filter removal and to stay downtown for eventual discharge to [place]. Did Patient Have Sepsis: YES    Procedures done this admission:  * No surgery found *    Consults this admission:  IP CONSULT TO CARDIOLOGY  IP CONSULT TO INFECTIOUS DISEASES  IP CONSULT TO PALLIATIVE CARE - PROVIDER  IP CONSULT TO INTERVENTIONAL RADIOLOGY  IP CONSULT TO ORTHOPEDIC SURGERY  IP CONSULT TO PALLIATIVE CARE - PROVIDER    Significant Diagnostic Studies:    Labs: Results:       Chemistry Recent Labs     04/08/22 0331 04/07/22 0321 04/06/22  0756   GLU 93 85 65    142 140   K 4.0 4.0 4.0   * 111* 110*   CO2 24 25 26   BUN 36* 34* 32*   CREA 0.65* 0.62* 0.71*   CA 8.0* 8.2* 8.2*   AGAP 6* 6* 4*      CBC w/Diff Recent Labs     04/08/22 0331 04/07/22  0321 04/06/22  0756   WBC 9.5 8.8 6.8   RBC 2.86* 2.93* 2.93*   HGB 7.7* 7.9* 8.0*   HCT 23.6* 24.2* 24.6*   PLT 75* 74* 66*   GRANS 74 74 73   LYMPH 12* 11* 10*   EOS 3 2 3      Cardiac Enzymes Recent Labs     04/08/22  0331   CPK 18*      Coagulation No results for input(s): PTP, INR, APTT, INREXT in the last 72 hours.     Lipid Panel No results found for: CHOL, CHOLPOCT, CHOLX, CHLST, CHOLV, 718912, HDL, HDLP, LDL, LDLC, DLDLP, 195790, VLDLC, VLDL, TGLX, TRIGL, TRIGP, TGLPOCT, CHHD, CHHDX   BNP No results for input(s): BNPP in the last 72 hours. Liver Enzymes No results for input(s): TP, ALB, TBIL, AP in the last 72 hours. No lab exists for component: SGOT, GPT, DBIL   Thyroid Studies No results found for: T4, T3U, TSH, TSHEXT         Imaging:  XR SPINE LUMB 2 OR 3 V    Result Date: 4/3/2022  1. Interval development of L1 compression fracture. 2. Interval progression of L3 compression deformity, now with vertebral plana. 3. Osteopenia. 4. Multilevel lumbar spondylosis. 5. Interval placement of IVC filter. MRI LUMB SPINE W WO CONT    Result Date: 4/6/2022  1. Multilevel degenerative disc changes and areas of mild spinal canal and mild to moderate neural foraminal stenosis throughout the lumbar spine. No evidence of infectious or metastatic disease to account for the T12 and L3 vertebral body compression fractures. 2. Subtle fluid signal with subtle surrounding enhancement right psoas muscle at approximately the L1-2 level. Small hematoma and/or early changes of abscess are possible in light of patient's bacteremia. Microbiology/Cultures:   All Micro Results     Procedure Component Value Units Date/Time    CULTURE, BLOOD [[id number]] Collected: 04/07/22 0321    Order Status: Completed Specimen: Blood Updated: 04/08/22 0955     Special Requests: --        RIGHT  HAND       GRAM STAIN GRAM POSITIVE COCCI         AEROBIC BOTTLE POSITIVE               RESULTS VERIFIED, PHONED TO AND READ BACK BY MJ TO Susie Kelly RN AT 0945 ON 4/8/22           Culture result:       CULTURE IN PROGRESS,FURTHER UPDATES TO FOLLOW          CULTURE, BLOOD [[id number]]  (Abnormal) Collected: 04/05/22 0324    Order Status: Completed Specimen: Blood Updated: 04/08/22 0716     Special Requests: --        RIGHT  HAND       GRAM STAIN       GRAM POS COCCI IN CLUSTERS                  AEROBIC AND ANAEROBIC BOTTLES                  RESULTS VERIFIED, PHONED TO AND READ BACK BY            HOANG BONDS @2130 BY TS 4/5/22. Culture result:       STAPHYLOCOCCUS AUREUS For Susceptibility Refer to Culture ACCESSION [id number]          CULTURE, BLOOD [[id number]]  (Abnormal)  (Susceptibility) Collected: 04/05/22 0324    Order Status: Completed Specimen: Blood Updated: 04/08/22 0713     Special Requests: --        RIGHT  ARM       GRAM STAIN GRAM POSITIVE COCCI               AEROBIC AND ANAEROBIC BOTTLES            RESULTS VERIFIED,PHONED TO AND READ BACK BY TS TO [facility] VAMSHI RN AT 4/5/22 AT 2130     Culture result:       * Methicillin Resistant Staphylococcus aureus *                  Refer to Blood Culture ID Panel Accession  [id number]        PATIENT IS A KNOWN MRSA       CULTURE, BLOOD [[id number]] Collected: 04/07/22 0313    Order Status: Completed Specimen: Blood Updated: 04/08/22 0227     Special Requests: --        LEFT  HAND       GRAM STAIN AEROBIC BOTTLE POSITIVE         GRAM POSITIVE COCCI               CRITICAL RESULT NOT CALLED DUE TO PREVIOUS NOTIFICATION OF CRITICAL RESULT WITHIN THE LAST 24 HOURS.            Culture result:       CULTURE IN PROGRESS,FURTHER UPDATES TO FOLLOW          CULTURE, BLOOD [[id number]]  (Abnormal) Collected: 04/03/22 1608    Order Status: Completed Specimen: Blood Updated: 04/06/22 0654     Special Requests: --        RIGHT  Antecubital       GRAM STAIN GRAM POSITIVE COCCI               AEROBIC AND ANAEROBIC BOTTLES                  RESULTS VERIFIED, PHONED TO AND READ BACK BY Shona Shipman (R) RN BY NB AT [address] [place] [address] ON 192065           Culture result: STAPHYLOCOCCUS AUREUS               For Susceptibility Refer to Culture  [id number]      CULTURE, BLOOD [[id number]]  (Abnormal)  (Susceptibility) Collected: 04/03/22 1608    Order Status: Completed Specimen: Blood Updated: 04/06/22 0651     Special Requests: --        LEFT  Antecubital       GRAM STAIN Kareen Fitzgerald POSITIVE COCCI               AEROBIC AND ANAEROBIC BOTTLES                  RESULTS VERIFIED, PHONED TO AND READ BACK BY Shona Shipman (R) RN BY NB AT 0420 ON [id number]                  RESULTS VERIFIED, PHONED TO AND READ BACK BY LEANDER ESCOBEDO AT 1128 PN 4/4/22, [address]           Culture result:       * Methicillin Resistant Staphylococcus aureus *                  Refer to Blood Culture ID Panel Accession  [id number]              MRSA CALLED TO AND CORRECTLY REPEATED BY:  [facility] WALL AT 0649 ON 4/6/22, KYLER      BLOOD CULTURE ID PANEL [[id number]]  (Abnormal) Collected: 04/06/22 0345    Order Status: Completed Specimen: Blood Updated: 04/06/22 0508     Acc. no. from Micro Order [id number]     Staphylococcus Detected        Staphylococcus aureus Detected        Comment: RESULTS VERIFIED, PHONED TO AND READ BACK BY  Jake Pimentel RN @0507 4.6.22 SC          mecA (Methicillin-Resistance Genes) Detected        Comment: Presence of mecA is highly indicative of methicillin resistance. The test does not replace traditional culture and susceptibilities        INTERPRETATION       Gram positive cocci in clusters, identified by realtime PCR as SUSPECTED MRSA. Comment: Recommend IV vancomycin use, unlikely to be a contaminant. Infectious Diseases Consult recommended in adult patients. THIS TEST DOES NOT REPLACE SENSITIVITY TESTING.       BLOOD CULTURE ID PANEL [[id number]]  (Abnormal) Collected: 04/03/22 1608    Order Status: Completed Specimen: Blood Updated: 04/04/22 1132     Acc. no. from Micro Order [id number]     Staphylococcus Detected        Staphylococcus aureus Detected        Comment: RESULTS VERIFIED, PHONED TO AND READ BACK BY  LEANDER ESCOBEDO AT 4712 ON 4/4/22, KYLER          mecA (Methicillin-Resistance Genes) Detected        Comment: Presence of mecA is highly indicative of methicillin resistance.    The test does not replace traditional culture and susceptibilities        INTERPRETATION       Gram positive cocci in clusters, identified by realtime PCR as SUSPECTED MRSA. Comment: Recommend IV vancomycin use, unlikely to be a contaminant. Infectious Diseases Consult recommended in adult patients. THIS TEST DOES NOT REPLACE SENSITIVITY TESTING. Discharge Exam:  Visit Vitals  /72   Pulse 77   Temp 97.8 °F (36.6 °C)   Resp 14   Ht 5' 11\" (1.803 m)   Wt 96.3 kg (212 lb 3.2 oz)   SpO2 96%   BMI 29.60 kg/m²     General appearance: alert, cooperative, appears ill  Lungs: scattered rhonchi bilaterally  Heart: regular rate and rhythm, S1, S2 normal, no murmur, click, rub or gallop  Abdomen: soft, non-tender. Bowel sounds normal. No masses,  no organomegaly  Extremities: 1-2+ BLE edema  Neurologic: Grossly normal    Disposition: Transfer to DT  Discharge Condition: stable  Patient Instructions:   Current Discharge Medication List          Follow-up:  No orders of the defined types were placed in this encounter. Follow-up Information    None         Plan was discussed with nursing, case mangement, patient, and wife. All questions answered. Patient was stable at time of discharge. Instructions given to call a physician or return if any concerns. Time spent in patient discharge and coordination 45 minutes.   Signed:  Alex Reed DO  4/8/2022  9:54 AM

## 2022-04-08 NOTE — PROGRESS NOTES
RN CM discussed discharge planning with the attending MD. The patient may need a procedure today. The patient is being reviewed by [facility] for a possible admission once he's medically ready for discharge.

## 2022-04-08 NOTE — PROGRESS NOTES
Care Management Interventions  PCP Verified by CM: Yes Jake Valderrama MD)  Mode of Transport at Discharge: Other (see comment)  Transition of Care Consult (CM Consult): Discharge [address]: No  Reason Outside [place]: Patient already serviced by other home care/hospice agency (Already with [facility])  Physical Therapy Consult: Yes  Occupational Therapy Consult: Yes  Support Systems: Spouse/Significant Other  Confirm Follow Up Transport: Other (see comment) (medical transport)  The Patient and/or Patient Representative was Provided with a Choice of Provider and Agrees with the Discharge Plan?: Yes  Name of the Patient Representative Who was Provided with a Choice of Provider and Agrees with the Discharge Plan: Patient and spouse  Freedom of Choice List was Provided with Basic Dialogue that Supports the Patient's Individualized Plan of Care/Goals, Treatment Preferences and Shares the Quality Data Associated with the Providers?: Yes  Discharge Location  Patient Expects to be Discharged to[de-identified] Other: ([facility])    The patient is transferring to [facility].

## 2022-04-08 NOTE — PROGRESS NOTES
US Guided PIV access    Called for assistance with IV access. Ultrasound was used to find the vein which was compressible and does not have any features of an artery or nerve bundle. Skin was cleaned and disinfected prior to IV puncture. Under real-time ultrasound guidance peripheral access was obtained in the left basilic using 20 G 6 CM Extended Dwell Peripheral IV catheter LOT # [id number] x 1attempt. Blood return was present and IV flushed without difficulty. IV dressing applied, no immediate complications noted, and patient tolerated the procedure well.     Abdirahman Omer RN, PCCN

## 2022-04-08 NOTE — PROGRESS NOTES
04/08/22 1420   Dual Skin Pressure Injury Assessment   Dual Skin Pressure Injury Assessment X   Second Care Provider (Based on [address] [place] [address]) Yuli Mclaughlin RN   Sacrum  Mid   Buttocks/Ishium  Right   Skin Integumentary   Skin Integumentary (WDL) X    Pressure  Injury Documentation Pressure Injury Noted-See Wound LDA to Document   Skin Color Ecchymosis (comment)   Skin Condition/Temp Inflamed   Skin Integrity Wound (add Wound LDA); Excoriation; Incision (comment); Abrasion;Tear  (sacrum, incision to R neck, tear to bilateral arms)   Turgor Epidermis thin w/ loss of subcut tissue   Wound Prevention and Protection Methods   Orientation of Wound Prevention Posterior;Left;Right   Location of Wound Prevention Buttocks; Sacrum/Coccyx;Gluteal fold   Dressing Present  Yes; Intact, not due to be changed   Dressing Status Intact  (applied)   Wound Offloading (Prevention Methods) Bed, pressure reduction mattress;Repositioning;Turning;Specialty boot/shoe; Foam silicone;Elevate heels   Patient's skin very fragile. Skin tears to Bilateral arms. Weeping noted to bilateral arms. Scrotum red and edematous +4. Prevalon boots applied to BLE. Foam pad applied to sacrum and gauze and kerlex applied to weeping arms.

## 2022-04-08 NOTE — PROGRESS NOTES
Bedside and Verbal shift change report given to Mee Shoemaker RN (oncoming nurse) by Marlene Mason RN (offgoing nurse). Report included the following information SBAR, Kardex, Intake/Output, MAR, Recent Results, Med Rec Status, Cardiac Rhythm NSR and Alarm Parameters .       Infusions: D5%-0.9%NaCl @ 75mL/hr

## 2022-04-09 NOTE — PROGRESS NOTES
Hospitalist Progress Note   Admit Date:  2022  2:18 PM   Name:  Gayle Younger   Age:  67 y.o. Sex:  male  :  1945   MRN:  [id number]   Room:      Presenting Complaint: No chief complaint on file. Reason(s) for Admission: MRSA bacteremia [R78.81, B95.62]     Hospital Course & Interval History: This is a 65-year-old male with past medical history significant for congestive heart failure with preserved ejection fraction, liver cirrhosis, orthostatic hypotension, prostate cancer status post surgery as well as radiation therapy, was sent to the ER from urgent care because of hypotension. Patient usually has orthostatic hypotension and is on midodrine. In the ER, his blood pressure was found to be in the 60s. ER work-up shows patient has leukocytosis, lactic acidosis, UTI. He also has chronic back pain with compression fractures. Patient was started on Levophed overnight due to hypotension. Blood cultures on admission positive for MRSA. Patient was started on vancomycin. ID consulted. Echocardiogram was done on admission which showed hyperdynamic left ventricular systolic function with EF of greater than 92%, normal diastolic function. No visible vegetations. Repeat blood cultures were done on  and still positive. Given patient's history of liver cirrhosis pt is too risky for DAPHNEY, as he may have varices and also risk of anesthesia and may not be a candidate for any surgical intervention. ID recommends continuing vancomycin and obtaining MRI of the spine as well as repeating blood cultures on . Subjective/24hr Events (22): Patient seen and examined with his wife and daughter at bedside. Patient reports severe back pain. Patient denies fever chills nausea vomiting chest pain shortness of breath. ROS:  10 systems reviewed and negative except as noted above. Assessment & Plan:     Principal Problem:     This is a 66y Male with:     Septic shock ([address] (4/4/2022) secondary to MRSA bacteremia/osteomyelitis/infected IVC filter  Septic shock resolved  MRSA bacteremia-unknown etiology  Patient meets criteria for sepsis with leukocytosis, fever, tachycardia, tachypnea, lactic acidosis, positive blood cultures. 4/5 Able to wean off of Levophed. Midodrine dose decreased to 5 mg p.o. 3 times daily. Blood cultures on admission positive for MRSA. ID on board. Appreciate recommendations. TTE showed no visible vegetations. 4/6 blood cultures repeated yesterday still positive. ID recommends obtaining MRI of the spine. Repeat blood cultures in a.m. Patient may not be a candidate for DAPHNEY given his liver cirrhosis, may have underlying esophageal varices, as well as poor surgical candidate and risks of anesthesia. Continue IV vancomycin day 3. He may need to be treated empirically with IV antibiotics. 4/7 Repeat blood cultures taken this AM. Continue Vanc. ID following. 4/9-repeat blood cultures obtained today. Patient transition to daptomycin per infectious disease. Continue to monitor blood cultures. Presumed infective endocarditis-  4/9-patient not able to tolerate DAPHNEY secondary to underlying cirrhosis and concern for variceal's and potential bleeding complications. Given inability to tolerate DAPHNEY will presume patient has infective endocarditis and will prolong course of antibiotics      Elevated troponin-no signs of acute coronary syndrome  Likely secondary to demand supply mismatch. Cardiology consulted. Appreciate recommendations. Echocardiogram shows preserved left ventricular ejection fraction of greater than 65% with hyperdynamic left ventricular systolic function, normal diastolic function. No evidence of vegetations on the transthoracic echocardiogram.  No plans for DAPHNEY at this time, given his liver cirrhosis now underlying esophageal varices as well as poor surgical candidate and risks of anesthesia.     Acute on chronic normocytic anemia  Multifactorial.  Patient's baseline hemoglobin seems to be approximately 8-9. Hemoglobin this morning is 8. Patient denies any visible external bleeding. Hold Lovenox. Hypokalemia  Hypomagnesemia  Resolved  Repeat magnesium in a.m. Lactic acidosis present on admission  Resolved    L1 compression fracture  Multilevel lumbar spondylosis  Interval progression of L3 compression deformity  Orthopedics consulted. Recommends brace and outpatient follow-up. Given sepsis may not be a candidate for any intervention at this time. Reported history of chronic diastolic congestive heart failure  Echo showed no evidence of congestive heart failure this admission. Liver cirrhosis    Hypoalbuminemia/moderate protein calorie malnutrition/hypoglycemia from poor p.o. intake. Ordered dextrose containing fluids. Nutrition consulted. Patient started on mirtazapine as he complains of poor appetite. Discharge Planning:      Anticipate inpatient hospital stay for at least 72 -96 hours. PT/OT eval. Pt may be a candidate for LTAC. Case management on board. Diet:  ADULT ORAL NUTRITION SUPPLEMENT Breakfast, Lunch; Clear Liquid  ADULT ORAL NUTRITION SUPPLEMENT Lunch, Dinner; Standard High Calorie/High Protein  ADULT ORAL NUTRITION SUPPLEMENT Breakfast, Lunch, Dinner; Diabetic Supplement  ADULT DIET Dysphagia - Minced & Moist; Likes soup  DVT PPx: Lovenox held.  SCDs  Code status: DNR    Hospital Problems as of 4/9/2022 Date Reviewed: 3/10/2022          Codes Class Noted - Resolved POA    MRSA bacteremia ICD-10-CM: R78.81, B95.62  ICD-9-CM: 790.7, 041.12  4/7/2022 - Present Unknown              Objective:     Patient Vitals for the past 24 hrs:   Temp Pulse Resp BP SpO2   04/09/22 1018 97.6 °F (36.4 °C) (!) 50 16 107/60 98 %   04/09/22 0720 97.5 °F (36.4 °C) 94 12 115/70 96 %   04/09/22 0354 98.3 °F (36.8 °C) 92 20 126/64 96 %   04/08/22 2341 98.5 °F (36.9 °C) 92 18 118/80 97 %   04/08/22 1951 98.5 °F (36.9 °C) 91 20 116/80 96 %   04/08/22 1500 97.4 °F (36.3 °C) 88 16 130/79 96 %   04/08/22 1437 98.8 °F (37.1 °C) 94 16 121/71 94 %     Oxygen Therapy  O2 Sat (%): 98 % (04/09/22 1018)  O2 Device: Nasal cannula (04/08/22 1953)  O2 Flow Rate (L/min): 3 l/min (04/08/22 1953)    Estimated body mass index is 29.89 kg/m² as calculated from the following:    Height as of an earlier encounter on 4/8/22: 5' 11\" (1.803 m). Weight as of this encounter: 97.2 kg (214 lb 4.6 oz). Intake/Output Summary (Last 24 hours) at 4/9/2022 1322  Last data filed at 4/9/2022 0958  Gross per 24 hour   Intake    Output 450 ml   Net -450 ml         Physical Exam:     Blood pressure 107/60, pulse (!) 50, temperature 97.6 °F (36.4 °C), resp. rate 16, weight 97.2 kg (214 lb 4.6 oz), SpO2 98 %. General:    Elderly male, acutely ill and chronically ill-appearing, mildly confused  Head:  Normocephalic, atraumatic  Eyes:  Sclerae appear normal.  Pupils equally round. ENT:  Nares appear normal, no drainage. Moist oral mucosa  Neck:  No restricted ROM. Trachea midline   CV:   RRR. Grade 3/6 systolic murmur heard in aortic area, no jugular venous distension. Lungs:   CTAB. No wheezing, rhonchi, or rales. Respirations even, unlabored  Abdomen: Bowel sounds present. Soft, nontender, nondistended. Extremities: No cyanosis or clubbing. 1- 2+ leg edema bilaterally  Skin:     No rashes and normal coloration. Warm and dry. Neuro:  CN II-XII grossly intact. Sensation intact. A&Ox3  Psych:  Normal mood and affect.       I have reviewed ordered lab tests and independently visualized imaging below:    Recent Labs:  Recent Results (from the past 48 hour(s))   PLEASE READ & DOCUMENT PPD TEST IN 24 HRS    Collection Time: 04/07/22  2:27 PM   Result Value Ref Range    PPD Negative Negative    mm Induration 0 0 - 5 mm   GLUCOSE, POC    Collection Time: 04/07/22  4:24 PM   Result Value Ref Range    Glucose (POC) 101 (H) 65 - 100 mg/dL    Performed by Julissa)    GLUCOSE, POC    Collection Time: 04/07/22  8:40 PM   Result Value Ref Range    Glucose (POC) 87 65 - 100 mg/dL    Performed by Juana    GLUCOSE, POC    Collection Time: 04/07/22 11:17 PM   Result Value Ref Range    Glucose (POC) 88 65 - 100 mg/dL    Performed by [facility]    GLUCOSE, POC    Collection Time: 04/08/22  3:29 AM   Result Value Ref Range    Glucose (POC) 93 65 - 100 mg/dL    Performed by [facility]    CBC WITH AUTOMATED DIFF    Collection Time: 04/08/22  3:31 AM   Result Value Ref Range    WBC 9.5 4.3 - 11.1 K/uL    RBC 2.86 (L) 4.23 - 5.6 M/uL    HGB 7.7 (L) 13.6 - 17.2 g/dL    HCT 23.6 (L) 41.1 - 50.3 %    MCV 82.5 79.6 - 97.8 FL    MCH 26.9 26.1 - 32.9 PG    MCHC 32.6 31.4 - 35.0 g/dL    RDW 16.6 (H) 11.9 - 14.6 %    PLATELET 75 (L) 618 - 450 K/uL    MPV 9.5 9.4 - 12.3 FL    ABSOLUTE NRBC 0.00 0.0 - 0.2 K/uL    DF AUTOMATED      NEUTROPHILS 74 43 - 78 %    LYMPHOCYTES 12 (L) 13 - 44 %    MONOCYTES 10 4.0 - 12.0 %    EOSINOPHILS 3 0.5 - 7.8 %    BASOPHILS 0 0.0 - 2.0 %    IMMATURE GRANULOCYTES 1 0.0 - 5.0 %    ABS. NEUTROPHILS 7.0 1.7 - 8.2 K/UL    ABS. LYMPHOCYTES 1.2 0.5 - 4.6 K/UL    ABS. MONOCYTES 1.0 0.1 - 1.3 K/UL    ABS. EOSINOPHILS 0.3 0.0 - 0.8 K/UL    ABS. BASOPHILS 0.0 0.0 - 0.2 K/UL    ABS. IMM.  GRANS. 0.1 0.0 - 0.5 K/UL   METABOLIC PANEL, BASIC    Collection Time: 04/08/22  3:31 AM   Result Value Ref Range    Sodium 143 136 - 145 mmol/L    Potassium 4.0 3.5 - 5.1 mmol/L    Chloride 113 (H) 98 - 107 mmol/L    CO2 24 21 - 32 mmol/L    Anion gap 6 (L) 7 - 16 mmol/L    Glucose 93 65 - 100 mg/dL    BUN 36 (H) 8 - 23 MG/DL    Creatinine 0.65 (L) 0.8 - 1.5 MG/DL    GFR est AA >60 >60 ml/min/1.73m2    GFR est non-AA >60 >60 ml/min/1.73m2    Calcium 8.0 (L) 8.3 - 10.4 MG/DL   VANCOMYCIN, RANDOM    Collection Time: 04/08/22  3:31 AM   Result Value Ref Range    Vancomycin, random 25.2 UG/ML   CK    Collection Time: 04/08/22  3:31 AM   Result Value Ref Range    CK 18 (L) 21 - 215 U/L   PLEASE READ & DOCUMENT PPD TEST IN 48 HRS    Collection Time: 04/08/22 10:15 AM   Result Value Ref Range    PPD Negative Negative    mm Induration 0 0 - 5 mm   GLUCOSE, POC    Collection Time: 04/08/22 11:14 AM   Result Value Ref Range    Glucose (POC) 85 65 - 100 mg/dL    Performed by [address] [place] [address], MEDICAL DEVICE    Collection Time: 04/08/22 12:12 PM    Specimen: Surgical Specimen   Result Value Ref Range    Special Requests: IVC FILTER     GRAM STAIN PENDING     Culture result: NO GROWTH 1 DAY     GLUCOSE, POC    Collection Time: 04/08/22  4:30 PM   Result Value Ref Range    Glucose (POC) 71 65 - 100 mg/dL    Performed by [facility]    GLUCOSE, POC    Collection Time: 04/08/22  9:11 PM   Result Value Ref Range    Glucose (POC) 76 65 - 100 mg/dL    Performed by Constantino    GLUCOSE, POC    Collection Time: 04/09/22  7:12 AM   Result Value Ref Range    Glucose (POC) 113 (H) 65 - 100 mg/dL    Performed by Ellen    CBC WITH AUTOMATED DIFF    Collection Time: 04/09/22  9:33 AM   Result Value Ref Range    WBC 16.5 (H) 4.3 - 11.1 K/uL    RBC 3.36 (L) 4.23 - 5.6 M/uL    HGB 9.1 (L) 13.6 - 17.2 g/dL    HCT 28.2 (L) 41.1 - 50.3 %    MCV 83.9 79.6 - 97.8 FL    MCH 27.1 26.1 - 32.9 PG    MCHC 32.3 31.4 - 35.0 g/dL    RDW 17.0 (H) 11.9 - 14.6 %    PLATELET 684 (L) 989 - 450 K/uL    MPV 10.0 9.4 - 12.3 FL    ABSOLUTE NRBC 0.00 0.0 - 0.2 K/uL    DF AUTOMATED      NEUTROPHILS 77 43 - 78 %    LYMPHOCYTES 12 (L) 13 - 44 %    MONOCYTES 8 4.0 - 12.0 %    EOSINOPHILS 3 0.5 - 7.8 %    BASOPHILS 1 0.0 - 2.0 %    IMMATURE GRANULOCYTES 1 0.0 - 5.0 %    ABS. NEUTROPHILS 12.6 (H) 1.7 - 8.2 K/UL    ABS. LYMPHOCYTES 1.9 0.5 - 4.6 K/UL    ABS. MONOCYTES 1.3 0.1 - 1.3 K/UL    ABS. EOSINOPHILS 0.5 0.0 - 0.8 K/UL    ABS. BASOPHILS 0.1 0.0 - 0.2 K/UL    ABS. IMM.  GRANS. 0.1 0.0 - 0.5 K/UL   METABOLIC PANEL, BASIC    Collection Time: 04/09/22  9:33 AM   Result Value Ref Range Sodium 145 136 - 145 mmol/L    Potassium 4.4 3.5 - 5.1 mmol/L    Chloride 116 (H) 98 - 107 mmol/L    CO2 22 21 - 32 mmol/L    Anion gap 7 7 - 16 mmol/L    Glucose 169 (H) 65 - 100 mg/dL    BUN 36 (H) 8 - 23 MG/DL    Creatinine 0.80 0.8 - 1.5 MG/DL    GFR est AA >60 >60 ml/min/1.73m2    GFR est non-AA >60 >60 ml/min/1.73m2    Calcium 8.5 8.3 - 10.4 MG/DL   PROTHROMBIN TIME + INR    Collection Time: 04/09/22  9:33 AM   Result Value Ref Range    Prothrombin time 23.7 (H) 12.6 - 14.5 sec    INR 2.1     GLUCOSE, POC    Collection Time: 04/09/22 10:12 AM   Result Value Ref Range    Glucose (POC) 107 (H) 65 - 100 mg/dL    Performed by Ellen        All Micro Results     Procedure Component Value Units Date/Time    CULTURE, BLOOD [[id number]] Collected: 04/09/22 1148    Order Status: Completed Specimen: Blood Updated: 04/09/22 1206    CULTURE, BLOOD [[id number]] Collected: 04/09/22 1148    Order Status: Completed Specimen: Blood Updated: 04/09/22 1206          Other Studies:  No results found.     Current Meds:  Current Facility-Administered Medications   Medication Dose Route Frequency    acetaminophen (TYLENOL) tablet 650 mg  650 mg Oral Q6H PRN    Or    acetaminophen (TYLENOL) suppository 650 mg  650 mg Rectal Q6H PRN    polyethylene glycol (MIRALAX) packet 17 g  17 g Oral DAILY    promethazine (PHENERGAN) tablet 12.5 mg  12.5 mg Oral Q6H PRN    Or    ondansetron (ZOFRAN) injection 4 mg  4 mg IntraVENous Q6H PRN    sodium chloride (NS) flush 5-40 mL  5-40 mL IntraVENous Q8H    sodium chloride (NS) flush 5-40 mL  5-40 mL IntraVENous PRN    dextrose 10% infusion 125-250 mL  125-250 mL IntraVENous PRN    glucagon (GLUCAGEN) injection 1 mg  1 mg IntraMUSCular PRN    glucose chewable tablet 16 g  16 g Oral PRN    DAPTOmycin (CUBICIN) 750 mg in 0.9% sodium chloride 15 mL IV Syringe  8 mg/kg IntraVENous Q24H    dextrose 5% and 0.9% NaCl infusion  75 mL/hr IntraVENous CONTINUOUS    fenofibrate (LOFIBRA) tablet 160 mg  160 mg Oral DAILY    folic acid (FOLVITE) tablet 1 mg  1 mg Oral DAILY    midodrine (PROAMATINE) tablet 5 mg  5 mg Oral TID WITH MEALS    mirtazapine (REMERON) tablet 7.5 mg  7.5 mg Oral QHS    montelukast (SINGULAIR) tablet 10 mg  10 mg Oral DAILY    morphine injection 4 mg  4 mg IntraVENous Q4H PRN    multivitamin, tx-iron-ca-min (THERA-M w/ IRON) tablet 1 Tablet  1 Tablet Oral DAILY    oxyCODONE IR (ROXICODONE) tablet 5 mg  5 mg Oral Q4H PRN    pantoprazole (PROTONIX) tablet 40 mg  40 mg Oral ACB    rOPINIRole (REQUIP) tablet 1 mg  1 mg Oral QAM    rOPINIRole (REQUIP) tablet 2 mg  2 mg Oral QHS    traZODone (DESYREL) tablet 50 mg  50 mg Oral QHS PRN    cefTARoline (TEFLARO) 600 mg in 0.9% sodium chloride (MBP/ADV) 50 mL MBP  600 mg IntraVENous Q8H    alcohol 62% (NOZIN) nasal  1 Ampule  1 Ampule Topical Q12H       Signed:  Cristy Silva DO    Part of this note may have been written by using a voice dictation software. The note has been proof read but may still contain some grammatical/other typographical errors.

## 2022-04-09 NOTE — PROGRESS NOTES
Pt spontaneously open eyes to voice, stimulus and voices, \"yes\" in response  to his name when called. Pt medicated per mar for pain. VSS taken and IV lines flushed. Pt shows no distress at this time, will continue to monitor. Hourly rounds completed on this shift, bed low/locked, personal belongings and call light within reach. Report will be given to on coming nurse.

## 2022-04-09 NOTE — PROGRESS NOTES
Occupational Therapy Note: orders received, chart reviewed, pt transferred from [facility] and was being followed by OT but had not met any goals. Attempted Re-eval today but pt and family report pt is in too much pain 10/10 with any movement. He recently had BM and was rolled to get cleaned up. Spent 15 mins getting background information from pt's wife and dtr since pt has soft vocal quality and is struggling to speak with dry mouth and O2 in place. Pt barely able to move B UEs and could barely move his fingers in either hand without crying out. Pt has very fragile skin that is swollen and tight over all joints and is weepy everywhere with bandages all over to catch the drainage. Family asked OT to return tomorrow in hopes his pain is better. Will probably do better with coTX with PT staff. Thank you for this referral. Will check back tomorrow as time and schedule allows.  Sydni Sharp MS, OTR/L

## 2022-04-09 NOTE — PROGRESS NOTES
Problem: Dysphagia (Adult)  Goal: *Acute Goals and Plan of Care (Insert Text)  Note: ST. Pt. Will tolerate minced and moist, thin liquids with no overt s/sx of aspiration/penetration. 2. Patient will tolerate trial upgrades with no overt s/sx of aspiration/penetration and adequate mastication/oral clearance. LTG: Pt. Will tolerate least restrictive diet without respiratory decline. SPEECH LANGUAGE PATHOLOGY: DYSPHAGIA  Initial Assessment    NAME/AGE/GENDER: Robert White is a 67 y.o. male  DATE: 2022  PRIMARY DIAGNOSIS: MRSA bacteremia [R78.81, B95.62]       ICD-10: Treatment Diagnosis: R13.11 Dysphagia, Oral Phase    RECOMMENDATIONS   DIET:   Minced and Moist  Thin Liquids    MEDICATIONS: Crushed in puree     ASPIRATION PRECAUTIONS  Slow rate of intake  Small bites/sips  Upright at 90 degrees during meal     COMPENSATORY STRATEGIES/MODIFICATIONS  Alternate liquids/solids  Small sips and bites     EDUCATION:  Recommendations discussed with Nursing  Patient     CONTINUATION OF SKILLED SERVICES/MEDICAL NECESSITY:  Patient is expected to demonstrate progress in  swallow strength, swallow timeliness, swallow function, diet tolerance, and swallow safety in order to  improve swallow safety, work toward diet advancement, and decrease aspiration risk. Patient continues to require skilled intervention due to dysphagia identified. RECOMMENDATIONS for CONTINUED SPEECH THERAPY: YES: Anticipate need for ongoing speech therapy during this hospitalization. ASSESSMENT   Patient presents with generalized pain/crying out impacting ability to fully participate in bedside swallow evaluation. Agreeable to sitting up to 60 degree angle but wincing and crying out in pain. Agreeable to trials of thin liquids via tsp and cup sip as well as mixed consistency fruit and applesauce. Moderate oral residue with solids which cleared with eventual liquid wash.  Risk for aspiration with solids due to positioning and intermittent disregard for residuals in oral cavity. Recommend downgrade to minced and moist diet/thin liquids; medication crushed. Nursing notified and planning to provide pain medication by mouth. REHABILITATION POTENTIAL FOR STATED GOALS: Good    PLAN    FREQUENCY/DURATION: Continue to follow patient 3 times a week for duration of hospital stay to address above goals. Recommendations for next treatment session: Next treatment session will address diet tolerance, po trials, and dysphagia exercises      SUBJECTIVE   Crying out in pain  History of Present Injury/Illness: Mr. Hortencia Rivas  has a past medical history of Adverse effect of anesthesia, Allergic rhinitis, Androgen deficiency (10/2/2015), Anemia (2018), Back pain, Colon polyps, COVID-19 (1/11/2021), Diabetes ([facility] [address] Elevated liver function tests, GERD (gastroesophageal reflux disease), H/O alcohol abuse, Hiatal hernia, Hypercholesteremia (10/2/2015), Hypercholesterolemia, Hypertension, Hypotestosteronism, Migraine, Personal history of prostate cancer, Restless leg, and Sleep apnea. . He also  has a past surgical history that includes hx colonoscopy (~2017); hx prostatectomy (2003); hx orthopaedic (Left, 2017); hx other surgical (Right, 10/16/2018); ir ivc filter (2/3/2022); and ir remove ivc filter w si (4/8/2022). Problem List:  (Impairments causing functional limitations):  Dysphagia    Previous Dysphagia: NONE REPORTED  Diet Prior to Evaluation: soft bite sized    Orientation:   Person  Place    Pain: Pain Scale 1: Numeric (0 - 10)  Pain Intensity 1: 10  Pain Location 1: Generalized  Pain Intervention(s) 1: Nurse notified    OBJECTIVE   Oral Motor:   Labial: Decreased seal  Dentition:  limited assessment due to decreased command following; mostly crying out in pain  Oral Hygiene: Dry  Lingual:  limited assessment    Swallow evaluation:   Patient consumed trials of ice chip, thin liquids via tsp and straw sip, puree and mixed consistency fruit. Adequate mastication with soft solids but mild to moderate oral residue. Patient pausing mid bolus prep to cry out in pain/speak with SLP which impacted bolus control. No overt s/sx with any/all consistencies. Recommend minced and moist diet/thin liquids with medication crushed in puree. INTERDISCIPLINARY COLLABORATION: Registered Nurse  PRECAUTIONS/ALLERGIES: Amoxicillin, Lisinopril, and Sulfa (sulfonamide antibiotics)     Tool Used: Dysphagia Outcome and Severity Scale (TERENCE)    Score Comments   Normal Diet  [] 7 With no strategies or extra time needed   Functional Swallow  [] 6 May have mild oral or pharyngeal delay   Mild Dysphagia  [] 5 Which may require one diet consistency restricted    Mild-Moderate Dysphagia  [] 4 With 1-2 diet consistencies restricted   Moderate Dysphagia  [x] 3 With 2 or more diet consistencies restricted   Moderate-Severe Dysphagia  [] 2 With partial PO strategies (trials with ST only)   Severe Dysphagia  [] 1 With inability to tolerate any PO safely      Score:  Initial: 3   Most Recent: x   (Date 04/09/22 )   Interpretation of Tool: The Dysphagia Outcome and Severity Scale (TERENCE) is a simple, easy-to-use, 7-point scale developed to systematically rate the functional severity of dysphagia based on objective assessment and make recommendations for diet level, independence level, and type of nutrition.      Current Medications:   Current Facility-Administered Medications on File Prior to Encounter   Medication Dose Route Frequency Provider Last Rate Last Admin    [COMPLETED] HYDROmorphone (DILAUDID) injection 1 mg  1 mg IntraVENous ONCE Maurilio Prabhakar MD   1 mg at 04/08/22 1108    [COMPLETED] 0.9% sodium chloride infusion  25 mL/hr IntraVENous ONCE Maurilio Prabhakar MD 25 mL/hr at 04/08/22 1158 25 mL/hr at 04/08/22 1158    [COMPLETED] iopamidoL (ISOVUE 300) 61 % contrast injection 1-200 mL  1-200 mL IntraCATHeter RAD ONCE Maurilio Prabhakar MD   50 mL at 04/08/22 1215 [COMPLETED] morphine injection 4 mg  4 mg IntraVENous ONCE Mila Castillo MD   4 mg at 04/08/22 1304    [DISCONTINUED] DAPTOmycin (CUBICIN) 750 mg in 0.9% sodium chloride 15 mL IV Syringe  8 mg/kg IntraVENous Q24H Napoleon Zimmerman MD        [DISCONTINUED] cefTARoline (TEFLARO) injection 600 mg  600 mg IntraVENous Q8H Gaby Wong MD        [DISCONTINUED] cefTARoline (TEFLARO) 600 mg in 0.9% sodium chloride (MBP/ADV) 50 mL  600 mg IntraVENous Q8H Gaby Wong MD        [DISCONTINUED] lidocaine (XYLOCAINE) 20 mg/mL (2 %) injection  mg   mg IntraDERMal Rad Multiple Mila Castillo MD   100 mg at 04/08/22 1207    [DISCONTINUED] fentaNYL citrate (PF) injection  mcg   mcg IntraVENous Rad Multiple Mila Castillo MD   25 mcg at 04/08/22 1210    [DISCONTINUED] midazolam (VERSED) injection 0.5-2 mg  0.5-2 mg IntraVENous Rad Multiple Mila Castillo MD   1 mg at 04/08/22 1148    [DISCONTINUED] heparin (PF) 2 units/ml in NS infusion 2,000-16,000 Units  1,000-8,000 mL Irrigation Rad Multiple Mila Castillo MD   6,000 Units at 04/08/22 1216    [DISCONTINUED] pantoprazole (PROTONIX) tablet 40 mg  40 mg Oral ACB Jan Rodriguez MD   40 mg at 04/08/22 3185    [DISCONTINUED] dextrose 5% and 0.9% NaCl infusion  75 mL/hr IntraVENous CONTINUOUS Lindy Santos MD   Stopped at 04/08/22 1030    [DISCONTINUED] midodrine (PROAMATINE) tablet 5 mg  5 mg Oral TID WITH MEALS Lindy Santos MD   5 mg at 04/08/22 0714    [DISCONTINUED] mirtazapine (REMERON) tablet 7.5 mg  7.5 mg Oral QHS Josselyn Santos MD   7.5 mg at 04/06/22 2127    [DISCONTINUED] glucose chewable tablet 16 g  16 g Oral PRN Carina Wadsworth MD        [DISCONTINUED] glucagon (GLUCAGEN) injection 1 mg  1 mg IntraMUSCular PRN Clarendon MD Ananth        [DISCONTINUED] dextrose 10% infusion 125-250 mL  125-250 mL IntraVENous PRN Clarendon MD Ananth   Stopped at 04/04/22 [place] multivitamin, tx-iron-ca-min (THERA-M w/ IRON) tablet 1 Tablet  1 Tablet Oral DAILY Luis Santos MD   1 Tablet at 04/08/22 0910    [DISCONTINUED] bumetanide (BUMEX) tablet 1 mg  1 mg Oral BID Raciel Woo MD        [DISCONTINUED] fenofibrate (LOFIBRA) tablet 160 mg  160 mg Oral DAILY Raciel Woo MD   160 mg at 04/08/22 9665    [DISCONTINUED] folic acid (FOLVITE) tablet 1 mg  1 mg Oral DAILY Raciel Woo MD   1 mg at 04/08/22 0910    [DISCONTINUED] montelukast (SINGULAIR) tablet 10 mg  10 mg Oral DAILY Raciel Woo MD   10 mg at 04/08/22 7983    [DISCONTINUED] rOPINIRole (REQUIP) tablet 1 mg  1 mg Oral QAM Raciel Woo MD   1 mg at 04/08/22 8578    [DISCONTINUED] rOPINIRole (REQUIP) tablet 2 mg  2 mg Oral QHS Raciel Woo MD   2 mg at 04/06/22 2127    [DISCONTINUED] traZODone (DESYREL) tablet 50 mg  50 mg Oral QHS PRN Raciel Woo MD        [DISCONTINUED] sodium chloride (NS) flush 5-40 mL  5-40 mL IntraVENous Bard Lavelle Fuentes MD   10 mL at 04/08/22 0535    [DISCONTINUED] sodium chloride (NS) flush 5-40 mL  5-40 mL IntraVENous PRN Raciel Woo MD        [DISCONTINUED] acetaminophen (TYLENOL) tablet 650 mg  650 mg Oral Q6H PRN Raciel Woo MD   650 mg at 04/04/22 1205    [DISCONTINUED] acetaminophen (TYLENOL) suppository 650 mg  650 mg Rectal Q6H PRN Raciel Woo MD        [DISCONTINUED] polyethylene glycol (MIRALAX) packet 17 g  17 g Oral DAILY Raciel Woo MD   17 g at 04/07/22 7503    [DISCONTINUED] promethazine (PHENERGAN) tablet 12.5 mg  12.5 mg Oral Q6H PRN Raciel Woo MD        [DISCONTINUED] ondansetron [facility]) injection 4 mg  4 mg IntraVENous Q6H PRN Raciel Woo MD        [DISCONTINUED] enoxaparin (LOVENOX) injection 40 mg  40 mg SubCUTAneous DAILY Raciel Woo MD   40 mg at 04/04/22 0800    [DISCONTINUED] oxyCODONE IR (ROXICODONE) tablet 5 mg  5 mg Oral Q4H PRN Gerald Sonia Ramirez MD   5 mg at 04/07/22 6666    [DISCONTINUED] morphine injection 4 mg  4 mg IntraVENous Q4H PRN Rekha Nugent MD   4 mg at 04/08/22 6892     Current Outpatient Medications on File Prior to Encounter   Medication Sig Dispense Refill    cholecalciferol (VITAMIN D3) (2,000 UNITS /50 MCG) cap capsule Take  by mouth daily. bumetanide (BUMEX) 1 mg tablet Take 1 mg by mouth two (2) times a day. brief disposable (ADULT) misc by Does Not Apply route. 1 Package 11    miscellaneous medical supply ([facility] [place] INCONTIN [facility]) misc Use as needed for incontinence 1 Each 0    nystatin (MYCOSTATIN) topical cream Apply  to affected area two (2) times a day. 15 g 0    pramipexole (MIRAPEX) 1 mg tablet Take 0.5 mg by mouth two (2) times a day. 0.5-1 tablet BID PRN      Integra 125-40-3 mg cap Take 1 Capsule by mouth daily. montelukast (SINGULAIR) 10 mg tablet Take 10 mg by mouth daily. atorvastatin (LIPITOR) 40 mg tablet Take 40 mg by mouth daily. traZODone (DESYREL) 50 mg tablet 50 mg.      traMADoL (ULTRAM) 50 mg tablet Take 50 mg by mouth every six (6) hours as needed for Pain. 1-2   Indications: pain      rOPINIRole (REQUIP) 1 mg tablet Take One tab by mouth in AM, two tabs by mouth in PM 10 Tablet 0    methotrexate 25 mg/mL chemo injection 20 mg by SubCUTAneous route Every Thursday. (Patient not taking: Reported on 2/1/2022)      triamcinolone acetonide (KENALOG) 0.1 % topical cream Apply topically 2 (two) times a day. folic acid 075 mcg tablet Take 400 mcg by mouth daily. Everyday except Thursday (Patient not taking: Reported on 4/3/2022)      cinnamon bark 500 mg cap Take 500 mg by mouth daily. (Patient not taking: Reported on 4/3/2022)      b complex vitamins tablet Take 1 Tab by mouth daily. fenofibrate (TRICOR) 160 mg tablet Take 160 mg by mouth daily.  (Patient not taking: Reported on 4/3/2022)      fluticasone (FLONASE) 50 mcg/actuation nasal spray 2 Sprays by Both Nostrils route as needed. (Patient not taking: Reported on 4/3/2022)      pantoprazole (PROTONIX) 40 mg tablet Take 40 mg by mouth daily. (Patient not taking: Reported on 4/3/2022)      simvastatin (ZOCOR) 80 mg tablet Take 80 mg by mouth daily. (Patient not taking: Reported on 4/3/2022)      ascorbic acid, vitamin C, (VITAMIN C) 500 mg tablet Take 500 mg by mouth daily. SAFETY:  After treatment position/precautions:  Nurse notified  Call light within reach    Total Treatment Duration:   Time In: 1536  Time Out: [address] [facility]  Deysi Diehl MS, CCC-SLP         Speech Language Pathologist          Acute Rehabilitation Services                   Contact: Arlen

## 2022-04-09 NOTE — PROGRESS NOTES
PHYSICAL THERAPY Note: orders received, chart reviewed, pt transferred from [facility] and was being followed by PT but had not met any goals and was MAX A x 2 to TOTAL A x 2 while there. Attempted DWTN EVALUATION FOR PT today but pt and family report pt is in too much pain 10/10 with any movement. He recently had BM and was rolled to get cleaned up. Pt barely able to move B LEs and could barely move his fingers in either hand without crying out. Pt has very fragile skin that is swollen and tight over all joints and is weepy everywhere with bandages all over to catch the drainage. Family asked PT to return later 4/10 or 4/11 in hopes his pain is better. Will probably do better with coTX with OT staff. Thank you for this referral. Will check back tomorrow or 4/11/22 as time and schedule allows. Bladimir Mendoza, PT, DPT, OCS.

## 2022-04-10 PROBLEM — I33.0 ACUTE BACTERIAL ENDOCARDITIS: Status: ACTIVE | Noted: 2022-01-01

## 2022-04-10 NOTE — PROGRESS NOTES
Hourly rounds completed on this shift, IV flushed and patent, Pt medicated for pain per MAR. Pt shows no distress at this time, will continue to monitor. Bed in low/locked position, call light within reach. Shift report will be given to on coming nurse.

## 2022-04-10 NOTE — PROGRESS NOTES
Physical/Occupational Therapy Note:    Physical therapy evaluation orders received and chart reviewed. Patient transferred from [facility]. Per Dr. Pleas Leventhal, pt with \"compression fracture of T12 and L3. At this point the L3 has collapsed beyond what kyphoplasty can benefit. T12 would have kyphoplasty. However patient is septic and in no shape to undergo such a procedure. Would recommend having spouse bring the TLSO from the prior treatment of the L3 fracture back in and use it as needed to mobilize patient. From my perspective, patient can be WBAT with walker. Brace should not be worn in bed. Kyphoplasty of T12 could be reconsidered as an outpatient after discharge if his pain remains unabated. \" Called wife and requested she bring TLSO brace prior to attempting mobilization with pt who is still experiencing high levels of pain (10/10). Will follow and re-attempt at a later time/date as schedule permits/patient available. PM Addendum: Wife at bedside, she has brought the LSO Brace which pt had for prior L3 fracture. Pt just had chest X-Ray and is experiencing high levels of pain; moaning and crying out. Discussed with wife. Plan to defer mobility at this time due to patient condition. Will continue to follow.     Thank you,  Florence Butler, PT, DPT  Randi Ramirez, MS, OTR/L

## 2022-04-10 NOTE — CONSULTS
Comprehensive Nutrition Assessment    Type and Reason for Visit: Consult,Positive nutrition screen  Best Practice Alert for Malnutrition Screening Tool: Recently Lost Weight Without Trying: Yes, If Yes, How Much Weight Loss: >33 lbs, Eating Poorly Due to Decreased Appetite: Yes  Tube Feeding Management (Hospitalist)  [address] for Pressure Injury stage 2 or greater    Nutrition Recommendations/Plan:   Enteral Nutrition: Initiate TF once placement verified per KUB  Enteral Access: Nasogastric  Formula: Standard without Fiber (Osmolite 1.2 [place])  Delivery: Continuous feeding: Initiate  15ml/hour, progress by 10ml/10 hours to goal rate of 65ml/hour. Water flush 90 ml every 4 hours  Modulars: None deferred until tolerance of base tube feeding regimen achieved ; pt would benefit from addition of Gerson wound healing modular once TF tolerance established   Enteral regimen at goal to provide:  1716 calories (100% estimated calorie needs), 79 grams protein (100% estimated protein needs) and 1713ml free fluid (1ml/kcal) calculations based on 22 hour infusion. IV Fluids:  Decrease to half rate with TF start and discontinue once TF infusing at 65ml/hr  Nutritional Supplement Therapy:   Implement electrolyte replacement per nutrition support protocols  Replacement indicated:  None  Labs:   EN labs: BMP daily, Mg MWF and Phos MWF. POC Glucoses/SSI Not indicated  Meals and Snacks:   NPO     Malnutrition Assessment:  Malnutrition Status: Insufficient data (fluid status skewing weight, unable to complete NFPE)    Nutrition Assessment:   Nutrition History: Pt transferred from [facility] 4/8 from [facility], Per RD assessment 4/4: Hx primarily from wife. Pt was eating very well at the end of last year. He weighed 239# when he was admitted to [facility] at he first of January 2022. His dentures were lost there and thus he did not eat well.  He then went to [facility] and continued with poor po intake because he needed but did not like the minced and moist or pureed consistency unless the food item was puree texture at baseline. He was admitted to [facility] in February and continued with decreased po intake-mainly soups and ONS. His weight at home was ~190# on 2/25. He weighs almost daily and his weight has been somewhat variable but close to 190# with last weight at home PTA of about 188#. Nutrition Background: Pt with PMH significant for congestive heart failure with preserved ejection fraction, liver cirrhosis, orthostatic hypotension, prostate cancer status post surgery as well as radiation therapy, was sent to the ER from urgent care because of hypotension 4/3. Pt found to have bacteremia. His blood cultures from 4/7 remained positive so he was transferred to IR for IVC filter removal 4/8. Nutrition Interval:  Pt seen at bedside, receiving KUB s/p NGT placement. NPO per SLP this AM. Per note, Wet, poor clearance with max cues to maintain airway at baseline. Made no attempt to move bolus of applesauce with increasing wet vocal quality. Pt moaning in pain w/ any movement, unable to answer any RD questions. Discussed pt with [facility]. TF to begin once NGT placement confirmed per KUB. Abdominal Status (last documented): Intact abdomen with Hypoactive  bowel sounds. Last BM 04/06/22. Pertinent Medications: folvite, midodrine, remeron, mvi, protonix, miralax  Drips: D5 w/ 1/2 NS @ 100ml/hr   Pertinent Labs:  Lab Results   Component Value Date/Time    Sodium 147 (H) 04/10/2022 05:19 AM    Potassium 4.1 04/10/2022 05:19 AM    Chloride 117 (H) 04/10/2022 05:19 AM    CO2 23 04/10/2022 05:19 AM    Anion gap 7 04/10/2022 05:19 AM    Glucose 113 (H) 04/10/2022 05:19 AM    BUN 37 (H) 04/10/2022 05:19 AM    Creatinine 0.80 04/10/2022 05:19 AM    Calcium 8.6 04/10/2022 05:19 AM    Albumin 2.2 (L) 04/03/2022 02:59 PM    Magnesium 1.8 04/07/2022 03:21 AM     Nutrition Related Findings:   NFPE deferred 4/10 r/t pt in significant pain.  NPO per SLP 4/10, DHT placed. TF recieved 4/10. Wound Type: Stage II,Multiple (see WC documentation)    Current Nutrition Therapies:  ADULT ORAL NUTRITION SUPPLEMENT Breakfast, Lunch; Clear Liquid  ADULT ORAL NUTRITION SUPPLEMENT Lunch, Dinner; Standard High Calorie/High Protein  ADULT ORAL NUTRITION SUPPLEMENT Breakfast, Lunch, Dinner; Diabetic Supplement  DIET NPO    Current Intake:   Average Meal Intake: NPO Average Supplement Intake: NPO      Anthropometric Measures:  Height: 5' 11\" (180.3 cm)  Current Body Wt: 98.1 kg (216 lb 4.3 oz) (4/10), Weight source: Bed scale  BMI: 30.2, Obese class 1 (BMI 30.0-34. 9)  Admission Body Weight: 180 lb (pt stated ; SFE 4/3)  Ideal Body Weight (lbs) (Calculated): 172 lbs (78 kg), 125.7 %  Usual Body Wt: 108.4 kg (239 lb) (stated weight 2/2022), Percent weight change: -9.5        Edema: Genital: 3+; Non-pitting (4/10/2022  7:30 AM)  LUE: 1+; Non-pitting; Weeping (4/10/2022  7:30 AM)  RUE: 1+; Non-pitting; Weeping (4/10/2022  7:30 AM)    Estimated Daily Nutrient Needs:  Energy (kcal/day): 1462-1837 (Kcal/kg (18-22), Weight Used: Other (specify) (88.2kg ; ?dry weight at [facility]))  Protein (g/day): 79-97 (20% of estimated kcal needs) Weight Used: (Other (specify) (88.2kg ; ?dry weight at [facility]))  Fluid (ml/day):   (1 ml/kcal)    Nutrition Diagnosis:   · Inadequate oral intake related to swallowing difficulty (mentation) as evidenced by NPO or clear liquid status due to medical condition (waxing and waning mentation)     Nutrition Interventions:   Food and/or Nutrient Delivery: Continue NPO,Start tube feeding     Coordination of Nutrition Care: Continue to monitor while inpatient  Plan of Care discussed with Karina Corea RN    Goals:       Active Goal: Tolerate TF at goal rate within 3 days    Nutrition Monitoring and Evaluation:      Food/Nutrient Intake Outcomes: Enteral nutrition intake/tolerance  Physical Signs/Symptoms Outcomes: Biochemical data,GI status,Fluid status or edema,Weight    Discharge Planning:     Too soon to determine    Verlin Fragmin) [address] [place] [address],   Contact: [phone]

## 2022-04-10 NOTE — PROGRESS NOTES
Problem: Dysphagia (Adult)  Goal: *Acute Goals and Plan of Care (Insert Text)  Note: ST. Pt. Will tolerate minced and moist, thin liquids with no overt s/sx of aspiration/penetration. 2. Patient will tolerate trial upgrades with no overt s/sx of aspiration/penetration and adequate mastication/oral clearance. LTG: Pt. Will tolerate least restrictive diet without respiratory decline. SPEECH LANGUAGE PATHOLOGY: DYSPHAGIA  Daily Note 1    NAME/AGE/GENDER: Francisco Elise is a 67 y.o. male  DATE: 4/10/2022  PRIMARY DIAGNOSIS: MRSA bacteremia [R78.81, B95.62]      ICD-10: Treatment Diagnosis: R13.11 Dysphagia, Oral Phase    RECOMMENDATIONS   DIET:    NPO    MEDICATIONS: Non-oral     ASPIRATION PRECAUTIONS  · Oral care every 3 hours  · Upright positioning during tube feedings     COMPENSATORY STRATEGIES/MODIFICATIONS  · Non oral      EDUCATION:  · Recommendations discussed with Nursing  · Patient     CONTINUATION OF SKILLED SERVICES/MEDICAL NECESSITY:   Patient is expected to demonstrate progress in  swallow strength, swallow timeliness, swallow function, diet tolerance, and swallow safety in order to  improve swallow safety, work toward diet advancement, and decrease aspiration risk.  Patient continues to require skilled intervention due to dysphagia identified. RECOMMENDATIONS for CONTINUED SPEECH THERAPY: YES: Anticipate need for ongoing speech therapy during this hospitalization. ASSESSMENT   Patient presents with generalized pain/crying out impacting ability to accept PO. Answers \"yes\" to \"are you hungry or thirsty? \" Wet, poor clearance with max cues to maintain airway at baseline. Made no attempt to move bolus of applesauce with increasing wet vocal quality. Oral care provided to remove bolus. Recommend strict NPO. Family/nursing at bedside and verbalize understanding.      REHABILITATION POTENTIAL FOR STATED GOALS: Good    PLAN    FREQUENCY/DURATION: Continue to follow patient 3 times a week for duration of hospital stay to address above goals. Recommendations for next treatment session: Next treatment session will address diet tolerance, po trials, and dysphagia exercises      SUBJECTIVE   Restful when sleeping; when roused immediately began crying out in pain  History of Present Injury/Illness: Mr. 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 [address] Elevated liver function tests, GERD (gastroesophageal reflux disease), H/O alcohol abuse, Hiatal hernia, Hypercholesteremia (10/2/2015), Hypercholesterolemia, Hypertension, Hypotestosteronism, Migraine, Personal history of prostate cancer, Restless leg, and Sleep apnea. . He also  has a past surgical history that includes hx colonoscopy (~2017); hx prostatectomy (2003); hx orthopaedic (Left, 2017); hx other surgical (Right, 10/16/2018); ir ivc filter (2/3/2022); and ir remove ivc filter w si (4/8/2022). Problem List:  (Impairments causing functional limitations):  1. Dysphagia    Previous Dysphagia: NONE REPORTED  Diet Prior to Evaluation: soft bite sized    Orientation:   Person    Pain: Pain Scale 1: FLACC  Pain Intensity 1: 8  Pain Location 1: Generalized  Pain Intervention(s) 1: Nurse notified    OBJECTIVE   Oral Motor:   · Labial: Decreased seal  · Dentition:  limited assessment due to decreased command following; mostly crying out in pain  · Oral Hygiene: Dry  · Lingual:  limited assessment    Swallow evaluation:   Patient with baseline wet vocal quality - with max cues attempt to clear minimally successful. With increased upright positioning, significant coughing episode with airway clearance. Unable to move bolus of applesauce and required manual removal by SLP. Provided oral care. Recommend strict NPO.           INTERDISCIPLINARY COLLABORATION: Registered Nurse  PRECAUTIONS/ALLERGIES: Amoxicillin, Lisinopril, and Sulfa (sulfonamide antibiotics)     Tool Used: Dysphagia Outcome and Severity Scale (TERENCE)    Score Comments   Normal Diet  [] 7 With no strategies or extra time needed   Functional Swallow  [] 6 May have mild oral or pharyngeal delay   Mild Dysphagia  [] 5 Which may require one diet consistency restricted    Mild-Moderate Dysphagia  [] 4 With 1-2 diet consistencies restricted   Moderate Dysphagia  [x] 3 With 2 or more diet consistencies restricted   Moderate-Severe Dysphagia  [] 2 With partial PO strategies (trials with ST only)   Severe Dysphagia  [] 1 With inability to tolerate any PO safely      Score:  Initial: 3   Most Recent: 1   (Date 04/10/22 )   Interpretation of Tool: The Dysphagia Outcome and Severity Scale (TERENCE) is a simple, easy-to-use, 7-point scale developed to systematically rate the functional severity of dysphagia based on objective assessment and make recommendations for diet level, independence level, and type of nutrition. Current Medications:   No current facility-administered medications on file prior to encounter. Current Outpatient Medications on File Prior to Encounter   Medication Sig Dispense Refill    cholecalciferol (VITAMIN D3) (2,000 UNITS /50 MCG) cap capsule Take  by mouth daily.  bumetanide (BUMEX) 1 mg tablet Take 1 mg by mouth two (2) times a day.  brief disposable (ADULT) misc by Does Not Apply route. 1 Package 11    miscellaneous medical supply ([facility]) misc Use as needed for incontinence 1 Each 0    nystatin (MYCOSTATIN) topical cream Apply  to affected area two (2) times a day. 15 g 0    pramipexole (MIRAPEX) 1 mg tablet Take 0.5 mg by mouth two (2) times a day. 0.5-1 tablet BID PRN      Integra 125-40-3 mg cap Take 1 Capsule by mouth daily.  montelukast (SINGULAIR) 10 mg tablet Take 10 mg by mouth daily.  atorvastatin (LIPITOR) 40 mg tablet Take 40 mg by mouth daily.       traZODone (DESYREL) 50 mg tablet 50 mg.      traMADoL (ULTRAM) 50 mg tablet Take 50 mg by mouth every six (6) hours as needed for Pain. 1-2   Indications: pain      rOPINIRole (REQUIP) 1 mg tablet Take One tab by mouth in AM, two tabs by mouth in PM 10 Tablet 0    methotrexate 25 mg/mL chemo injection 20 mg by SubCUTAneous route Every Thursday. (Patient not taking: Reported on 2/1/2022)      triamcinolone acetonide (KENALOG) 0.1 % topical cream Apply topically 2 (two) times a day.  folic acid 830 mcg tablet Take 400 mcg by mouth daily. Everyday except Thursday (Patient not taking: Reported on 4/3/2022)      cinnamon bark 500 mg cap Take 500 mg by mouth daily. (Patient not taking: Reported on 4/3/2022)      b complex vitamins tablet Take 1 Tab by mouth daily.  fenofibrate (TRICOR) 160 mg tablet Take 160 mg by mouth daily. (Patient not taking: Reported on 4/3/2022)      fluticasone (FLONASE) 50 mcg/actuation nasal spray 2 Sprays by Both Nostrils route as needed. (Patient not taking: Reported on 4/3/2022)      pantoprazole (PROTONIX) 40 mg tablet Take 40 mg by mouth daily. (Patient not taking: Reported on 4/3/2022)      simvastatin (ZOCOR) 80 mg tablet Take 80 mg by mouth daily. (Patient not taking: Reported on 4/3/2022)      ascorbic acid, vitamin C, (VITAMIN C) 500 mg tablet Take 500 mg by mouth daily. SAFETY:  After treatment position/precautions:  · Nurse notified  · Call light within reach   · Family at bedside    Total Treatment Duration:   Time In: 1030  Time Out: [address].  Reta Escobedo MS, CCC-SLP         Speech Language Pathologist          Acute Rehabilitation Services                   Contact: Arlen

## 2022-04-10 NOTE — PROGRESS NOTES
Hospitalist Progress Note   Admit Date:  2022  2:18 PM   Name:  Ab Younger   Age:  67 y.o. Sex:  male  :  1945   MRN:  [id number]   Room:      Presenting Complaint: No chief complaint on file. Reason(s) for Admission: MRSA bacteremia [R78.81, B95.62]     Hospital Course & Interval History: This is a 79-year-old male with past medical history significant for congestive heart failure with preserved ejection fraction, liver cirrhosis, orthostatic hypotension, prostate cancer status post surgery as well as radiation therapy, was sent to the ER from urgent care because of hypotension. Patient usually has orthostatic hypotension and is on midodrine. In the ER, his blood pressure was found to be in the 60s. ER work-up shows patient has leukocytosis, lactic acidosis, UTI. He also has chronic back pain with compression fractures. Patient was started on Levophed overnight due to hypotension. Blood cultures on admission positive for MRSA. Patient was started on vancomycin. ID consulted. Echocardiogram was done on admission which showed hyperdynamic left ventricular systolic function with EF of greater than 61%, normal diastolic function. No visible vegetations. Repeat blood cultures were done on  and still positive. Given patient's history of liver cirrhosis pt is too risky for DAPHNEY, as he may have varices and also risk of anesthesia and may not be a candidate for any surgical intervention. ID recommends continuing vancomycin and obtaining MRI of the spine as well as repeating blood cultures on . Subjective/24hr Events (04/10/22): Patient seen and examined. Patient complains of 10 out of 10 pain. Patient poorly able to follow commands. Patient not able to recall me asking him to increase his water intake yesterday. Patient is a poor historian and unable to participate my examination. ROS:  10 systems reviewed and negative except as noted above.      Assessment & Plan:     Principal Problem: This is a 66y Male with:     Septic shock ([address] (4/4/2022) secondary to MRSA bacteremia/osteomyelitis/infected IVC filter  Septic shock resolved  MRSA bacteremia-unknown etiology  Patient meets criteria for sepsis with leukocytosis, fever, tachycardia, tachypnea, lactic acidosis, positive blood cultures. 4/5 Able to wean off of Levophed. Midodrine dose decreased to 5 mg p.o. 3 times daily. Blood cultures on admission positive for MRSA. ID on board. Appreciate recommendations. TTE showed no visible vegetations. 4/6 blood cultures repeated yesterday still positive. ID recommends obtaining MRI of the spine. Repeat blood cultures in a.m. Patient may not be a candidate for DAPHNEY given his liver cirrhosis, may have underlying esophageal varices, as well as poor surgical candidate and risks of anesthesia. Continue IV vancomycin day 3. He may need to be treated empirically with IV antibiotics. 4/7 Repeat blood cultures taken this AM. Continue Vanc. ID following. 4/9-repeat blood cultures obtained today. Patient transition to daptomycin per infectious disease. Continue to monitor blood cultures. 4/10-continue current antibiotic regimen of daptomycin and Teflaro. Repeat blood cultures negative to date. Continue to follow until resolution of bacteremia. Will increase patient's oral oxycodone to 10 mg. Continue IV morphine        Dysphagia-  4/10-likely secondary to profound weakness. Will place Dop-lucia and initiate tube feedings. Will ask speech to continue following patient. Hypernatremia-  4/10-serum sodium continues to rise patient unable to tolerate free water orally. We will change IV fluids to D5W with half-normal saline and initiate tube feeds and monitor. Presumed infective endocarditis-  4/9-patient not able to tolerate DAPHNEY secondary to underlying cirrhosis and concern for variceal's and potential bleeding complications.   Given inability to tolerate DAPHNEY will presume patient has infective endocarditis and will prolong course of antibiotics  4/10-continue daptomycin and Teflaro    Elevated troponin-no signs of acute coronary syndrome  Likely secondary to demand supply mismatch. Cardiology consulted. Appreciate recommendations. Echocardiogram shows preserved left ventricular ejection fraction of greater than 65% with hyperdynamic left ventricular systolic function, normal diastolic function. No evidence of vegetations on the transthoracic echocardiogram.  No plans for DAPHNEY at this time, given his liver cirrhosis now underlying esophageal varices as well as poor surgical candidate and risks of anesthesia. Acute on chronic normocytic anemia  Multifactorial.  Patient's baseline hemoglobin seems to be approximately 8-9. Hemoglobin this morning is 8. Patient denies any visible external bleeding. Hold Lovenox. Hypokalemia  Hypomagnesemia  Resolved  Repeat magnesium in a.m. Lactic acidosis present on admission  Resolved    L1 compression fracture  Multilevel lumbar spondylosis  Interval progression of L3 compression deformity  Orthopedics consulted. Recommends brace and outpatient follow-up. Given sepsis may not be a candidate for any intervention at this time. Reported history of chronic diastolic congestive heart failure  Echo showed no evidence of congestive heart failure this admission. Liver cirrhosis    Hypoalbuminemia/moderate protein calorie malnutrition/hypoglycemia from poor p.o. intake. Ordered dextrose containing fluids. Nutrition consulted. Patient started on mirtazapine as he complains of poor appetite. Debility-patient will need placement. PT OT consulted and waiting for prior brace to be brought in by spouse to initiate physical activity with patient. Discharge Planning:      Anticipate inpatient hospital stay for at least 72 -96 hours. PT/OT eval. Pt may be a candidate for LTAC. Case management on board.     Diet: ADULT ORAL NUTRITION SUPPLEMENT Breakfast, Lunch; Clear Liquid  ADULT ORAL NUTRITION SUPPLEMENT Lunch, Dinner; Standard High Calorie/High Protein  ADULT ORAL NUTRITION SUPPLEMENT Breakfast, Lunch, Dinner; Diabetic Supplement  DIET NPO  DVT PPx: Lovenox held.  SCDs  Code status: DNR    Hospital Problems as of 4/10/2022 Date Reviewed: 3/10/2022          Codes Class Noted - Resolved POA    * (Principal) Acute bacterial endocarditis ICD-10-CM: I33.0  ICD-9-CM: 421.0  4/10/2022 - Present Unknown        MRSA bacteremia ICD-10-CM: R78.81, B95.62  ICD-9-CM: 790.7, 041.12  4/7/2022 - Present Unknown        Hypoalbuminemia due to protein-calorie malnutrition (HCC) (Chronic) ICD-10-CM: E88.09, E46  ICD-9-CM: 273.8, 263.9  1/31/2022 - Present Yes        (HFpEF) heart failure with preserved ejection fraction (HCC) (Chronic) ICD-10-CM: I50.30  ICD-9-CM: 428.9  1/19/2022 - Present Yes        Debility ICD-10-CM: R53.81  ICD-9-CM: 799.3  1/12/2022 - Present Yes    Overview Signed 1/31/2022  4:07 PM by Guru Campbell DO     Last Assessment & Plan:   Formatting of this note might be different from the original.  Stable for rehab  Pain improved  Tolerating activity  Progressing slowly  Continue current care             Alcoholic cirrhosis of liver without ascites ([facility] ICD-10-CM: K70.30  ICD-9-CM: 571.2  10/8/2021 - Present Yes    Overview Signed 1/31/2022  4:07 PM by Guru Campbell DO     Last Assessment & Plan:   Formatting of this note might be different from the original.  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  Blood sugars have been in good range without treatment  Very poor appetite  Continue supplements  Elevated BUN  Begin IV fluids today  Check BMP daily  Encourage oral fluids                   Objective:     Patient Vitals for the past 24 hrs:   Temp Pulse Resp BP SpO2   04/10/22 1201 97.3 °F (36.3 °C) (!) 115 21 106/85 95 %   04/10/22 0733 97.5 °F (36.4 °C) (!) 112 20 113/75 96 %   04/10/22 0500  (!) 108      04/10/22 0426 97.6 °F (36.4 °C) (!) 121 16 123/75 94 %   04/10/22 0218    100/69    04/09/22 2320 97.4 °F (36.3 °C) 61 16 97/69 94 %   04/09/22 2007     96 %   04/09/22 1943 97.6 °F (36.4 °C) 88 16 107/66 96 %   04/09/22 1509 97.8 °F (36.6 °C) 90 16 112/70 97 %     Oxygen Therapy  O2 Sat (%): 95 % (04/10/22 1201)  Pulse via Oximetry: 81 beats per minute (04/09/22 2007)  O2 Device: Nasal cannula (04/10/22 1201)  Skin Assessment: Clean, dry, & intact (04/10/22 0730)  Skin Protection for O2 Device: No (04/10/22 0730)  O2 Flow Rate (L/min): 3 l/min (04/10/22 0730)    Estimated body mass index is 30.16 kg/m² as calculated from the following:    Height as of an earlier encounter on 4/8/22: 5' 11\" (1.803 m). Weight as of this encounter: 98.1 kg (216 lb 4.3 oz). Intake/Output Summary (Last 24 hours) at 4/10/2022 1205  Last data filed at 4/10/2022 0800  Gross per 24 hour   Intake 120 ml   Output 800 ml   Net -680 ml         Physical Exam:     Blood pressure 106/85, pulse (!) 115, temperature 97.3 °F (36.3 °C), resp. rate 21, weight 98.1 kg (216 lb 4.3 oz), SpO2 95 %. General:    Elderly male, acutely ill and chronically ill-appearing, mildly confused  Head:  Normocephalic, atraumatic  Eyes:  Sclerae appear normal.  Pupils equally round. ENT:  Nares appear normal, no drainage. Moist oral mucosa  Neck:  No restricted ROM. Trachea midline   CV:   RRR. Grade 3/6 systolic murmur heard in aortic area, no jugular venous distension. Lungs:   CTAB. No wheezing, rhonchi, or rales. Respirations even, unlabored  Abdomen: Bowel sounds present. Soft, nontender, nondistended. Extremities: No cyanosis or clubbing. 1- 2+ leg edema bilaterally  Skin:     No rashes and normal coloration. Warm and dry. Neuro:  CN II-XII grossly intact. Sensation intact. A&Ox3  Psych:  Normal mood and affect. I have reviewed ordered lab tests and independently visualized imaging below:    Recent Labs:  Recent Results (from the past 48 hour(s))   CULTURE, MEDICAL DEVICE    Collection Time: 04/08/22 12:12 PM    Specimen: Surgical Specimen   Result Value Ref Range    Special Requests: IVC FILTER     Culture result: NO GROWTH 2 DAYS     GLUCOSE, POC    Collection Time: 04/08/22  4:30 PM   Result Value Ref Range    Glucose (POC) 71 65 - 100 mg/dL    Performed by Vaughn Burton    GLUCOSE, POC    Collection Time: 04/08/22  9:11 PM   Result Value Ref Range    Glucose (POC) 76 65 - 100 mg/dL    Performed by Constantino    GLUCOSE, POC    Collection Time: 04/09/22  7:12 AM   Result Value Ref Range    Glucose (POC) 113 (H) 65 - 100 mg/dL    Performed by SqulaEL    CBC WITH AUTOMATED DIFF    Collection Time: 04/09/22  9:33 AM   Result Value Ref Range    WBC 16.5 (H) 4.3 - 11.1 K/uL    RBC 3.36 (L) 4.23 - 5.6 M/uL    HGB 9.1 (L) 13.6 - 17.2 g/dL    HCT 28.2 (L) 41.1 - 50.3 %    MCV 83.9 79.6 - 97.8 FL    MCH 27.1 26.1 - 32.9 PG    MCHC 32.3 31.4 - 35.0 g/dL    RDW 17.0 (H) 11.9 - 14.6 %    PLATELET 266 (L) 960 - 450 K/uL    MPV 10.0 9.4 - 12.3 FL    ABSOLUTE NRBC 0.00 0.0 - 0.2 K/uL    DF AUTOMATED      NEUTROPHILS 77 43 - 78 %    LYMPHOCYTES 12 (L) 13 - 44 %    MONOCYTES 8 4.0 - 12.0 %    EOSINOPHILS 3 0.5 - 7.8 %    BASOPHILS 1 0.0 - 2.0 %    IMMATURE GRANULOCYTES 1 0.0 - 5.0 %    ABS. NEUTROPHILS 12.6 (H) 1.7 - 8.2 K/UL    ABS. LYMPHOCYTES 1.9 0.5 - 4.6 K/UL    ABS. MONOCYTES 1.3 0.1 - 1.3 K/UL    ABS. EOSINOPHILS 0.5 0.0 - 0.8 K/UL    ABS. BASOPHILS 0.1 0.0 - 0.2 K/UL    ABS. IMM.  GRANS. 0.1 0.0 - 0.5 K/UL   METABOLIC PANEL, BASIC    Collection Time: 04/09/22 9:33 AM   Result Value Ref Range    Sodium 145 136 - 145 mmol/L    Potassium 4.4 3.5 - 5.1 mmol/L    Chloride 116 (H) 98 - 107 mmol/L    CO2 22 21 - 32 mmol/L    Anion gap 7 7 - 16 mmol/L    Glucose 169 (H) 65 - 100 mg/dL    BUN 36 (H) 8 - 23 MG/DL    Creatinine 0.80 0.8 - 1.5 MG/DL    GFR est AA >60 >60 ml/min/1.73m2    GFR est non-AA >60 >60 ml/min/1.73m2    Calcium 8.5 8.3 - 10.4 MG/DL   PROTHROMBIN TIME + INR    Collection Time: 04/09/22  9:33 AM   Result Value Ref Range    Prothrombin time 23.7 (H) 12.6 - 14.5 sec    INR 2.1     GLUCOSE, POC    Collection Time: 04/09/22 10:12 AM   Result Value Ref Range    Glucose (POC) 107 (H) 65 - 100 mg/dL    Performed by Taste GuruEL    CULTURE, BLOOD    Collection Time: 04/09/22 11:48 AM    Specimen: Blood   Result Value Ref Range    Special Requests: LEFT  Antecubital        GRAM STAIN GRAM POS COCCI IN CLUSTERS      GRAM STAIN AEROBIC BOTTLE POSITIVE      GRAM STAIN        CRITICAL RESULT NOT CALLED DUE TO PREVIOUS NOTIFICATION OF CRITICAL RESULT WITHIN THE LAST 24 HOURS.     Culture result: CULTURE IN PROGRESS,FURTHER UPDATES TO FOLLOW     CULTURE, BLOOD    Collection Time: 04/09/22 11:48 AM    Specimen: Blood   Result Value Ref Range    Special Requests: RIGHT  FOREARM        GRAM STAIN GRAM POS COCCI IN CLUSTERS      GRAM STAIN AEROBIC BOTTLE POSITIVE      GRAM STAIN        RESULTS VERIFIED, PHONED TO AND READ BACK BY NATE VILLA, RN @ 6797 ON 4/10/22 BY AMM    Culture result: CULTURE IN PROGRESS,FURTHER UPDATES TO FOLLOW     GLUCOSE, POC    Collection Time: 04/09/22  4:47 PM   Result Value Ref Range    Glucose (POC) 113 (H) 65 - 100 mg/dL    Performed by Taste GuruT    GLUCOSE, POC    Collection Time: 04/09/22  9:17 PM   Result Value Ref Range    Glucose (POC) 106 (H) 65 - 100 mg/dL    Performed by Alex    CBC WITH AUTOMATED DIFF    Collection Time: 04/10/22  5:19 AM   Result Value Ref Range    WBC 13.3 (H) 4.3 - 11.1 K/uL    RBC 3.14 (L) 4.23 - 5.6 M/uL    HGB 8.3 (L) 13.6 - 17.2 g/dL    HCT 26.1 (L) 41.1 - 50.3 %    MCV 83.1 79.6 - 97.8 FL    MCH 26.4 26.1 - 32.9 PG    MCHC 31.8 31.4 - 35.0 g/dL    RDW 17.0 (H) 11.9 - 14.6 %    PLATELET 929 (L) 937 - 450 K/uL    MPV 10.3 9.4 - 12.3 FL    ABSOLUTE NRBC 0.00 0.0 - 0.2 K/uL    DF AUTOMATED      NEUTROPHILS 78 43 - 78 %    LYMPHOCYTES 10 (L) 13 - 44 %    MONOCYTES 7 4.0 - 12.0 %    EOSINOPHILS 4 0.5 - 7.8 %    BASOPHILS 0 0.0 - 2.0 %    IMMATURE GRANULOCYTES 1 0.0 - 5.0 %    ABS. NEUTROPHILS 10.4 (H) 1.7 - 8.2 K/UL    ABS. LYMPHOCYTES 1.4 0.5 - 4.6 K/UL    ABS. MONOCYTES 1.0 0.1 - 1.3 K/UL    ABS. EOSINOPHILS 0.5 0.0 - 0.8 K/UL    ABS. BASOPHILS 0.1 0.0 - 0.2 K/UL    ABS. IMM. GRANS. 0.1 0.0 - 0.5 K/UL   METABOLIC PANEL, BASIC    Collection Time: 04/10/22  5:19 AM   Result Value Ref Range    Sodium 147 (H) 136 - 145 mmol/L    Potassium 4.1 3.5 - 5.1 mmol/L    Chloride 117 (H) 98 - 107 mmol/L    CO2 23 21 - 32 mmol/L    Anion gap 7 7 - 16 mmol/L    Glucose 113 (H) 65 - 100 mg/dL    BUN 37 (H) 8 - 23 MG/DL    Creatinine 0.80 0.8 - 1.5 MG/DL    GFR est AA >60 >60 ml/min/1.73m2    GFR est non-AA >60 >60 ml/min/1.73m2    Calcium 8.6 8.3 - 10.4 MG/DL   GLUCOSE, POC    Collection Time: 04/10/22  7:30 AM   Result Value Ref Range    Glucose (POC) 102 (H) 65 - 100 mg/dL    Performed by Jeannie Hull        All Micro Results     Procedure Component Value Units Date/Time    CULTURE, BLOOD [[id number]] Collected: 04/09/22 1148    Order Status: Completed Specimen: Blood Updated: 04/10/22 1056     Special Requests: --        LEFT  Antecubital       GRAM STAIN       GRAM POS COCCI IN CLUSTERS            AEROBIC BOTTLE POSITIVE               CRITICAL RESULT NOT CALLED DUE TO PREVIOUS NOTIFICATION OF CRITICAL RESULT WITHIN THE LAST 24 HOURS.            Culture result:       CULTURE IN PROGRESS,FURTHER UPDATES TO FOLLOW          CULTURE, BLOOD [[id number]] Collected: 04/09/22 1148    Order Status: Completed Specimen: Blood Updated: 04/10/22 8251     Special Requests: --        RIGHT  FOREARM       GRAM STAIN       GRAM POS COCCI IN CLUSTERS            AEROBIC BOTTLE POSITIVE               RESULTS VERIFIED, PHONED TO AND READ BACK BY NATE VILLA RN @ 2056 ON 4/10/22 BY AMM           Culture result:       CULTURE IN PROGRESS,FURTHER UPDATES TO FOLLOW                Other Studies:  No results found.     Current Meds:  Current Facility-Administered Medications   Medication Dose Route Frequency    dextrose 5 % - 0.45% NaCl infusion  100 mL/hr IntraVENous CONTINUOUS    NUTRITIONAL SUPPORT ELECTROLYTE PRN ORDERS   Does Not Apply PRN    oxyCODONE IR (ROXICODONE) tablet 10 mg  10 mg Oral Q4H PRN    lip protectant (BLISTEX) ointment 1 Each  1 Each Topical PRN    artificial saliva (MOUTH KOTE) 1 Spray  1 Spray Oral PRN    acetaminophen (TYLENOL) tablet 650 mg  650 mg Oral Q6H PRN    Or    acetaminophen (TYLENOL) suppository 650 mg  650 mg Rectal Q6H PRN    polyethylene glycol (MIRALAX) packet 17 g  17 g Oral DAILY    promethazine (PHENERGAN) tablet 12.5 mg  12.5 mg Oral Q6H PRN    Or    ondansetron (ZOFRAN) injection 4 mg  4 mg IntraVENous Q6H PRN    sodium chloride (NS) flush 5-40 mL  5-40 mL IntraVENous Q8H    sodium chloride (NS) flush 5-40 mL  5-40 mL IntraVENous PRN    dextrose 10% infusion 125-250 mL  125-250 mL IntraVENous PRN    glucagon (GLUCAGEN) injection 1 mg  1 mg IntraMUSCular PRN    glucose chewable tablet 16 g  16 g Oral PRN    DAPTOmycin (CUBICIN) 750 mg in 0.9% sodium chloride 15 mL IV Syringe  8 mg/kg IntraVENous Q24H    fenofibrate (LOFIBRA) tablet 160 mg  160 mg Oral DAILY    folic acid (FOLVITE) tablet 1 mg  1 mg Oral DAILY    midodrine (PROAMATINE) tablet 5 mg  5 mg Oral TID WITH MEALS    mirtazapine (REMERON) tablet 7.5 mg  7.5 mg Oral QHS    montelukast (SINGULAIR) tablet 10 mg  10 mg Oral DAILY    morphine injection 4 mg  4 mg IntraVENous Q4H PRN    multivitamin, tx-iron-ca-min (THERA-M w/ IRON) tablet 1 Tablet  1 Tablet Oral DAILY    pantoprazole (PROTONIX) tablet 40 mg  40 mg Oral ACB    rOPINIRole (REQUIP) tablet 1 mg  1 mg Oral QAM    rOPINIRole (REQUIP) tablet 2 mg  2 mg Oral QHS    traZODone (DESYREL) tablet 50 mg  50 mg Oral QHS PRN    cefTARoline (TEFLARO) 600 mg in 0.9% sodium chloride (MBP/ADV) 50 mL MBP  600 mg IntraVENous Q8H    alcohol 62% (NOZIN) nasal  1 Ampule  1 Ampule Topical Q12H       Signed:  Sinai Scales DO    Part of this note may have been written by using a voice dictation software. The note has been proof read but may still contain some grammatical/other typographical errors.

## 2022-04-10 NOTE — PROGRESS NOTES
Pt is A&O. Pt is tachy to 120, all other VSS on 3L nasal cannula. Q2 turns cont. Pt refused some Q2 turns dt pain. Pt is tearful and moaning this morning, exhibiting s/s of pain. Medicated w PRN IV morphine and PRN PO oxy, pt cont to exhibit s/s of pain, MD aware. Pt was made NPO by speech this morning, MD notified. DHT placed for nutrition. TF's started at 17:14 at 15cc/ hr w 90cc flush Q4. Aspiration precautions in place. Q1h safety checks cont. Will cont to monitor. Problem: Risk for Spread of Infection  Goal: Prevent transmission of infectious organism to others  Description: Prevent the transmission of infectious organisms to other patients, staff members, and visitors. Outcome: Progressing Towards Goal     Problem: Patient Education:  Go to Education Activity  Goal: Patient/Family Education  Outcome: Progressing Towards Goal     Problem: Falls - Risk of  Goal: *Absence of Falls  Description: Document [place] Fall Risk and appropriate interventions in the flowsheet. Outcome: Progressing Towards Goal  Note: Fall Risk Interventions:  Mobility Interventions: Bed/chair exit alarm    Mentation Interventions: Bed/chair exit alarm,Door open when patient unattended    Medication Interventions: Bed/chair exit alarm,Patient to call before getting OOB    Elimination Interventions: Bed/chair exit alarm,Call light in reach    History of Falls Interventions: Vital signs minimum Q4HRs X 24 hrs (comment for end date),Bed/chair exit alarm         Problem: Patient Education: Go to Patient Education Activity  Goal: Patient/Family Education  Outcome: Progressing Towards Goal     Problem: Patient Education: Go to Patient Education Activity  Goal: Patient/Family Education  Outcome: Progressing Towards Goal     Problem: Pressure Injury - Risk of  Goal: *Prevention of pressure injury  Description: Document Matthew Scale and appropriate interventions in the flowsheet.   Outcome: Progressing Towards Goal  Note: Pressure Injury Interventions:  Sensory Interventions: Assess changes in LOC,Turn and reposition approx. every two hours (pillows and wedges if needed)    Moisture Interventions: Absorbent underpads,Apply protective barrier, creams and emollients,Check for incontinence Q2 hours and as needed    Activity Interventions: Increase time out of bed    Mobility Interventions: Turn and reposition approx.  every two hours(pillow and wedges)    Nutrition Interventions: Document food/fluid/supplement intake    Friction and Shear Interventions: Apply protective barrier, creams and emollients,Feet elevated on foot rest,Foam dressings/transparent film/skin sealants                Problem: Patient Education: Go to Patient Education Activity  Goal: Patient/Family Education  Outcome: Progressing Towards Goal

## 2022-04-11 NOTE — PROGRESS NOTES
Problem: Dysphagia (Adult)  Goal: *Acute Goals and Plan of Care (Insert Text)  Note: ST. Pt. Will tolerate minced and moist, thin liquids with no overt s/sx of aspiration/penetration. 2. Patient will tolerate trial upgrades with no overt s/sx of aspiration/penetration and adequate mastication/oral clearance. LTG: Pt. Will tolerate least restrictive diet without respiratory decline. SPEECH LANGUAGE PATHOLOGY: DYSPHAGIA  Daily Note 2    NAME/AGE/GENDER: William Martinez is a 67 y.o. male  DATE: 2022  PRIMARY DIAGNOSIS: MRSA bacteremia [R78.81, B95.62]      ICD-10: Treatment Diagnosis: R13.11 Dysphagia, Oral Phase    RECOMMENDATIONS   DIET:    NPO    MEDICATIONS: Non-oral     ASPIRATION PRECAUTIONS  · Oral care every 3 hours  · Upright positioning during tube feedings     COMPENSATORY STRATEGIES/MODIFICATIONS  · Non oral      EDUCATION:  · Recommendations discussed with Nursing  · Patient  · MD with palliative care     CONTINUATION OF SKILLED SERVICES/MEDICAL NECESSITY:   Patient is expected to demonstrate progress in  swallow strength, swallow timeliness, swallow function, diet tolerance, and swallow safety in order to  improve swallow safety, work toward diet advancement, and decrease aspiration risk.  Patient continues to require skilled intervention due to dysphagia identified. RECOMMENDATIONS for CONTINUED SPEECH THERAPY: YES: Anticipate need for ongoing speech therapy during this hospitalization. ASSESSMENT   Patient noted to have congested breath sounds at baseline. Vocal quality minimally wet; however, patient unable to follow commands to cough in attempts to clear. Provided patient with single small ice chip in attempts to prompt patient to swallow to clear secretions. Patient slowly manipulated ice chip, but did not elicit swallow despite mod-max verbal/tactile cues. Oral suctioning provided removing minimal amount of secretions. Further trials deferred.      Recommend continuestrict NPO. Will continue to follow for ongoing po trials pending goals of care. Palliative care following. REHABILITATION POTENTIAL FOR STATED GOALS: Good    PLAN    FREQUENCY/DURATION: Continue to follow patient 3 times a week for duration of hospital stay to address above goals. Recommendations for next treatment session: Next treatment session will address diet tolerance, po trials, and dysphagia exercises      SUBJECTIVE   Awake upright in bed for session. NG tube in place. Patient's speech is slurred and unintelligible. Vocal quality minimally wet. Limited command following. History of Present Injury/Illness: Mr. Rasheeda Flowers  has a past medical history of Adverse effect of anesthesia, Allergic rhinitis, Androgen deficiency (10/2/2015), Anemia (2018), Back pain, Colon polyps, COVID-19 (1/11/2021), Diabetes ([facility] [address] Elevated liver function tests, GERD (gastroesophageal reflux disease), H/O alcohol abuse, Hiatal hernia, Hypercholesteremia (10/2/2015), Hypercholesterolemia, Hypertension, Hypotestosteronism, Migraine, Personal history of prostate cancer, Restless leg, and Sleep apnea. . He also  has a past surgical history that includes hx colonoscopy (~2017); hx prostatectomy (2003); hx orthopaedic (Left, 2017); hx other surgical (Right, 10/16/2018); ir ivc filter (2/3/2022); and ir remove ivc filter w si (4/8/2022). Problem List:  (Impairments causing functional limitations):  1. Dysphagia    Previous Dysphagia: NONE REPORTED  Diet Prior to Evaluation: soft bite sized    Orientation:   Person    Pain: Pain Scale 1: FLACC  Pain Intensity 1: 6  Pain Location 1: Generalized  Pain Intervention(s) 1: Repositioned    OBJECTIVE   Swallow treatment:   Patient seen for po trials. Minimally wet vocal quality and baseline congestion noted. Oral suctioning provided prior to po trials; however, oral cavity is dry. Patient unable to follow commands to cough. Presented patient with small ice chip x1.   Patient sealed lips around spoon to accept ice chip and slowly manipulated bolus; however, no swallow palpated despite mod-max verbal/tactle cues provided. Patient appeared to pump tongue in attempts to swallow; however, unable to elicit swallow. Minimal amount of water/secretions removed via yanker. INTERDISCIPLINARY COLLABORATION: Registered Nurse, MD with palliative care  PRECAUTIONS/ALLERGIES: Amoxicillin, Lisinopril, and Sulfa (sulfonamide antibiotics)     Tool Used: Dysphagia Outcome and Severity Scale (TERENCE)    Score Comments   Normal Diet  [] 7 With no strategies or extra time needed   Functional Swallow  [] 6 May have mild oral or pharyngeal delay   Mild Dysphagia  [] 5 Which may require one diet consistency restricted    Mild-Moderate Dysphagia  [] 4 With 1-2 diet consistencies restricted   Moderate Dysphagia  [x] 3 With 2 or more diet consistencies restricted   Moderate-Severe Dysphagia  [] 2 With partial PO strategies (trials with ST only)   Severe Dysphagia  [] 1 With inability to tolerate any PO safely      Score:  Initial: 3   Most Recent: 1   (Date 04/11/22 )   Interpretation of Tool: The Dysphagia Outcome and Severity Scale (TERENCE) is a simple, easy-to-use, 7-point scale developed to systematically rate the functional severity of dysphagia based on objective assessment and make recommendations for diet level, independence level, and type of nutrition. Current Medications:   No current facility-administered medications on file prior to encounter. Current Outpatient Medications on File Prior to Encounter   Medication Sig Dispense Refill    cholecalciferol (VITAMIN D3) (2,000 UNITS /50 MCG) cap capsule Take  by mouth daily.  bumetanide (BUMEX) 1 mg tablet Take 1 mg by mouth two (2) times a day.  brief disposable (ADULT) misc by Does Not Apply route.  1 Package 11    miscellaneous medical supply (BARD [place] INCONTIN CLAMP) misc Use as needed for incontinence 1 Each 0    nystatin (MYCOSTATIN) topical cream Apply  to affected area two (2) times a day. 15 g 0    pramipexole (MIRAPEX) 1 mg tablet Take 0.5 mg by mouth two (2) times a day. 0.5-1 tablet BID PRN      Integra 125-40-3 mg cap Take 1 Capsule by mouth daily.  montelukast (SINGULAIR) 10 mg tablet Take 10 mg by mouth daily.  atorvastatin (LIPITOR) 40 mg tablet Take 40 mg by mouth daily.  traZODone (DESYREL) 50 mg tablet 50 mg.      traMADoL (ULTRAM) 50 mg tablet Take 50 mg by mouth every six (6) hours as needed for Pain. 1-2   Indications: pain      rOPINIRole (REQUIP) 1 mg tablet Take One tab by mouth in AM, two tabs by mouth in PM 10 Tablet 0    methotrexate 25 mg/mL chemo injection 20 mg by SubCUTAneous route Every Thursday. (Patient not taking: Reported on 2/1/2022)      triamcinolone acetonide (KENALOG) 0.1 % topical cream Apply topically 2 (two) times a day.  folic acid 189 mcg tablet Take 400 mcg by mouth daily. Everyday except Thursday (Patient not taking: Reported on 4/3/2022)      cinnamon bark 500 mg cap Take 500 mg by mouth daily. (Patient not taking: Reported on 4/3/2022)      b complex vitamins tablet Take 1 Tab by mouth daily.  fenofibrate (TRICOR) 160 mg tablet Take 160 mg by mouth daily. (Patient not taking: Reported on 4/3/2022)      fluticasone (FLONASE) 50 mcg/actuation nasal spray 2 Sprays by Both Nostrils route as needed. (Patient not taking: Reported on 4/3/2022)      pantoprazole (PROTONIX) 40 mg tablet Take 40 mg by mouth daily. (Patient not taking: Reported on 4/3/2022)      simvastatin (ZOCOR) 80 mg tablet Take 80 mg by mouth daily. (Patient not taking: Reported on 4/3/2022)      ascorbic acid, vitamin C, (VITAMIN C) 500 mg tablet Take 500 mg by mouth daily.          SAFETY:  After treatment position/precautions:  · Upright in bed  · RN notified   · MD with palliative at bedside    Total Treatment Duration:   Time In: 0945  Time Out: [address] [address] [facility]

## 2022-04-11 NOTE — PROGRESS NOTES
Hospitalist Progress Note   Admit Date:  2022  2:18 PM   Name:  Sandra Younger   Age:  67 y.o. Sex:  male  :  1945   MRN:  [id number]   Room:      Presenting Complaint: No chief complaint on file. Reason(s) for Admission: MRSA bacteremia [R78.81, B95.62]     Hospital Course & Interval History: This is a 44-year-old male with past medical history significant for congestive heart failure with preserved ejection fraction, liver cirrhosis, orthostatic hypotension, prostate cancer status post surgery as well as radiation therapy, was sent to the ER from urgent care because of hypotension. Patient usually has orthostatic hypotension and is on midodrine. In the ER, his blood pressure was found to be in the 60s. ER work-up shows patient has leukocytosis, lactic acidosis, UTI. He also has chronic back pain with compression fractures. Patient was started on Levophed overnight due to hypotension. Blood cultures on admission positive for MRSA. Patient was started on vancomycin. ID consulted. Echocardiogram was done on admission which showed hyperdynamic left ventricular systolic function with EF of greater than 40%, normal diastolic function. No visible vegetations. Repeat blood cultures were done on  and still positive. Given patient's history of liver cirrhosis pt is too risky for DAPHNEY, as he may have varices and also risk of anesthesia and may not be a candidate for any surgical intervention. ID recommends continuing vancomycin and obtaining MRI of the spine as well as repeating blood cultures on . Subjective/24hr Events (22): Patient seen and examined with wife at bedside. Patient continues to complain of severe diffuse pain. Patient denies fever chills nausea vomiting chest pain or shortness of breath. ROS:  10 systems reviewed and negative except as noted above. Assessment & Plan:     Principal Problem:     This is a 66y Male with:     Septic shock ([address] (4/4/2022) secondary to MRSA bacteremia/osteomyelitis/infected IVC filter  Dysphagia-  Hypernatremia-  Presumed infective endocarditis-  Elevated troponin-no signs of acute coronary syndrome  Acute on chronic normocytic anemia  Hypokalemia  Hypomagnesemia  Lactic acidosis present on admission  L1 compression fracture  Reported history of chronic diastolic congestive heart failure  Liver cirrhosis  Hypoalbuminemia/moderate protein calorie malnutrition/hypoglycemia from poor p.o. intake. Debility      Patient's blood cultures continue to be positive for MRSA despite appropriate prolonged course of antibiotics. Given patient's advanced age of 67 and failure to respond to antibiotic therapy had a long discussion with the patient's spouse regarding his continued and ongoing pain and multiple comorbidities and patient's family has decided to initiate comfort care change CODE STATUS to DNR and transitioning to hospice.     Diet:  ADULT DIET Regular; Likes soup  DVT PPx: None  Code status: DNR    Hospital Problems as of 4/11/2022 Date Reviewed: 3/10/2022          Codes Class Noted - Resolved POA    * (Principal) Acute bacterial endocarditis ICD-10-CM: I33.0  ICD-9-CM: 421.0  4/10/2022 - Present Unknown        MRSA bacteremia ICD-10-CM: R78.81, B95.62  ICD-9-CM: 790.7, 041.12  4/7/2022 - Present Unknown        Hypoalbuminemia due to protein-calorie malnutrition (HCC) (Chronic) ICD-10-CM: E88.09, E46  ICD-9-CM: 273.8, 263.9  1/31/2022 - Present Yes        (HFpEF) heart failure with preserved ejection fraction (HCC) (Chronic) ICD-10-CM: I50.30  ICD-9-CM: 428.9  1/19/2022 - Present Yes        Debility ICD-10-CM: R53.81  ICD-9-CM: 799.3  1/12/2022 - Present Yes    Overview Signed 1/31/2022  4:07 PM by Nolvia Jimenez DO     Last Assessment & Plan:   Formatting of this note might be different from the original.  Stable for rehab  Pain improved  Tolerating activity  Progressing slowly  Continue current care             Alcoholic cirrhosis of liver without ascites [facility]) ICD-10-CM: K70.30  ICD-9-CM: 571.2  10/8/2021 - Present Yes    Overview Signed 1/31/2022  4:07 PM by Anne Kapoor DO     Last Assessment & Plan:   Formatting of this note might be different from the original.  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  Blood sugars have been in good range without treatment  Very poor appetite  Continue supplements  Elevated BUN  Begin IV fluids today  Check BMP daily  Encourage oral fluids                   Objective:     Patient Vitals for the past 24 hrs:   Temp Pulse Resp BP SpO2   04/11/22 1114 98.3 °F (36.8 °C) (!) 108 22 101/70 95 %   04/11/22 0722 98.1 °F (36.7 °C) (!) 109 22 109/72 94 %   04/11/22 0325 98.2 °F (36.8 °C) (!) 121 22 110/72 90 %   04/10/22 2314 98.1 °F (36.7 °C) (!) 114 24 96/80 91 %   04/10/22 1948 98.7 °F (37.1 °C) (!) 116 20 101/77 92 %   04/10/22 1840 98.4 °F (36.9 °C) (!) 109  108/69 92 %   04/10/22 1500 98 °F (36.7 °C) (!) 123  110/84 91 %   04/10/22 1458   28       Oxygen Therapy  O2 Sat (%): 95 % (04/11/22 1114)  Pulse via Oximetry: 81 beats per minute (04/09/22 2007)  O2 Device: Nasal cannula;Humidifier (04/11/22 0721)  Skin Assessment: Clean, dry, & intact (04/11/22 5639)  Skin Protection for O2 Device: No (04/10/22 1948)  O2 Flow Rate (L/min): 5 l/min (04/11/22 0721)    Estimated body mass index is 30.69 kg/m² as calculated from the following:    Height as of this encounter: 5' 11\" (1.803 m). Weight as of this encounter: 99.8 kg (220 lb 0.3 oz).     Intake/Output Summary (Last 24 hours) at 4/11/2022 5167  Last data filed at 4/11/2022 1053  Gross per 24 hour   Intake 2273.5 ml   Output 400 ml   Net 1873.5 ml         Physical Exam:     Blood pressure 101/70, pulse (!) 108, temperature 98.3 °F (36.8 °C), resp. rate 22, height 5' 11\" (1.803 m), weight 99.8 kg (220 lb 0.3 oz), SpO2 95 %. General:    Elderly male, acutely ill and chronically ill-appearing, mildly confused  Head:  Normocephalic, atraumatic  Eyes:  Sclerae appear normal.  Pupils equally round. ENT:  Nares appear normal, no drainage. Moist oral mucosa  Neck:  No restricted ROM. Trachea midline   CV:   RRR. Grade 3/6 systolic murmur heard in aortic area, no jugular venous distension. Lungs:   CTAB. No wheezing, rhonchi, or rales. Respirations even, unlabored  Abdomen: Bowel sounds present. Soft, nontender, nondistended. Extremities: No cyanosis or clubbing. 1- 2+ leg edema bilaterally  Skin:     No rashes and normal coloration. Warm and dry. Neuro:  CN II-XII grossly intact. Sensation intact. A&Ox3  Psych:  Normal mood and affect.       I have reviewed ordered lab tests and independently visualized imaging below:    Recent Labs:  Recent Results (from the past 48 hour(s))   GLUCOSE, POC    Collection Time: 04/09/22  4:47 PM   Result Value Ref Range    Glucose (POC) 113 (H) 65 - 100 mg/dL    Performed by Ellen    GLUCOSE, POC    Collection Time: 04/09/22  9:17 PM   Result Value Ref Range    Glucose (POC) 106 (H) 65 - 100 mg/dL    Performed by Alex    CBC WITH AUTOMATED DIFF    Collection Time: 04/10/22  5:19 AM   Result Value Ref Range    WBC 13.3 (H) 4.3 - 11.1 K/uL    RBC 3.14 (L) 4.23 - 5.6 M/uL    HGB 8.3 (L) 13.6 - 17.2 g/dL    HCT 26.1 (L) 41.1 - 50.3 %    MCV 83.1 79.6 - 97.8 FL    MCH 26.4 26.1 - 32.9 PG    MCHC 31.8 31.4 - 35.0 g/dL    RDW 17.0 (H) 11.9 - 14.6 %    PLATELET 337 (L) 094 - 450 K/uL    MPV 10.3 9.4 - 12.3 FL    ABSOLUTE NRBC 0.00 0.0 - 0.2 K/uL    DF AUTOMATED      NEUTROPHILS 78 43 - 78 %    LYMPHOCYTES 10 (L) 13 - 44 %    MONOCYTES 7 4.0 - 12.0 %    EOSINOPHILS 4 0.5 - 7.8 %    BASOPHILS 0 0.0 - 2.0 %    IMMATURE GRANULOCYTES 1 0.0 - 5.0 %    ABS. NEUTROPHILS 10.4 (H) 1.7 - 8.2 K/UL    ABS. LYMPHOCYTES 1.4 0.5 - 4.6 K/UL    ABS. MONOCYTES 1.0 0.1 - 1.3 K/UL    ABS. EOSINOPHILS 0.5 0.0 - 0.8 K/UL    ABS. BASOPHILS 0.1 0.0 - 0.2 K/UL    ABS. IMM.  GRANS. 0.1 0.0 - 0.5 K/UL   METABOLIC PANEL, BASIC    Collection Time: 04/10/22  5:19 AM   Result Value Ref Range    Sodium 147 (H) 136 - 145 mmol/L    Potassium 4.1 3.5 - 5.1 mmol/L    Chloride 117 (H) 98 - 107 mmol/L    CO2 23 21 - 32 mmol/L    Anion gap 7 7 - 16 mmol/L    Glucose 113 (H) 65 - 100 mg/dL    BUN 37 (H) 8 - 23 MG/DL    Creatinine 0.80 0.8 - 1.5 MG/DL    GFR est AA >60 >60 ml/min/1.73m2    GFR est non-AA >60 >60 ml/min/1.73m2    Calcium 8.6 8.3 - 10.4 MG/DL   GLUCOSE, POC    Collection Time: 04/10/22  7:30 AM   Result Value Ref Range    Glucose (POC) 102 (H) 65 - 100 mg/dL    Performed by Iris jJ    GLUCOSE, POC    Collection Time: 04/10/22 11:59 AM   Result Value Ref Range    Glucose (POC) 115 (H) 65 - 100 mg/dL    Performed by Iris Jj    GLUCOSE, POC    Collection Time: 04/10/22  3:42 PM   Result Value Ref Range    Glucose (POC) 111 (H) 65 - 100 mg/dL    Performed by Chelsea Claude, POC    Collection Time: 04/10/22  8:40 PM   Result Value Ref Range    Glucose (POC) 108 (H) 65 - 100 mg/dL    Performed by Calni    CBC WITH AUTOMATED DIFF    Collection Time: 04/11/22  6:01 AM   Result Value Ref Range    WBC 17.9 (H) 4.3 - 11.1 K/uL    RBC 3.03 (L) 4.23 - 5.6 M/uL    HGB 8.0 (L) 13.6 - 17.2 g/dL    HCT 25.4 (L) 41.1 - 50.3 %    MCV 83.8 79.6 - 97.8 FL    MCH 26.4 26.1 - 32.9 PG    MCHC 31.5 31.4 - 35.0 g/dL    RDW 17.1 (H) 11.9 - 14.6 %    PLATELET 846 (L) 945 - 450 K/uL    MPV 9.7 9.4 - 12.3 FL    ABSOLUTE NRBC 0.00 0.0 - 0.2 K/uL    DF AUTOMATED      NEUTROPHILS 84 (H) 43 - 78 %    LYMPHOCYTES 7 (L) 13 - 44 %    MONOCYTES 5 4.0 - 12.0 %    EOSINOPHILS 2 0.5 - 7.8 %    BASOPHILS 0 0.0 - 2.0 %    IMMATURE GRANULOCYTES 1 0.0 - 5.0 %    ABS. NEUTROPHILS 15.1 (H) 1.7 - 8.2 K/UL    ABS. LYMPHOCYTES 1.3 0.5 - 4.6 K/UL    ABS. MONOCYTES 0.9 0.1 - 1.3 K/UL    ABS. EOSINOPHILS 0.4 0.0 - 0.8 K/UL    ABS. BASOPHILS 0.1 0.0 - 0.2 K/UL    ABS. IMM.  GRANS. 0.2 0.0 - 0.5 K/UL   METABOLIC PANEL, BASIC    Collection Time: 04/11/22  6:01 AM   Result Value Ref Range    Sodium 146 (H) 138 - 145 mmol/L    Potassium 4.2 3.5 - 5.1 mmol/L    Chloride 117 (H) 98 - 107 mmol/L    CO2 22 21 - 32 mmol/L    Anion gap 7 7 - 16 mmol/L    Glucose 128 (H) 65 - 100 mg/dL    BUN 38 (H) 8 - 23 MG/DL    Creatinine 0.80 0.8 - 1.5 MG/DL    GFR est AA >60 >60 ml/min/1.73m2    GFR est non-AA >60 >60 ml/min/1.73m2    Calcium 8.4 8.3 - 10.4 MG/DL   MAGNESIUM    Collection Time: 04/11/22  6:01 AM   Result Value Ref Range    Magnesium 2.0 1.8 - 2.4 mg/dL   PHOSPHORUS    Collection Time: 04/11/22  6:01 AM   Result Value Ref Range    Phosphorus 3.2 2.3 - 3.7 MG/DL   GLUCOSE, POC    Collection Time: 04/11/22  7:13 AM   Result Value Ref Range    Glucose (POC) 125 (H) 65 - 100 mg/dL    Performed by PruittLindaV    GLUCOSE, POC    Collection Time: 04/11/22 11:06 AM   Result Value Ref Range    Glucose (POC) 130 (H) 65 - 100 mg/dL    Performed by PruittLindaV        All Micro Results     Procedure Component Value Units Date/Time    CULTURE, BLOOD [[id number]] Collected: 04/11/22 0956    Order Status: Completed Specimen: Blood Updated: 04/11/22 1051    CULTURE, BLOOD [[id number]] Collected: 04/11/22 0956    Order Status: Completed Specimen: Blood Updated: 04/11/22 1051    CULTURE, BLOOD [[id number]]  (Abnormal) Collected: 04/09/22 1148    Order Status: Completed Specimen: Blood Updated: 04/11/22 0730     Special Requests: --        LEFT  Antecubital       GRAM STAIN       GRAM POS COCCI IN CLUSTERS            AEROBIC BOTTLE POSITIVE               CRITICAL RESULT NOT CALLED DUE TO PREVIOUS NOTIFICATION OF CRITICAL RESULT WITHIN THE LAST 24 HOURS. Culture result: STAPHYLOCOCCUS AUREUS               CULTURE IN PROGRESS,FURTHER UPDATES TO FOLLOW          CULTURE, BLOOD [[id number]]  (Abnormal) Collected: 04/09/22 1148    Order Status: Completed Specimen: Blood Updated: 04/11/22 0729     Special Requests: --        RIGHT  FOREARM       GRAM STAIN       GRAM POS COCCI IN CLUSTERS            AEROBIC BOTTLE POSITIVE               RESULTS VERIFIED, PHONED TO AND READ BACK BY NATE VILLA RN @ 6422 ON 4/10/22 BY AMM           Culture result:       STAPHYLOCOCCUS AUREUS SENSITIVITY TO FOLLOW                Other Studies:  XR ABD (KUB)    Result Date: 4/10/2022  PORTABLE ABDOMEN, April 10, 2022 at 1517 hours: CLINICAL HISTORY:  Recess Dobbhoff tube position. COMPARISON:  1346 hours today. FINDINGS:  AP supine image demonstrates a Dobbhoff tube with the weighted tip at in the junction of the body and antrum of the stomach. No dilated bowel loops are evident in the upper abdomen. Interval advancement of the Dobbhoff tube tip to the junction of the body and antrum of the stomach.       Current Meds:  Current Facility-Administered Medications   Medication Dose Route Frequency    fentaNYL (DURAGESIC) 25 mcg/hr patch 1 Patch  1 Patch TransDERmal Q72H    fenofibrate (LOFIBRA) tablet 160 mg  160 mg Per NG tube DAILY    acetaminophen (TYLENOL) tablet 650 mg  650 mg Per NG tube Q6H PRN    Or    acetaminophen (TYLENOL) suppository 650 mg  650 mg Rectal Q6H PRN    lip protectant (BLISTEX) ointment 1 Each  1 Each Topical PRN    artificial saliva (MOUTH KOTE) 1 Spray  1 Spray Oral PRN    ondansetron (ZOFRAN) injection 4 mg  4 mg IntraVENous Q6H PRN    sodium chloride (NS) flush 5-40 mL  5-40 mL IntraVENous PRN    dextrose 10% infusion 125-250 mL  125-250 mL IntraVENous PRN    glucagon (GLUCAGEN) injection 1 mg  1 mg IntraMUSCular PRN    glucose chewable tablet 16 g  16 g Oral PRN    morphine injection 4 mg  4 mg IntraVENous Q4H PRN    traZODone (DESYREL) tablet 50 mg  50 mg Oral QHS PRN    alcohol 62% (NOZIN) nasal  1 Ampule  1 Ampule Topical Q12H       Signed:  Moncho Palm DO    Part of this note may have been written by using a voice dictation software. The note has been proof read but may still contain some grammatical/other typographical errors.

## 2022-04-11 NOTE — HOSPICE
[facility]    Liaison to bedside, spoke with patient's wife Alfa Rice and her close friend Rafita Mota. Liaison inquired about transferring to [facility] and wife after giving this some thought, feels more inclined to take him home for his last days. She is interested in home hospice support but wants to talk with her daughter and son before making a final decision about discharge location. Reviewed home hospice support and visit frequency and wife confirms she and her son (who lives with them) can provide 24 hour care to patient at home. Provided her with my phone number and requested she call me once she talks with her children or if she has any further questions. Will need to have DME switched to [facility] provider if patient discharges home with hospice. CASEY Guevara updated. 5 pm wife called back and confirmed she does want to take him home with hospice. Will order DME first thing tomorrow morning in hopes of getting patient home tomorrow. Patient approved for home hospice by [facility] medical director. He requests discharging provider send patient home with prescription for roxanol 20 mg/mL, take 0.5 mL = 10 mg PO or SL Q3h PRN pain or dyspnea.       Thank you for this referral,  Grey Dumont RN, BSN  [facility] liaison  [phone]

## 2022-04-11 NOTE — PROGRESS NOTES
completed initial visit with patient. Patient was able to respond little, but could not verbalize. Family was at bedside and supportive. Family stated that patient will probably be going on hospice and expected EOL. Family was tearful and expressed grief. Family expressed positive life review, despite grief.  provided pastoral presence, prayer and empathetic listening.   Signed by  Josh Hemphill M.Div.

## 2022-04-11 NOTE — PROGRESS NOTES
ACUTE OT GOALS:  (Developed with and agreed upon by patient and/or caregiver.)  1. Patient will complete rolling for hygiene and positioning with moderate assistance. 2. Patient will complete gentle AAROM exercises for UEs to decrease edema and improve function for ADL. 3. Patient will tolerate 23 minutes of OT treatment with 2-3 rest breaks to increase activity tolerance for ADLs. 4. Patient will tolerate sitting edge of bed within 3 visits to improve positioning for ADL. 5. Patient will complete simple grooming task with moderate assistance to improve participation with self-care. Timeframe: 7 visits       OCCUPATIONAL THERAPY ASSESSMENT: Daily Note and Re-evaluation OT Treatment Day # 1    Radu Keenan is a 67 y.o. male   PRIMARY DIAGNOSIS: Acute bacterial endocarditis  MRSA bacteremia [R78.81, B95.62]       Reason for Referral:    ICD-10: Treatment Diagnosis: Generalized Muscle Weakness (M62.81)  Other lack of cordination (R27.8)  History of falling (Z91.81)  Low Back Pain (M54.5)  [place] / [place] / Product Type: Medicare /   ASSESSMENT:     REHAB RECOMMENDATIONS:   Recommendation to date pending progress:  Setting:   DC plan TBD dependent on future plan of care  Equipment:    To Be Determined     PRIOR LEVEL OF FUNCTION:  (Prior to Hospitalization)  INITIAL/CURRENT LEVEL OF FUNCTION:  (Based on today's evaluation)   Bathing:   Unknown  Dressing:   Unknown  Feeding/Grooming:   Unknown  Toileting:   Unknown  Functional Mobility:   Unknown Bathing:   Total Assistance  Dressing:   Total Assistance  Feeding/Grooming:   Total Assistance  Toileting:   Total Assistance  Functional Mobility:   Unable to perform     ASSESSMENT:  Mr. Elder Woods presents to the [facility] from [place] with acute bacterial endocarditis and MRSA bacteremia. Pt has hx of compression fractures with LSO at bedside. Pt confused and moaning/crying out in bed.  No family is currently at bedside. Pt opens eyes with verbal cues but only briefly. Pt not really able to answer yes/no questions. Pt with extremities elevated with increased edema in R UE with pt encouraged to do some gentle ROM in B UEs. Pt has leg L LE flexed and external rotated and it was repositioned with boot adjusted. Pt very limited by pain. MD came to bedside and pt worked on partial roll to the R so MD could listen to lungs and pt positioned off back side. Pt not able to tolerate much activity at all today. Will follow with OT services on trial basis to see if progress is able to be made towards goals. Pt is currently needing total assistance x 2 for rolling and with all ADL.       SUBJECTIVE:   Mr. Alexa Hull states, \"I hurt everywhere\"    SOCIAL HISTORY/LIVING ENVIRONMENT: Per initial eval states pt reports independence  Home Environment: Private residence  # Steps to Enter: 0  One/Two Story Residence: One story  Living Alone: No  Support Systems: Spouse/Significant Other    OBJECTIVE:     PAIN: VITAL SIGNS: LINES/DRAINS:   Pre Treatment: Pain Screen  Pain Scale 1: FLACC  Pain Intensity 1: 6  Pain Onset 1: ongoing  Pain Location 1: Generalized  Pain Intervention(s) 1: Repositioned  Post Treatment: same   IV  O2 Device: Nasal cannula,Humidifier     GROSS EVALUATION:   Within Functional Limits Abnormal/ Functional Abnormal/ Non-Functional (see comments) Not Tested Comments:   AROM [] [] [x] [] R UE with increased edema    PROM [] [] [x] []    Strength [] [] [x] [] Very weak   Balance [] [] [] [x]    Posture [] [] [] [x]    Sensation [] [] [] [x]    Coordination [] [] [x] []    Tone [] [] [] [x]    Edema [] [] [x] [] Significant edema in extremities    Activity Tolerance [] [] [x] []     [] [] [] []      COGNITION/  PERCEPTION: Intact Impaired   (see comments) Comments:   Orientation [] [x]    Vision [] []    Hearing [] []    Judgment/ Insight [] []    Attention [] [x]    Memory [] []    Command Following [] [x] Following some commands   Emotional Regulation [] [x]     [] []      ACTIVITIES OF DAILY LIVING: I Mod I S SBA CGA Min Mod Max Total NT Comments   BASIC ADLs:              Bathing/ Showering [] [] [] [] [] [] [] [] [] [x]    Toileting [] [] [] [] [] [] [] [] [] [x]    Dressing [] [] [] [] [] [] [] [] [] [x]    Feeding [] [] [] [] [] [] [] [] [x] [] Bringing sponge to mouth   Grooming [] [] [] [] [] [] [] [] [] []    Personal Device Care [] [] [] [] [] [] [] [] [] [x]    Functional Mobility [] [] [] [] [] [] [] [] [] [x]    I=Independent, Mod I=Modified Independent, S=Supervision, SBA=Standby Assistance, CGA=Contact Guard Assistance,   Min=Minimal Assistance, Mod=Moderate Assistance, Max=Maximal Assistance, Total=Total Assistance, NT=Not Tested    MOBILITY: I Mod I S SBA CGA Min Mod Max Total  NT x2 Comments:   Supine to sit [] [] [] [] [] [] [] [] [] [x] []    Sit to supine [] [] [] [] [] [] [] [] [] [x] []    Sit to stand [] [] [] [] [] [] [] [] [] [x] []    Bed to chair [] [] [] [] [] [] [] [] [] [x] []    I=Independent, Mod I=Modified Independent, S=Supervision, SBA=Standby Assistance, CGA=Contact Guard Assistance,   Min=Minimal Assistance, Mod=Moderate Assistance, Max=Maximal Assistance, Total=Total Assistance, NT=Not Tested    [address] [facility] [address] [id number] AM-PAC 6 Clicks   Daily Activity Inpatient Short Form        How much help from another person does the patient currently need. .. Total A Lot A Little None   1. Putting on and taking off regular lower body clothing? [x] 1   [] 2   [] 3   [] 4   2. Bathing (including washing, rinsing, drying)? [x] 1   [] 2   [] 3   [] 4   3. Toileting, which includes using toilet, bedpan or urinal?   [x] 1   [] 2   [] 3   [] 4   4. Putting on and taking off regular upper body clothing? [x] 1   [] 2   [] 3   [] 4   5. Taking care of personal grooming such as brushing teeth? [x] 1   [] 2   [] 3   [] 4   6. Eating meals?    [x] 1   [] 2   [] 3   [] 4   © 2007, Trustees of [facility], under license to [employer]. All rights reserved     Score:  Initial: 7 Most Recent: 6 (Date: 4-11- 22)   Interpretation of Tool:  Represents activities that are increasingly more difficult (i.e. Bed mobility, Transfers, Gait). PLAN:   FREQUENCY/DURATION:   for duration of hospital stay or until stated goals are met, whichever comes first.    PROBLEM LIST:   (Skilled intervention is medically necessary to address:)  1. Decreased ADL/Functional Activities  2. Decreased Activity Tolerance  3. Decreased AROM/PROM  4. Decreased Balance  5. Decreased Cognition  6. Decreased Coordination  7. Decreased Gait Ability  8. Decreased Strength  9. Decreased Transfer Abilities  10. Increased Pain   INTERVENTIONS PLANNED:   (Benefits and precautions of occupational therapy have been discussed with the patient.)  1. Self Care Training  2. Therapeutic Activity  3. Therapeutic Exercise/HEP  4. Neuromuscular Re-education  5. Education     TREATMENT:     EVALUATION: Low Complexity : (Untimed Charge)    TREATMENT:   ( $$ Therapeutic Exercises: 8-22 mins   )  Therapeutic Exercise (15 Minutes): Therapeutic exercises noted below to improve functional activity tolerance, AROM, strength and mobility.      TREATMENT GRID:   Date:  4/11/22 Date:   Date:     Activity/Exercise Parameters Parameters Parameters   Finger flexion/extension  R hand in elevated position above the heart; 4-5 reps      Shoulder flexion  5 AAROM L UE     Elbow flexion  5 reps AROM L UE     Rolling and reaching with R UE 2 reps x 15 (s)                             AFTER TREATMENT POSITION/PRECAUTIONS:  Bed, Needs within reach and RN notified    INTERDISCIPLINARY COLLABORATION:  RN/PCT, PT/PTA and OT/MONTOYA    TOTAL TREATMENT DURATION:  OT Patient Time In/Time Out  Time In: 0912  Time Out: [address],

## 2022-04-11 NOTE — CONSULTS
Palliative Care    Patient: Gwyn Babb MRN: [id number]  SSN: [id number]    YOB: 1945  Age: 67 y.o. Sex: male       Date of Request: 4/11/2022  Date of Consult:  4/11/2022  Reason for Consult:  pain and symptom management, family support and education and goals of care  Requesting Physician: Dr. Peter Marin     Assessment/Plan:     Principal Diagnosis:     Altered Mental Status R41.82    Additional Diagnoses:   · Advance Care Planning Counseling Z71.89  · Delirium  F05  · Dysphagia  R13.10  · Frailty  R54  · Pain, general  R52  · Counseling, Encounter for Medical Advice  Z71.9  · Encounter for Palliative Care  Z51.5    Palliative Performance Scale (PPS):       Medical Decision Making:   Reviewed and summarized labs and imaging from admission. Pt awakens easily but doesn't follow commands. Failed speech therapy evaluation this am.  Has NG in place with tube feeds running. Attempted to call spouse but got voicemail. Pt utilizing iv morphine. Used 60 meq 2 days ago and 50 yesterday. Appears uncomfortable and crying during my examination. Will start fentanyl patch 25 mcg q 72 hr.   Continue morphine 4 mg iv q 4 hr prn. Hope to reduce overall suffering. Will continue to reach out to wife to discuss goals. Will follow     Addendum:  Spoke with wife at bedside. Reviewed ongoing care and lack of improvement from infection. Discussed hospice philosophy and comfort measures. Wife agrees this is level of care she wishes for. Will stop lab draws and unnecessary meds. Consult hospice. DNR in place. Remove NG tube and stop ivf. Continue pain med regimen as outlined above. I spent greater than 25 mins on advanced care planning.      Will discuss findings with members of the interdisciplinary team.      Thank you for this referral.         Subjective:     History obtained from:  Care Provider and Chart    Chief Complaint: altered mental status  History of Present Illness:  41-year-old male with past medical history significant for congestive heart failure with preserved ejection fraction, liver cirrhosis, orthostatic hypotension, prostate cancer status post surgery as well as radiation therapy, was sent to the ER from urgent care because of hypotension. Patient usually has orthostatic hypotension and is on midodrine. In the ER, his blood pressure was found to be in the 60s. ER work-up shows patient has leukocytosis, lactic acidosis, UTI. He also has chronic back pain with compression fractures. Patient was started on Levophed overnight due to hypotension. Blood cultures on admission positive for MRSA. Patient was started on vancomycin. ID consulted. Echocardiogram was done on admission which showed hyperdynamic left ventricular systolic function with EF of greater than 49%, normal diastolic function. No visible vegetations. Repeat blood cultures were done on 4/5 and still positive. Given patient's history of liver cirrhosis pt is too risky for DAPHNEY, as he may have varices and also risk of anesthesia and may not be a candidate for any surgical intervention. ivc filter removed on 4/8. cx repeated today. Advance Directive: Yes       Code Status:  DNR            Health Care Power of : Yes - Copy of [address] on file.     Past Medical History:   Diagnosis Date    Adverse effect of anesthesia     reports \"slow to get over effects\" of anesthesia- drowsy    Allergic rhinitis     Androgen deficiency 10/2/2015    Anemia 2018    Back pain     Colon polyps     COVID-19 1/11/2021    Last Assessment & Plan:  Formatting of this note might be different from the original. Now COVID recovered    Diabetes ([address]     oral agents; rarely checks BS, reports 138 today; occasional hypo symptoms (shaky) unaware of hypo level    Elevated liver function tests     GERD (gastroesophageal reflux disease)     controlled with meds    H/O alcohol abuse     Hiatal hernia     Hypercholesteremia 10/2/2015    Last Assessment & Plan:  Formatting of this note might be different from the original. chronic stable condition, refilled Simvastatin 80 mg 90/2    Hypercholesterolemia     Hypertension     well controlled with meds    Hypotestosteronism     Migraine     Personal history of prostate cancer     Restless leg     Sleep apnea     wears CPAP      Past Surgical History:   Procedure Laterality Date    HX COLONOSCOPY  ~2017    HX ORTHOPAEDIC Left 2017    post tibial tendon repair    HX OTHER SURGICAL Right 10/16/2018    ingrown toenail removal    HX PROSTATECTOMY      IR IVC FILTER  2/3/2022    IR REMOVE IVC FILTER W SI  2022     Family History   Problem Relation Age of Onset    Heart Disease Father     Emphysema Mother     Heart Disease Brother     Psychiatric Disorder Brother       Social History     Tobacco Use    Smoking status: Former Smoker     Types: Cigars     Quit date: 2003     Years since quittin.9    Smokeless tobacco: Never Used   Substance Use Topics    Alcohol use: No     Prior to Admission medications    Medication Sig Start Date End Date Taking? Authorizing Provider   cholecalciferol (VITAMIN D3) (2,000 UNITS /50 MCG) cap capsule Take  by mouth daily. Yes Provider, Historical   bumetanide (BUMEX) 1 mg tablet Take 1 mg by mouth two (2) times a day. 21  Yes Other, MD Frederic   brief disposable (ADULT) misc by Does Not Apply route. 20  Yes aMrta Landrum MD   miscellaneous medical supply (BARD [place] INCONTIN CLAMP) misc Use as needed for incontinence 19  Yes Marta Landrum MD   nystatin (MYCOSTATIN) topical cream Apply  to affected area two (2) times a day. 18  Yes Evan Morrison DO   pramipexole (MIRAPEX) 1 mg tablet Take 0.5 mg by mouth two (2) times a day. 0.5-1 tablet BID PRN    Provider, Historical   Integra 125-40-3 mg cap Take 1 Capsule by mouth daily.  21   Other, MD Frederic   montelukast (SINGULAIR) 10 mg tablet Take 10 mg by mouth daily. 12/27/21   Other, MD Frederic   atorvastatin (LIPITOR) 40 mg tablet Take 40 mg by mouth daily. 3/4/22 3/4/23  Gertrude, MD Frederic   traZODone (DESYREL) 50 mg tablet 50 mg. 12/22/21   Other, MD Frederic   traMADoL (ULTRAM) 50 mg tablet Take 50 mg by mouth every six (6) hours as needed for Pain. 1-2   Indications: pain    Provider, Historical   rOPINIRole (REQUIP) 1 mg tablet Take One tab by mouth in AM, two tabs by mouth in PM 2/11/22   Roddy Santos MD   methotrexate 25 mg/mL chemo injection 20 mg by SubCUTAneous route Every Thursday. Patient not taking: Reported on 2/1/2022 8/16/18   Provider, Historical   triamcinolone acetonide (KENALOG) 0.1 % topical cream Apply topically 2 (two) times a day. 1/25/18   Provider, Historical   folic acid 073 mcg tablet Take 400 mcg by mouth daily. Everyday except Thursday  Patient not taking: Reported on 4/3/2022    Provider, Historical   cinnamon bark 500 mg cap Take 500 mg by mouth daily. Patient not taking: Reported on 4/3/2022    Provider, Historical   b complex vitamins tablet Take 1 Tab by mouth daily. Provider, Historical   fenofibrate (TRICOR) 160 mg tablet Take 160 mg by mouth daily. Patient not taking: Reported on 4/3/2022 1/19/15   Provider, Historical   fluticasone (FLONASE) 50 mcg/actuation nasal spray 2 Sprays by Both Nostrils route as needed. Patient not taking: Reported on 4/3/2022 2/9/15   Provider, Historical   pantoprazole (PROTONIX) 40 mg tablet Take 40 mg by mouth daily. Patient not taking: Reported on 4/3/2022 2/9/15   Provider, Historical   simvastatin (ZOCOR) 80 mg tablet Take 80 mg by mouth daily. Patient not taking: Reported on 4/3/2022 3/13/15   Provider, Historical   ascorbic acid, vitamin C, (VITAMIN C) 500 mg tablet Take 500 mg by mouth daily.     Provider, Historical       Allergies   Allergen Reactions    Amoxicillin Rash    Lisinopril Cough     cough    Sulfa (Sulfonamide Antibiotics) Rash Comprehensive review of systems unable to complete due to mentation. Objective:     Visit Vitals  /72 (BP 1 Location: Right upper arm, BP Patient Position: At rest)   Pulse (!) 109   Temp 98.1 °F (36.7 °C)   Resp 22   Ht 5' 11\" (1.803 m)   Wt 220 lb 0.3 oz (99.8 kg)   SpO2 94%   BMI 30.69 kg/m²        Physical Exam:    General:  Lethargic. Crying when awakened. .   Eyes:  Conjunctivae/corneas clear    Nose: Nares normal. Septum midline. Neck: Supple, symmetrical, trachea midline, no JVD   Lungs:   Clear to auscultation bilaterally, unlabored   Heart:  Regular rate and rhythm, no murmur    Abdomen:   Soft, non-tender, non-distended. Positive bowel sounds   Extremities: Normal, atraumatic, no cyanosis or edema   Skin: Skin color, texture, turgor normal. No rash or lesions. Neurologic: Not following commands   Psych: Awakens but is confused.        Assessment:     Hospital Problems  Date Reviewed: 3/10/2022          Codes Class Noted POA    * (Principal) Acute bacterial endocarditis ICD-10-CM: I33.0  ICD-9-CM: 421.0  4/10/2022 Unknown        MRSA bacteremia ICD-10-CM: R78.81, B95.62  ICD-9-CM: 790.7, 041.12  4/7/2022 Unknown        Hypoalbuminemia due to protein-calorie malnutrition (HCC) (Chronic) ICD-10-CM: E88.09, E46  ICD-9-CM: 273.8, 263.9  1/31/2022 Yes        (HFpEF) heart failure with preserved ejection fraction (HCC) (Chronic) ICD-10-CM: I50.30  ICD-9-CM: 428.9  1/19/2022 Yes        Debility ICD-10-CM: R53.81  ICD-9-CM: 799.3  1/12/2022 Yes    Overview Signed 1/31/2022  4:07 PM by Fredo Tejada DO     Last Assessment & Plan:   Formatting of this note might be different from the original.  Stable for rehab  Pain improved  Tolerating activity  Progressing slowly  Continue current care             Alcoholic cirrhosis of liver without ascites ([facility] ICD-10-CM: K70.30  ICD-9-CM: 571.2  10/8/2021 Yes    Overview Signed 1/31/2022  4:07 PM by Fredo Tejada DO     Last Assessment & Plan: Formatting of this note might be different from the original.  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  Blood sugars have been in good range without treatment  Very poor appetite  Continue supplements  Elevated BUN  Begin IV fluids today  Check BMP daily  Encourage oral fluids                   Signed By: Heron Swartz MD     April 11, 2022

## 2022-04-11 NOTE — PROGRESS NOTES
Physical Therapy Note:    Patient's family has decided to initiate comfort care and transition to hospice. Therapist is discontinuing acute PT services at this time. Patient's goals were not met. Please re-consult acute PT/OT services if anything changes or any needs arise.     Thank you,  Makenzie Bella, PT, DPT

## 2022-04-11 NOTE — PROGRESS NOTES
Bedside shift report received from Kevin, [facility]    TF infusing currently at 25 mL with no residual. Pt on 5 L NC; humidity added for Pt comfort. Guajardo draining brown urine. 1045 Fentanyl 25 mcg placed on left arm.     1100  Hospice consulted. TF and IVF stopped per orders. 1450 Medicated with morphine per MAR. Repositioned. NGT removed. Pt did not tolerate well but settles with wife at bedside. VS only to be taken PRN per family; no more FSBS.

## 2022-04-11 NOTE — PROGRESS NOTES
ACUTE PHYSICAL THERAPY GOALS:  (Developed with and agreed upon by patient and/or caregiver.)  (1.) Gely Connors will move from supine to sit and sit to supine , scoot up and down and roll side to side with MODERATE ASSIST within 7 treatment day(s). (2.) Gely Connors will transfer from bed to chair and chair to bed with MODERATE ASSIST using the least restrictive device within 7 treatment day(s). (3.) Gely Connors will perform seated static and dynamic balance activities x 5 minutes with CONTACT GUARD ASSIST to improve safety within 7 treatment day(s). (4.) Gely Connors will perform therapeutic exercises x 10 min for HEP with MINIMAL ASSIST to improve strength, endurance, and functional mobility within 7 treatment day(s).      PHYSICAL THERAPY ASSESSMENT: Re-evaluation PT Treatment Day # 1    Gely Connors is a 67 y.o. male   PRIMARY DIAGNOSIS: Acute bacterial endocarditis  MRSA bacteremia [R78.81, B95.62]     Reason for Referral:   ICD-10: Treatment Diagnosis: Generalized Muscle Weakness (M62.81)  Other lack of cordination (R27.8)  Difficulty in walking, Not elsewhere classified (R26.2)  Other abnormalities of gait and mobility (R26.89)  Unspecified Lack of Coordination (R27.9)  [place] / [place] / Product Type: Medicare /     ASSESSMENT:     REHAB RECOMMENDATIONS:   Recommendation to date pending progress:  Setting:   TBD pending medical course & POC decision  Equipment:    To Be Determined     PRIOR LEVEL OF FUNCTION:  (Prior to Hospitalization) INITIAL/CURRENT LEVEL OF FUNCTION:  (Most Recently Demonstrated)   Bed Mobility:   Unknown  Sit to Stand:   Unknown  Transfers:   Unknown  Gait/Mobility:   Unknown Bed Mobility:   Total Assistance x 2  Sit to Stand:   Unable to perform  Transfers:   Unable to perform  Gait/Mobility:   Unable to perform     ASSESSMENT:  Mr. Arango Nurse presents to [facility] from [address] [place] [address] with acute bacterial endocarditis and MRSA bacteremia. Pt has hx of compression fx, with LSO at bedside. This date pt performs mobility including rolling side to side,  Repositioning in bed, and BLE PROM with Total Ax2, pt moaning out in pain and does not tolerate mobility or any contact at all. Pt presents as functioning below his baseline, with deficits in mobility including transfers, gait, balance, and activity tolerance. Pt will continue to benefit from skilled therapy services to address stated deficits to promote return to highest level of function, independence, and safety. Will continue therapy efforts on trial basis pending plan of care decisions. SUBJECTIVE:   Mr. Gatito Monroe states, \"Please.  I hurt everywhere\"    SOCIAL HISTORY/LIVING ENVIRONMENT: Per initial evaluation, reportedly independent  Home Environment: Private residence  # Steps to Enter: 0  One/Two Story Residence: One story  Living Alone: No  Support Systems: Spouse/Significant Other  OBJECTIVE:     PAIN: VITAL SIGNS: LINES/DRAINS:   Pre Treatment:  FLACC 6  Post Treatment: 6   Guajardo Catheter, IV and Nasogastric Tube  O2 Device: Nasal cannula,Humidifier     GROSS EVALUATION:  BLE Within Functional Limits Abnormal/ Functional Abnormal/ Non-Functional (see comments) Not Tested Comments:   AROM [] [] [x] []  very limited to absent   PROM [] [] [x] []  guarded; unable to tolerate full PROM   Strength [] [] [x] []  appears generally weak   Balance [] [] [x] []    Posture [] [] [x] []    Sensation [] [] [] [x]    Coordination [] [] [x] []    Tone [] [] [x] []    Edema [] [] [x] []    Activity Tolerance [] [] [x] []  unable to tolerate any mobility    [] [] [] []      COGNITION/  PERCEPTION: Intact Impaired   (see comments) Comments:   Orientation [] [x]    Vision [x] []    Hearing [x] []    Command Following [] [x]    Safety Awareness [] [x]     [] []      MOBILITY: I Mod I S SBA CGA Min Mod Max Total  NT x2 Comments:   Bed Mobility    Rolling [] [] [] [] [] [] [] [] [x] [] [x]    Supine to Sit [] [] [] [] [] [] [] [] [] [x] []    Scooting [] [] [] [] [] [] [] [] [x] [] [x]    Sit to Supine [] [] [] [] [] [] [] [] [] [x] []    Transfers    Sit to Stand [] [] [] [] [] [] [] [] [] [x] []    Bed to Chair [] [] [] [] [] [] [] [] [] [x] []    Stand to Sit [] [] [] [] [] [] [] [] [] [x] []    I=Independent, Mod I=Modified Independent, S=Supervision, SBA=Standby Assistance, CGA=Contact Guard Assistance,   Min=Minimal Assistance, Mod=Moderate Assistance, Max=Maximal Assistance, Total=Total Assistance, NT=Not Tested  GAIT: I Mod I S SBA CGA Min Mod Max Total  NT x2 Comments:   Level of Assistance [] [] [] [] [] [] [] [] [] [x] []    Distance N/A    DME N/A    Gait Quality N/A    Weightbearing Status N/A     I=Independent, Mod I=Modified Independent, S=Supervision, SBA=Standby Assistance, CGA=Contact Guard Assistance,   Min=Minimal Assistance, Mod=Moderate Assistance, Max=Maximal Assistance, Total=Total Assistance, NT=Not Tested    [facility]       How much difficulty does the patient currently have. .. Unable A Lot A Little None   1. Turning over in bed (including adjusting bedclothes, sheets and blankets)? [] 1   [x] 2   [] 3   [] 4   2. Sitting down on and standing up from a chair with arms ( e.g., wheelchair, bedside commode, etc.)   [x] 1   [] 2   [] 3   [] 4   3. Moving from lying on back to sitting on the side of the bed? [x] 1   [] 2   [] 3   [] 4   How much help from another person does the patient currently need. .. Total A Lot A Little None   4. Moving to and from a bed to a chair (including a wheelchair)? [x] 1   [] 2   [] 3   [] 4   5. Need to walk in hospital room? [x] 1   [] 2   [] 3   [] 4   6. Climbing 3-5 steps with a railing? [x] 1   [] 2   [] 3   [] 4   © 2007, Trustees of [employer], under license to [employer].  All rights reserved     Score:  Initial: 7 Most Recent: X (Date: -- )    Interpretation of Tool:  Represents activities that are increasingly more difficult (i.e. Bed mobility, Transfers, Gait). PLAN:   FREQUENCY/DURATION: PT Plan of Care: 3 times/week (trial basis) for duration of hospital stay or until stated goals are met, whichever comes first.    PROBLEM LIST:   (Skilled intervention is medically necessary to address:)  1. Decreased ADL/Functional Activities  2. Decreased Activity Tolerance  3. Decreased AROM/PROM  4. Decreased Balance  5. Decreased Cognition  6. Decreased Coordination  7. Decreased Gait Ability  8. Decreased Strength  9. Decreased Transfer Abilities  10. Increased Pain   INTERVENTIONS PLANNED:   (Benefits and precautions of physical therapy have been discussed with the patient.)  1. Therapeutic Activity  2. Therapeutic Exercise/HEP  3. Neuromuscular Re-education  4. Gait Training  5. Manual Therapy  6. Education     TREATMENT:     PT RE-EVALUATION: (Untimed Charge)    TREATMENT:   ($$ Therapeutic Activity: 8-22 mins    )  Co-Treatment PT/OT necessary due to patient's decreased overall endurance/tolerance levels, as well as need for high level skilled assistance to complete functional transfers/mobility and functional tasks  Therapeutic Activity (15 Minutes): Therapeutic activity included Rolling, Scooting and BLE  PROM, gentle mobilization,  and repositioning for comfort to improve functional Mobility and ROM.     TREATMENT GRID:  N/A    AFTER TREATMENT POSITION/PRECAUTIONS:  Bed, Needs within reach and RN notified    INTERDISCIPLINARY COLLABORATION:  MD/PA/NP, RN/PCT, PT/PTA and OT/MONTOYA    TOTAL TREATMENT DURATION:  PT Patient Time In/Time Out  Time In: 4585  Time Out: [address], PT

## 2022-04-11 NOTE — PROGRESS NOTES
Spoke to Mr. Younger (non-verbal) and his family in room 611. They agree to referral to [address] [place] [address]. Order and referral in [facility], and called Ms. Pramod Crenshaw, RN liaison for [facility]. Await hospice review and visit.       Care Management Interventions  Support Systems: Spouse/Significant Other  The Plan for Transition of Care is Related to the Following Treatment Goals : palliative care recommends referral to [facility] hospice house  Discharge Location  Patient Expects to be Discharged to[de-identified] Rehab hospital/unit acute

## 2022-04-11 NOTE — PROGRESS NOTES
4/11/2022  OT orders to be discontinued at this time. Pt now comfort care with plan to transition to hospice at discharge. Please re-consult therapy if patient has any needs.   Thank you,  López Nicole, OT

## 2022-04-11 NOTE — PROGRESS NOTES
Infectious Disease Note    Today's Date: 2022   Admit Date: 2022    Impression:   · MRSA bacteremia (4/3, , ), TTE with MV calcification and aortic sclerosis. Compression fracture in spine. MRI spine with small fluid collection in psoas c/f possible abscess  · Chronic radiation cystitis (recent exacerbation 2022)  · Cirrhosis  · History of prostate cancer  · History of IVC filter placement s/p removal     Plan:   · Continue Daptomycin/ceftaroline. · Repeat BCs ordered for today. · Planning 8 weeks of therapy but start date TBD  · Hold PICC placement     Anti-infectives:   · Vancomycin (4/3 -  · Dapto/Ceftaroline -  · Ceftriaxone (4/3 - )    Subjective:   Lethargic but responds to pain. He will moan when touched. + skin tears to deidra UEs (L worse than R). Review of Systems:  Pertinent items are noted in the History of Present Illness.     Objective:     Visit Vitals  /72 (BP 1 Location: Right upper arm, BP Patient Position: At rest)   Pulse (!) 109   Temp 98.1 °F (36.7 °C)   Resp 22   Ht 5' 11\" (1.803 m)   Wt 99.8 kg (220 lb 0.3 oz)   SpO2 94%   BMI 30.69 kg/m²     Temp (24hrs), Av.1 °F (36.7 °C), Min:97.3 °F (36.3 °C), Max:98.7 °F (37.1 °C)       Lines:  Peripheral IV:     Exam performed  and unchanged except as noted below  Physical Exam:    General:  Appears ill, lethargic    Eyes:  Sclera anicteric, EOMI   Mouth/Throat: Mucous membranes dry    Neck: Supple   Lungs:   Normal WOB on supplemental O2, upper airway noise   CV:  RR, 2/6 systolic murmur   Abdomen:   Soft, non-tender, catheter with dark urine   Extremities: No cyanosis; 2+ pitting edema bilateral LE   Skin: No acute rash   Musculoskeletal: No deformity   Lines/Devices:  Intact, no erythema, drainage or tenderness   Psych: sleepy       Data Review:     CBC:  Recent Labs     22  0601 04/10/22  0519 22  0933 22  0933   WBC 17.9* 13.3*  --  16.5*   GRANS 84* 78   < > 77   MONOS 5 7   < > 8 EOS 2 4   < > 3   ANEU 15.1* 10.4*   < > 12.6*   ABL 1.3 1.4   < > 1.9   HGB 8.0* 8.3*  --  9.1*   HCT 25.4* 26.1*  --  28.2*   * 112*  --  121*    < > = values in this interval not displayed. BMP:  Recent Labs     04/11/22  0601 04/10/22  0519 04/09/22  0933   CREA 0.80 0.80 0.80   BUN 38* 37* 36*   * 147* 145   K 4.2 4.1 4.4   * 117* 116*   CO2 22 23 22   AGAP 7 7 7   * 113* 169*       LFTS:  No results for input(s): TBILI, ALT, AP, TP, ALB in the last 72 hours. No lab exists for component: SGOT    Microbiology:     All Micro Results     Procedure Component Value Units Date/Time    CULTURE, BLOOD [[id number]]     Order Status: Sent Specimen: Blood     CULTURE, BLOOD [[id number]]     Order Status: Sent Specimen: Blood     CULTURE, BLOOD [[id number]]  (Abnormal) Collected: 04/09/22 1148    Order Status: Completed Specimen: Blood Updated: 04/11/22 0730     Special Requests: --        LEFT  Antecubital       GRAM STAIN       GRAM POS COCCI IN CLUSTERS            AEROBIC BOTTLE POSITIVE               CRITICAL RESULT NOT CALLED DUE TO PREVIOUS NOTIFICATION OF CRITICAL RESULT WITHIN THE LAST 24 HOURS. Culture result: STAPHYLOCOCCUS AUREUS               CULTURE IN PROGRESS,FURTHER UPDATES TO FOLLOW          CULTURE, BLOOD [[id number]]  (Abnormal) Collected: 04/09/22 1148    Order Status: Completed Specimen: Blood Updated: 04/11/22 0729     Special Requests: --        RIGHT  FOREARM       GRAM STAIN       GRAM POS COCCI IN CLUSTERS            AEROBIC BOTTLE POSITIVE               RESULTS VERIFIED, PHONED TO AND READ BACK BY NATE VILLA RN @ 2993 ON 4/10/22 BY AMM           Culture result:       STAPHYLOCOCCUS AUREUS SENSITIVITY TO FOLLOW                Imaging:   XR Spine Lumbar 4/3/22  IMPRESSION  1. Interval development of L1 compression fracture. 2. Interval progression of L3 compression deformity, now with vertebral plana. 3. Osteopenia.   4. Multilevel lumbar spondylosis. 5. Interval placement of IVC filter.     Signed By: Aziza Key NP     April 11, 2022

## 2022-04-11 NOTE — PROGRESS NOTES
Bedside and Verbal shift change report given to Jose Ashby RN (oncoming nurse) by ROCHELLE Eric (offgoing nurse). Report included the following information SBAR, Kardex, Intake/Output, MAR and Recent Results.

## 2022-04-12 NOTE — PROGRESS NOTES
Discharge note:  [facility] transport arranged for 1pm to Mr. Younger' home in [facility]. [facility] to admit. Per hospice and Dr. Author Talamantes, leave fentanyl patch on left arm, leave jasso in place, give 4 mg IV morphine close to 1pm pickup time, and then remove both peripheral IVs.  Updated Mr. And Mrs. Younger in room 611, and ROCHELLE Chan.

## 2022-04-12 NOTE — PROGRESS NOTES
SPEECH PATHOLOGY NOTE:    Per chart review, patient transitioning to hospice. Will discontinue speech therapy services at this time.       Gideon Crawford MS, CCC-SLP

## 2022-04-12 NOTE — HOSPICE
[facility]    DME ordered through [facility] and will be delivered to the home by 12 noon today.   [facility] requested prescription from discharging provider for roxanol per [facility] medical director's request.      Thank you for this referral,  Mychal Rose RN, BSN  [facility] liaison  [phone]

## 2022-04-12 NOTE — HOSPICE
Joshua Lomeli, 67year old male, admitted to [id number]  on 4/12/22 with the primary diagnosis of MRSA bacteremia antibiotic resistant. PPS 30% Associated Dx:  Mitral regurgitation, tricuspid regurgitation, Psoas muscle abscess, intravascular foreign body, alcoholic cirrhosis, thrombocytopenia, hepatic coagulopathy, lactic acidosis, septic shock with pressor support therapy, metabolic encephalopathy, liver failure, debility, back pain. Non-Associated Dx: Klebsiella/ESBL UTI, hx prostate cancer, radiation cystitis, multilevel vertebral body fracture, multilevel lumbar stenosis, multilevel compressive radiculopathy, DM II, iron deficiency anemia, restless legs syndrome, atelectasis, pleural effusion, CHF with normal LV function, obesity. Patient released from the hospital today after a lengthy hospital stay. In the ER, his blood pressure was found to be in the 60s. ER work-up shows patient has leukocytosis, lactic acidosis, UTI. He also has chronic back pain with compression fractures. Patient was started on Levophed overnight due to hypotension. Blood cultures on admission positive for MRSA. Patient was started on vancomycin. ID consulted. Echocardiogram was done on admission which showed hyperdynamic left ventricular systolic function with EF of greater than 84%, normal diastolic function. Not able to assess backside due to patient refusing to turn. He returns to his home today where his wife Milton Tabor will be caring for him. He is weeping from bilateral upper extremities. He has a jasso catheter that was placed on 4/3/22. His testicles and penis are swollen. Reddened area on sacrum is covered with foam dressing. Consents signed today with this nurse and wife Milton Tabor as patient is confused. DME delivered today: bed, OBT, Oxygen concentrator at 5l continuous flow. Medications picked up at [place] [address] by wife Sharon: Kim Dawkins. Medications to come from [facility]: senna.  Supplies left in the home today: urinal, basin, soap, wipes, XL briefs, chux 2 packs, barrier cream. Dedicated equipment left for MRSA contact precautions. RN educated patient and family on hospice process and philosophy, medication management, pain control, skin care and protection, fall precautions, home safety, oxygen safety, and social distancing due to COVID-19. Pt history, assessment, meds and status reviewed with patient's hospice attending MD, Dr. Yesika Santana. Jerry Baljosue made aware of volunteer services but pending at this time due to COVID-19; plans to reassess volunteer needs once volunteer services become available. Patient is appropriate for hospice services at this time. [facility] 2xw. [facility] and [place] services accepted. Caregiver made aware that an RN will be completing a follow up visit within 24 hours. Handwritten medication list, [facility] book and magnet with [facility] phone number left in home. RN educated caregiver to call [facility] 24/7 phone line with any changes, concerns or falls with verbalized understanding.

## 2022-04-12 NOTE — DISCHARGE SUMMARY
Hospitalist Discharge Summary   Admit Date:  2022  2:18 PM   DC Note date: 2022  Name:  Aaron Gomes   Age:  67 y.o. Sex:  male  :  1945   MRN:  [id number]   Room:  [facility]  PCP:  Naheed Prabhakar MD    Presenting Complaint: No chief complaint on file.     Initial Admission Diagnosis: MRSA bacteremia [R78.81, B95.62]     Problem List for this Hospitalization:  Hospital Problems as of 2022 Date Reviewed: 3/10/2022          Codes Class Noted - Resolved POA    * (Principal) Acute bacterial endocarditis ICD-10-CM: I33.0  ICD-9-CM: 421.0  4/10/2022 - Present Unknown        MRSA bacteremia ICD-10-CM: R78.81, B95.62  ICD-9-CM: 790.7, 041.12  2022 - Present Unknown        Hypoalbuminemia due to protein-calorie malnutrition (HCC) (Chronic) ICD-10-CM: E88.09, E46  ICD-9-CM: 273.8, 263.9  2022 - Present Yes        (HFpEF) heart failure with preserved ejection fraction (HCC) (Chronic) ICD-10-CM: I50.30  ICD-9-CM: 428.9  2022 - Present Yes        Debility ICD-10-CM: R53.81  ICD-9-CM: 799.3  2022 - Present Yes    Overview Signed 2022  4:07 PM by Rafael Haley DO     Last Assessment & Plan:   Formatting of this note might be different from the original.  Stable for rehab  Pain improved  Tolerating activity  Progressing slowly  Continue current care             Alcoholic cirrhosis of liver without ascites ([facility] [address] ICD-10-CM: K70.30  ICD-9-CM: 571.2  10/8/2021 - Present Yes    Overview Signed 2022  4:07 PM by Rafael Haley DO     Last Assessment & Plan:   Formatting of this note might be different from the original.  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  Blood sugars have been in good range without treatment  Very poor appetite  Continue supplements  Elevated BUN  Begin IV fluids today  Check BMP daily  Encourage oral fluids                 Did Patient have Sepsis (YES OR NO): yes    Hospital Course:  Maryana Clark is a 67 y.o. male with medical history of orthostatic hypotension, heart failure with preserved ejection fraction, and cirrhosis of liver, who presented with hypotension. He has known orthostatic hypotension and takes midodrine for this. His wife checks his blood pressure every morning and he usually is around a systolic of 096. She states today it was in the 76s with a few rechecks. She then took him to urgent care. Urgent care referred him to the ER. Initial evaluation in the ER showed his systolic blood pressure being 60s, but this quickly came up to the 80s and moved maintained generally between 80 and 100. Further work-up however, shows him to have a UTI and lactic acidosis. He does not meet other criteria for sepsis, and I believe the lactic acidosis is due to the hypotension. Also complicating matters is that he has chronic back pain with compression fractures, and appears to have another acute compression fracture. He denies fever/chills, NVD, abdominal pain, chest pain, shortness of breath. He complains only of back pain. He does have a history of prostate cancer, status post resection and radiation. 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         Assessment and plan  Septic shock ([facility] [address] (4/4/2022) secondary to MRSA bacteremia/osteomyelitis/infected IVC filter  Dysphagia-  Hypernatremia-  Presumed infective endocarditis-  Elevated troponin-no signs of acute coronary syndrome  Acute on chronic normocytic anemia  Hypokalemia  Hypomagnesemia  Lactic acidosis present on admission  L1 compression fracture  Reported history of chronic diastolic congestive heart failure  Liver cirrhosis  Hypoalbuminemia/moderate protein calorie malnutrition/hypoglycemia from poor p.o. intake. Debility        Patient was initially admitted to the hospital and started on IV vancomycin and subsequently had continuously positive blood cultures as such patient's antibiotic regimen was escalated and despite appropriate antibiotic treatment patient did not have clearing of blood cultures at 7 days. Patient was severely ill with poorly controlled pain and based on recommendations from infectious disease felt patient's bacteremia osteomyelitis and infective endocarditis were failing to respond to antibiotic therapy. This was discussed with the patient's wife and patient and they elected for discharge home with hospice. As such I discontinued all the patient's prior home medications and the patient has been accepted to hospice at home. Defer further management to hospice provider. Disposition: Home Hospice  Diet: ADULT DIET Regular; Likes soup  Code Status: DNR    Follow Up Orders: Follow-up Appointments   Procedures    FOLLOW UP VISIT Appointment in: 3 - 5 Days Hospice     Hospice     Standing Status:   Standing     Number of Occurrences:   1     Order Specific Question:   Appointment in     Answer:   3 - 5 Days       Follow-up Information     Follow up With Specialties Details Why Contact Info    David Gallegos MD [place] [address]  [phone]            Follow up labs/diagnostics (ultimately defer to outpatient provider):  None    Time spent in patient discharge and coordination 39 minutes. Plan was discussed with patient and spouse. All questions answered. Patient was stable at time of discharge. Instructions given to call a physician or return if any concerns.     Discharge Info:   Current Discharge Medication List      START taking these medications    Details   morphine (ROXANOL) 100 mg/5 mL (20 mg/mL) concentrated solution Take 0.5 mL by mouth every three (3) hours as needed for Pain or Shortness of Breath for up to 3 days.  Max Daily Amount: 80 mg.  Qty: 30 mL, Refills: 0  Start date: 4/12/2022, End date: 4/15/2022    Associated Diagnoses: Encounter for palliative care         STOP taking these medications       cholecalciferol (VITAMIN D3) (2,000 UNITS /50 MCG) cap capsule Comments:   Reason for Stopping:         bumetanide (BUMEX) 1 mg tablet Comments:   Reason for Stopping:         brief disposable (ADULT) misc Comments:   Reason for Stopping:         miscellaneous medical supply (BARD C[place] INCONTIN CLAMP) misc Comments:   Reason for Stopping:         nystatin (MYCOSTATIN) topical cream Comments:   Reason for Stopping:         pramipexole (MIRAPEX) 1 mg tablet Comments:   Reason for Stopping:         Integra 125-40-3 mg cap Comments:   Reason for Stopping:         montelukast (SINGULAIR) 10 mg tablet Comments:   Reason for Stopping:         atorvastatin (LIPITOR) 40 mg tablet Comments:   Reason for Stopping:         traZODone (DESYREL) 50 mg tablet Comments:   Reason for Stopping:         traMADoL (ULTRAM) 50 mg tablet Comments:   Reason for Stopping:         rOPINIRole (REQUIP) 1 mg tablet Comments:   Reason for Stopping:         methotrexate 25 mg/mL chemo injection Comments:   Reason for Stopping:         triamcinolone acetonide (KENALOG) 0.1 % topical cream Comments:   Reason for Stopping:         folic acid 817 mcg tablet Comments:   Reason for Stopping:         cinnamon bark 500 mg cap Comments:   Reason for Stopping:         b complex vitamins tablet Comments:   Reason for Stopping:         fenofibrate (TRICOR) 160 mg tablet Comments:   Reason for Stopping:         fluticasone (FLONASE) 50 mcg/actuation nasal spray Comments:   Reason for Stopping:         pantoprazole (PROTONIX) 40 mg tablet Comments:   Reason for Stopping:         simvastatin (ZOCOR) 80 mg tablet Comments:   Reason for Stopping: ascorbic acid, vitamin C, (VITAMIN C) 500 mg tablet Comments:   Reason for Stopping:               Procedures done this admission:  * No surgery found *    Consults this admission:  IP CONSULT TO PALLIATIVE CARE - PROVIDER    Echocardiogram/EKG results:  Results from Hospital Encounter encounter on 04/03/22    ECHO ADULT COMPLETE    Interpretation Summary    Left Ventricle: Left ventricle size is normal. Normal wall thickness. Normal wall motion. Hyperdynamic left ventricular systolic function with a visually estimated EF of greater than 65%. Normal diastolic function.   Left Atrium: Left atrium is mildly dilated.   Aortic Valve: Moderate sclerosis of the aortic valve cusp.   Mitral Valve: Mild annular calcification of the mitral valve. EKG Results     None          Diagnostic Imaging/Tests:   IR REMOVE IVC FILTER W SI    Result Date: 4/8/2022  PROCEDURE:  Inferior Vena Cava Venogram.  Inferior vena cava (IVC) filter retrieval. Procedural Personnel Attending physician(s): Elis Lester M.D. Pre-procedure diagnosis:  Unfortunate 79-year-old man with severe back pain secondary to worsening T12 and L3 vertebral compression fractures. Hospitalization complicated by ongoing MRSA bacteremia. IVC filter retrieval will be performed due to the ongoing bacteremia. History also includes congestive heart failure, prostate cancer, recent episode of hemorrhagic cystitis secondary to radiation therapy, anticoagulation is currently on hold due to the cystitis. Hemorrhagic cystitis was the original indication for IVC filter placement 02/03/2022. Post-procedure diagnosis:  Same. Indication:  The IVC filter is potentially seeded with MRSA bacteriuria. Hemorrhagic cystitis has resolved. Anticoagulation will be reinitiated. Complications:  No immediate complications. Technically successful inferior vena cava filter retrieval.  No thrombus, fibrin sheath, or obvious vegetation on the IVC filter.  Note: Per policy at [facility], the filter was discarded at the conclusion of this procedure; the filter was not placed into storage. Plan:  One hour bedrest.  The patient has been transferred from the [address] [place] [address] to the [facility].  Anticipate long-term intravenous antibiotic therapy. Hopefully, anticoagulation can resume today/tomorrow. _______________________________________________________________ PROCEDURE SUMMARY: - IVC filter retrieval with fluoroscopic guidance - Additional procedure(s):  Additional diagnostic venography PROCEDURE DETAILS: Pre-procedure Consent:  Informed written and oral consent for the procedure was obtained from the patient and his wife after explanation of risks (including, but not limitted to:  hemorrhage, filter fracture, infection) benefits and alternatives. Her questions were answered to their satisfaction. She stated understanding and requested that we proceed. Final Verification:  A time-out identifying the patient and planned procedure was performed prior to the procedure. Preparation:  Maximal sterile barrier technique (including:  cap, mask, sterile gown, sterile gloves, sterile drape, hand hygiene, sterile ultrasound cover, sterile ultrasound gel, 2% chlorhexidine cutaneous antisepsis) was used. Anesthesia/sedation Level of anesthesia/sedation:  Moderate sedation (conscious sedation) Anesthesia/sedation administered by: Independent trained observer under attending supervision with continuous monitoring of the patient?s level of consciousness and physiologic status Total intra-service sedation time (minutes):  27 Access Local anesthesia was administered. The vessel was sonographically evaluated and determined to be patent. Real time ultrasound was used to visualize needle entry into the vessel and a permanent image was stored. A diagnostic angiography catheter was advanced over a wire.  Vein accessed:  Right internal jugular vein Access technique:  Micropuncture set with 21 gauge needle Venography Venography of the pelvis/inferior vena cava (IVC) was performed to assess filter position and evaluate for intra-filter thrombus. Catheter tip position for venography:  Left common iliac vein Filter position:  Infrarenal IVC Filter integrity:  Intact Intra-filter thrombus:  None Additional findings:  Widely patent left common iliac vein and infrarenal inferior vena cava. The IVC filter is intact. No thrombus identified on the filter during venography. Filter retrieval The diagnostic angiography catheter was exchanged over an Amplatz wire for the retrieval set. Telescoping retrieval sheaths were advanced. Retrieval sheath size(s):  11-[place] The filter hook was then snared, the filter was collapsed into the sheath, and then removed. The filter was inspected on the back table. Filter capture technique:  Standard technique, employing the loop snare included in the filter retrieval kit Filter evaluation: The filter was inspected and found to be Intact. No thrombus, fibrin sheath, or vegetation visible on the filter. The filter was placed into a sterile specimen cup. Post-retrieval venography Post-retrieval venography of the IVC was performed. IVC patency post:  Widely patent IVC following filter retrieval. Additional findings:  None. Closure The sheath was removed and hemostasis was achieved with manual compression. A sterile dressing or glue was applied. Contrast Contrast agent:  Isovue-300 Contrast volume (mL):  50 Radiation Exposure Indices: Reference Air Kerma (Helena Prosper):  365 mGy Dose Area Product/Kerma Area Product (DAP/JORDON/PKA):  [id number] cGy-cm2 Fluoroscopy Exposure Time:  48 seconds Fluorographic Images:  2 venograms Additional Details Specimens removed:  IVC filter sent to microbiology Estimated blood loss (mL):  Less than 10 Standardized report: [email or web address] Attestation Signer name: VIJAY Dye attest that I performed the entire procedure. I reviewed the stored images and agree with the report as written. All Micro Results     Procedure Component Value Units Date/Time    CULTURE, BLOOD [[id number]] Collected: 04/11/22 0956    Order Status: Completed Specimen: Blood Updated: 04/12/22 0808     Special Requests: --        RIGHT  HAND       Culture result: NO GROWTH AFTER 21 HOURS       CULTURE, BLOOD [[id number]] Collected: 04/11/22 0956    Order Status: Completed Specimen: Blood Updated: 04/12/22 0743     Special Requests: --        LEFT  HAND       GRAM STAIN       GRAM POS COCCI IN CLUSTERS            AEROBIC BOTTLE POSITIVE               RESULTS VERIFIED, PHONED TO AND READ BACK BY NATE VILLA RN ON 4/12/22 @0742, TA           Culture result:       CULTURE IN [address] UPDATES TO FOLLOW          CULTURE, BLOOD [[id number]]  (Abnormal) Collected: 04/09/22 1148    Order Status: Completed Specimen: Blood Updated: 04/12/22 0741     Special Requests: --        LEFT  Antecubital       GRAM STAIN       GRAM POS COCCI IN CLUSTERS            AEROBIC BOTTLE POSITIVE               CRITICAL RESULT NOT CALLED DUE TO PREVIOUS NOTIFICATION OF CRITICAL RESULT WITHIN THE LAST 24 HOURS.            Culture result: STAPHYLOCOCCUS AUREUS               For Susceptibility Refer to Culture  Accession [id number]      CULTURE, BLOOD [[id number]]  (Abnormal)  (Susceptibility) Collected: 04/09/22 1148    Order Status: Completed Specimen: Blood Updated: 04/12/22 0739     Special Requests: --        RIGHT  FOREARM       GRAM STAIN       GRAM POS COCCI IN CLUSTERS            AEROBIC BOTTLE POSITIVE               RESULTS VERIFIED, PHONED TO AND READ BACK BY NATE VILLA RN @ 8784 ON 4/10/22 BY AMM           Culture result:       * Methicillin Resistant Staphylococcus aureus *            PATIENT IS A KNOWN MRSA             Labs: Results:       BMP, Mg, Phos Recent Labs     04/11/22  0601 04/10/22  0519   * 147*   K 4.2 4.1   * 117*   CO2 22 23   AGAP 7 7   BUN 38* 37*   CREA 0.80 0.80   CA 8.4 8.6   * 113*   MG 2.0  --    PHOS 3.2  --       CBC Recent Labs     04/11/22  0601 04/10/22  0519   WBC 17.9* 13.3*   RBC 3.03* 3.14*   HGB 8.0* 8.3*   HCT 25.4* 26.1*   * 112*   GRANS 84* 78   LYMPH 7* 10*   EOS 2 4   MONOS 5 7   BASOS 0 0   IG 1 1   ANEU 15.1* 10.4*   ABL 1.3 1.4   ALIN 0.4 0.5   ABM 0.9 1.0   ABB 0.1 0.1   AIG 0.2 0.1      LFT No results for input(s): ALT, TBIL, AP, TP, ALB, GLOB, AGRAT in the last 72 hours.     No lab exists for component: SGOT, GPT   Cardiac Testing Lab Results   Component Value Date/Time    CK 18 (L) 04/08/2022 03:31 AM    CK 77 11/29/2018 09:27 AM      Coagulation Tests Lab Results   Component Value Date/Time    Prothrombin time 23.7 (H) 04/09/2022 09:33 AM    Prothrombin time 18.2 (H) 02/02/2022 10:17 AM    Prothrombin time 18.0 (H) 02/01/2022 04:48 AM    INR 2.1 04/09/2022 09:33 AM    INR 1.5 02/02/2022 10:17 AM    INR 1.5 02/01/2022 04:48 AM      A1c Lab Results   Component Value Date/Time    Hemoglobin A1c 4.7 02/05/2022 05:58 AM      Lipid Panel No results found for: CHOL, CHOLPOCT, CHOLX, CHLST, CHOLV, 404169, HDL, HDLP, LDL, LDLC, DLDLP, 950961, VLDLC, VLDL, TGLX, TRIGL, TRIGP, TGLPOCT, CHHD, CHHDX   Thyroid Panel No results found for: TSH, T4, FT4, TT3, T3U, TSHEXT     Most Recent UA Lab Results   Component Value Date/Time    Color RED 01/31/2022 02:32 PM    Appearance TURBID 01/31/2022 02:32 PM    Specific gravity 1.022 11/29/2018 09:26 AM    pH (UA) 7.0 01/31/2022 02:32 PM    Protein 100 (A) 01/31/2022 02:32 PM    Glucose Negative 01/31/2022 02:32 PM    Ketone Negative 01/31/2022 02:32 PM    Bilirubin Negative 01/31/2022 02:32 PM    Blood LARGE (A) 01/31/2022 02:32 PM    Urobilinogen 0.2 01/31/2022 02:32 PM    Nitrites Negative 01/31/2022 02:32 PM    Leukocyte Esterase SMALL (A) 01/31/2022 02:32 PM    WBC >100 04/03/2022 05:44 PM    RBC 3-5 04/03/2022 05:44 PM    Epithelial cells 0-3 04/03/2022 05:44 PM    Bacteria TRACE 04/03/2022 05:44 PM    Casts 3-5 04/03/2022 05:44 PM    Crystals, urine 0 04/03/2022 05:44 PM    Mucus 0 04/03/2022 05:44 PM    Other observations RESULTS VERIFIED MANUALLY 01/31/2022 02:32 PM          All Labs from Last 24 Hrs:  No results found for this or any previous visit (from the past 24 hour(s)). Current Med List in Hospital:   Current Facility-Administered Medications   Medication Dose Route Frequency    fentaNYL (DURAGESIC) 25 mcg/hr patch 1 Patch  1 Patch TransDERmal Q72H    fenofibrate (LOFIBRA) tablet 160 mg  160 mg Per NG tube DAILY    acetaminophen (TYLENOL) tablet 650 mg  650 mg Per NG tube Q6H PRN    Or    acetaminophen (TYLENOL) suppository 650 mg  650 mg Rectal Q6H PRN    lip protectant (BLISTEX) ointment 1 Each  1 Each Topical PRN    artificial saliva (MOUTH KOTE) 1 Spray  1 Spray Oral PRN    ondansetron (ZOFRAN) injection 4 mg  4 mg IntraVENous Q6H PRN    sodium chloride (NS) flush 5-40 mL  5-40 mL IntraVENous PRN    dextrose 10% infusion 125-250 mL  125-250 mL IntraVENous PRN    glucagon (GLUCAGEN) injection 1 mg  1 mg IntraMUSCular PRN    glucose chewable tablet 16 g  16 g Oral PRN    morphine injection 4 mg  4 mg IntraVENous Q4H PRN    traZODone (DESYREL) tablet 50 mg  50 mg Oral QHS PRN    alcohol 62% (NOZIN) nasal  1 Ampule  1 Ampule Topical Q12H       Allergies   Allergen Reactions    Amoxicillin Rash    Lisinopril Cough     cough    Sulfa (Sulfonamide Antibiotics) Rash     Immunization History   Administered Date(s) Administered    COVID-19, Pfizer Purple top, DILUTE for use, 12+ yrs, 30mcg/0.3mL dose 06/11/2021, 07/03/2021    TB Skin Test (PPD) Intradermal 02/01/2022, 04/06/2022       Recent Vital Data:  No data found.   Oxygen Therapy  O2 Sat (%): 95 % (04/11/22 1114)  Pulse via Oximetry: 81 beats per minute (04/09/22 2007)  O2 Device: Nasal cannula (04/12/22 0854)  Skin Assessment: Clean, dry, & intact (04/12/22 8398)  Skin Protection for O2 Device: No (04/12/22 0715)  O2 Flow Rate (L/min): 5 l/min (04/12/22 1314)    Estimated body mass index is 30.69 kg/m² as calculated from the following:    Height as of this encounter: 5' 11\" (1.803 m). Weight as of this encounter: 99.8 kg (220 lb 0.3 oz). Intake/Output Summary (Last 24 hours) at 4/12/2022 1137  Last data filed at 4/12/2022 0954  Gross per 24 hour   Intake 120 ml   Output 200 ml   Net -80 ml         Physical Exam:    General:    Well nourished. No overt distress  Head:  Normocephalic, atraumatic  Eyes:  Sclerae appear normal.  Pupils equally round. HENT:  Nares appear normal, no drainage. Moist mucous membranes  Neck:  No restricted ROM. Trachea midline  CV:   RRR. No m/r/g. No JVD  Lungs:   CTAB. No wheezing, rhonchi, or rales. Even, unlabored  Abdomen:   Soft, nontender, nondistended. Extremities: Warm and dry. No cyanosis or clubbing. Anasarca noted  Skin:     No rashes. Normal coloration  Neuro:  CN II-XII grossly intact. Psych:  Normal mood and affect. Signed:  Alissa Kumar DO    Part of this note may have been written by using a voice dictation software. The note has been proof read but may still contain some grammatical/other typographical errors.

## 2022-04-12 NOTE — PROGRESS NOTES
Nutrition Note    Per chart review, pt transitioned to comfort care only. TF and FT d/c. No further nutrition intervention indicated at this time.       Jan Izaguirre, [address], LDN   Contact: [phone]

## 2022-04-12 NOTE — PROGRESS NOTES
Hourly rounds completed. All needs met. PRN meds given for comfort. Pt rested well throughout the shift. Gave report to the oncoming day shift nurse.

## 2022-04-12 NOTE — PROGRESS NOTES
Problem: Risk for Spread of Infection  Goal: Prevent transmission of infectious organism to others  Description: Prevent the transmission of infectious organisms to other patients, staff members, and visitors. Outcome: Resolved/Met     Problem: Patient Education:  Go to Education Activity  Goal: Patient/Family Education  Outcome: Resolved/Met     Problem: Falls - Risk of  Goal: *Absence of Falls  Description: Document Jose Fall Risk and appropriate interventions in the flowsheet. Outcome: Resolved/Met     Problem: Patient Education: Go to Patient Education Activity  Goal: Patient/Family Education  Outcome: Resolved/Met     Problem: Patient Education: Go to Patient Education Activity  Goal: Patient/Family Education  Outcome: Resolved/Met     Problem: Pressure Injury - Risk of  Goal: *Prevention of pressure injury  Description: Document Matthew Scale and appropriate interventions in the flowsheet.   Outcome: Resolved/Met     Problem: Patient Education: Go to Patient Education Activity  Goal: Patient/Family Education  Outcome: Resolved/Met     Problem: Patient Education: Go to Patient Education Activity  Goal: Patient/Family Education  Outcome: Resolved/Met

## 2022-04-12 NOTE — PROGRESS NOTES
Pt was discharged at 13:26, transported by ambulance set up by . PRN morphine given before discharge for pain. PIVx2 removed w tip intact. All belongings sent w patient.

## 2022-04-12 NOTE — PROGRESS NOTES
Pt is A&Ox4. Pt denies pain this morning while resting in bed. Pt c/o pain when being repositioned. Guajardo remains in place w marginal bloody output. Pt is arranged to be discharged with hospice care today. Q1h safety checks cont. Will cont to monitor.

## 2022-04-15 NOTE — HOSPICE
Pt is 67 y. o.male with Sepsis. Pt lives in his home with the assistance of spouse and son. Upon arrivial pt in bed asleep and sleot throufhout visit. Spoke with spouse and son regarding the rolr of [facility] and advised of community resources. At present pt requires assistance with all ADL's. Spouse reports pt not eating or drinking much at this time. Spouse states pt has spent several days in the hospital and verbalized that he was glad to be home. Spouse states pain has been a problem but pt comfortable at this time. Spouse states several family Jia Pippins will be visiting this week to spend time with family. [place] arrangements are being discussed. Plans include cremation. [facility] provided a list number for  homes whom assist with cremation. Spouse and son coping appropriately at this time. Advised of all hospice services and community resources. Reviewed emergency careplan and encouraged to contact hospice as needed. All voiced understanding.

## 2022-04-17 NOTE — HOSPICE
:  Daily visit for decline, symptoms well controlled. Green pads and wipes left at bedside. Discussed signs and symptoms at the end of life; changes in breathing patten, skin color changes. Family will call if pt passes.

## 2022-04-18 NOTE — HOSPICE
daily nurse visits due to pt decline and family support. Pt Sitting up in hospital bed awake. pt is drowsy but he is able to sip water with a straw and answer questions. Pt is bedbound. Pt's family denies any pt falls. Instructed pts cg on falls preventions. Pts cg verbalized understanding to teaching. Pts wife , son and dtr  are  present. Pt has no Sob today. Pt wears o2 at 5 liters continuous. Pt has severe edema in bilateral arms and scrotum. pts arms and scrotum are elevated. sn asked if ok to turn pt and check skin. family refused for pt to be moved due to swollen scrotum, severe pains when rolling and previous back injury. Pt is having regular Bms. Pts Appetite is none. Pts cg denies any pt Nausea / vomiting. Pts Urine is dark yeallow/brown color in catheter bag. Pt denies any Pain today. Pt has no s&s of pain today. pts dtr is giving pt roxanol prn for s&s of distress. pt appears calm with s&s controlled. Sn reviewed pts meds. Refilled meds: none needed  Ordered supplies: none needed  SN updated mar & reconciled meds. sn updated care plan. pts dtr, wife and  son are taking turns caring for pt. family states that they do not need any extra support at this time. Instructed pts cg to call [facility] with any questions or concerns. Pts cg verbalized understanding and agreement.

## 2022-04-18 NOTE — HOSPICE
:  prn visit  to help family with pt care. Pt is 200lbs  and they cant turn pt . pt had a small black soft bm. Mercedes care rendered. He is edematous in both arms and legs. Scrotum  filled with fluid, greater than the size of grapefruit. Folded towel placed under scrotum for support. . pt very lethargic, non verbal..CM  to see pt today. family states pt has pain when turned or moved. roxanol 05ml given prior to turning. pt has a stage 1 purplish color, not opened  sacral wound. old foam dressing removed, new one placed. jasso catheter to gravity drainage, urine dark tray color. pt was re-situated in bed with help from family. wife will call if further  issues. wife instructed on wearing gloves at all times when handling pt, due to pt having hx of MRSA, and good handwashing etc. before and after handling pt, jasso, bed linens etc. she verbalized understanding.

## 2022-04-20 NOTE — HOSPICE
RN received phone call from answering service for patient with increased edema in legs. Talked with daughter [address], [address] on phone who states pt is gurgling loudly but also sounds like he is drowning due to increased secretions. On arrival of RN pt is raspy with audible crackles. Received order to give 2 tablets of Levsin now, and continue to give 2 tablets as compared to 1 tablet family was giving to start with per Dr Ester Encarnacion on home visit with patient and family yesterday. Received order for increased dose of Roxanol to every 1 hour prn for dyspnea/pain. Ordered suction machine from [facility] and machine arrived before arrival of RN. Pt is clammy all over. Arms jeison, RN assisted in changing underpads beneath arms. Encouraged family to call [facility] with further concerns. At end of visit RN noted pt still not comfortable AEB groaning and moaning with every breath. Received order for Roxanol to increase in frequency again to 10 mg every 30 minutes as needed for dyspnea/pain. See MAR.

## 2022-04-20 NOTE — HOSPICE
RN was greeted at door by patients spouse, Skip Esteves and son Demetris Sher for 24 hour f/u visit. Patient lying in supine position in a hospital bed in the living room. He presents in an altered state of consciousness but was aroused enough to verbalize that he was experiencing some pain. Roxanol dose administered by son, Demetris Sher at time of visit. 24 hour f/u assessment completed, medications reconciled, education provided and POC reviewed with and accepted by family. See Care Plan and ROS for details. Currently no refills or supplies needed. RN plans an additional visit on 4/15 to assess for further decline.

## 2022-04-20 NOTE — HOSPICE
Upon arrival RN observed that Mr Cliff Hussein was experiencing increased pain as demonstrated by facial grimacing and soft intermittent moaning. Caregivers have been administering Roxanol as ordered over the weekend,  When repositioning Mr Cliff Hussein and providing pericare he became increasingly distressed crying out in pain and verbally expressing his displeasure. Call placed to Dr. Merary Patel and received orders for morphine (ROXANOL) 100 mg/5 mL (20 mg/mL) concentrated solution 20 mg by Sublingual route every six (6) hours scheduled and 10 mg by Sublingual route every two (2) hours as needed for break through pain. Assessment completed, medications reconciled, education provided. See ROS and care plan for details. Medication ordered from [facility]. No supplies needed. Plan continue daily visits. Informed family of MD planned visit for 4/19/2022.

## 2022-04-20 NOTE — HOSPICE
Scheduled f/u visit to monitor for pathological changes. Family states Patient appears to be experiencing more pain, however they have not been administering the Roxanol as ordered for fear of overdosing Mr. Younger. Assessment today yields further decline aeb decreased urinary output and decreased fluid intake as well increased pain. See ROS and care plan for further details. Medications reconciled, further education provided r/t to Roxanol indications for use, half life and side effects, Care plan reviewed and accepted by family.   Plan:  increase SN visits to daily

## 2022-04-22 NOTE — HOSPICE
FAMILY AT BEDSIDE. AUSCULTATED 2 MIN. NO PULSE OR RESP. FAMILY GRIEVING APPROPRIATELY. [address] TEAM NOTIFIED. [address] AND [address] NOTIFIED. FAMILY USING [place] [employer].

## 2022-04-25 NOTE — HOSPICE
Arrived at home to complete a dual visit with Dr. Kristen Prado. Family attentive during Dr. Etta Rice consultation and asked appropriate questions. After discussion with Dr. Kristen Prado family made the decision to discontinue O2 at this this time. RN completed assessment, reconciled medications and provided further education r/t end of life. Family verbalized understanding and appear accepting of patients impending death. See ROS and care plan for further details. RX written by Dr. Kristen Prado for Levsin and Roxanol local fill. POC reviewed and accepted by family.

## 2025-01-15 NOTE — PROGRESS NOTES
Hospitalist Progress Note   Admit Date:  4/3/2022  2:25 PM   Name:  Nahid Younger   Age:  67 y.o. Sex:  male  :  1945   MRN:  [id number]   Room:  374/01    Presenting Complaint: Hypotension    Reason(s) for Admission: UTI (urinary tract infection) [N39.0]  Hypotension [I95.9]     Hospital Course & Interval History: This is a 54-year-old male with past medical history significant for congestive heart failure with preserved ejection fraction, liver cirrhosis, orthostatic hypotension, prostate cancer status post surgery as well as radiation therapy, was sent to the ER from urgent care because of hypotension. Patient usually has orthostatic hypotension and is on midodrine. In the ER, his blood pressure was found to be in the 60s. ER work-up shows patient has leukocytosis, lactic acidosis, UTI. He also has chronic back pain with compression fractures. Patient was started on Levophed overnight due to hypotension. Blood cultures on admission positive for MRSA. Patient was started on vancomycin. ID consulted. Echocardiogram was done on admission which showed hyperdynamic left ventricular systolic function with EF of greater than 69%, normal diastolic function. No visible vegetations. Repeat blood cultures were done on  and still positive. Given patient's history of liver cirrhosis pt is too risky for DAPHNEY, as he may have varices and also risk of anesthesia and may not be a candidate for any surgical intervention. ID recommends continuing vancomycin and obtaining MRI of the spine as well as repeating blood cultures on . Subjective/24hr Events (22): Patient's p.o. intake is very poor. Blood pressure stable off of pressors. Still on midodrine. Patient's glucoses are still low due to poor p.o. intake. He does not have an appetite. Patient's wife at bedside and updated regarding patient condition and current plan of care.     ROS:  10 systems reviewed and negative except as noted above. Assessment & Plan:     Principal Problem: This is a 66y Male with:     Septic shock ([address] (4/4/2022) secondary to MRSA bacteremia  Septic shock resolved  MRSA bacteremia-unknown etiology  Patient meets criteria for sepsis with leukocytosis, fever, tachycardia, tachypnea, lactic acidosis, positive blood cultures. 4/5 Able to wean off of Levophed. Midodrine dose decreased to 5 mg p.o. 3 times daily. Blood cultures on admission positive for MRSA. ID on board. Appreciate recommendations. TTE showed no visible vegetations. 4/6 blood cultures repeated yesterday still positive. ID recommends obtaining MRI of the spine. Repeat blood cultures in a.m. Patient may not be a candidate for DAPHNEY given his liver cirrhosis, may have underlying esophageal varices, as well as poor surgical candidate and risks of anesthesia. Continue IV vancomycin day 3. He may need to be treated empirically with IV antibiotics. Elevated troponin-no signs of acute coronary syndrome  Likely secondary to demand supply mismatch. Cardiology consulted. Appreciate recommendations. Echocardiogram shows preserved left ventricular ejection fraction of greater than 65% with hyperdynamic left ventricular systolic function, normal diastolic function. No evidence of vegetations on the transthoracic echocardiogram.  No plans for DAPHNEY at this time, given his liver cirrhosis now underlying esophageal varices as well as poor surgical candidate and risks of anesthesia. Acute on chronic normocytic anemia  Multifactorial.  Patient's baseline hemoglobin seems to be approximately 8-9. Hemoglobin this morning is 8. Patient denies any visible external bleeding. Will check Hemoccult. Hold Lovenox. Hypokalemia  Hypomagnesemia  Resolved  Repeat magnesium in a.m.     Lactic acidosis present on admission  Resolved    L1 compression fracture  Multilevel lumbar spondylosis  Interval progression of L3 compression deformity  Orthopedics consulted. Recommends brace and outpatient follow-up. Given sepsis may not be a candidate for any intervention at this time. Reported history of chronic diastolic congestive heart failure  Echo showed no evidence of congestive heart failure this admission. Liver cirrhosis    Hypoalbuminemia/moderate protein calorie malnutrition/hypoglycemia from poor p.o. intake. Ordered dextrose containing fluids. Nutrition consulted. Patient started on mirtazapine as he complains of poor appetite. Discharge Planning:      Anticipate inpatient hospital stay for at least 72 -96 hours. PT/OT eval. Pt may be a candidate for LTAC. Case management on board. Diet:  ADULT ORAL NUTRITION SUPPLEMENT Breakfast, Lunch, Dinner; Standard High Calorie/High Protein  ADULT DIET Dysphagia - Soft & Bite Sized; Likes soup  DVT PPx: Lovenox held.  SCDs  Code status: DNR    Hospital Problems as of 4/6/2022 Date Reviewed: 3/10/2022          Codes Class Noted - Resolved POA    Severe protein-calorie malnutrition ([facility] ICD-10-CM: E43  ICD-9-CM: 481  4/5/2022 - Present Yes        * (Principal) Septic shock ([facility] ICD-10-CM: A41.9, R65.21  ICD-9-CM: 038.9, 785.52, 995.92  4/4/2022 - Present Unknown        UTI (urinary tract infection) ICD-10-CM: N39.0  ICD-9-CM: 599.0  4/3/2022 - Present Yes        Hypotension ICD-10-CM: I95.9  ICD-9-CM: 458.9  4/3/2022 - Present Yes        Lactic acidosis ICD-10-CM: E87.2  ICD-9-CM: 276.2  4/3/2022 - Present Yes        Hypoalbuminemia due to protein-calorie malnutrition (HCC) (Chronic) ICD-10-CM: E88.09, E46  ICD-9-CM: 273.8, 263.9  1/31/2022 - Present Yes        (HFpEF) heart failure with preserved ejection fraction (HCC) (Chronic) ICD-10-CM: I50.30  ICD-9-CM: 428.9  1/19/2022 - Present Yes        Hepatic cirrhosis ([facility] (Chronic) ICD-10-CM: K74.60  ICD-9-CM: 571.5  10/8/2021 - Present Yes    Overview Signed 1/31/2022  4:07 PM by Nia Landa DO     Last Assessment & Plan: Formatting of this note might be different from the original.  Follow-up for edema and ascites  Avoid Tylenol  Check labs on Mondays                   Objective:     Patient Vitals for the past 24 hrs:   Temp Pulse Resp BP SpO2   04/06/22 1401  89 16  96 %   04/06/22 1400  85 18 112/61    04/06/22 1359  88 17  96 %   04/06/22 1358  95 19  96 %   04/06/22 1357  89 16  96 %   04/06/22 1356  83 17  96 %   04/06/22 1355  89 17  96 %   04/06/22 1354  88 17  95 %   04/06/22 1353  92 18  96 %   04/06/22 1352  87 19  96 %   04/06/22 1351  86 17  96 %   04/06/22 1350  87 18  96 %   04/06/22 1349  92 17  96 %   04/06/22 1348  89 17  96 %   04/06/22 1347  85 17  96 %   04/06/22 1345  87 15  96 %   04/06/22 1330  96 30  96 %   04/06/22 1315  88 17  96 %   04/06/22 1300  88 19 109/60 95 %   04/06/22 1245  93 25     04/06/22 1230  84 16  100 %   04/06/22 1215  87 17  100 %   04/06/22 1200 99 °F (37.2 °C) 90 18 (!) 110/58 100 %   04/06/22 1145  89 20  100 %   04/06/22 1130  84 16  100 %   04/06/22 1100  89 25 109/63 100 %   04/06/22 1045  84 16  100 %   04/06/22 1030  83 16  100 %   04/06/22 1015  78 15  100 %   04/06/22 1000  82 14 (!) 100/58 100 %   04/06/22 0945  85 20  98 %   04/06/22 0930  88 21  100 %   04/06/22 0915  88 16  96 %   04/06/22 0909     96 %   04/06/22 0900  88 21 (!) 110/58 94 %   04/06/22 0845  (!) 122 15  96 %   04/06/22 0830  89 19  94 %   04/06/22 0815  94 16  95 %   04/06/22 0800  85 12 (!) 104/56 95 %   04/06/22 0745  90 16  95 %   04/06/22 0730  83 13  95 %   04/06/22 0719 99.3 °F (37.4 °C) 89 20 (!) 89/54 95 %   04/06/22 0715  82 14  95 %   04/06/22 0700  84 13 (!) 89/54 96 %   04/06/22 0630  78 14  94 %   04/06/22 0600  90 15 (!) 99/48 95 %   04/06/22 0500  80 16 (!) 94/59 97 %   04/06/22 0415  91 24 108/68 95 %   04/06/22 0400  93 27 110/63 (!) 89 %   04/06/22 0300 99.9 °F (37.7 °C) 88 23 (!) 101/57 91 % 04/06/22 0200  94 (!) 33 98/63 93 %   04/06/22 0100  94 21 121/68 95 %   04/06/22 0000  84 17 111/71 96 %   04/05/22 2300 99.3 °F (37.4 °C) 82 22 107/69 98 %   04/05/22 2217  82 24  98 %   04/05/22 2200  86 18 97/64 96 %   04/05/22 2100  90 15 (!) 102/53 94 %   04/05/22 2005     92 %   04/05/22 1915 98.8 °F (37.1 °C) 100 20 91/60 91 %   04/05/22 1900  93 19 (!) 87/61 (!) 89 %   04/05/22 1800  99 20 97/61 95 %   04/05/22 1600  90 18 108/63 96 %     Oxygen Therapy  O2 Sat (%): 96 % (04/06/22 1401)  Pulse via Oximetry: 85 beats per minute (04/06/22 1401)  O2 Device: Nasal cannula (04/06/22 1200)  Skin Assessment: Clean, dry, & intact (04/06/22 1200)  Skin Protection for O2 Device: N/A (04/06/22 1200)  O2 Flow Rate (L/min): 2 l/min (04/06/22 1200)  ETCO2 (mmHg): 96 mmHg (04/05/22 1400)    Estimated body mass index is 27.89 kg/m² as calculated from the following:    Height as of this encounter: 5' 11\" (1.803 m). Weight as of this encounter: 90.7 kg (200 lb). Intake/Output Summary (Last 24 hours) at 4/6/2022 1534  Last data filed at 4/6/2022 0546  Gross per 24 hour   Intake 237 ml   Output 250 ml   Net -13 ml         Physical Exam:     Blood pressure 112/61, pulse 89, temperature 99 °F (37.2 °C), resp. rate 16, height 5' 11\" (1.803 m), weight 90.7 kg (200 lb), SpO2 96 %. General:    Elderly male, acutely ill and chronically ill-appearing,  Head:  Normocephalic, atraumatic  Eyes:  Sclerae appear normal.  Pupils equally round. ENT:  Nares appear normal, no drainage. Moist oral mucosa  Neck:  No restricted ROM. Trachea midline   CV:   RRR. Grade 3/6 systolic murmur heard in aortic area, no jugular venous distension. Lungs:   CTAB. No wheezing, rhonchi, or rales. Respirations even, unlabored  Abdomen: Bowel sounds present. Soft, nontender, nondistended. Extremities: No cyanosis or clubbing. 1- 2+ leg edema bilaterally  Skin:     No rashes and normal coloration. Warm and dry. Neuro:  CN II-XII grossly intact. Sensation intact. A&Ox3  Psych:  Normal mood and affect. I have reviewed ordered lab tests and independently visualized imaging below:    Recent Labs:  Recent Results (from the past 48 hour(s))   GLUCOSE, POC    Collection Time: 04/04/22  5:04 PM   Result Value Ref Range    Glucose (POC) 131 (H) 65 - 100 mg/dL    Performed by Zofia Herrmann    GLUCOSE, POC    Collection Time: 04/05/22  1:59 AM   Result Value Ref Range    Glucose (POC) 174 (H) 65 - 100 mg/dL    Performed by [address], BASIC    Collection Time: 04/05/22  3:24 AM   Result Value Ref Range    Sodium 138 136 - 145 mmol/L    Potassium 3.7 3.5 - 5.1 mmol/L    Chloride 109 (H) 98 - 107 mmol/L    CO2 24 21 - 32 mmol/L    Anion gap 5 (L) 7 - 16 mmol/L    Glucose 102 (H) 65 - 100 mg/dL    BUN 31 (H) 8 - 23 MG/DL    Creatinine 0.79 (L) 0.8 - 1.5 MG/DL    GFR est AA >60 >60 ml/min/1.73m2    GFR est non-AA >60 >60 ml/min/1.73m2    Calcium 7.9 (L) 8.3 - 10.4 MG/DL   CBC WITH AUTOMATED DIFF    Collection Time: 04/05/22  3:24 AM   Result Value Ref Range    WBC 6.5 4.3 - 11.1 K/uL    RBC 2.70 (L) 4.23 - 5.6 M/uL    HGB 7.3 (L) 13.6 - 17.2 g/dL    HCT 22.9 (L) 41.1 - 50.3 %    MCV 84.8 79.6 - 97.8 FL    MCH 27.0 26.1 - 32.9 PG    MCHC 31.9 31.4 - 35.0 g/dL    RDW 16.4 (H) 11.9 - 14.6 %    PLATELET 88 (L) 536 - 450 K/uL    MPV 8.5 (L) 9.4 - 12.3 FL    ABSOLUTE NRBC 0.00 0.0 - 0.2 K/uL    DF AUTOMATED      NEUTROPHILS 85 (H) 43 - 78 %    LYMPHOCYTES 5 (L) 13 - 44 %    MONOCYTES 9 4.0 - 12.0 %    EOSINOPHILS 0 (L) 0.5 - 7.8 %    BASOPHILS 0 0.0 - 2.0 %    IMMATURE GRANULOCYTES 1 0.0 - 5.0 %    ABS. NEUTROPHILS 5.5 1.7 - 8.2 K/UL    ABS. LYMPHOCYTES 0.4 (L) 0.5 - 4.6 K/UL    ABS. MONOCYTES 0.6 0.1 - 1.3 K/UL    ABS. EOSINOPHILS 0.0 0.0 - 0.8 K/UL    ABS. BASOPHILS 0.0 0.0 - 0.2 K/UL    ABS. IMM.  GRANS. 0.0 0.0 - 0.5 K/UL   VANCOMYCIN, RANDOM    Collection Time: 04/05/22  3:24 AM   Result Value Ref Range    Vancomycin, random 8.7 UG/ML   CULTURE, BLOOD    Collection Time: 04/05/22  3:24 AM    Specimen: Blood   Result Value Ref Range    Special Requests: RIGHT  ARM        GRAM STAIN GRAM POSITIVE COCCI      GRAM STAIN AEROBIC BOTTLE POSITIVE      GRAM STAIN       RESULTS VERIFIED,PHONED TO AND READ BACK BY TS TO [facility] VAMSHI RN AT 4/5/22 AT 2130    Culture result: CULTURE IN PROGRESS,FURTHER UPDATES TO FOLLOW      Culture result:        Refer to Blood Culture ID Panel Accession  [id number]     CULTURE, BLOOD    Collection Time: 04/05/22  3:24 AM    Specimen: Blood   Result Value Ref Range    Special Requests: RIGHT  HAND        GRAM STAIN GRAM POS COCCI IN CLUSTERS      GRAM STAIN AEROBIC AND ANAEROBIC BOTTLES      GRAM STAIN RESULTS VERIFIED, PHONED TO AND READ BACK BY      GRAM MITCH BONDS @2130 BY TS 4/5/22.      Culture result: CULTURE IN PROGRESS,FURTHER UPDATES TO FOLLOW     GLUCOSE, POC    Collection Time: 04/05/22  7:46 AM   Result Value Ref Range    Glucose (POC) 82 65 - 100 mg/dL    Performed by Umanzor (Davis)    GLUCOSE, POC    Collection Time: 04/05/22 11:22 AM   Result Value Ref Range    Glucose (POC) 107 (H) 65 - 100 mg/dL    Performed by Umanzor (Davis)    HGB & HCT    Collection Time: 04/05/22 11:55 AM   Result Value Ref Range    HGB 9.9 (L) 13.6 - 17.2 g/dL    HCT 30.0 (L) 41.1 - 50.3 %   GLUCOSE, POC    Collection Time: 04/05/22  3:58 PM   Result Value Ref Range    Glucose (POC) 116 (H) 65 - 100 mg/dL    [facility]    GLUCOSE, POC    Collection Time: 04/05/22  8:35 PM   Result Value Ref Range    Glucose (POC) 139 (H) 65 - 100 mg/dL    Performed by [address] [place] [address], POC    Collection Time: 04/05/22 11:16 PM   Result Value Ref Range    Glucose (POC) 80 65 - 100 mg/dL    Performed by Matthew    BLOOD CULTURE ID PANEL    Collection Time: 04/06/22  3:45 AM    Specimen: Blood   Result Value Ref Range    Acc. no. from Micro Order [id number] Staphylococcus Detected (A) NOTDET      Staphylococcus aureus Detected (A) NOTDET      mecA (Methicillin-Resistance Genes) Detected (A) NOTDET      INTERPRETATION        Gram positive cocci in clusters, identified by realtime PCR as SUSPECTED MRSA. GLUCOSE, POC    Collection Time: 04/06/22  4:04 AM   Result Value Ref Range    Glucose (POC) 71 65 - 100 mg/dL    Performed by [address] [place] [address], POC    Collection Time: 04/06/22  7:35 AM   Result Value Ref Range    Glucose (POC) 62 (L) 65 - 100 mg/dL    Performed by Angie Ortiz, RANDOM    Collection Time: 04/06/22  7:56 AM   Result Value Ref Range    Vancomycin, random 14.3 UG/ML   CBC WITH AUTOMATED DIFF    Collection Time: 04/06/22  7:56 AM   Result Value Ref Range    WBC 6.8 4.3 - 11.1 K/uL    RBC 2.93 (L) 4.23 - 5.6 M/uL    HGB 8.0 (L) 13.6 - 17.2 g/dL    HCT 24.6 (L) 41.1 - 50.3 %    MCV 84.0 79.6 - 97.8 FL    MCH 27.3 26.1 - 32.9 PG    MCHC 32.5 31.4 - 35.0 g/dL    RDW 16.3 (H) 11.9 - 14.6 %    PLATELET 66 (L) 412 - 450 K/uL    MPV 8.5 (L) 9.4 - 12.3 FL    ABSOLUTE NRBC 0.00 0.0 - 0.2 K/uL    NEUTROPHILS 73 43 - 78 %    LYMPHOCYTES 10 (L) 13 - 44 %    MONOCYTES 14 (H) 4.0 - 12.0 %    EOSINOPHILS 3 0.5 - 7.8 %    BASOPHILS 0 0.0 - 2.0 %    IMMATURE GRANULOCYTES 0 0.0 - 5.0 %    ABS. NEUTROPHILS 4.9 1.7 - 8.2 K/UL    ABS. LYMPHOCYTES 0.7 0.5 - 4.6 K/UL    ABS. MONOCYTES 1.0 0.1 - 1.3 K/UL    ABS. EOSINOPHILS 0.2 0.0 - 0.8 K/UL    ABS. BASOPHILS 0.0 0.0 - 0.2 K/UL    ABS. IMM.  GRANS. 0.0 0.0 - 0.5 K/UL    RBC COMMENTS MODERATE  HYPOCHROMIA        WBC COMMENTS Result Confirmed By Smear      PLATELET COMMENTS DECREASED      DF MANUAL     METABOLIC PANEL, BASIC    Collection Time: 04/06/22  7:56 AM   Result Value Ref Range    Sodium 140 136 - 145 mmol/L    Potassium 4.0 3.5 - 5.1 mmol/L    Chloride 110 (H) 98 - 107 mmol/L    CO2 26 21 - 32 mmol/L    Anion gap 4 (L) 7 - 16 mmol/L    Glucose 65 65 - 100 mg/dL    BUN 32 (H) 8 - 23 MG/DL Creatinine 0.71 (L) 0.8 - 1.5 MG/DL    GFR est AA >60 >60 ml/min/1.73m2    GFR est non-AA >60 >60 ml/min/1.73m2    Calcium 8.2 (L) 8.3 - 10.4 MG/DL   GLUCOSE, POC    Collection Time: 04/06/22  8:55 AM   Result Value Ref Range    Glucose (POC) 76 65 - 100 mg/dL    Performed by [employer] Shaper    GLUCOSE, POC    Collection Time: 04/06/22 11:45 AM   Result Value Ref Range    Glucose (POC) 99 65 - 100 mg/dL    Performed by [employer] Shaper        All Micro Results     Procedure Component Value Units Date/Time    CULTURE, BLOOD [[id number]] Collected: 04/05/22 0324    Order Status: Completed Specimen: Blood Updated: 04/06/22 1125     Special Requests: --        RIGHT  HAND       GRAM STAIN       GRAM POS COCCI IN CLUSTERS                  AEROBIC AND ANAEROBIC BOTTLES                  RESULTS VERIFIED, PHONED TO AND READ BACK BY            HOANG BONDS @2130 BY  4/5/22.      Culture result:       CULTURE IN PROGRESS,FURTHER UPDATES TO FOLLOW          CULTURE, BLOOD [[id number]]  (Abnormal) Collected: 04/03/22 1608    Order Status: Completed Specimen: Blood Updated: 04/06/22 0654     Special Requests: --        RIGHT  Antecubital       GRAM STAIN GRAM POSITIVE COCCI               AEROBIC AND ANAEROBIC BOTTLES                  RESULTS VERIFIED, PHONED TO AND READ BACK BY Shona Shipman (R) RN BY [address]  [address] [place] [id number]           Culture result: STAPHYLOCOCCUS AUREUS               For Susceptibility Refer to Culture  [id number]      CULTURE, BLOOD [[id number]]  (Abnormal)  (Susceptibility) Collected: 04/03/22 1608    Order Status: Completed Specimen: Blood Updated: 04/06/22 0651     Special Requests: --        LEFT  Antecubital       GRAM STAIN GRAM POSITIVE COCCI               AEROBIC AND ANAEROBIC BOTTLES                  RESULTS VERIFIED, PHONED TO AND READ BACK BY Shona Shipman (R) RN BY NB AT 9980 ON [id number]                  RESULTS VERIFIED, PHONED TO AND READ BACK BY LEANDER ESCOBEDO AT 8920 PN 4/4/22, [address]           Culture result: * Methicillin Resistant Staphylococcus aureus *                  Refer to Blood Culture ID Panel Accession  [id number]              MRSA CALLED TO AND CORRECTLY REPEATED BY:  [facility] WALL AT 0649 ON 4/6/22, KYLER      BLOOD CULTURE ID PANEL [[id number]]  (Abnormal) Collected: 04/06/22 0345    Order Status: Completed Specimen: Blood Updated: 04/06/22 0508     Acc. no. from Micro Order [id number]     Staphylococcus Detected        Staphylococcus aureus Detected        Comment: RESULTS VERIFIED, PHONED TO AND READ BACK BY  Jake Pimentel, RN @5818 4.6.22 SC          mecA (Methicillin-Resistance Genes) Detected        Comment: Presence of mecA is highly indicative of methicillin resistance. The test does not replace traditional culture and susceptibilities        INTERPRETATION       Gram positive cocci in clusters, identified by realtime PCR as SUSPECTED MRSA. Comment: Recommend IV vancomycin use, unlikely to be a contaminant. Infectious Diseases Consult recommended in adult patients. THIS TEST DOES NOT REPLACE SENSITIVITY TESTING.        CULTURE, BLOOD [[id number]] Collected: 04/05/22 0324    Order Status: Completed Specimen: Blood Updated: 04/06/22 0352     Special Requests: --        RIGHT  ARM       GRAM STAIN GRAM POSITIVE COCCI         AEROBIC BOTTLE POSITIVE         RESULTS VERIFIED,PHONED TO AND READ BACK BY TS TO [address] RN AT 4/5/22 AT 2130     Culture result:       CULTURE IN [facility] UPDATES TO FOLLOW                  Refer to Blood Culture ID Panel Accession  [id number]      BLOOD CULTURE ID PANEL [[id number]]  (Abnormal) Collected: 04/03/22 1608    Order Status: Completed Specimen: Blood Updated: 04/04/22 1132     Acc. no. from Micro Order [id number]     Staphylococcus Detected        Staphylococcus aureus Detected        Comment: RESULTS VERIFIED, PHONED TO AND READ BACK BY  LEANDER ESCOBEDO AT 8507 ON 4/4/22, KYLER          mecA (Methicillin-Resistance Genes) Detected        Comment: Presence of mecA is highly indicative of methicillin resistance. The test does not replace traditional culture and susceptibilities        INTERPRETATION       Gram positive cocci in clusters, identified by realtime PCR as SUSPECTED MRSA. Comment: Recommend IV vancomycin use, unlikely to be a contaminant. Infectious Diseases Consult recommended in adult patients. THIS TEST DOES NOT REPLACE SENSITIVITY TESTING. Other Studies:  No results found.     Current Meds:  Current Facility-Administered Medications   Medication Dose Route Frequency    dextrose 5% and 0.9% NaCl infusion  75 mL/hr IntraVENous CONTINUOUS    tuberculin injection 5 Units  5 Units IntraDERMal ONCE    gadoteridoL (PROHANCE) 279.3 mg/mL contrast solution 18 mL  18 mL IntraVENous ONCE    saline peripheral flush soln 10 mL  10 mL InterCATHeter RAD ONCE    midodrine (PROAMATINE) tablet 5 mg  5 mg Oral TID WITH MEALS    mirtazapine (REMERON) tablet 7.5 mg  7.5 mg Oral QHS    vancomycin (VANCOCIN) 1,000 mg in 0.9% sodium chloride 250 mL (Uree1Twh)  1,000 mg IntraVENous Q12H    glucose chewable tablet 16 g  16 g Oral PRN    glucagon (GLUCAGEN) injection 1 mg  1 mg IntraMUSCular PRN    dextrose 10% infusion 125-250 mL  125-250 mL IntraVENous PRN    multivitamin, tx-iron-ca-min (THERA-M w/ IRON) tablet 1 Tablet  1 Tablet Oral DAILY    [Held by provider] bumetanide (BUMEX) tablet 1 mg  1 mg Oral BID    fenofibrate (LOFIBRA) tablet 160 mg  160 mg Oral DAILY    folic acid (FOLVITE) tablet 1 mg  1 mg Oral DAILY    montelukast (SINGULAIR) tablet 10 mg  10 mg Oral DAILY    pantoprazole (PROTONIX) tablet 40 mg  40 mg Oral 6am    rOPINIRole (REQUIP) tablet 1 mg  1 mg Oral QAM    rOPINIRole (REQUIP) tablet 2 mg  2 mg Oral QHS    atorvastatin (LIPITOR) tablet 40 mg  40 mg Oral QHS    traZODone (DESYREL) tablet 50 mg  50 mg Oral QHS PRN    sodium chloride (NS) flush 5-40 mL  5-40 mL IntraVENous Q8H    sodium chloride (NS) flush 5-40 mL 5-40 mL IntraVENous PRN    acetaminophen (TYLENOL) tablet 650 mg  650 mg Oral Q6H PRN    Or    acetaminophen (TYLENOL) suppository 650 mg  650 mg Rectal Q6H PRN    polyethylene glycol (MIRALAX) packet 17 g  17 g Oral DAILY    promethazine (PHENERGAN) tablet 12.5 mg  12.5 mg Oral Q6H PRN    Or    ondansetron (ZOFRAN) injection 4 mg  4 mg IntraVENous Q6H PRN    [Held by provider] enoxaparin (LOVENOX) injection 40 mg  40 mg SubCUTAneous DAILY    oxyCODONE IR (ROXICODONE) tablet 5 mg  5 mg Oral Q4H PRN    morphine injection 4 mg  4 mg IntraVENous Q4H PRN       Signed:  Magdalena Gupta MD    Part of this note may have been written by using a voice dictation software. The note has been proof read but may still contain some grammatical/other typographical errors. No

## (undated) DEVICE — SUTURE PERMAHAND SZ 3-0 L18IN NONABSORBABLE BLK SILK BRAID A184H

## (undated) DEVICE — GDWIRE 3CM FLX-TIP 0.038X150CM -- BX/5 SENSOR

## (undated) DEVICE — SOLUTION IV STRL H2O 500 ML AQUALITE POUR BTL

## (undated) DEVICE — CYSTO/BLADDER IRRIGATION SET, REGULATING CLAMP

## (undated) DEVICE — DRAPE TWL SURG 16X26IN BLU ORB04] ALLCARE INC]

## (undated) DEVICE — 3M™ TEGADERM™ TRANSPARENT FILM DRESSING FRAME STYLE, 1624W, 2-3/8 IN X 2-3/4 IN (6 CM X 7 CM), 100/CT 4CT/CASE: Brand: 3M™ TEGADERM™

## (undated) DEVICE — EYE SPEAR / FINE DISSECTOR: Brand: DEROYAL

## (undated) DEVICE — KENDALL SCD EXPRESS SLEEVES, KNEE LENGTH, MEDIUM: Brand: KENDALL SCD

## (undated) DEVICE — CONSTELLATION VISION SYSTEM 5000 CPM ULTRAVIT PROBE STRAIGHT ENDOILLUMINATOR 25+ TOTALPLUS VITRECTOMY PAK EDGEPLUS BLADE VALVED ENTRY SYSTEM: Brand: CONSTELLATION, ULTRAVIT, 25+, TOTALPLUS, EDGEPLUS

## (undated) DEVICE — SYR 10ML LUER LOK 1/5ML GRAD --

## (undated) DEVICE — COVER,MAYO STAND,STERILE: Brand: MEDLINE

## (undated) DEVICE — CATHETER URETH 22FR BLLN 5CC STD LTX 2 W TWO OPP DRNGE EYE

## (undated) DEVICE — DIATHERMY PROBE DSP, 25G: Brand: ALCON GRIESHABER

## (undated) DEVICE — Z DISCONTINUED NO SUB IDED SHIELD EYE PLAS RT BGE M 7.5CMX5.7CM VISITEC

## (undated) DEVICE — SUTURE MERSLEN 5-0 18IN RD-1 746G

## (undated) DEVICE — 3M™ STERI-DRAPE™ INSTRUMENT POUCH 1018: Brand: STERI-DRAPE™

## (undated) DEVICE — SUTURE VCRL SZ 7-0 L18IN ABSRB VLT L6.5MM TG140-8 3/8 CIR J546G

## (undated) DEVICE — SOLUTION IRRIGATION BAL SALT SOLUTION 500 ML BTL 6/CA BSS +

## (undated) DEVICE — NEEDLE HYPO 27GA L1.25IN GRY POLYPR HUB S STL REG BVL STR

## (undated) DEVICE — STRIP,CLOSURE,WOUND,MEDI-STRIP,1/2X4: Brand: MEDLINE

## (undated) DEVICE — 3M™ STERI-DRAPE™ MEDIUM DRAPE WITH INCISE FILM AND POUCH 1061: Brand: STERI-DRAPE™

## (undated) DEVICE — PAD,EYE,1-5/8X2 5/8,STERILE,LF,1/PK: Brand: MEDLINE

## (undated) DEVICE — Device: Brand: MILLEX-OR

## (undated) DEVICE — BETADINE 5% EYE SOL

## (undated) DEVICE — AMPLATZ TYPE GRADUATED RENAL DILATATION SET

## (undated) DEVICE — REM POLYHESIVE ADULT PATIENT RETURN ELECTRODE: Brand: VALLEYLAB

## (undated) DEVICE — PROBE BPLR 18GA 45DEG ANG BLNT TIP HEMSTAT ERAS WET-FIELD

## (undated) DEVICE — ADVANCED DSP BACKFLUSH BLUNT, 25G: Brand: ALCON GRIESHABER

## (undated) DEVICE — CYSTO: Brand: MEDLINE INDUSTRIES, INC.

## (undated) DEVICE — SOLUTION IRRIGATION BAL SALT SOLUTION 500 ML STRL BSS

## (undated) DEVICE — SOLUTION IRRIG 1000ML H2O STRL BLT

## (undated) DEVICE — SYR 50ML LR LCK 1ML GRAD NSAF --

## (undated) DEVICE — SUTURE ABSORBABLE MONOFILAMENT 6-0 G-1 18 IN PLN GUT 770G

## (undated) DEVICE — 3M™ TEGADERM™ TRANSPARENT FILM DRESSING FRAME STYLE, 1626W, 4 IN X 4-3/4 IN (10 CM X 12 CM), 50/CT 4CT/CASE: Brand: 3M™ TEGADERM™

## (undated) DEVICE — BIPOLAR FORCEPS CORD: Brand: VALLEYLAB

## (undated) DEVICE — IRRIGATION KT OPHTH 80IN --

## (undated) DEVICE — BAG DRNGE 4000ML CONT IRRIG ROUNDED TEARDROP SHP DISP

## (undated) DEVICE — CARDINAL HEALTH FLEXIBLE LIGHT HANDLE COVER: Brand: CARDINAL HEALTH

## (undated) DEVICE — 3M™ STERI-STRIP™ REINFORCED ADHESIVE SKIN CLOSURES, R1547, 1/2 IN X 4 IN (12 MM X 100 MM), 6 STRIPS/ENVELOPE: Brand: 3M™ STERI-STRIP™

## (undated) DEVICE — SURGICAL PROCEDURE PACK EYE CDS

## (undated) DEVICE — NEEDLE HYPO 25GA L5/8IN ORNG HUB S STL LATCH BVL UP

## (undated) DEVICE — SOLUTION IRRIG 3000ML H2O STRL BAG

## (undated) DEVICE — TRAY PREP DRY W/ PREM GLV 2 APPL 6 SPNG 2 UNDPD 1 OVERWRAP

## (undated) DEVICE — CATH URETH FOL 2W MED 22FRX5 --

## (undated) DEVICE — SYR 5ML 1/5 GRAD LL NSAF LF --